# Patient Record
Sex: MALE | Race: WHITE | NOT HISPANIC OR LATINO | Employment: OTHER | URBAN - NONMETROPOLITAN AREA
[De-identification: names, ages, dates, MRNs, and addresses within clinical notes are randomized per-mention and may not be internally consistent; named-entity substitution may affect disease eponyms.]

---

## 2017-02-28 ENCOUNTER — HOSPITAL ENCOUNTER (OUTPATIENT)
Dept: RADIOLOGY | Facility: OTHER | Age: 67
End: 2017-02-28
Attending: FAMILY MEDICINE

## 2017-02-28 ENCOUNTER — HISTORY (OUTPATIENT)
Dept: FAMILY MEDICINE | Facility: OTHER | Age: 67
End: 2017-02-28

## 2017-02-28 ENCOUNTER — OFFICE VISIT - GICH (OUTPATIENT)
Dept: FAMILY MEDICINE | Facility: OTHER | Age: 67
End: 2017-02-28

## 2017-02-28 DIAGNOSIS — M75.101 RIGHT ROTATOR CUFF TEAR: ICD-10-CM

## 2017-03-06 ENCOUNTER — AMBULATORY - GICH (OUTPATIENT)
Dept: FAMILY MEDICINE | Facility: OTHER | Age: 67
End: 2017-03-06

## 2017-03-06 ENCOUNTER — COMMUNICATION - GICH (OUTPATIENT)
Dept: FAMILY MEDICINE | Facility: OTHER | Age: 67
End: 2017-03-06

## 2017-03-06 DIAGNOSIS — M75.101 RIGHT ROTATOR CUFF TEAR: ICD-10-CM

## 2017-03-06 DIAGNOSIS — S46.811D STRAIN OF OTHER MUSCLES, FASCIA AND TENDONS AT SHOULDER AND UPPER ARM LEVEL, RIGHT ARM, SUBSEQUENT ENCOUNTER: ICD-10-CM

## 2017-03-23 ENCOUNTER — HISTORY (OUTPATIENT)
Dept: ORTHOPEDICS | Facility: OTHER | Age: 67
End: 2017-03-23

## 2017-03-23 ENCOUNTER — AMBULATORY - GICH (OUTPATIENT)
Dept: ORTHOPEDICS | Facility: OTHER | Age: 67
End: 2017-03-23

## 2017-03-27 ENCOUNTER — OFFICE VISIT - GICH (OUTPATIENT)
Dept: ORTHOPEDICS | Facility: OTHER | Age: 67
End: 2017-03-27

## 2017-03-27 ENCOUNTER — HISTORY (OUTPATIENT)
Dept: ORTHOPEDICS | Facility: OTHER | Age: 67
End: 2017-03-27

## 2017-03-27 ENCOUNTER — COMMUNICATION - GICH (OUTPATIENT)
Dept: FAMILY MEDICINE | Facility: OTHER | Age: 67
End: 2017-03-27

## 2017-03-27 DIAGNOSIS — G47.33 OBSTRUCTIVE SLEEP APNEA: ICD-10-CM

## 2017-03-27 DIAGNOSIS — M75.121 COMPLETE TEAR OF RIGHT ROTATOR CUFF: ICD-10-CM

## 2017-03-27 DIAGNOSIS — S46.811D STRAIN OF OTHER MUSCLES, FASCIA AND TENDONS AT SHOULDER AND UPPER ARM LEVEL, RIGHT ARM, SUBSEQUENT ENCOUNTER: ICD-10-CM

## 2017-03-27 DIAGNOSIS — M75.41 IMPINGEMENT SYNDROME OF RIGHT SHOULDER: ICD-10-CM

## 2017-03-27 DIAGNOSIS — M19.019 PRIMARY OSTEOARTHRITIS OF SHOULDER: ICD-10-CM

## 2017-03-28 ENCOUNTER — COMMUNICATION - GICH (OUTPATIENT)
Dept: FAMILY MEDICINE | Facility: OTHER | Age: 67
End: 2017-03-28

## 2017-03-28 DIAGNOSIS — M75.121 COMPLETE TEAR OF RIGHT ROTATOR CUFF: ICD-10-CM

## 2017-03-29 ENCOUNTER — HISTORY (OUTPATIENT)
Dept: FAMILY MEDICINE | Facility: OTHER | Age: 67
End: 2017-03-29

## 2017-03-29 ENCOUNTER — OFFICE VISIT - GICH (OUTPATIENT)
Dept: FAMILY MEDICINE | Facility: OTHER | Age: 67
End: 2017-03-29

## 2017-03-29 DIAGNOSIS — M75.121 COMPLETE TEAR OF RIGHT ROTATOR CUFF: ICD-10-CM

## 2017-04-10 ENCOUNTER — OFFICE VISIT - GICH (OUTPATIENT)
Dept: FAMILY MEDICINE | Facility: OTHER | Age: 67
End: 2017-04-10

## 2017-04-10 ENCOUNTER — HISTORY (OUTPATIENT)
Dept: FAMILY MEDICINE | Facility: OTHER | Age: 67
End: 2017-04-10

## 2017-04-10 ENCOUNTER — HISTORY (OUTPATIENT)
Dept: SURGERY | Facility: OTHER | Age: 67
End: 2017-04-10

## 2017-04-10 DIAGNOSIS — G47.33 OBSTRUCTIVE SLEEP APNEA: ICD-10-CM

## 2017-04-11 ENCOUNTER — AMBULATORY - GICH (OUTPATIENT)
Dept: FAMILY MEDICINE | Facility: OTHER | Age: 67
End: 2017-04-11

## 2017-04-11 DIAGNOSIS — G47.33 OBSTRUCTIVE SLEEP APNEA: ICD-10-CM

## 2017-04-19 ENCOUNTER — SURGERY (OUTPATIENT)
Dept: SURGERY | Facility: OTHER | Age: 67
End: 2017-04-19

## 2017-04-19 ENCOUNTER — HOSPITAL ENCOUNTER (OUTPATIENT)
Dept: SURGERY | Facility: OTHER | Age: 67
Discharge: HOME | End: 2017-04-19
Attending: ORTHOPAEDIC SURGERY | Admitting: ORTHOPAEDIC SURGERY

## 2017-04-19 ENCOUNTER — HISTORY (OUTPATIENT)
Dept: SURGERY | Facility: OTHER | Age: 67
End: 2017-04-19

## 2017-04-19 DIAGNOSIS — Z98.890 OTHER SPECIFIED POSTPROCEDURAL STATES: ICD-10-CM

## 2017-04-21 ENCOUNTER — COMMUNICATION - GICH (OUTPATIENT)
Dept: ORTHOPEDICS | Facility: OTHER | Age: 67
End: 2017-04-21

## 2017-04-21 ENCOUNTER — AMBULATORY - GICH (OUTPATIENT)
Dept: ORTHOPEDICS | Facility: OTHER | Age: 67
End: 2017-04-21

## 2017-04-21 DIAGNOSIS — Z98.890 OTHER SPECIFIED POSTPROCEDURAL STATES: ICD-10-CM

## 2017-04-21 DIAGNOSIS — M75.121 COMPLETE TEAR OF RIGHT ROTATOR CUFF: ICD-10-CM

## 2017-04-24 ENCOUNTER — HISTORY (OUTPATIENT)
Dept: ORTHOPEDICS | Facility: OTHER | Age: 67
End: 2017-04-24

## 2017-04-24 ENCOUNTER — OFFICE VISIT - GICH (OUTPATIENT)
Dept: ORTHOPEDICS | Facility: OTHER | Age: 67
End: 2017-04-24

## 2017-04-24 DIAGNOSIS — Z98.890 OTHER SPECIFIED POSTPROCEDURAL STATES: ICD-10-CM

## 2017-05-03 ENCOUNTER — HOSPITAL ENCOUNTER (OUTPATIENT)
Dept: PHYSICAL THERAPY | Facility: OTHER | Age: 67
Setting detail: THERAPIES SERIES
End: 2017-05-03
Attending: ORTHOPAEDIC SURGERY

## 2017-05-03 DIAGNOSIS — Z98.890 OTHER SPECIFIED POSTPROCEDURAL STATES: ICD-10-CM

## 2017-05-10 ENCOUNTER — HOSPITAL ENCOUNTER (OUTPATIENT)
Dept: PHYSICAL THERAPY | Facility: OTHER | Age: 67
Setting detail: THERAPIES SERIES
End: 2017-05-10
Attending: ORTHOPAEDIC SURGERY

## 2017-05-17 ENCOUNTER — HOSPITAL ENCOUNTER (OUTPATIENT)
Dept: PHYSICAL THERAPY | Facility: OTHER | Age: 67
Setting detail: THERAPIES SERIES
End: 2017-05-17
Attending: ORTHOPAEDIC SURGERY

## 2017-05-31 ENCOUNTER — HOSPITAL ENCOUNTER (OUTPATIENT)
Dept: PHYSICAL THERAPY | Facility: OTHER | Age: 67
Setting detail: THERAPIES SERIES
End: 2017-05-31
Attending: ORTHOPAEDIC SURGERY

## 2017-06-01 ENCOUNTER — HOSPITAL ENCOUNTER (OUTPATIENT)
Dept: PHYSICAL THERAPY | Facility: OTHER | Age: 67
Setting detail: THERAPIES SERIES
End: 2017-06-01
Attending: ORTHOPAEDIC SURGERY

## 2017-06-05 ENCOUNTER — HISTORY (OUTPATIENT)
Dept: ORTHOPEDICS | Facility: OTHER | Age: 67
End: 2017-06-05

## 2017-06-05 ENCOUNTER — OFFICE VISIT - GICH (OUTPATIENT)
Dept: ORTHOPEDICS | Facility: OTHER | Age: 67
End: 2017-06-05

## 2017-06-05 DIAGNOSIS — Z98.890 OTHER SPECIFIED POSTPROCEDURAL STATES: ICD-10-CM

## 2017-06-06 ENCOUNTER — HOSPITAL ENCOUNTER (OUTPATIENT)
Dept: PHYSICAL THERAPY | Facility: OTHER | Age: 67
Setting detail: THERAPIES SERIES
End: 2017-06-06
Attending: ORTHOPAEDIC SURGERY

## 2017-06-08 ENCOUNTER — HOSPITAL ENCOUNTER (OUTPATIENT)
Dept: PHYSICAL THERAPY | Facility: OTHER | Age: 67
Setting detail: THERAPIES SERIES
End: 2017-06-08
Attending: ORTHOPAEDIC SURGERY

## 2017-06-13 ENCOUNTER — HOSPITAL ENCOUNTER (OUTPATIENT)
Dept: PHYSICAL THERAPY | Facility: OTHER | Age: 67
Setting detail: THERAPIES SERIES
End: 2017-06-13
Attending: ORTHOPAEDIC SURGERY

## 2017-06-15 ENCOUNTER — HOSPITAL ENCOUNTER (OUTPATIENT)
Dept: PHYSICAL THERAPY | Facility: OTHER | Age: 67
Setting detail: THERAPIES SERIES
End: 2017-06-15
Attending: ORTHOPAEDIC SURGERY

## 2017-06-16 ENCOUNTER — OFFICE VISIT - GICH (OUTPATIENT)
Dept: FAMILY MEDICINE | Facility: OTHER | Age: 67
End: 2017-06-16

## 2017-06-16 ENCOUNTER — HISTORY (OUTPATIENT)
Dept: FAMILY MEDICINE | Facility: OTHER | Age: 67
End: 2017-06-16
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
## 2017-06-16 DIAGNOSIS — L03.90 CELLULITIS: ICD-10-CM

## 2017-06-16 LAB
ABSOLUTE BASOPHILS - HISTORICAL: 0.1 THOU/CU MM
ABSOLUTE EOSINOPHILS - HISTORICAL: 0.2 THOU/CU MM
ABSOLUTE IMMATURE GRANULOCYTES(METAS,MYELOS,PROS) - HISTORICAL: 0 THOU/CU MM
ABSOLUTE LYMPHOCYTES - HISTORICAL: 2.4 THOU/CU MM (ref 0.9–2.9)
ABSOLUTE MONOCYTES - HISTORICAL: 0.6 THOU/CU MM
ABSOLUTE NEUTROPHILS - HISTORICAL: 3.5 THOU/CU MM (ref 1.7–7)
BASOPHILS # BLD AUTO: 0.9 %
EOSINOPHIL NFR BLD AUTO: 2.7 %
ERYTHROCYTE [DISTWIDTH] IN BLOOD BY AUTOMATED COUNT: 12.7 % (ref 11.5–15.5)
HCT VFR BLD AUTO: 44.2 % (ref 37–53)
HEMOGLOBIN: 15.3 G/DL (ref 13.5–17.5)
IMMATURE GRANULOCYTES(METAS,MYELOS,PROS) - HISTORICAL: 0.1 %
LYMPHOCYTES NFR BLD AUTO: 35 % (ref 20–44)
MCH RBC QN AUTO: 32.6 PG (ref 26–34)
MCHC RBC AUTO-ENTMCNC: 34.6 G/DL (ref 32–36)
MCV RBC AUTO: 94 FL (ref 80–100)
MONOCYTES NFR BLD AUTO: 8.9 %
NEUTROPHILS NFR BLD AUTO: 52.4 % (ref 42–72)
PLATELET # BLD AUTO: 216 THOU/CU MM (ref 140–440)
PMV BLD: 9.9 FL (ref 6.5–11)
RED BLOOD COUNT - HISTORICAL: 4.69 MIL/CU MM (ref 4.3–5.9)
WHITE BLOOD COUNT - HISTORICAL: 6.7 THOU/CU MM (ref 4.5–11)

## 2017-06-20 ENCOUNTER — HOSPITAL ENCOUNTER (OUTPATIENT)
Dept: PHYSICAL THERAPY | Facility: OTHER | Age: 67
Setting detail: THERAPIES SERIES
End: 2017-06-20
Attending: ORTHOPAEDIC SURGERY

## 2017-06-22 ENCOUNTER — HOSPITAL ENCOUNTER (OUTPATIENT)
Dept: PHYSICAL THERAPY | Facility: OTHER | Age: 67
Setting detail: THERAPIES SERIES
End: 2017-06-22
Attending: ORTHOPAEDIC SURGERY

## 2017-06-22 LAB — CULTURE - HISTORICAL: NORMAL

## 2017-06-27 ENCOUNTER — HOSPITAL ENCOUNTER (OUTPATIENT)
Dept: PHYSICAL THERAPY | Facility: OTHER | Age: 67
Setting detail: THERAPIES SERIES
End: 2017-06-27
Attending: ORTHOPAEDIC SURGERY

## 2017-06-28 ENCOUNTER — AMBULATORY - GICH (OUTPATIENT)
Dept: SCHEDULING | Facility: OTHER | Age: 67
End: 2017-06-28

## 2017-06-29 ENCOUNTER — HOSPITAL ENCOUNTER (OUTPATIENT)
Dept: PHYSICAL THERAPY | Facility: OTHER | Age: 67
Setting detail: THERAPIES SERIES
End: 2017-06-29
Attending: ORTHOPAEDIC SURGERY

## 2017-07-13 ENCOUNTER — HOSPITAL ENCOUNTER (OUTPATIENT)
Dept: PHYSICAL THERAPY | Facility: OTHER | Age: 67
Setting detail: THERAPIES SERIES
End: 2017-07-13
Attending: ORTHOPAEDIC SURGERY

## 2017-07-20 ENCOUNTER — HOSPITAL ENCOUNTER (OUTPATIENT)
Dept: PHYSICAL THERAPY | Facility: OTHER | Age: 67
Setting detail: THERAPIES SERIES
End: 2017-07-20
Attending: ORTHOPAEDIC SURGERY

## 2017-07-24 ENCOUNTER — OFFICE VISIT - GICH (OUTPATIENT)
Dept: ORTHOPEDICS | Facility: OTHER | Age: 67
End: 2017-07-24

## 2017-07-24 ENCOUNTER — HISTORY (OUTPATIENT)
Dept: ORTHOPEDICS | Facility: OTHER | Age: 67
End: 2017-07-24

## 2017-07-24 DIAGNOSIS — Z98.890 OTHER SPECIFIED POSTPROCEDURAL STATES: ICD-10-CM

## 2017-07-24 DIAGNOSIS — M65.311 TRIGGER THUMB OF RIGHT HAND: ICD-10-CM

## 2017-07-27 ENCOUNTER — HOSPITAL ENCOUNTER (OUTPATIENT)
Dept: PHYSICAL THERAPY | Facility: OTHER | Age: 67
Setting detail: THERAPIES SERIES
End: 2017-07-27
Attending: ORTHOPAEDIC SURGERY

## 2017-08-02 ENCOUNTER — OFFICE VISIT - GICH (OUTPATIENT)
Dept: FAMILY MEDICINE | Facility: OTHER | Age: 67
End: 2017-08-02

## 2017-08-02 ENCOUNTER — HISTORY (OUTPATIENT)
Dept: FAMILY MEDICINE | Facility: OTHER | Age: 67
End: 2017-08-02

## 2017-08-02 DIAGNOSIS — M65.311 TRIGGER THUMB OF RIGHT HAND: ICD-10-CM

## 2017-08-02 DIAGNOSIS — R09.82 POSTNASAL DRIP: ICD-10-CM

## 2017-08-17 ENCOUNTER — COMMUNICATION - GICH (OUTPATIENT)
Dept: FAMILY MEDICINE | Facility: OTHER | Age: 67
End: 2017-08-17

## 2017-08-17 DIAGNOSIS — N40.0 ENLARGED PROSTATE WITHOUT LOWER URINARY TRACT SYMPTOMS (LUTS): ICD-10-CM

## 2017-09-21 ENCOUNTER — AMBULATORY - GICH (OUTPATIENT)
Dept: ORTHOPEDICS | Facility: OTHER | Age: 67
End: 2017-09-21

## 2017-09-21 DIAGNOSIS — M25.511 PAIN IN RIGHT SHOULDER: ICD-10-CM

## 2017-09-22 ENCOUNTER — HOSPITAL ENCOUNTER (OUTPATIENT)
Dept: RADIOLOGY | Facility: OTHER | Age: 67
End: 2017-09-22
Attending: ORTHOPAEDIC SURGERY

## 2017-09-22 ENCOUNTER — OFFICE VISIT - GICH (OUTPATIENT)
Dept: ORTHOPEDICS | Facility: OTHER | Age: 67
End: 2017-09-22

## 2017-09-22 DIAGNOSIS — M25.511 PAIN IN RIGHT SHOULDER: ICD-10-CM

## 2017-09-22 DIAGNOSIS — M65.311 TRIGGER THUMB OF RIGHT HAND: ICD-10-CM

## 2017-09-22 DIAGNOSIS — Z98.890 OTHER SPECIFIED POSTPROCEDURAL STATES: ICD-10-CM

## 2017-12-27 NOTE — PROGRESS NOTES
Patient Information     Patient Name MRN Sex Kaushik Christensen [id number] Male 1950      Progress Notes by Forest Jennings DO at 2017  8:30 AM     Author:  Forest Jennings DO Service:  (none) Author Type:  Physician     Filed:  2017  8:53 AM Encounter Date:  2017 Status:  Signed     :  Forest Jennings DO (Physician)            PROGRESS NOTE    SUBJECTIVE:  Kaushik Ansari is here for evaluation in regards to his right shoulder. He is now 13 weeks and 5 days since shoulder surgery. He has expected discomfort into the biceps as well as some of his stretches as he is now working on his strengthening. He did have a new complaint about his right thumb and start having some clicking into the thumb. It appears that he does have a right trigger thumb. This came about after doing some of his exercises where he wrap the rope around his thumb. He is not utilizing medications of present time. I encouraged him to utilize an anti-inflammatory if he can. No other complaints.    OBJECTIVE:  /82  Pulse 88  Wt 106.6 kg (235 lb)  BMI 29.97 kg/m2 Body mass index is 29.97 kg/(m^2).    General Appearance: Pleasant male in good appearance, mood and affect.  Alert and orientated times three ( time, date and location).    Skin: Well-healed incision sites.    Shoulder:  Strength: Expected weakness.  Motion:  When testing his motion in the standing position I can get him to approximately 170   with him wanting to utilize his scapula for part of the motion. There is some tightness to his capsule, this has improved. Abduction he is still about 10  short actively and passively only 5 .    Elbow:  Flexion: Normal  Extension: Normal  Deep tendon reflexes: Normal    Hand:  Sensation: Normal  Radial and ulnar blood flow:  Normal.  Patient does have a palpable nodule and clicking of the right thumb.    Eyes: Pupils are round.    Ears: Hearing: Intact    Heart: Good cap refill into  his hands.    Lungs: Coarse breath sounds.     Physical therapy: Notes show that he has 145  active forward flexion.    Notes where reviewed with the patient.  Please refer to the reports for full details.    ASSESSMENT:    Right trigger thumb.    POSTOPERATIVE DIAGNOSES  1. Large chronic right rotator cuff tear to include supraspinatus and infraspinatus.   2. Long head of the biceps tearing.   3. Labral tearing.   4. 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   5. Impingement syndrome.  6. AC arthritis.     PROCEDURES  1. Right shoulder arthroscopy with extensive debridement to include 4% of the anterior to posterior labrum (DOS 04/19/2017).   2. Arthroscopic biceps tenotomy.   3. Arthroscopic chondroplasty of the humeral head and glenoid.   4. Arthroscopic subacromial decompression.   5. Arthroscopic distal clavicle excision.   6. Open rotator cuff repair utilizing a double row technique with 2 medial row 5.5 mm BioComposite SwiveLock anchors with 2 FiberTapes and one FiberWire placed for traction and lateral row fixation with two 5.5 mm BioComposite SwiveLock anchors with the FiberTapes and one 4.75 mm BioComposite SwiveLock with the FiberWire.     PLAN:    We went over his wall walking, his counter slides, and utilizing the cane to help regain his forward flexion and he understands the importance of this motion. The importance is always working on his motion.  We did talk at length about the fact that he has a large chance of failure because this was a massive tear he does verbalize an understanding and also how to follow the rules.  He will continue with physical therapy as ordered.  Questions and concerns answered to their satisfaction.  Follow up in 2 months with x-ray first of the right shoulder.  If patient starts having increased discomfort about the thumb and the symptoms have not resolved in 1 month he will come in I would need x-rays of his thumb prior to that exam.    Forest Jennings,  JANNETTE  Orthopaedic Surgeon    [facility]  [address] [place] [address]  Phone [phone] (KNEE)  Fax [phone]    This document was created using computer generated templates and voice activated software.    8:48 AM 7/24/2017

## 2017-12-27 NOTE — PROGRESS NOTES
"Patient Information     Patient Name MRN Sex Kaushik Christensen [id number] Male 1950      Progress Notes by Ary Vargas at 2017 10:31 AM     Author:  Ary Vargas Service:  (none) Author Type:  PT- Physical Therapy Assistant     Filed:  2017 11:14 AM Date of Service:  2017 10:31 AM Status:  Signed     :  Ary Vargas (PT- Physical Therapy Assistant)            [facility]  Outpatient PT - Daily Note      Date of Service: 2017    Visit: 6     Patient Name: \"Jean Claude\" Kaushik Ansari   YOB: 1950   Referring MD/Provider: Dick  Diagnosis: S/P arthroscopic right shoulder surgery Z98.890    Medical & Treatment Diagnosis: S/P arthroscopic right shoulder surgery Z98.890   Insurance:  Medicare  Start of Care Date: 17  Certification Dates: From Start of Service to Re-Cert Due: 17  Preadmission Functional Mobility: Independent  Instructions:  DOS 17  Start rehab now   Phase 1 - no passive ROM at shoulder   Phase 2A - on    Phase 2B - on  -out of pillow in the daytime, wall walk 1X per every hour awake.  Focus is full passive forward flexion by 8 weeks post op.   Wear pillow at night   Phase 3 - on  - out of all gear then.      Subjective      Pain Ratin/10; Location: R shoulder  Jean Claude saw Dr. Jennings yesterday - seemed to be happy with the progress.     Continued compliance with HEP - now has pulleys at home.     Objective    DATE:       ROM:  R shoulder flexion 90 P 130 P 135 P 139 P      P=PROM     Today's Intervention:   Therapeutic Exercise to improve mobility, strength and endurance:  - PROM R shoulder flexion, hold end range 60-90 second hold x5; jt oscillations between sets  - supine AAROM flexion with wand,   - R scap mobility, pt L side ly  - OH pulleys, hold end range flexion 60 second hold x3  - scap sets with green band x15  - wall slide, 60 second hold - instruction on 1x/hr     Game " Ready x10 minutes low compression with R UE resting on pillow for comfort    Home Exercise Program:   - R elbow, wrist AROM    Assessment    Goals:   STGs to be reached in 3-6 weeks  1. Pt to be independent with HEP and self mgmt techniques.  2. Pt to demo full PROM of R shoulder for improved mobility  3. Pt to have 1/10 pain or less throughout the day for improved ADLs     LTGs to be reached in 6-12 weeks  1. Pt to be able use his R UE for everyday ADLs like dressing, eating, drinking  2. Pt to have 0/10 pain or less throughout the day for improved ADLs  3. Pt to have at least 5-/5 MMT of R shoulder for improved strength and mobility  4. Pt to be able to sleep throughout the night without waking do to pain for improved health and wellness.      Patient Specific Functional and Pain Scales (PSFS) on 5/3/2017:                         Clinician Instructions: Complete after the history and before the exam.                         Initial Assessment: We want to know what 3 activities in your life you are unable to perform, or are having the most difficulty performing, as a result of your chief problem. Please list and score at least 3 activities that you are unable to perform, or having the most difficulty performing, because of your chief problem.   Patient Specific Activity Scoring Scheme (score one number for each activity):   Activity Score (0-10)  0= Unable to perform activity  10= Able to perform activity at same level as before injury or problem   1. Reaching overhead with R UE 0/10   2. Using R UE for dressing 0/10   3. Using R UE for drinking/eating 0/10               Totals:  0/30 = 0% ability which relates to 100% impairment                         Patient verbally states that they understand that the information they have provided above is current and complete to the best of their knowledge.                         Patient Specific Functional Scale Modifier Scale Conversion: (patient's modifier that  correlates with pt's score on PSFS): 0-CN (100% Impaired).     G codes and Modifier taken from patient completing the PSFS:   Initial Primary G Code and Modifier:                         Per the Patient's intake and/or assessment the Primary G Code is: Mobility .                        The Patient's Impairment, Limitation or Restriction Modifier would be best described as: CN - 100% Impairment.   Goal Primary G Code and Modifier:                         The Patient's G Code Goal would be: Mobility                         The Patient's Impairment, Limitation or Restriction Modifier goal would be best described as: CI - 1% - 20% Impairment.       Plan    Plan for next visit:  Cont with PT per POC to improve/maintain ROM, strength, endurance, proprioception and to decrease pain/discomfort.     Planned Interventions:    Home Exercise Program development   Therapeutic Exercise (ROM & Strengthening)   Manual Therapy   Neuromuscular Re-education   Ultrasound including Phonophoresis with Ketoprofen   Electrical Stimulation including Iontophoresis with Dexamethasone   Therapeutic Activities   Gait Training  Mechanical Traction        Student or PTA has been instructed in and demonstrates skills necessary to carry out above stated treatment plan: Yes    Thank you for your referral to [facility].  Please call with any questions, concerns or comments.  [phone]

## 2017-12-27 NOTE — PROGRESS NOTES
Patient Information     Patient Name MRN Sex Kaushik Christensen [id number] Male 1950      Progress Notes by Alec Howard MD at 2017 10:15 AM     Author:  Alec Howard MD Service:  (none) Author Type:  Physician     Filed:  2017 10:43 AM Encounter Date:  2017 Status:  Signed     :  Alec Hoawrd MD (Physician)            SUBJECTIVE:    Kaushik Ansari is a 66 y.o. male who presents for sore throat and thumb concerns    HPI    3 weeks ago he started to have some post nasal drip and mild sore throat.  Has noted white matter in the pharynx when he look in it.  Worse in the afternoons, with mild malaise.  Had been trying essential oils in his CPAP, felt this might be triggering it so stopped, but still has the symptoms.  No previous seasonal allergies.    Had recent right shoulder surgery.  Has been doing a pulley ROM activity at home.  In doing this he has injured the left thumb.  Had a rope wrapped around the thumb daily for a few days and then noted pains in the 1st MCP.  Will get clicking in it.  Mentioned this to Dr. Jennings who felt it was a trigger thumb and offered a steroid injection if it does not resolve after a month or so.  Uses ice and ibuprofen and continues to have pain.  Wants my opinion as well.      No Known Allergies,   Current Outpatient Prescriptions on File Prior to Visit       Medication  Sig Dispense Refill     CPAP CPAP, heated humidifier, mask, headgear, filters and tubing. For home use.     Length of Need: 99 1 unit 0     cyclobenzaprine (FLEXERIL) 10 mg tablet Take 1 tablet by mouth 3 times daily. 30 tablet 2     propranolol (INDERAL) 40 mg tablet Take 1 by mouth daily as needed for tremors.       tamsulosin (FLOMAX) 0.4 mg capsule TAKE ONE CAPSULE BY MOUTH ONCE DAILY AFTER  A  MEAL 90 capsule 3     No current facility-administered medications on file prior to visit.    ,   Past Medical History:     Diagnosis  Date     AC (acromioclavicular) joint  arthritis 3/23/2017     Anxiety      Complete tear of right rotator cuff 3/23/2017     Early morning wakening      Familial tremor      Impingement syndrome of right shoulder 3/23/2017     REDD on CPAP      S/P arthroscopic right shoulder surgery 4/19/2017     Sciatica      Trigger finger of right thumb 7/24/2017    and   Past Surgical History:      Procedure  Laterality Date     COLONOSCOPY  3/1/2005    normal by patient report       COLONOSCOPY SCREENING  03/2000     CT CORONARY ANGIOGRAM (IA)       S/P RIGHT ARTHROSCOPIC SHOULDER SURGERY Right 04/19/2017     VASECTOMY         REVIEW OF SYSTEMS:  Review of Systems   Constitutional: Positive for malaise/fatigue. Negative for chills and fever.   HENT: Positive for congestion and sore throat.    Respiratory: Negative for cough and shortness of breath.    Musculoskeletal: Positive for joint pain.   Skin: Negative for itching and rash.       OBJECTIVE:  /88  Temp 98.2  F (36.8  C) (Tympanic)  Wt 108.1 kg (238 lb 6.4 oz)  BMI 30.4 kg/m2    EXAM:   Physical Exam   Constitutional: He is oriented to person, place, and time and well-developed, well-nourished, and in no distress. No distress.   HENT:   Head: Normocephalic and atraumatic.   Right Ear: External ear normal.   Left Ear: External ear normal.   Mild to moderate cobblestoning of pharynx    Eyes: Conjunctivae are normal. Pupils are equal, round, and reactive to light.   Pulmonary/Chest: Effort normal. No respiratory distress. He has no wheezes. He has no rales.   Musculoskeletal:   Triggering of right thumb   Neurological: He is alert and oriented to person, place, and time.   Skin: Skin is warm and dry. No rash noted. He is not diaphoretic. No erythema.   Psychiatric: Memory, affect and judgment normal.       ASSESSMENT/PLAN:    ICD-10-CM    1. Trigger finger of right thumb M65.311    2. Post-nasal drip R09.82         Plan:  For the thumb, I agree with trying the injection in a few weeks if ongoing  conservative treatment not helping.  For the nose, I do not see thrush or other infectious findings on exam.  I advised either saline lavage or a nasal steroid.  Follow up as needed if this is not working for him.    Alec Howard MD ....................  8/2/2017   10:42 AM

## 2017-12-27 NOTE — PROGRESS NOTES
"Patient Information     Patient Name MRN Sex Kaushik Christensen [id number] Male 1950      Progress Notes by Ary Vargas at 2017  9:03 AM     Author:  Ary Vargas Service:  (none) Author Type:  PT- Physical Therapy Assistant     Filed:  2017 10:11 AM Date of Service:  2017  9:03 AM Status:  Signed     :  Ary Vargas (PT- Physical Therapy Assistant)            [facility]  Outpatient PT - Daily Note      Date of Service: 2017    Visit: 5     Patient Name: \"Jean Claude\" Kaushik Ansari   YOB: 1950   Referring MD/Provider: Dick  Diagnosis: S/P arthroscopic right shoulder surgery Z98.890    Medical & Treatment Diagnosis: S/P arthroscopic right shoulder surgery Z98.890   Insurance:  Medicare  Start of Care Date: 17  Certification Dates: From Start of Service to Re-Cert Due: 17  Preadmission Functional Mobility: Independent  Instructions:  DOS 17  Start rehab now   Phase 1 - no passive ROM at shoulder   Phase 2A - on    Phase 2B - on  -out of pillow in the daytime, wall walk 1X per every hour awake.  Focus is full passive forward flexion by 8 weeks post op.   Wear pillow at night   Phase 3 - on  - out of all gear then.      Subjective      Pain Ratin/10; Location: R shoulder  Jean Claude reports that he is a little insecure without his abductor pillow during the day.      Objective    DATE:        ROM:  R shoulder flexion 90 P 130 P 135 P       P=PROM     Today's Intervention:   Therapeutic Exercise to improve mobility, strength and endurance:  - PROM R shoulder flexion, hold end range 60-90 second hold x5; jt oscillations between sets  - supine AAROM flexion with wand,   - R scap mobility, pt L side ly  - OH pulleys, hold end range flexion 60 second hold x3  - scap sets with green band x15  - wall slide, 60 second hold - instruction on 1x/hr    Jean Claude was issued a green tband as he forgot it from his last " appointment.     Pt declined GameReady, stated he would ice at home    Home Exercise Program:   - R elbow, wrist AROM    Assessment    Goals:   STGs to be reached in 3-6 weeks  1. Pt to be independent with HEP and self mgmt techniques.  2. Pt to demo full PROM of R shoulder for improved mobility  3. Pt to have 1/10 pain or less throughout the day for improved ADLs     LTGs to be reached in 6-12 weeks  1. Pt to be able use his R UE for everyday ADLs like dressing, eating, drinking  2. Pt to have 0/10 pain or less throughout the day for improved ADLs  3. Pt to have at least 5-/5 MMT of R shoulder for improved strength and mobility  4. Pt to be able to sleep throughout the night without waking do to pain for improved health and wellness.      Patient Specific Functional and Pain Scales (PSFS) on 5/3/2017:                         Clinician Instructions: Complete after the history and before the exam.                         Initial Assessment: We want to know what 3 activities in your life you are unable to perform, or are having the most difficulty performing, as a result of your chief problem. Please list and score at least 3 activities that you are unable to perform, or having the most difficulty performing, because of your chief problem.   Patient Specific Activity Scoring Scheme (score one number for each activity):   Activity Score (0-10)  0= Unable to perform activity  10= Able to perform activity at same level as before injury or problem   1. Reaching overhead with R UE 0/10   2. Using R UE for dressing 0/10   3. Using R UE for drinking/eating 0/10               Totals:  0/30 = 0% ability which relates to 100% impairment                         Patient verbally states that they understand that the information they have provided above is current and complete to the best of their knowledge.                         Patient Specific Functional Scale Modifier Scale Conversion: (patient's modifier that correlates with  pt's score on PSFS): 0-CN (100% Impaired).     G codes and Modifier taken from patient completing the PSFS:   Initial Primary G Code and Modifier:                         Per the Patient's intake and/or assessment the Primary G Code is: Mobility .                        The Patient's Impairment, Limitation or Restriction Modifier would be best described as: CN - 100% Impairment.   Goal Primary G Code and Modifier:                         The Patient's G Code Goal would be: Mobility                         The Patient's Impairment, Limitation or Restriction Modifier goal would be best described as: CI - 1% - 20% Impairment.       Plan    Plan for next visit:  Cont with PT per POC to improve/maintain ROM, strength, endurance, proprioception and to decrease pain/discomfort.     Planned Interventions:    Home Exercise Program development   Therapeutic Exercise (ROM & Strengthening)   Manual Therapy   Neuromuscular Re-education   Ultrasound including Phonophoresis with Ketoprofen   Electrical Stimulation including Iontophoresis with Dexamethasone   Therapeutic Activities   Gait Training  Mechanical Traction        Student or PTA has been instructed in and demonstrates skills necessary to carry out above stated treatment plan: Yes    Thank you for your referral to [facility].  Please call with any questions, concerns or comments.  [phone]

## 2017-12-27 NOTE — PROGRESS NOTES
"Patient Information     Patient Name MRN Sex Kaushik Christensen [id number] Male 1950      Progress Notes by Ary Vargas at 2017  9:35 AM     Author:  Ary Vargas  Service:  (none) Author Type:  PT- Physical Therapy Assistant     Filed:  2017 10:14 AM  Date of Service:  2017  9:35 AM Status:  Addendum     :  Ary Vargas (PT- Physical Therapy Assistant)        Related Notes: Original Note by Ary Vargas (PT- Physical Therapy Assistant) filed at 2017 10:11 AM            [facility]  Outpatient PT - Daily Note      Date of Service: 2017    Visit: 8     Patient Name: \"Jean Claude\" Kaushik Ansari   YOB: 1950   Referring MD/Provider: Dick  Diagnosis: S/P arthroscopic right shoulder surgery Z98.890    Medical & Treatment Diagnosis: S/P arthroscopic right shoulder surgery Z98.890   Insurance:  Medicare  Start of Care Date: 17  Certification Dates: From Start of Service to Re-Cert Due: 17  Preadmission Functional Mobility: Independent  Instructions:  DOS 17  Start rehab now   Phase 1 - no passive ROM at shoulder   Phase 2A - on    Phase 2B - on  -out of pillow in the daytime, wall walk 1X per every hour awake.  Focus is full passive forward flexion by 8 weeks post op.   Wear pillow at night   Phase 3 - on  - out of all gear then.      Subjective      Pain Ratin/10; Location: R shoulder  Jean Claude reports that his shoulder was really sore yesterday - iced a lot and worked on HEP a lot as well. Reports pain yesterday was 3-4/10     Objective    DATE:     ROM:  R shoulder flexion 90 P 130 P 135 P 139 P 155  AA     P=PROM  AA=AAROM    Today's Intervention:   Therapeutic Exercise to improve mobility, strength and endurance:  - PROM R shoulder flexion, hold end range 60-90 second hold x5; jt oscillations between sets  - PA/AP mobs   - supine AAROM flexion with wand, 2# wt on " "wand. 90\" hold x3  - R scap mobility, pt L sidely  - OH pulleys, hold end range flexion 60 second hold x3  - wall slide, 60 second hold - instruction on 1x/hr     GameReady x15 minutes low compression      Discussed progression into next phase and D/Cing use of sling. Discussed keeping it handy as needed for the first few days.     Home Exercise Program:   - R elbow, wrist AROM    Assessment    Goals:   STGs to be reached in 3-6 weeks  1. Pt to be independent with HEP and self mgmt techniques.  2. Pt to demo full PROM of R shoulder for improved mobility  3. Pt to have 1/10 pain or less throughout the day for improved ADLs     LTGs to be reached in 6-12 weeks  1. Pt to be able use his R UE for everyday ADLs like dressing, eating, drinking  2. Pt to have 0/10 pain or less throughout the day for improved ADLs  3. Pt to have at least 5-/5 MMT of R shoulder for improved strength and mobility  4. Pt to be able to sleep throughout the night without waking do to pain for improved health and wellness.      Patient Specific Functional and Pain Scales (PSFS) on 5/3/2017:                         Clinician Instructions: Complete after the history and before the exam.                         Initial Assessment: We want to know what 3 activities in your life you are unable to perform, or are having the most difficulty performing, as a result of your chief problem. Please list and score at least 3 activities that you are unable to perform, or having the most difficulty performing, because of your chief problem.   Patient Specific Activity Scoring Scheme (score one number for each activity):   Activity Score (0-10)  0= Unable to perform activity  10= Able to perform activity at same level as before injury or problem   1. Reaching overhead with R UE 0/10   2. Using R UE for dressing 0/10   3. Using R UE for drinking/eating 0/10               Totals:  0/30 = 0% ability which relates to 100% impairment                         Patient " verbally states that they understand that the information they have provided above is current and complete to the best of their knowledge.                         Patient Specific Functional Scale Modifier Scale Conversion: (patient's modifier that correlates with pt's score on PSFS): 0-CN (100% Impaired).     G codes and Modifier taken from patient completing the PSFS:   Initial Primary G Code and Modifier:                         Per the Patient's intake and/or assessment the Primary G Code is: Mobility .                        The Patient's Impairment, Limitation or Restriction Modifier would be best described as: CN - 100% Impairment.   Goal Primary G Code and Modifier:                         The Patient's G Code Goal would be: Mobility                         The Patient's Impairment, Limitation or Restriction Modifier goal would be best described as: CI - 1% - 20% Impairment.       Plan    Plan for next visit:  Cont with PT per POC to improve/maintain ROM, strength, endurance, proprioception and to decrease pain/discomfort.     Planned Interventions:    Home Exercise Program development   Therapeutic Exercise (ROM & Strengthening)   Manual Therapy   Neuromuscular Re-education   Ultrasound including Phonophoresis with Ketoprofen   Electrical Stimulation including Iontophoresis with Dexamethasone   Therapeutic Activities   Gait Training  Mechanical Traction        Student or PTA has been instructed in and demonstrates skills necessary to carry out above stated treatment plan: Yes    Thank you for your referral to [facility]  Please call with any questions, concerns or comments.  [phone]

## 2017-12-27 NOTE — PROGRESS NOTES
"Patient Information     Patient Name MRN Kaushik Miguel [id number] Male 1950      Progress Notes by Yfn Kelley, PT at 2017 11:13 AM     Author:  Yfn Kelley, PT Service:  (none) Author Type:  PT- Physical Therapist     Filed:  2017 11:58 AM Date of Service:  2017 11:13 AM Status:  Signed     :  Yfn Kelley, PT (PT- Physical Therapist)            [facility]  Outpatient PT - Daily Note      Date of Service: 2017    Visit: 5/10   Total Visits: 14  Updated PSFS: 6/15/2017, visit #9     Patient Name: \"Jean Claude\" Kaushik Ansari   YOB: 1950   Referring MD/Provider: Dick  Diagnosis: S/P arthroscopic right shoulder surgery Z98.890    Medical & Treatment Diagnosis: S/P arthroscopic right shoulder surgery Z98.890   Insurance:  Medicare  Start of Care Date: 17  Certification Dates: From Start of Service to Re-Cert Due: 17  Preadmission Functional Mobility: Independent  Instructions:  DOS 17  Start rehab now   Phase 1 - no passive ROM at shoulder   Phase 2A - on    Phase 2B - on  - out of pillow in the daytime, wall walk 1X per every hour awake.  Focus is full passive forward flexion by 8 weeks post op.   Wear pillow at night   Phase 3 - on  - out of all gear then.      Subjective      Pain Rating: 3-4/10; Location: R shoulder  Using his arm more and more throughout the day. He feels that there is more motion compared to a few weeks ago. Shoulder tires quickly with activity. His R thumb is causing him concern, it clicks at the IP joint. We discussed inflammation mgmt, ie icing, for his thumb    Objective    DATE: 5/17 5/31 6/1 6/6 6/8 6/13 6/15 6/20   ROM:  R shoulder flexion 90 P 130 P 135 P 139 P 155  AA  150 AA  95 A 100 A     DATE:        ROM:   120 A 155 AA  120 A 160 AA  140 A       P=PROM  AA=AAROM  A=AROM    Today's Intervention:   Therapeutic Exercise to improve mobility, " strength and endurance:  - arm bike 5 minutes fwd x3 minutes back    Manual Therapy to help improve tissue mobility, improve circulation and to decrease tissue tension thru the use of IASTM with the Graston Technique  - GT3 to posterior, lateral, anterior R shoulder & R UT  - GT3 over scar on R shoulder    Therapeutic Exercise to improve mobility, strength and endurance:  - MedX lat pull used as stretch, 120#, hold per pt tolerance x2  - overhead activity threading string thru chain until pt fatigued  - placing cones on/off high shelf in work cond room, 3# wt on wrist. 3 cones on/off x3  - educ on importance of continuing to work on mobility/motion  - reviewed HEP and self mgm techniques     Home Exercise Program:   - wall slides  - standing AAROM with wand  - using his R UE for ADLs  - scap sets  - isometrics   - OH pulleys    Date: 06/27/2017 Prepared by: Ary Vargas Access Code: [email or web address]    Bent Shoulder Horizontal Abduction and Adduction with Table Support - 10-30 reps - 1 sets - 0 hold - 1x daily - 7x weekly    Assessment    Goals:   STGs to be reached in 3-6 weeks   1. Pt to be independent with HEP and self mgmt techniques. GOAL MET AT THIS TIME   2. Pt to demo full PROM of R shoulder for improved mobility   3. Pt to have 1/10 pain or less throughout the day for improved ADLs     LTGs to be reached in 6-12 weeks  1. Pt to be able use his R UE for everyday ADLs like dressing, eating, drinking  2. Pt to have 0/10 pain or less throughout the day for improved ADLs  3. Pt to have at least 5-/5 MMT of R shoulder for improved strength and mobility  4. Pt to be able to sleep throughout the night without waking do to pain for improved health and wellness.      Patient Specific Functional and Pain Scales (PSFS) on 5/3/2017:                         Clinician Instructions: Complete after the history and before the exam.                         Initial Assessment: We want to know what 3 activities in your life you are  unable to perform, or are having the most difficulty performing, as a result of your chief problem. Please list and score at least 3 activities that you are unable to perform, or having the most difficulty performing, because of your chief problem.   Patient Specific Activity Scoring Scheme (score one number for each activity):   Activity Score (0-10)  0= Unable to perform activity  10= Able to perform activity at same level as before injury or problem   1. Reaching overhead with R UE 0/10   2. Using R UE for dressing 0/10   3. Using R UE for drinking/eating 0/10               Totals:  0/30 = 0% ability which relates to 100% impairment                         Patient verbally states that they understand that the information they have provided above is current and complete to the best of their knowledge.                         Patient Specific Functional Scale Modifier Scale Conversion: (patient's modifier that correlates with pt's score on PSFS): 0-CN (100% Impaired).     G codes and Modifier taken from patient completing the PSFS:   Initial Primary G Code and Modifier:                         Per the Patient's intake and/or assessment the Primary G Code is: Mobility .                        The Patient's Impairment, Limitation or Restriction Modifier would be best described as: CN - 100% Impairment.   Goal Primary G Code and Modifier:                         The Patient's G Code Goal would be: Mobility                         The Patient's Impairment, Limitation or Restriction Modifier goal would be best described as: CI - 1% - 20% Impairment.     Updated Patient Specific Functional and Pain Scales (PSFS) on 6/15/2017:    Clinician Instructions: Complete after the history and before the exam.    Initial Assessment: We want to know what 3 activities in your life you are unable to perform, or are having the most difficulty performing, as a result of your chief problem. Please list and score at least 3  activities that you are unable to perform, or having the most difficulty performing, because of your chief problem.   Patient Specific Activity Scoring Scheme (score one number for each activity):   Activity Score (0-10)  0= Unable to perform activity  10= Able to perform activity at same level as before injury or problem   1. Reaching overhead with R UE 5/10   2. Using R UE for dressing 5/10   3. Using R UE for drinking/eating 5/10           Totals:  15/30 = 50% ability which relates to 50% impairment    Patient verbally states that they understand that the information they have provided above is current and complete to the best of their knowledge.    Patient Specific Functional Scale Modifier Scale Conversion: (patient's modifier that correlates with pt's score on PSFS): 5-CK (50% Impaired).    G codes and Modifier taken from patient completing the PSFS:   Current Primary G Code and Modifier:    Per the Patient's intake and/or assessment the Primary G Code is: Mobility .   The Patient's Impairment, Limitation or Restriction Modifier would be best described as: CK - 40% - 60% Impairment.   Goal Primary G Code and Modifier:    The Patient's G Code Goal would be: Mobility    The Patient's Impairment, Limitation or Restriction Modifier goal would be best described as: CI - 1% - 20% Impairment.       Plan    Plan for next visit:  Cont with PT per POC to improve/maintain ROM, strength, endurance, proprioception and to decrease pain/discomfort.     Planned Interventions:    Home Exercise Program development   Therapeutic Exercise (ROM & Strengthening)   Manual Therapy   Neuromuscular Re-education   Ultrasound including Phonophoresis with Ketoprofen   Electrical Stimulation including Iontophoresis with Dexamethasone   Therapeutic Activities   Gait Training  Mechanical Traction        Student or PTA has been instructed in and demonstrates skills necessary to carry out above stated treatment plan: Yes    Thank you  for your referral to [facility].  Please call with any questions, concerns or comments.  [phone]

## 2017-12-27 NOTE — PROGRESS NOTES
"Patient Information     Patient Name MRN Sex Kaushik Christensen [id number] Male 1950      Progress Notes by Forest Jennings DO at 2017  9:15 AM     Author:  Forest Jennings DO Service:  (none) Author Type:  Physician     Filed:  2017  9:51 AM Encounter Date:  2017 Status:  Signed     :  Forest Jennings DO (Physician)            PROGRESS NOTE    SUBJECTIVE:  Kaushik Ansari is here for evaluation in regards to his right shoulder. He is doing very well and making good progress in regards to his shoulder. He is now 5 months out from shoulder surgery. He does have some pain about that right thumb it is still catching and locking. He has been wearing his brace with some relief. He is considering an injection. Overall satisfied with his care and progress. He does utilize over-the-counter medications were needed and has been going to the [facility] to work out.    OBJECTIVE:  /64  Pulse 88  Ht 1.88 m (6' 2\")  Wt 108 kg (238 lb)  BMI 30.56 kg/m2 Body mass index is 30.56 kg/(m^2).    General Appearance: Pleasant male in good appearance, mood and affect.  Alert and orientated times three ( time, date and location).    Skin: Well-healed incision sites.    Shoulder:  Strength: Expected weakness.  Motion:  When testing his motion in the standing position I can get him to approximately 175   actively he moves to 170  with ease. There is some tightness to his capsule, this has improved. Abduction he is still about 5  short actively and passively full.    Elbow:  Flexion: Normal  Extension: Normal  Deep tendon reflexes: Normal    Hand:  Sensation: Normal  Radial and ulnar blood flow:  Normal.  Patient does have a palpable nodule and clicking of the right thumb.    Eyes: Pupils are round.    Ears: Hearing: Intact    Heart: Good cap refill into his hands.    Lungs: Coarse breath sounds.     Radiographic images from  where independently reviewed and discussed with the " patient.      Xray:     X-rays show appropriate subacromial decompression and distal clavicle excision. Humeral head is in appropriate position. There is some mild glenohumeral arthritis.    General Appearance: Pleasant male in good appearance, mood and affect.  Alert and orientated times three ( time, date and location).    Skin: Well-healed incision sites.    Shoulder:  Strength: Expected weakness.  Motion:  When testing his motion in the standing position I can get him to approximately 170   with him wanting to utilize his scapula for part of the motion. There is some tightness to his capsule, this has improved. Abduction he is still about 10  short actively and passively only 5 .    Elbow:  Flexion: Normal  Extension: Normal  Deep tendon reflexes: Normal    Hand:  Sensation: Normal  Radial and ulnar blood flow:  Normal.  Patient does have a palpable nodule and clicking of the right thumb.    Eyes: Pupils are round.    Ears: Hearing: Intact    Heart: Good cap refill into his hands.    Lungs: Coarse breath sounds.     Physical therapy: Notes show that he has 145  active forward flexion.    Notes where reviewed with the patient.  Please refer to the reports for full details.    ASSESSMENT:    Right trigger thumb.    POSTOPERATIVE DIAGNOSES  1. Large chronic right rotator cuff tear to include supraspinatus and infraspinatus.   2. Long head of the biceps tearing.   3. Labral tearing.   4. 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   5. Impingement syndrome.  6. AC arthritis.     PROCEDURES  1. Right shoulder arthroscopy with extensive debridement to include 4% of the anterior to posterior labrum (DOS 04/19/2017).   2. Arthroscopic biceps tenotomy.   3. Arthroscopic chondroplasty of the humeral head and glenoid.   4. Arthroscopic subacromial decompression.   5. Arthroscopic distal clavicle excision.   6. Open rotator cuff repair utilizing a double row technique with 2 medial row 5.5 mm BioComposite  SwiveLock anchors with 2 FiberTapes and one FiberWire placed for traction and lateral row fixation with two 5.5 mm BioComposite SwiveLock anchors with the FiberTapes and one 4.75 mm BioComposite SwiveLock with the FiberWire.     PLAN:    We went over his wall walking, his counter slides, and utilizing the cane to help regain his forward flexion and he understands the importance of this motion. The importance is always working on his motion.  We did talk at length about the fact that he has a large chance of failure because this was a massive tear he does verbalize an understanding and also how to follow the rules.  He will continue with physical therapy as ordered.  Questions and concerns answered to their satisfaction.  Follow up in 2 months with x-ray first of the right shoulder.  If patient starts having increased discomfort about the thumb and the symptoms have not resolved in 1 month he will come in I would need x-rays of his thumb prior to that exam.    ASSESSMENT:    Right trigger thumb.    Right glenohumeral arthritis.    POSTOPERATIVE DIAGNOSES  1. Large chronic right rotator cuff tear to include supraspinatus and infraspinatus.   2. Long head of the biceps tearing.   3. Labral tearing.   4. 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   5. Impingement syndrome.  6. AC arthritis.     PROCEDURES  1. Right shoulder arthroscopy with extensive debridement to include 4% of the anterior to posterior labrum (DOS 04/19/2017).   2. Arthroscopic biceps tenotomy.   3. Arthroscopic chondroplasty of the humeral head and glenoid.   4. Arthroscopic subacromial decompression.   5. Arthroscopic distal clavicle excision.   6. Open rotator cuff repair utilizing a double row technique with 2 medial row 5.5 mm BioComposite SwiveLock anchors with 2 FiberTapes and one FiberWire placed for traction and lateral row fixation with two 5.5 mm BioComposite SwiveLock anchors with the FiberTapes and one 4.75 mm BioComposite  Ha with the FiberWire.     PLAN:    We went over his wall walking, his counter slides, and utilizing the cane to help regain his forward flexion and he understands the importance of this motion. The importance is always working on his motion.  We did talk at length about the fact that he has a large chance of failure because this was a massive tear he does verbalize an understanding and also how to follow the rules.  He will continue with his home workout regiment but the importance is he must work on his motion.  Questions and concerns answered to their satisfaction.  Follow up as needed  At this time he would like an injection into his trigger thumb.    Risks, benefits, conservative, surgical, and alternatives of treatment were thoroughly outlined. No guarantees were given. Risks which do include, but are not limited to:  Scar, infection, decreased motion, damage to blood vessels, nerves and tendons, failure or need for further treatment, reaction to medications and anesthesia, blood clots, and the possibility of death where discussed.  He did verbalize an understanding. All questions and concerns were addressed.    PROCEDURE:  After written and oral informed consent was received from the patient having listed the risks that do include pain, nerve damage, injury to the tendons ,damage to vascular structures and infection but not limited to these; the patients right hand was sterilely prepped and draped after a timeout was performed. Utilizing ethyl chloride the area was cooled.  With sterile technique a 25 gauge needle was introduced into the hand just proximal to the A1 pulley and 1/2 cc of lidocaine and 1/2 cc of Celestone was injected.  A sterile bandage was applied and the patient tolerated the procedure well.  They where given a handout about injections to take home.     Forest Jennings D.O.  Orthopaedic Surgeon    [facility]  [address] [place] [address]  Phone  [phone] ([phone])  Fax [phone]    This document was created using computer generated templates and voice activated software.    9:47 AM 9/22/2017

## 2017-12-28 NOTE — PROGRESS NOTES
"Patient Information     Patient Name MRN Sex Kaushik Christensen [id number] Male 1950      Progress Notes by Ary Vargas at 2017 10:33 AM     Author:  Ary Vargas  Service:  (none) Author Type:  PT- Physical Therapy Assistant     Filed:  2017  1:20 PM  Date of Service:  2017 10:33 AM Status:  Addendum     :  Ary Vargas (PT- Physical Therapy Assistant)        Related Notes: Original Note by Ary Vargas (PT- Physical Therapy Assistant) filed at 2017  1:19 PM            [facility]  Outpatient PT - Daily Note      Date of Service: 2017    Visit: 1/10   Total Visits: 10  Updated PSFS: 6/15/2017, visit #9     Patient Name: \"Jean Claude\" Kaushik Ansari   YOB: 1950   Referring MD/Provider: Dick  Diagnosis: S/P arthroscopic right shoulder surgery Z98.890    Medical & Treatment Diagnosis: S/P arthroscopic right shoulder surgery Z98.890   Insurance:  Medicare  Start of Care Date: 17  Certification Dates: From Start of Service to Re-Cert Due: 17  Preadmission Functional Mobility: Independent  Instructions:  DOS 17  Start rehab now   Phase 1 - no passive ROM at shoulder   Phase 2A - on    Phase 2B - on  -out of pillow in the daytime, wall walk 1X per every hour awake.  Focus is full passive forward flexion by 8 weeks post op.   Wear pillow at night   Phase 3 - on  - out of all gear then.      Subjective      Pain Ratin/10; Location: R shoulder  Generally shoulder feels good. Incision did turn red and warm following a tick bite - this was cultured and no signs of infection at this point.       Objective  Pt hesitant to use his R UE, needs verbal encouragement to use    DATE: 5/17 5/31 6/1 6/6 6/8 6/13 6/15 6/20   ROM:  R shoulder flexion 90 P 130 P 135 P 139 P 155  AA  150 AA  95 A 100 A   P=PROM  AA=AAROM  A=AROM    Today's Intervention:   Therapeutic Exercise to improve mobility, strength and " endurance:  - arm bike 5' fwd    Manual Therapy to help improve tissue mobility, improve circulation and to decrease tissue tension thru the use of IASTM with the Graston Technique  - GT3 to posterior, lateral, anterior R shoulder. Pt seated with UE support from pillow - added work on R UT   - GT3 over scar on R shoulder    Therapeutic Exercise to improve mobility, strength and endurance:  - reaching for cones in random postitions  - OH pulleys, hold end range flexion 60 second hold x3  - standing AAROM flexion with wand, pt leaning back into wall, hold end range per tolerance x3  - ball on wall cw/ccw  - educ on using his R UE for ADLs throughout the day  - reviewed HEP and self mgm techniques     Answered Jean Claude's questions regarding his ROM progress and when it is appropriate to add weights to his exercises.     Home Exercise Program:   - wall slides  - standing AAROM with wand  - using his R UE for ADLs  - scap sets    Assessment    Goals:   STGs to be reached in 3-6 weeks   1. Pt to be independent with HEP and self mgmt techniques. GOAL MET AT THIS TIME   2. Pt to demo full PROM of R shoulder for improved mobility   3. Pt to have 1/10 pain or less throughout the day for improved ADLs     LTGs to be reached in 6-12 weeks  1. Pt to be able use his R UE for everyday ADLs like dressing, eating, drinking  2. Pt to have 0/10 pain or less throughout the day for improved ADLs  3. Pt to have at least 5-/5 MMT of R shoulder for improved strength and mobility  4. Pt to be able to sleep throughout the night without waking do to pain for improved health and wellness.      Patient Specific Functional and Pain Scales (PSFS) on 5/3/2017:                         Clinician Instructions: Complete after the history and before the exam.                         Initial Assessment: We want to know what 3 activities in your life you are unable to perform, or are having the most difficulty performing, as a result of your chief problem.  Please list and score at least 3 activities that you are unable to perform, or having the most difficulty performing, because of your chief problem.   Patient Specific Activity Scoring Scheme (score one number for each activity):   Activity Score (0-10)  0= Unable to perform activity  10= Able to perform activity at same level as before injury or problem   1. Reaching overhead with R UE 0/10   2. Using R UE for dressing 0/10   3. Using R UE for drinking/eating 0/10               Totals:  0/30 = 0% ability which relates to 100% impairment                         Patient verbally states that they understand that the information they have provided above is current and complete to the best of their knowledge.                         Patient Specific Functional Scale Modifier Scale Conversion: (patient's modifier that correlates with pt's score on PSFS): 0-CN (100% Impaired).     G codes and Modifier taken from patient completing the PSFS:   Initial Primary G Code and Modifier:                         Per the Patient's intake and/or assessment the Primary G Code is: Mobility .                        The Patient's Impairment, Limitation or Restriction Modifier would be best described as: CN - 100% Impairment.   Goal Primary G Code and Modifier:                         The Patient's G Code Goal would be: Mobility                         The Patient's Impairment, Limitation or Restriction Modifier goal would be best described as: CI - 1% - 20% Impairment.     Updated Patient Specific Functional and Pain Scales (PSFS) on 6/15/2017:    Clinician Instructions: Complete after the history and before the exam.    Initial Assessment: We want to know what 3 activities in your life you are unable to perform, or are having the most difficulty performing, as a result of your chief problem. Please list and score at least 3 activities that you are unable to perform, or having the most difficulty performing, because of your  chief problem.   Patient Specific Activity Scoring Scheme (score one number for each activity):   Activity Score (0-10)  0= Unable to perform activity  10= Able to perform activity at same level as before injury or problem   1. Reaching overhead with R UE 5/10   2. Using R UE for dressing 5/10   3. Using R UE for drinking/eating 5/10           Totals:  15/30 = 50% ability which relates to 50% impairment    Patient verbally states that they understand that the information they have provided above is current and complete to the best of their knowledge.    Patient Specific Functional Scale Modifier Scale Conversion: (patient's modifier that correlates with pt's score on PSFS): 5-CK (50% Impaired).    G codes and Modifier taken from patient completing the PSFS:   Current Primary G Code and Modifier:    Per the Patient's intake and/or assessment the Primary G Code is: Mobility .   The Patient's Impairment, Limitation or Restriction Modifier would be best described as: CK - 40% - 60% Impairment.   Goal Primary G Code and Modifier:    The Patient's G Code Goal would be: Mobility    The Patient's Impairment, Limitation or Restriction Modifier goal would be best described as: CI - 1% - 20% Impairment.       Plan    Plan for next visit:  Cont with PT per POC to improve/maintain ROM, strength, endurance, proprioception and to decrease pain/discomfort.     Planned Interventions:    Home Exercise Program development   Therapeutic Exercise (ROM & Strengthening)   Manual Therapy   Neuromuscular Re-education   Ultrasound including Phonophoresis with Ketoprofen   Electrical Stimulation including Iontophoresis with Dexamethasone   Therapeutic Activities   Gait Training  Mechanical Traction        Student or PTA has been instructed in and demonstrates skills necessary to carry out above stated treatment plan: Yes    Thank you for your referral to [facility].  Please call with any questions, concerns or  comments.  [phone]

## 2017-12-28 NOTE — PROGRESS NOTES
Patient Information     Patient Name MRN Sex Kaushik Christensen [id number] Male 1950      Progress Notes by Forest Jennings DO at 2017  8:15 AM     Author:  Forest Jennings DO Service:  (none) Author Type:  Physician     Filed:  2017  8:35 AM Encounter Date:  2017 Status:  Signed     :  Forest Jennings DO (Physician)            PROGRESS NOTE    SUBJECTIVE:  Kaushik Ansari is here for evaluation in regards to right shoulder. He is 6 weeks 5 days out from shoulder surgery. He was down in [place] in a camper so he wasn't doing as much therapy over a 10 day period. He has been working hard now once he returned home. He has expected discomfort.    OBJECTIVE:  /88  Pulse 80  Wt 108.9 kg (240 lb)  BMI 30.61 kg/m2 Body mass index is 30.61 kg/(m^2).    General Appearance: Pleasant male in good appearance, mood and affect.  Alert and orientated times three ( time, date and location).    Incision Clean and dry, closed, no infection    Shoulder:  Strength: Expected weakness.  Motion:  When testing his motion in the standing position I can get him to approximately 130  with him wanting to utilize his scapula for part of the motion. There is some tightness to his capsule.    Elbow:  Flexion: Normal  Extension: Normal  Deep tendon reflexes: Normal    Hand:  Sensation: Normal  Radial and ulnar blood flow:  Normal    Eyes: Pupils are round.    Ears: Hearing: Intact    Heart: Good cap refill into his hands.    Lungs: Coarse breath sounds.     Physical therapy: Notes show that he has 130  passive forward flexion.    Notes where reviewed with the patient.  Please refer to the reports for full details.    ASSESSMENT:    POSTOPERATIVE DIAGNOSES  1. Large chronic right rotator cuff tear to include supraspinatus and infraspinatus.   2. Long head of the biceps tearing.   3. Labral tearing.   4. 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   5. Impingement  syndrome.  6. AC arthritis.     PROCEDURES  1. Right shoulder arthroscopy with extensive debridement to include 4% of the anterior to posterior labrum (DOS 04/19/2017).   2. Arthroscopic biceps tenotomy.   3. Arthroscopic chondroplasty of the humeral head and glenoid.   4. Arthroscopic subacromial decompression.   5. Arthroscopic distal clavicle excision.   6. Open rotator cuff repair utilizing a double row technique with 2 medial row 5.5 mm BioComposite SwiveLock anchors with 2 FiberTapes and one FiberWire placed for traction and lateral row fixation with two 5.5 mm BioComposite SwiveLock anchors with the FiberTapes and one 4.75 mm BioComposite SwiveLock with the FiberWire.     PLAN:    We went over his wall walking, his counter slides, and utilizing the cane to help regain his forward flexion and he understands the importance of this motion.  We did talk at length about the fact that he has a large chance of failure because this was a beak tear knee does verbalize an understanding and also how to follow the rules.  He will continue with physical therapy as ordered.  Questions and concerns answered to their satisfaction.  Follow-up in 6 1/2 weeks with PT notes.    Phase 3 - on 6/14 - out of all gear then.    Forest Jennings D.O.  Orthopaedic Surgeon    [facility]  [address] [place] [address]  Phone [phone] (KNEE)  Fax [phone]    This document was created using computer generated templates and voice activated software.    8:33 AM 6/5/2017

## 2017-12-28 NOTE — PATIENT INSTRUCTIONS
Patient Information     Patient Name MRN Sex Kaushik Christensen [id number] Male 1950      Patient Instructions by Bebe Jasso MD at 2017  3:26 PM     Author:  Bebe Jasso MD  Service:  (none) Author Type:  Physician     Filed:  2017  3:26 PM  Encounter Date:  2017 Status:  Addendum     :  Bebe Jasso MD (Physician)        Related Notes: Original Note by Bebe Jasso MD (Physician) filed at 2017  3:26 PM            1.  Blood culture pending  2.  Normal cbc  3.  Okay to ice shoulder  4. Take 2 tablets of ibuprofen twice a day  For inflammation for 3 days.( always check temp prior if concern of infection )  5.  If redness goes beyond stehpani by 1 cm , then need to be seen for possible cbc.  6.  Okay to continue PT.     7.  Tick bite;    If signs of joint aches, muscle aches;  Then may need tick labs.        Index [place] Related topics   Tick Bite   ________________________________________________________________________  KEY POINTS    Ticks are small bugs that feed on the blood of animals, birds, and people. If you find a tick attached to your skin, you have been bitten. You usually will not feel anything when a tick bites you, but you may have a little redness around the bite.    You need to remove the tick yourself or get help from your healthcare provider. Save the tick in case you later start having symptoms of disease and need to know what kind of tick bit you. Check for a rash and other symptoms for about 4 weeks after the bite.    When you are outdoors, wear long-sleeved shirts tucked into your pants. Wear your pants tucked into your socks or boot tops if possible. Use approved tick repellents on exposed skin and clothing.  ________________________________________________________________________  What are ticks?  Ticks are small bugs that feed on the blood of animals, birds, and people. There are many different kinds of  ticks. Black-legged ticks, or deer ticks, are usually no bigger than the head of a pin. Wood and dog ticks are usually much larger. They may be about a quarter of an inch before feeding and half an inch when they are full of blood.  Ticks are found in woodlands, grasslands, and marshlands and at the seashore. Wild birds and animals, as well as domestic animals and pets such as dogs, horses, and cows, can carry ticks. Ticks may climb on humans from animals, leaves, or low-lying brush. They may fall from tree leaves, especially on a windy day. Ticks cannot jump or fly.  How do I know if I have been bitten by a tick?  If you find a tick attached to your skin, you have been bitten. You usually will not feel anything when a tick bites you, but you may have a little redness around the bite.  Can I get sick from a tick bite?  There is little risk from the bite of a tick most of the time. However, some ticks carry infections that can be passed to people. For example:    Deer tick bites may cause Lyme disease. The symptoms of Lyme disease include a red, round rash that starts at the site of the bite and gets bigger. Over time, a similar rash may appear in other parts of the body. In some cases, the rash looks like a bulls-eye or target on your skin. Some people have a rash without other symptoms. If you do have other symptoms, they may include feeling very tired, headache, muscle aches, joint pain or swelling, and a fever.    Wood tick or dog tick bites may cause [place] spotted fever (RMSF). RMSF may first cause fever, headache, muscle aches, joint pain or swelling, and then a pink or red spotted rash. You need to get treatment right away if you have these symptoms and have had a tick bite. RMSF is seen in most states, not just the [place] areas.  Tick bites may cause other diseases as well.  How are tick bites treated?  If you find a tick attached to your body, you need to remove it. You can remove it  yourself or get help from your healthcare provider. To remove an attached tick:    Grasp the tick with tweezers or fingers (covered with gloves or a tissue) as close to the skin as possible.    Gently pull the tick straight away from you until it releases its hold. Use a slow gentle pulling motion. Pulling the tick out too quickly may tear the body from the mouth, leaving the mouth still in the skin. If you are unable to remove the tick completely, you may need to see your healthcare provider.    Do not twist the tick as you pull, and try not to squeeze its body. Squeezing or crushing the tick could force infected fluids from the tick into the site of the bite.  After you have removed the tick, thoroughly wash your hands and the bite area with soap and water. Put an antiseptic such as rubbing alcohol on the area where you were bitten.  Infected ticks usually do not spread an infection until after the tick has been attached and feeding on your blood for several hours. Check for a rash and other symptoms for about 4 weeks after the bite.  Save the tick in case you later start having symptoms of disease and need to know what kind of tick bit you. Put the tick in a sealed plastic bag and keep it in the freezer. Identification of the tick may help your provider diagnose and treat your symptoms. If you do not have any symptoms of disease after 1 month, you can throw the tick away.  How can I help prevent tick bites?    In areas of thick underbrush, try to stay near the center of trails.    When you are outdoors, wear long-sleeved shirts tucked into your pants. Wear your pants tucked into your socks or boot tops if possible. A hat may help, too. Wearing light-colored clothing may make it easier to spot a small tick before it reaches your skin and bites. While you are outside, check for ticks every 4 hours and remove any ticks on clothing or exposed skin.    Use approved tick repellents on exposed skin and clothing. Don't  use more repellent than recommended in the package directions. Don't put repellent on open wounds or rashes. When using sprays, don t spray the repellent directly on your face. Spray the repellent on your hands first and then put it on your face, but not near your eyes or mouth. Then wash the spray off your hands.    Adults should use products with no more than 35% DEET. Children older than 2 months can use repellents with no more than 30% DEET. DEET should be applied just once a day. Wash it off your body when you go back indoors. Some products contain more than 35% DEET. The higher concentrations are no more effective than the lower concentrations, but they may last longer. Read the label carefully before applying.    Picaridin may irritate the skin less than DEET and appears to be just as effective.    Spray clothes with repellents because insects may crawl from clothing to the skin or bite through thin clothing. Products containing permethrin are recommended for use on clothing, shoes, bed nets, and camping gear. Permethrin-treated clothing repels and kills ticks, mosquitoes, and other insects and can keep working after laundering. Permethrin should be reapplied to clothing according to the instructions on the product label. You can buy clothing and hats pretreated with permethrin. Permethrin does not work as a repellent when it is put on the skin.    Treat household pets for ticks and fleas. Check pets after they have been outdoors.    Brush off clothing and pets before entering the house.    After you have been outdoors, undress and check your body for ticks. They usually crawl around for several hours before biting. Check your clothes, too. Wash them right away to remove any ticks.    Shower and shampoo after your outing.    Inspect any gear you have carried outdoors.    If you spend much time hiking, you may want to include a pair of tick tweezers in your first-aid kit. You can buy them at [employer]  stores.  Developed by [employer].  Adult Advisor 2016.3 published by [employer].  Last modified: 2016-04-04  Last reviewed: 2016-03-31  This content is reviewed periodically and is subject to change as new health information becomes available. The information is intended to inform and educate and is not a replacement for medical evaluation, advice, diagnosis or treatment by a healthcare professional.  References   Adult Advisor 2016.3 Index    Copyright   2016 [employer], a division of [employer] All rights reserved.        Index Thai Related topics   Lyme Disease   ________________________________________________________________________  KEY POINTS    Lyme disease is an infection caused by the bite of a blacklegged tick (also called deer tick) that is infected with the bacteria.    Lyme disease is treated with antibiotics. In most cases, the symptoms go away a few weeks or months after antibiotic treatment, but sometimes the symptoms last several years.    Follow the full course of treatment prescribed by your healthcare provider. Do not stop taking antibiotics because you start to feel better or your symptoms go away.    Wear long-sleeved shirts and long pants and use insect repellant to avoid ticks. If you find a tick attached to your body, you need to remove it.  ________________________________________________________________________  What is Lyme disease?  Lyme disease is an infection caused by the bite of a blacklegged tick (also called deer tick) infected with bacteria. The tick is so small that you may not notice the tick or its bite. Most deer ticks do not carry Lyme disease. Even if a tick is infected, it may not transfer the disease to you. An infected tick that is attached for less than 36 hours is less likely to cause Lyme disease.  If the disease is not diagnosed and treated, the symptoms can last for several years, but they usually get better over time.  What is the  cause?  Ticks are found in woods, forests, grasslands, and marshlands and at the seashore. Wild birds and animals, as well as domestic animals and pets such as dogs, horses, and cows, can carry ticks. Ticks may climb on humans from animals, leaves, or low-lying brush. Ticks cannot jump or fly.  People usually become infected during the summer when they are more likely to be exposed to ticks. Hikers, campers, hunters, and people living in wooded or rural areas have a higher risk for Lyme disease. In the , the infection is more common in the northern states.  What are the symptoms?  Lyme disease is hard to diagnose because its symptoms can vary greatly from person to person. The first symptoms may not even be noticed. Symptoms may come and go in cycles lasting a week or longer.  Untreated Lyme disease may progress through these 3 stages:  Stage 1:  In the first month after a bite by an infected tick, you may get a rash at the site of your bite. The rash begins as a large red spot that may be flat or bumpy. The area of the rash feels warm, but it is not painful or itchy. The rash slowly spreads and the red color at the center of the rash may fade, creating what is called a bull's-eye rash. Sometimes the rash may blister or scab in the center. The thigh, groin, and armpit are common sites for the rash, but it can appear anywhere.  Although most infected people develop a rash, you may not have this symptom, or you may overlook it.  You may feel like you have the flu, with symptoms such as:    Feeling very tired or drowsy    Pain or stiffness in muscles and joints    Headache or jaw pain    Chills and fever    Stiff neck  Less common symptoms of early Lyme disease are:    Red, irritated eyes    Sore throat and cough    In men, swelling of the testicles  Even if you don't get treatment, the early symptoms usually improve or go away within several weeks. However, you may feel tired, drowsy, and have muscle or joint pain  for months after the rash has gone.  Stage 2:  If not treated, Lyme disease can spread to your brain, heart, and joints. Several weeks to months after the first symptoms appear, you may develop:    Weakness or paralysis of one or both sides of your face    Fever, headache, and a stiff neck    Heart problems, such as an irregular heartbeat    Confusion    Seizures    Coma  During this second stage, you may have pain in your joints, tendons, muscles, or bones, usually without joint swelling. These symptoms usually go away within a few weeks.  Stage 3:  After several months of infection, you may have:    Joint pain and swelling    Numbness or tingling in your hands and feet    Trouble concentrating    Weakness in your arms or legs    Depression  How is it diagnosed?  Your healthcare provider will ask about your symptoms and medical history and examine you. You may have a blood test for Lyme disease. Or you and your provider may decide to start treatment without the test or before the test results are available.  If you were recently bitten by a tick, saving the tick in a marked plastic bag in your freezer may help your provider diagnose your symptoms and decide on treatment.  How is it treated?  Lyme disease is treated with antibiotics. In most cases, the symptoms go away a few weeks or months after antibiotic treatment, but sometimes the symptoms last several years.   If you are in stages 2 or 3 of the disease, you may need other treatments if you have symptoms that affect your heart, nervous system, or joints.  If you are pregnant or nursing and have Lyme disease, you may pass the disease to your baby. Although this happens rarely, you should call your healthcare provider right away if you are pregnant or nursing and are bitten by a tick or have symptoms of Lyme disease.  How can I take care of myself?  Follow the full course of treatment prescribed by your healthcare provider. You need to take all of your antibiotic  medicine. Do not stop taking antibiotics because you start to feel better or your symptoms go away. Ask your healthcare provider:    How long it will take to recover    If there are activities you should avoid and when you can return to your normal activities    How to take care of yourself at home    What symptoms or problems you should watch for and what to do if you have them  Make sure you know when you should come back for a checkup.  How can I help prevent Lyme disease?    In areas of thick underbrush, try to stay near the center of trails.    When you are outdoors, wear long-sleeved shirts tucked into your pants. Wear your pants tucked into your socks or boot tops if possible. A hat may help, too. Wearing light-colored clothing may make it easier to spot a small tick before it reaches your skin and bites. While you are outside, check for ticks every 4 hours and remove any ticks on clothing or exposed skin.    Use approved tick repellents on exposed skin and clothing. Don t use more than recommended in the package directions. Do not put repellent on open wounds or rashes. When using sprays, don t spray the repellent directly on your face. Spray the repellent on your hands first and then put it on your face, but not near your eyes or mouth. Then wash the spray off your hands.    Adults should use repellent products with no more than 35% DEET. Children older than 2 months can use repellents with no more than 30% DEET. Don t put DEET on a child s hand or other body part they are likely to put in their mouth. DEET should be applied just once a day. Wash it off your body when you go back indoors. Some products contain more than 35% DEET. The higher concentrations are no more effective than the lower concentrations, but they may last longer. Read the label carefully before applying.    Picaridin may irritate the skin less than DEET and appears to be just as effective.    Spray clothes with repellents because ticks  may crawl from clothing to the skin. Products containing permethrin are recommended for use on clothing, shoes, bed nets, and camping gear. Permethrin-treated clothing repels and kills ticks, mosquitoes, and other insects and can keep working after laundering. Permethrin should be reapplied to clothing according to the instructions on the product label. You can buy clothing and hats pretreated with permethrin. Permethrin does not work as a repellent when it is put on the skin.    Treat household pets for ticks and fleas. Check pets after they've been outdoors.    Brush off clothing and pets before entering the house.    After you have been outdoors, undress and check your body for ticks. They usually crawl around for several hours before biting. Check your clothes, too. Wash them right away to remove any ticks.    If you find a tick attached to your body, you need to remove it.    Grasp the tick with tweezers or fingers (covered with gloves or a tissue) as close to the skin as possible. Gently pull the tick straight away from you until it releases its hold. Use a slow gentle pulling motion. Pulling the tick out too quickly may tear the body from the mouth, leaving the mouth still in the skin. If you are unable to remove the tick completely, you may need to see your healthcare provider. Do not twist the tick as you pull, and try not to squeeze its body.    After you have removed the tick, thoroughly wash your hands and the bite area with soap and water. Put an antiseptic such as rubbing alcohol on the area where you were bitten.    Put the tick in a sealed plastic bag and keep it in the freezer. Identification of the tick may help your provider diagnose and treat any symptoms. If you do not have any symptoms of disease after 1 month, you can throw away the tick.    Shower and shampoo after your outing.    Inspect any gear you have carried outdoors.    If you spend much time hiking, you may want to include a pair of  tick tweezers in your first-aid kit. You can buy them at sporting [employer] stores.    If you had a shot against Lyme disease (shots were available until 2002) you are probably no longer protected because the vaccine loses its effect over time. A new vaccine may be approved by the FDA in 2015. There is no vaccine available for Lyme disease for humans at this time.  Developed by [employer].  Adult Advisor 2016.3 published by [employer].  Last modified: 2016-07-13  Last reviewed: 2016-05-31  This content is reviewed periodically and is subject to change as new health information becomes available. The information is intended to inform and educate and is not a replacement for medical evaluation, advice, diagnosis or treatment by a healthcare professional.  References   Adult Advisor 2016.3 Index    Copyright   2016 [employer], a division of [employer] All rights reserved.        Index Related topics   Ehrlichiosis   ________________________________________________________________________  KEY POINTS    Ehrlichiosis is an infection caused by the bite of a tick infected with bacteria. It affects animals such as dogs, deer, coyotes, and mice, as well as humans.    Ehrlichiosis is treated with antibiotic medicine. If your symptoms are serious, you may be treated in the hospital.    Follow the full course of treatment prescribed by your healthcare provider. Do not stop taking antibiotics because you start to feel better or your symptoms go away.    Wear clothing and use insect repellant to avoid ticks. If you find a tick attached to your body, you need to remove it.  ________________________________________________________________________  What is ehrlichiosis?  Ehrlichiosis is an infection caused by the bite of a tick infected with bacteria. It affects animals, such as dogs, deer, coyotes, and mice, as well as humans.  Rarely, especially without prompt treatment, the infection can become severe and life  threatening, causing serious complications such as infection in the brain, seizures, or heart failure.  What is the cause?  Ticks are found in woods, forests, grasslands, and marshlands and at the seashore. Wild birds and animals, as well as domestic animals and pets such as dogs, horses, and cows, can carry ticks. Ticks may climb on humans from animals, leaves, or low-lying brush. Ticks cannot jump or fly.  This disease does not spread from person to person. It is spread from the bite of an infected tick. In the , the infection is more common in the southeastern and [place] central parts of the country.  What are the symptoms?  The symptoms of ehrlichiosis vary. They may appear a few days to 3 weeks after a tick bite. You may not remember getting the bite.  Symptoms may include:    Muscle aches    Feeling tired    Headache    Fever and chills    Nausea and vomiting    Cough    Joint pain    Confusion  Unlike some other infections spread by ticks, the infection does not usually cause a rash.  How is it diagnosed?  Your healthcare provider will ask about your symptoms and medical history and examine you. You may have blood tests.  How is it treated?  Ehrlichiosis is treated with antibiotic medicine, usually for 7 to 10 days. If your symptoms are serious, you may be treated in the hospital.  Without treatment, the symptoms of ehrlichiosis may last for up to 2 months. Once you start taking antibiotics, you will usually start feeling better in a couple of days.  How can I take care of myself?  Follow the full course of treatment prescribed by your healthcare provider. Do not stop taking antibiotics because you start to feel better or your symptoms go away. Ask your healthcare provider:    How long it will take to recover    If there are activities you should avoid and when you can return to your normal activities    How to take care of yourself at home    What symptoms or problems you should watch for and what to do if  you have them  Make sure you know when you should come back for a checkup.  How can I help prevent ehrlichiosis?    In areas of thick underbrush, try to stay near the center of trails.    When you are outdoors, wear long-sleeved shirts tucked into your pants. Wear long pants tucked into your socks or boot tops if possible. A hat may help, too. Wearing light-colored clothing may make it easier to spot the small tick before it reaches your skin and bites. While you are outside, check for ticks every 4 hours and remove any ticks on clothing or exposed skin.    Use approved tick repellents on exposed skin and clothing. Don't use more repellent than recommended in the package directions. Don't put repellent on open wounds or rashes. When using sprays, don t spray the repellent directly on your face. Spray the repellent on your hands first and then put it on your face, but not close to your eyes or mouth. Then wash the spray off your hands.    Adults should use products with no more than 35% DEET. Children older than 2 months can use repellents with no more than 30% DEET. DEET should be applied just once a day. Wash it off your body when you go back indoors. Some products contain more than 35% DEET. The higher concentrations are no more effective than the lower concentrations, but they may last longer. Read the label carefully before applying.    Picaridin may irritate the skin less than DEET and appears to be just as effective.    Spray clothes with repellents because insects may crawl from clothing to the skin or bite through thin clothing. Products containing permethrin are recommended for use on clothing, shoes, bed nets, and camping gear. Permethrin-treated clothing repels and kills ticks, mosquitoes, and other insects and can keep working after laundering. Permethrin should be reapplied to clothing according to the instructions on the product label. You can buy clothing and hats pretreated with permethrin.  Permethrin does not work as a repellent when it is put on the skin.    Treat household pets for ticks and fleas. Check pets after they've been outdoors.    Brush off clothing and pets before entering the house.    After you have been outdoors, undress and check your body for ticks. They usually crawl around for several hours before biting. Check your clothes, too. Wash them right away to remove any ticks.    If you find a tick attached to your body, you need to remove it.    Grasp the tick with tweezers or fingers (covered with gloves or a tissue) as close to the skin as possible. Gently pull the tick straight away from you until it releases its hold. Use a slow gentle pulling motion. Pulling the tick out too quickly may tear the body from the mouth, leaving the mouth still in the skin. If you are unable to remove the tick completely, you may need to see your healthcare provider. Do not twist the tick as you pull, and try not to squeeze its body.    After you have removed the tick, thoroughly wash your hands and the bite area with soap and water. Put an antiseptic such as rubbing alcohol on the area where you were bitten.    Put the tick in a sealed plastic bag and keep it in the freezer. Identification of the tick may help your provider diagnose and treat any symptoms. If you do not have any symptoms of disease after 1 month, you can throw away the tick.    Shower and shampoo after your outing.    Inspect any gear you were carrying.    If you spend much time hiking, you may want to include a pair of tick tweezers in your first-aid kit. You can buy them at sporting goods stores.  Developed by [employer].  Adult Advisor 2016.3 published by [employer].  Last modified: 2016-05-31  Last reviewed: 2016-05-31  This content is reviewed periodically and is subject to change as new health information becomes available. The information is intended to inform and educate and is not a replacement for medical evaluation,  advice, diagnosis or treatment by a healthcare professional.  References   Adult Advisor 2016.3 Index    Copyright   2016 [employer], a division of [employer] All rights reserved.

## 2017-12-28 NOTE — PROGRESS NOTES
"Patient Information     Patient Name MRN Kaushik Miguel [id number] Male 1950      Progress Notes by Yfn Kelley, PT at 6/15/2017  8:59 AM     Author:  Yfn Kelley, PT Service:  (none) Author Type:  PT- Physical Therapist     Filed:  6/15/2017  9:53 AM Date of Service:  6/15/2017  8:59 AM Status:  Signed     :  Yfn Kelley, PT (PT- Physical Therapist)            [facility]  Outpatient PT - Daily Note/Progress Report      Date of Service: 6/15/2017    Visit: 9/10   Total Visits: 9  Updated PSFS: 6/15/2017, visit #9     Patient Name: \"Jean Claude\" Kaushik Ansari   YOB: 1950   Referring MD/Provider: Dick  Diagnosis: S/P arthroscopic right shoulder surgery Z98.890    Medical & Treatment Diagnosis: S/P arthroscopic right shoulder surgery Z98.890   Insurance:  Medicare  Start of Care Date: 17  Certification Dates: From Start of Service to Re-Cert Due: 17  Preadmission Functional Mobility: Independent  Instructions:  DOS 17  Start rehab now   Phase 1 - no passive ROM at shoulder   Phase 2A - on    Phase 2B - on  -out of pillow in the daytime, wall walk 1X per every hour awake.  Focus is full passive forward flexion by 8 weeks post op.   Wear pillow at night   Phase 3 - on  - out of all gear then.      Subjective      Pain Ratin/10; Location: R shoulder  Took his sling off yesterday and used it a few time throughout the day    Objective  Pt hesitant to use his R UE, needs verbal encouragement to use    DATE: 5/17 5/31 6/1 6/6 6/8 6/13 6/15   ROM:  R shoulder flexion 90 P 130 P 135 P 139 P 155  AA  150 AA  95 A   P=PROM  AA=AAROM  A=AROM    Today's Intervention:   Therapeutic Exercise to improve mobility, strength and endurance:  - arm bike 5' fwd    Manual Therapy to help improve tissue mobility, improve circulation and to decrease tissue tension thru the use of IASTM with the Graston Technique  - GT3 to " posterior, lateral, anterior R shoulder. Pt seated with UE support from pillow  - GT3 over scar on R shoulder    Therapeutic Exercise to improve mobility, strength and endurance:  - reaching for cones in random postitions  - OH pulleys, hold end range flexion 60 second hold x3  - standing AAROM flexion with wand, pt leaning back into wall, hold end range per tolerance x3  - ball on wall cw/ccw  - educ on using his R UE for ADLs throughout the day  - reviewed HEP and self mgm techniques     Home Exercise Program:   - wall slides  - standing AAROM with wand  - using his R UE for ADLs  - scap sets    Assessment    Goals:   STGs to be reached in 3-6 weeks   1. Pt to be independent with HEP and self mgmt techniques. GOAL MET AT THIS TIME   2. Pt to demo full PROM of R shoulder for improved mobility   3. Pt to have 1/10 pain or less throughout the day for improved ADLs     LTGs to be reached in 6-12 weeks  1. Pt to be able use his R UE for everyday ADLs like dressing, eating, drinking  2. Pt to have 0/10 pain or less throughout the day for improved ADLs  3. Pt to have at least 5-/5 MMT of R shoulder for improved strength and mobility  4. Pt to be able to sleep throughout the night without waking do to pain for improved health and wellness.      Patient Specific Functional and Pain Scales (PSFS) on 5/3/2017:                         Clinician Instructions: Complete after the history and before the exam.                         Initial Assessment: We want to know what 3 activities in your life you are unable to perform, or are having the most difficulty performing, as a result of your chief problem. Please list and score at least 3 activities that you are unable to perform, or having the most difficulty performing, because of your chief problem.   Patient Specific Activity Scoring Scheme (score one number for each activity):   Activity Score (0-10)  0= Unable to perform activity  10= Able to perform activity at same level as  before injury or problem   1. Reaching overhead with R UE 0/10   2. Using R UE for dressing 0/10   3. Using R UE for drinking/eating 0/10               Totals:  0/30 = 0% ability which relates to 100% impairment                         Patient verbally states that they understand that the information they have provided above is current and complete to the best of their knowledge.                         Patient Specific Functional Scale Modifier Scale Conversion: (patient's modifier that correlates with pt's score on PSFS): 0-CN (100% Impaired).     G codes and Modifier taken from patient completing the PSFS:   Initial Primary G Code and Modifier:                         Per the Patient's intake and/or assessment the Primary G Code is: Mobility .                        The Patient's Impairment, Limitation or Restriction Modifier would be best described as: CN - 100% Impairment.   Goal Primary G Code and Modifier:                         The Patient's G Code Goal would be: Mobility                         The Patient's Impairment, Limitation or Restriction Modifier goal would be best described as: CI - 1% - 20% Impairment.     Updated Patient Specific Functional and Pain Scales (PSFS) on 6/15/2017:    Clinician Instructions: Complete after the history and before the exam.    Initial Assessment: We want to know what 3 activities in your life you are unable to perform, or are having the most difficulty performing, as a result of your chief problem. Please list and score at least 3 activities that you are unable to perform, or having the most difficulty performing, because of your chief problem.   Patient Specific Activity Scoring Scheme (score one number for each activity):   Activity Score (0-10)  0= Unable to perform activity  10= Able to perform activity at same level as before injury or problem   1. Reaching overhead with R UE 5/10   2. Using R UE for dressing 5/10   3. Using R UE for drinking/eating 5/10            Totals:  15/30 = 50% ability which relates to 50% impairment    Patient verbally states that they understand that the information they have provided above is current and complete to the best of their knowledge.    Patient Specific Functional Scale Modifier Scale Conversion: (patient's modifier that correlates with pt's score on PSFS): 5-CK (50% Impaired).    G codes and Modifier taken from patient completing the PSFS:   Current Primary G Code and Modifier:    Per the Patient's intake and/or assessment the Primary G Code is: Mobility .   The Patient's Impairment, Limitation or Restriction Modifier would be best described as: CK - 40% - 60% Impairment.   Goal Primary G Code and Modifier:    The Patient's G Code Goal would be: Mobility    The Patient's Impairment, Limitation or Restriction Modifier goal would be best described as: CI - 1% - 20% Impairment.       Plan    Plan for next visit:  Cont with PT per POC to improve/maintain ROM, strength, endurance, proprioception and to decrease pain/discomfort.     Planned Interventions:    Home Exercise Program development   Therapeutic Exercise (ROM & Strengthening)   Manual Therapy   Neuromuscular Re-education   Ultrasound including Phonophoresis with Ketoprofen   Electrical Stimulation including Iontophoresis with Dexamethasone   Therapeutic Activities   Gait Training  Mechanical Traction        Student or PTA has been instructed in and demonstrates skills necessary to carry out above stated treatment plan: Yes    Thank you for your referral to [facility] & [facility].  Please call with any questions, concerns or comments.  [phone]

## 2017-12-28 NOTE — PROGRESS NOTES
"Patient Information     Patient Name MRN Sex Kaushik Christensen [id number] Male 1950      Progress Notes by Yfn Kelley, PT at 2017 10:30 AM     Author:  Yfn Kelley, PT  Service:  (none) Author Type:  PT- Physical Therapist     Filed:  2017 12:49 PM  Date of Service:  2017 10:30 AM Status:  Addendum     :  Yfn Kelley, PT (PT- Physical Therapist)        Related Notes: Original Note by Ary Vargas (PT- Physical Therapy Assistant) filed at 2017 12:01 PM            [facility]  Outpatient PT - Daily Note      Date of Service: 2017    Visit: 3/10   Total Visits: 12  Updated PSFS: 6/15/2017, visit #9     Patient Name: \"Jean Claude\" Kaushik Ansari   YOB: 1950   Referring MD/Provider: Dick  Diagnosis: S/P arthroscopic right shoulder surgery Z98.890    Medical & Treatment Diagnosis: S/P arthroscopic right shoulder surgery Z98.890   Insurance:  Medicare  Start of Care Date: 17  Certification Dates: From Start of Service to Re-Cert Due: 17  Preadmission Functional Mobility: Independent  Instructions:  DOS 17  Start rehab now   Phase 1 - no passive ROM at shoulder   Phase 2A - on    Phase 2B - on  - out of pillow in the daytime, wall walk 1X per every hour awake.  Focus is full passive forward flexion by 8 weeks post op.   Wear pillow at night   Phase 3 - on  - out of all gear then.      Subjective      Pain Rating: 3-4/10; Location: R shoulder    Reports that wonders if his pain is normal, the lowered tolerance to lying on his R side, and some pain that travels into his 3rd and 4th finger of his hand (no numbness/tingling) usually when in bed.      Continues to use R UE for everyday activities.     Objective    DATE: 5/17 5/31 6/1 6/6 6/8 6/13 6/15 6/20   ROM:  R shoulder flexion 90 P 130 P 135 P 139 P 155  AA  150 AA  95 A 100 A     DATE:         ROM:   120 A 120 A      " "  P=PROM  AA=AAROM  A=AROM    Today's Intervention:   Therapeutic Exercise to improve mobility, strength and endurance:  - arm bike 5 minutes fwd x3 minutes back    Discussed that having some pain at this point is normal, as is the difficulty with lying on affected side.     Manual Therapy to help improve tissue mobility, improve circulation and to decrease tissue tension thru the use of IASTM with the Graston Technique  - GT3 to posterior, lateral, anterior R shoulder & R UT  - GT3 over scar on R shoulder    Therapeutic Exercise to improve mobility, strength and endurance:  - isometrics: flex, abd, IR, ER 5\" hold x5 each  - reaching for cones in random positions   - added EC reaching  - OH pulleys, hold end range flexion 60 second hold x3, end range IR 60\" x3  - ball on wall cw/ccw  - added forward bent horizontal abd 0# x12  - scap sets with green band  - educ on using his R UE for ADLs throughout the day  - reviewed HEP and self mgm techniques     Home Exercise Program:   - wall slides  - standing AAROM with wand  - using his R UE for ADLs  - scap sets  - isometrics   - OH pulleys    Date: 06/27/2017 Prepared by: Ary Vargas Access Code: [email or web address]    Bent Shoulder Horizontal Abduction and Adduction with Table Support - 10-30 reps - 1 sets - 0 hold - 1x daily - 7x weekly    Assessment    Goals:   STGs to be reached in 3-6 weeks   1. Pt to be independent with HEP and self mgmt techniques. GOAL MET AT THIS TIME   2. Pt to demo full PROM of R shoulder for improved mobility   3. Pt to have 1/10 pain or less throughout the day for improved ADLs     LTGs to be reached in 6-12 weeks  1. Pt to be able use his R UE for everyday ADLs like dressing, eating, drinking  2. Pt to have 0/10 pain or less throughout the day for improved ADLs  3. Pt to have at least 5-/5 MMT of R shoulder for improved strength and mobility  4. Pt to be able to sleep throughout the night without waking do to pain for improved health and wellness. "      Patient Specific Functional and Pain Scales (PSFS) on 5/3/2017:                         Clinician Instructions: Complete after the history and before the exam.                         Initial Assessment: We want to know what 3 activities in your life you are unable to perform, or are having the most difficulty performing, as a result of your chief problem. Please list and score at least 3 activities that you are unable to perform, or having the most difficulty performing, because of your chief problem.   Patient Specific Activity Scoring Scheme (score one number for each activity):   Activity Score (0-10)  0= Unable to perform activity  10= Able to perform activity at same level as before injury or problem   1. Reaching overhead with R UE 0/10   2. Using R UE for dressing 0/10   3. Using R UE for drinking/eating 0/10               Totals:  0/30 = 0% ability which relates to 100% impairment                         Patient verbally states that they understand that the information they have provided above is current and complete to the best of their knowledge.                         Patient Specific Functional Scale Modifier Scale Conversion: (patient's modifier that correlates with pt's score on PSFS): 0-CN (100% Impaired).     G codes and Modifier taken from patient completing the PSFS:   Initial Primary G Code and Modifier:                         Per the Patient's intake and/or assessment the Primary G Code is: Mobility .                        The Patient's Impairment, Limitation or Restriction Modifier would be best described as: CN - 100% Impairment.   Goal Primary G Code and Modifier:                         The Patient's G Code Goal would be: Mobility                         The Patient's Impairment, Limitation or Restriction Modifier goal would be best described as: CI - 1% - 20% Impairment.     Updated Patient Specific Functional and Pain Scales (PSFS) on 6/15/2017:    Clinician Instructions:  Complete after the history and before the exam.    Initial Assessment: We want to know what 3 activities in your life you are unable to perform, or are having the most difficulty performing, as a result of your chief problem. Please list and score at least 3 activities that you are unable to perform, or having the most difficulty performing, because of your chief problem.   Patient Specific Activity Scoring Scheme (score one number for each activity):   Activity Score (0-10)  0= Unable to perform activity  10= Able to perform activity at same level as before injury or problem   1. Reaching overhead with R UE 5/10   2. Using R UE for dressing 5/10   3. Using R UE for drinking/eating 5/10           Totals:  15/30 = 50% ability which relates to 50% impairment    Patient verbally states that they understand that the information they have provided above is current and complete to the best of their knowledge.    Patient Specific Functional Scale Modifier Scale Conversion: (patient's modifier that correlates with pt's score on PSFS): 5-CK (50% Impaired).    G codes and Modifier taken from patient completing the PSFS:   Current Primary G Code and Modifier:    Per the Patient's intake and/or assessment the Primary G Code is: Mobility .   The Patient's Impairment, Limitation or Restriction Modifier would be best described as: CK - 40% - 60% Impairment.   Goal Primary G Code and Modifier:    The Patient's G Code Goal would be: Mobility    The Patient's Impairment, Limitation or Restriction Modifier goal would be best described as: CI - 1% - 20% Impairment.       Plan    Plan for next visit:  Cont with PT per POC to improve/maintain ROM, strength, endurance, proprioception and to decrease pain/discomfort.     Planned Interventions:    Home Exercise Program development   Therapeutic Exercise (ROM & Strengthening)   Manual Therapy   Neuromuscular Re-education   Ultrasound including Phonophoresis with Ketoprofen    Electrical Stimulation including Iontophoresis with Dexamethasone   Therapeutic Activities   Gait Training  Mechanical Traction        Student or PTA has been instructed in and demonstrates skills necessary to carry out above stated treatment plan: Yes    Thank you for your referral to [facility].  Please call with any questions, concerns or comments.  [phone]      Observed/supervised PTA. Plan of care reviewed and appropriate.   Yfn Kelley, PT 12:49 PM 6/27/2017

## 2017-12-28 NOTE — PROGRESS NOTES
Patient Information     Patient Name MRN Sex Kaushik Christensen [id number] Male 1950      Progress Notes by Bebe Jasso MD at 2017  2:00 PM     Author:  Bebe Jasso MD Service:  (none) Author Type:  Physician     Filed:  2017  5:59 PM Encounter Date:  2017 Status:  Signed     :  Bebe Jasso MD (Physician)            Nursing Notes:   Shanice Johnson  2017  2:01 PM  Signed  Patient is 8 weeks post op from a R) Shoulder rotator cuff surgery with Dr. Jennings on .  He states his shoulder has been feeling hot to touch and an increase of pain along with a headache,  but he just got out of his sling.  He also said he had received a tick bite in the same area X 1 week ago and is unsure if that me be causing his issues.   Shanice Johnson LPN........................2017  1:57 PM         Subjective:  Kaushik Ansari is a 66 y.o. male who presents for tick bite/ erythema    Patient is a very pleasant 66-year-old white male who had rotator cuff surgery back in April. He has recently started physical therapy in the last week as he just got out of his sling. He notes that he had an embedded tick which she doesn't think was on for longer than 24 hours. It was a regular tach. When he got out of the sling he noticed that he had some redness over his incision. The area of erythema increased with physical therapy. He notes his physical therapist and mentioned it to him. He is otherwise feeling fine and denies any fevers chills nausea vomiting or diarrhea no arthralgias nor myalgias. He is biggest concern is that he doesn't have an infection.    Allergies: reviewed in EMR  Medications: reviewed in EMR  Problem List/PMH: reviewed in EMR    Social Hx:  Social History      Substance Use Topics        Smoking status:  Former Smoker     Packs/day: 2.00     Years: 20.00     Types: Cigarettes     Quit date: 1984     Smokeless tobacco:  Former User      Types: Chew     Alcohol use  No     Social History Narrative    His wife  about , now remarried.     Works as a D[employer] .  Now retired as of .    Sarah (Dennie) - Wife    Updated  2013    **pre upload 2012**        Family Hx:   Family History      Problem  Relation Age of Onset     Cancer-breast Mother      Alcohol/Drug Father      Diabetes Daughter        Objective:  /80  Pulse 100  Temp 97.7  F (36.5  C) (Temporal)   Wt 106.6 kg (235 lb)  BMI 29.97 kg/m2    patient is alert oriented ×3 PERRLA EOMI moist mucous membranes neck supple without adenopathy. On his right shoulder he has a area of erythema extending 1 cm on either side of his surgical scar minimally warm to touch. No evidence of discharge. Nonfluctuant. He has marker around this area. At the inferior aspect seems to be receding in side based off marker. It is nontender with palpation. On the posterior aspect of his humerus he has a area of erythema that is approximately quarter-sized Center different word would take was. Lungs clear heart sounds regular. External genitalia without edema    Results for orders placed or performed in visit on 17      BLOOD CULTURE      Result  Value Ref Range    CULTURE No growth to date.    CBC WITH AUTO DIFFERENTIAL      Result  Value Ref Range    WHITE BLOOD COUNT         6.7 4.5 - 11.0 thou/cu mm    RED BLOOD COUNT           4.69 4.30 - 5.90 mil/cu mm    HEMOGLOBIN                15.3 13.5 - 17.5 g/dL    HEMATOCRIT                44.2 37.0 - 53.0 %    MCV                       94 80 - 100 fL    MCH                       32.6 26.0 - 34.0 pg    MCHC                      34.6 32.0 - 36.0 g/dL    RDW                       12.7 11.5 - 15.5 %    PLATELET COUNT            216 140 - 440 thou/cu mm    MPV                       9.9 6.5 - 11.0 fL    NEUTROPHILS               52.4 42.0 - 72.0 %    LYMPHOCYTES               35.0 20.0 - 44.0 %    MONOCYTES                 8.9 <12.0 %     EOSINOPHILS               2.7 <8.0 %    BASOPHILS                 0.9 <3.0 %    IMMATURE GRANULOCYTES(METAS,MYELOS,PROS) 0.1 %    ABSOLUTE NEUTROPHILS      3.5 1.7 - 7.0 thou/cu mm    ABSOLUTE LYMPHOCYTES      2.4 0.9 - 2.9 thou/cu mm    ABSOLUTE MONOCYTES        0.6 <0.9 thou/cu mm    ABSOLUTE EOSINOPHILS      0.2 <0.5 thou/cu mm    ABSOLUTE BASOPHILS        0.1 <0.3 thou/cu mm    ABSOLUTE IMMATURE GRANULOCYTES(METAS,MYELOS,PROS) 0.0 <=0.3 thou/cu mm         Assessment:    ICD-10-CM    1. Tick bite, initial encounter W57.XXXA CBC WITH DIFFERENTIAL      BLOOD CULTURE      CBC WITH DIFFERENTIAL      BLOOD CULTURE      CBC WITH AUTO DIFFERENTIAL        Reassurance provided no need for antibiotic at this time recommendations below    Plan:   -- Expected clinical course discussed   -- Medications and their side effects discussed  Patient Instructions   1.  Blood culture pending  2.  Normal cbc  3.  Okay to ice shoulder  4. Take 2 tablets of ibuprofen twice a day  For inflammation for 3 days.( always check temp prior if concern of infection )  5.  If redness goes beyond stephani by 1 cm , then need to be seen for possible cbc.  6.  Okay to continue PT.     7.  Tick bite;    If signs of joint aches, muscle aches;  Then may need tick labs.        Index [place] Related topics   Tick Bite   ________________________________________________________________________  KEY POINTS    Ticks are small bugs that feed on the blood of animals, birds, and people. If you find a tick attached to your skin, you have been bitten. You usually will not feel anything when a tick bites you, but you may have a little redness around the bite.    You need to remove the tick yourself or get help from your healthcare provider. Save the tick in case you later start having symptoms of disease and need to know what kind of tick bit you. Check for a rash and other symptoms for about 4 weeks after the bite.    When you are outdoors, wear long-sleeved shirts  tucked into your pants. Wear your pants tucked into your socks or boot tops if possible. Use approved tick repellents on exposed skin and clothing.  ________________________________________________________________________  What are ticks?  Ticks are small bugs that feed on the blood of animals, birds, and people. There are many different kinds of ticks. Black-legged ticks, or deer ticks, are usually no bigger than the head of a pin. Wood and dog ticks are usually much larger. They may be about a quarter of an inch before feeding and half an inch when they are full of blood.  Ticks are found in woodlands, grasslands, and marshlands and at the seashore. Wild birds and animals, as well as domestic animals and pets such as dogs, horses, and cows, can carry ticks. Ticks may climb on humans from animals, leaves, or low-lying brush. They may fall from tree leaves, especially on a windy day. Ticks cannot jump or fly.  How do I know if I have been bitten by a tick?  If you find a tick attached to your skin, you have been bitten. You usually will not feel anything when a tick bites you, but you may have a little redness around the bite.  Can I get sick from a tick bite?  There is little risk from the bite of a tick most of the time. However, some ticks carry infections that can be passed to people. For example:    Deer tick bites may cause Lyme disease. The symptoms of Lyme disease include a red, round rash that starts at the site of the bite and gets bigger. Over time, a similar rash may appear in other parts of the body. In some cases, the rash looks like a bulls-eye or target on your skin. Some people have a rash without other symptoms. If you do have other symptoms, they may include feeling very tired, headache, muscle aches, joint pain or swelling, and a fever.    Wood tick or dog tick bites may cause [place] spotted fever (RMSF). RMSF may first cause fever, headache, muscle aches, joint pain or swelling, and then  a pink or red spotted rash. You need to get treatment right away if you have these symptoms and have had a tick bite. RMSF is seen in most states, not just the [place] areas.  Tick bites may cause other diseases as well.  How are tick bites treated?  If you find a tick attached to your body, you need to remove it. You can remove it yourself or get help from your healthcare provider. To remove an attached tick:    Grasp the tick with tweezers or fingers (covered with gloves or a tissue) as close to the skin as possible.    Gently pull the tick straight away from you until it releases its hold. Use a slow gentle pulling motion. Pulling the tick out too quickly may tear the body from the mouth, leaving the mouth still in the skin. If you are unable to remove the tick completely, you may need to see your healthcare provider.    Do not twist the tick as you pull, and try not to squeeze its body. Squeezing or crushing the tick could force infected fluids from the tick into the site of the bite.  After you have removed the tick, thoroughly wash your hands and the bite area with soap and water. Put an antiseptic such as rubbing alcohol on the area where you were bitten.  Infected ticks usually do not spread an infection until after the tick has been attached and feeding on your blood for several hours. Check for a rash and other symptoms for about 4 weeks after the bite.  Save the tick in case you later start having symptoms of disease and need to know what kind of tick bit you. Put the tick in a sealed plastic bag and keep it in the freezer. Identification of the tick may help your provider diagnose and treat your symptoms. If you do not have any symptoms of disease after 1 month, you can throw the tick away.  How can I help prevent tick bites?    In areas of thick underbrush, try to stay near the center of trails.    When you are outdoors, wear long-sleeved shirts tucked into your pants. Wear your pants tucked into  your socks or boot tops if possible. A hat may help, too. Wearing light-colored clothing may make it easier to spot a small tick before it reaches your skin and bites. While you are outside, check for ticks every 4 hours and remove any ticks on clothing or exposed skin.    Use approved tick repellents on exposed skin and clothing. Don't use more repellent than recommended in the package directions. Don't put repellent on open wounds or rashes. When using sprays, don t spray the repellent directly on your face. Spray the repellent on your hands first and then put it on your face, but not near your eyes or mouth. Then wash the spray off your hands.    Adults should use products with no more than 35% DEET. Children older than 2 months can use repellents with no more than 30% DEET. DEET should be applied just once a day. Wash it off your body when you go back indoors. Some products contain more than 35% DEET. The higher concentrations are no more effective than the lower concentrations, but they may last longer. Read the label carefully before applying.    Picaridin may irritate the skin less than DEET and appears to be just as effective.    Spray clothes with repellents because insects may crawl from clothing to the skin or bite through thin clothing. Products containing permethrin are recommended for use on clothing, shoes, bed nets, and camping gear. Permethrin-treated clothing repels and kills ticks, mosquitoes, and other insects and can keep working after laundering. Permethrin should be reapplied to clothing according to the instructions on the product label. You can buy clothing and hats pretreated with permethrin. Permethrin does not work as a repellent when it is put on the skin.    Treat household pets for ticks and fleas. Check pets after they have been outdoors.    Brush off clothing and pets before entering the house.    After you have been outdoors, undress and check your body for ticks. They usually crawl  around for several hours before biting. Check your clothes, too. Wash them right away to remove any ticks.    Shower and shampoo after your outing.    Inspect any gear you have carried outdoors.    If you spend much time hiking, you may want to include a pair of tick tweezers in your first-aid kit. You can buy them at [employer] stores.  Developed by [employer].  Adult Advisor 2016.3 published by [employer].  Last modified: 2016-04-04  Last reviewed: 2016-03-31  This content is reviewed periodically and is subject to change as new health information becomes available. The information is intended to inform and educate and is not a replacement for medical evaluation, advice, diagnosis or treatment by a healthcare professional.  References   Adult Advisor 2016.3 Index    Copyright   2016 [employer], a division of [employer] All rights reserved.        Index [place] Related topics   Lyme Disease   ________________________________________________________________________  KEY POINTS    Lyme disease is an infection caused by the bite of a blacklegged tick (also called deer tick) that is infected with the bacteria.    Lyme disease is treated with antibiotics. In most cases, the symptoms go away a few weeks or months after antibiotic treatment, but sometimes the symptoms last several years.    Follow the full course of treatment prescribed by your healthcare provider. Do not stop taking antibiotics because you start to feel better or your symptoms go away.    Wear long-sleeved shirts and long pants and use insect repellant to avoid ticks. If you find a tick attached to your body, you need to remove it.  ________________________________________________________________________  What is Lyme disease?  Lyme disease is an infection caused by the bite of a blacklegged tick (also called deer tick) infected with bacteria. The tick is so small that you may not notice the tick or its bite. Most deer ticks do not  carry Lyme disease. Even if a tick is infected, it may not transfer the disease to you. An infected tick that is attached for less than 36 hours is less likely to cause Lyme disease.  If the disease is not diagnosed and treated, the symptoms can last for several years, but they usually get better over time.  What is the cause?  Ticks are found in woods, forests, grasslands, and marshlands and at the seashore. Wild birds and animals, as well as domestic animals and pets such as dogs, horses, and cows, can carry ticks. Ticks may climb on humans from animals, leaves, or low-lying brush. Ticks cannot jump or fly.  People usually become infected during the summer when they are more likely to be exposed to ticks. Hikers, campers, hunters, and people living in wooded or rural areas have a higher risk for Lyme disease. In the , the infection is more common in the northern states.  What are the symptoms?  Lyme disease is hard to diagnose because its symptoms can vary greatly from person to person. The first symptoms may not even be noticed. Symptoms may come and go in cycles lasting a week or longer.  Untreated Lyme disease may progress through these 3 stages:  Stage 1:  In the first month after a bite by an infected tick, you may get a rash at the site of your bite. The rash begins as a large red spot that may be flat or bumpy. The area of the rash feels warm, but it is not painful or itchy. The rash slowly spreads and the red color at the center of the rash may fade, creating what is called a bull's-eye rash. Sometimes the rash may blister or scab in the center. The thigh, groin, and armpit are common sites for the rash, but it can appear anywhere.  Although most infected people develop a rash, you may not have this symptom, or you may overlook it.  You may feel like you have the flu, with symptoms such as:    Feeling very tired or drowsy    Pain or stiffness in muscles and joints    Headache or jaw pain    Chills and  fever    Stiff neck  Less common symptoms of early Lyme disease are:    Red, irritated eyes    Sore throat and cough    In men, swelling of the testicles  Even if you don't get treatment, the early symptoms usually improve or go away within several weeks. However, you may feel tired, drowsy, and have muscle or joint pain for months after the rash has gone.  Stage 2:  If not treated, Lyme disease can spread to your brain, heart, and joints. Several weeks to months after the first symptoms appear, you may develop:    Weakness or paralysis of one or both sides of your face    Fever, headache, and a stiff neck    Heart problems, such as an irregular heartbeat    Confusion    Seizures    Coma  During this second stage, you may have pain in your joints, tendons, muscles, or bones, usually without joint swelling. These symptoms usually go away within a few weeks.  Stage 3:  After several months of infection, you may have:    Joint pain and swelling    Numbness or tingling in your hands and feet    Trouble concentrating    Weakness in your arms or legs    Depression  How is it diagnosed?  Your healthcare provider will ask about your symptoms and medical history and examine you. You may have a blood test for Lyme disease. Or you and your provider may decide to start treatment without the test or before the test results are available.  If you were recently bitten by a tick, saving the tick in a marked plastic bag in your freezer may help your provider diagnose your symptoms and decide on treatment.  How is it treated?  Lyme disease is treated with antibiotics. In most cases, the symptoms go away a few weeks or months after antibiotic treatment, but sometimes the symptoms last several years.   If you are in stages 2 or 3 of the disease, you may need other treatments if you have symptoms that affect your heart, nervous system, or joints.  If you are pregnant or nursing and have Lyme disease, you may pass the disease to your  baby. Although this happens rarely, you should call your healthcare provider right away if you are pregnant or nursing and are bitten by a tick or have symptoms of Lyme disease.  How can I take care of myself?  Follow the full course of treatment prescribed by your healthcare provider. You need to take all of your antibiotic medicine. Do not stop taking antibiotics because you start to feel better or your symptoms go away. Ask your healthcare provider:    How long it will take to recover    If there are activities you should avoid and when you can return to your normal activities    How to take care of yourself at home    What symptoms or problems you should watch for and what to do if you have them  Make sure you know when you should come back for a checkup.  How can I help prevent Lyme disease?    In areas of thick underbrush, try to stay near the center of trails.    When you are outdoors, wear long-sleeved shirts tucked into your pants. Wear your pants tucked into your socks or boot tops if possible. A hat may help, too. Wearing light-colored clothing may make it easier to spot a small tick before it reaches your skin and bites. While you are outside, check for ticks every 4 hours and remove any ticks on clothing or exposed skin.    Use approved tick repellents on exposed skin and clothing. Don t use more than recommended in the package directions. Do not put repellent on open wounds or rashes. When using sprays, don t spray the repellent directly on your face. Spray the repellent on your hands first and then put it on your face, but not near your eyes or mouth. Then wash the spray off your hands.    Adults should use repellent products with no more than 35% DEET. Children older than 2 months can use repellents with no more than 30% DEET. Don t put DEET on a child s hand or other body part they are likely to put in their mouth. DEET should be applied just once a day. Wash it off your body when you go back  indoors. Some products contain more than 35% DEET. The higher concentrations are no more effective than the lower concentrations, but they may last longer. Read the label carefully before applying.    Picaridin may irritate the skin less than DEET and appears to be just as effective.    Spray clothes with repellents because ticks may crawl from clothing to the skin. Products containing permethrin are recommended for use on clothing, shoes, bed nets, and camping gear. Permethrin-treated clothing repels and kills ticks, mosquitoes, and other insects and can keep working after laundering. Permethrin should be reapplied to clothing according to the instructions on the product label. You can buy clothing and hats pretreated with permethrin. Permethrin does not work as a repellent when it is put on the skin.    Treat household pets for ticks and fleas. Check pets after they've been outdoors.    Brush off clothing and pets before entering the house.    After you have been outdoors, undress and check your body for ticks. They usually crawl around for several hours before biting. Check your clothes, too. Wash them right away to remove any ticks.    If you find a tick attached to your body, you need to remove it.    Grasp the tick with tweezers or fingers (covered with gloves or a tissue) as close to the skin as possible. Gently pull the tick straight away from you until it releases its hold. Use a slow gentle pulling motion. Pulling the tick out too quickly may tear the body from the mouth, leaving the mouth still in the skin. If you are unable to remove the tick completely, you may need to see your healthcare provider. Do not twist the tick as you pull, and try not to squeeze its body.    After you have removed the tick, thoroughly wash your hands and the bite area with soap and water. Put an antiseptic such as rubbing alcohol on the area where you were bitten.    Put the tick in a sealed plastic bag and keep it in the  freezer. Identification of the tick may help your provider diagnose and treat any symptoms. If you do not have any symptoms of disease after 1 month, you can throw away the tick.    Shower and shampoo after your outing.    Inspect any gear you have carried outdoors.    If you spend much time hiking, you may want to include a pair of tick tweezers in your first-aid kit. You can buy them at sporting [employer] stores.    If you had a shot against Lyme disease (shots were available until 2002) you are probably no longer protected because the vaccine loses its effect over time. A new vaccine may be approved by the FDA in 2015. There is no vaccine available for Lyme disease for humans at this time.  Developed by [employer].  Adult Advisor 2016.3 published by [employer].  Last modified: 2016-07-13  Last reviewed: 2016-05-31  This content is reviewed periodically and is subject to change as new health information becomes available. The information is intended to inform and educate and is not a replacement for medical evaluation, advice, diagnosis or treatment by a healthcare professional.  References   Adult Advisor 2016.3 Index    Copyright   2016 [employer], a division of [employer] All rights reserved.        Index Related topics   Ehrlichiosis   ________________________________________________________________________  KEY POINTS    Ehrlichiosis is an infection caused by the bite of a tick infected with bacteria. It affects animals such as dogs, deer, coyotes, and mice, as well as humans.    Ehrlichiosis is treated with antibiotic medicine. If your symptoms are serious, you may be treated in the hospital.    Follow the full course of treatment prescribed by your healthcare provider. Do not stop taking antibiotics because you start to feel better or your symptoms go away.    Wear clothing and use insect repellant to avoid ticks. If you find a tick attached to your body, you need to remove  it.  ________________________________________________________________________  What is ehrlichiosis?  Ehrlichiosis is an infection caused by the bite of a tick infected with bacteria. It affects animals, such as dogs, deer, coyotes, and mice, as well as humans.  Rarely, especially without prompt treatment, the infection can become severe and life threatening, causing serious complications such as infection in the brain, seizures, or heart failure.  What is the cause?  Ticks are found in woods, forests, grasslands, and marshlands and at the seashore. Wild birds and animals, as well as domestic animals and pets such as dogs, horses, and cows, can carry ticks. Ticks may climb on humans from animals, leaves, or low-lying brush. Ticks cannot jump or fly.  This disease does not spread from person to person. It is spread from the bite of an infected tick. In the , the infection is more common in the southeastern and south [place] parts of the country.  What are the symptoms?  The symptoms of ehrlichiosis vary. They may appear a few days to 3 weeks after a tick bite. You may not remember getting the bite.  Symptoms may include:    Muscle aches    Feeling tired    Headache    Fever and chills    Nausea and vomiting    Cough    Joint pain    Confusion  Unlike some other infections spread by ticks, the infection does not usually cause a rash.  How is it diagnosed?  Your healthcare provider will ask about your symptoms and medical history and examine you. You may have blood tests.  How is it treated?  Ehrlichiosis is treated with antibiotic medicine, usually for 7 to 10 days. If your symptoms are serious, you may be treated in the hospital.  Without treatment, the symptoms of ehrlichiosis may last for up to 2 months. Once you start taking antibiotics, you will usually start feeling better in a couple of days.  How can I take care of myself?  Follow the full course of treatment prescribed by your healthcare provider. Do not  stop taking antibiotics because you start to feel better or your symptoms go away. Ask your healthcare provider:    How long it will take to recover    If there are activities you should avoid and when you can return to your normal activities    How to take care of yourself at home    What symptoms or problems you should watch for and what to do if you have them  Make sure you know when you should come back for a checkup.  How can I help prevent ehrlichiosis?    In areas of thick underbrush, try to stay near the center of trails.    When you are outdoors, wear long-sleeved shirts tucked into your pants. Wear long pants tucked into your socks or boot tops if possible. A hat may help, too. Wearing light-colored clothing may make it easier to spot the small tick before it reaches your skin and bites. While you are outside, check for ticks every 4 hours and remove any ticks on clothing or exposed skin.    Use approved tick repellents on exposed skin and clothing. Don't use more repellent than recommended in the package directions. Don't put repellent on open wounds or rashes. When using sprays, don t spray the repellent directly on your face. Spray the repellent on your hands first and then put it on your face, but not close to your eyes or mouth. Then wash the spray off your hands.    Adults should use products with no more than 35% DEET. Children older than 2 months can use repellents with no more than 30% DEET. DEET should be applied just once a day. Wash it off your body when you go back indoors. Some products contain more than 35% DEET. The higher concentrations are no more effective than the lower concentrations, but they may last longer. Read the label carefully before applying.    Picaridin may irritate the skin less than DEET and appears to be just as effective.    Spray clothes with repellents because insects may crawl from clothing to the skin or bite through thin clothing. Products containing permethrin are  recommended for use on clothing, shoes, bed nets, and camping gear. Permethrin-treated clothing repels and kills ticks, mosquitoes, and other insects and can keep working after laundering. Permethrin should be reapplied to clothing according to the instructions on the product label. You can buy clothing and hats pretreated with permethrin. Permethrin does not work as a repellent when it is put on the skin.    Treat household pets for ticks and fleas. Check pets after they've been outdoors.    Brush off clothing and pets before entering the house.    After you have been outdoors, undress and check your body for ticks. They usually crawl around for several hours before biting. Check your clothes, too. Wash them right away to remove any ticks.    If you find a tick attached to your body, you need to remove it.    Grasp the tick with tweezers or fingers (covered with gloves or a tissue) as close to the skin as possible. Gently pull the tick straight away from you until it releases its hold. Use a slow gentle pulling motion. Pulling the tick out too quickly may tear the body from the mouth, leaving the mouth still in the skin. If you are unable to remove the tick completely, you may need to see your healthcare provider. Do not twist the tick as you pull, and try not to squeeze its body.    After you have removed the tick, thoroughly wash your hands and the bite area with soap and water. Put an antiseptic such as rubbing alcohol on the area where you were bitten.    Put the tick in a sealed plastic bag and keep it in the freezer. Identification of the tick may help your provider diagnose and treat any symptoms. If you do not have any symptoms of disease after 1 month, you can throw away the tick.    Shower and shampoo after your outing.    Inspect any gear you were carrying.    If you spend much time hiking, you may want to include a pair of tick tweezers in your first-aid kit. You can buy them at [employer]  stores.  Developed by [employer].  Adult Advisor 2016.3 published by [employer].  Last modified: 2016-05-31  Last reviewed: 2016-05-31  This content is reviewed periodically and is subject to change as new health information becomes available. The information is intended to inform and educate and is not a replacement for medical evaluation, advice, diagnosis or treatment by a healthcare professional.  References   Adult Advisor 2016.3 Index    Copyright   2016 [employer], a division of [employer] All rights reserved.           Electronically signed by Bebe Jasso MD

## 2017-12-28 NOTE — PROGRESS NOTES
"Patient Information     Patient Name MRN Kaushik Miguel [id number] Male 1950      Progress Notes by Yfn Kelley, PT at 2017  8:57 AM     Author:  Yfn Kelley, PT  Service:  (none) Author Type:  PT- Physical Therapist     Filed:  2017  9:41 AM  Date of Service:  2017  8:57 AM Status:  Addendum     :  Yfn Kelley PT (PT- Physical Therapist)        Related Notes: Original Note by Yfn Kelley, PT (PT- Physical Therapist) filed at 2017  9:41 AM            [facility]  Outpatient PT - Daily Note      Date of Service: 2017    Visit: 2/10   Total Visits: 11  Updated PSFS: 6/15/2017, visit #9     Patient Name: \"Jean Claude\" Kaushik Ansari   YOB: 1950   Referring MD/Provider: Dick  Diagnosis: S/P arthroscopic right shoulder surgery Z98.890    Medical & Treatment Diagnosis: S/P arthroscopic right shoulder surgery Z98.890   Insurance:  Medicare  Start of Care Date: 17  Certification Dates: From Start of Service to Re-Cert Due: 17  Preadmission Functional Mobility: Independent  Instructions:  DOS 17  Start rehab now   Phase 1 - no passive ROM at shoulder   Phase 2A - on    Phase 2B - on  -out of pillow in the daytime, wall walk 1X per every hour awake.  Focus is full passive forward flexion by 8 weeks post op.   Wear pillow at night   Phase 3 - on  - out of all gear then.      Subjective      Pain Ratin-3/10; Location: R shoulder  He's using his R UE more and more each day. He's not fully comfortable laying on his R side yet. Was sore last night but he did use his arm quite a bit yesterday. Putting dishes away in the cupboard is improving. He shaved this morning with his R arm for the first time.     Objective    DATE: 5/17 5/31 6/1 6/6 6/8 6/13 6/15 6/20   ROM:  R shoulder flexion 90 P 130 P 135 P 139 P 155  AA  150 AA  95 A 100 A     DATE:          ROM:   120 A       " "  P=PROM  AA=AAROM  A=AROM    Today's Intervention:   Therapeutic Exercise to improve mobility, strength and endurance:  - arm bike 5' fwd    Manual Therapy to help improve tissue mobility, improve circulation and to decrease tissue tension thru the use of IASTM with the Graston Technique  - GT3 to posterior, lateral, anterior R shoulder & R UT  - GT3 over scar on R shoulder    Therapeutic Exercise to improve mobility, strength and endurance:  - isometrics: flex, abd, IR, ER 5\" hold x5 each  - reaching for cones in random postitions  - OH pulleys, hold end range flexion 60 second hold x3, end range IR 60\" x3  - ball on wall cw/ccw  - scap sets with green band  - educ on using his R UE for ADLs throughout the day  - reviewed HEP and self mgm techniques     Home Exercise Program:   - wall slides  - standing AAROM with wand  - using his R UE for ADLs  - scap sets  - isometrics   - OH pulleys    Assessment    Goals:   STGs to be reached in 3-6 weeks   1. Pt to be independent with HEP and self mgmt techniques. GOAL MET AT THIS TIME   2. Pt to demo full PROM of R shoulder for improved mobility   3. Pt to have 1/10 pain or less throughout the day for improved ADLs     LTGs to be reached in 6-12 weeks  1. Pt to be able use his R UE for everyday ADLs like dressing, eating, drinking  2. Pt to have 0/10 pain or less throughout the day for improved ADLs  3. Pt to have at least 5-/5 MMT of R shoulder for improved strength and mobility  4. Pt to be able to sleep throughout the night without waking do to pain for improved health and wellness.      Patient Specific Functional and Pain Scales (PSFS) on 5/3/2017:                         Clinician Instructions: Complete after the history and before the exam.                         Initial Assessment: We want to know what 3 activities in your life you are unable to perform, or are having the most difficulty performing, as a result of your chief problem. Please list and score at least " 3 activities that you are unable to perform, or having the most difficulty performing, because of your chief problem.   Patient Specific Activity Scoring Scheme (score one number for each activity):   Activity Score (0-10)  0= Unable to perform activity  10= Able to perform activity at same level as before injury or problem   1. Reaching overhead with R UE 0/10   2. Using R UE for dressing 0/10   3. Using R UE for drinking/eating 0/10               Totals:  0/30 = 0% ability which relates to 100% impairment                         Patient verbally states that they understand that the information they have provided above is current and complete to the best of their knowledge.                         Patient Specific Functional Scale Modifier Scale Conversion: (patient's modifier that correlates with pt's score on PSFS): 0-CN (100% Impaired).     G codes and Modifier taken from patient completing the PSFS:   Initial Primary G Code and Modifier:                         Per the Patient's intake and/or assessment the Primary G Code is: Mobility .                        The Patient's Impairment, Limitation or Restriction Modifier would be best described as: CN - 100% Impairment.   Goal Primary G Code and Modifier:                         The Patient's G Code Goal would be: Mobility                         The Patient's Impairment, Limitation or Restriction Modifier goal would be best described as: CI - 1% - 20% Impairment.     Updated Patient Specific Functional and Pain Scales (PSFS) on 6/15/2017:    Clinician Instructions: Complete after the history and before the exam.    Initial Assessment: We want to know what 3 activities in your life you are unable to perform, or are having the most difficulty performing, as a result of your chief problem. Please list and score at least 3 activities that you are unable to perform, or having the most difficulty performing, because of your chief problem.   Patient Specific  Activity Scoring Scheme (score one number for each activity):   Activity Score (0-10)  0= Unable to perform activity  10= Able to perform activity at same level as before injury or problem   1. Reaching overhead with R UE 5/10   2. Using R UE for dressing 5/10   3. Using R UE for drinking/eating 5/10           Totals:  15/30 = 50% ability which relates to 50% impairment    Patient verbally states that they understand that the information they have provided above is current and complete to the best of their knowledge.    Patient Specific Functional Scale Modifier Scale Conversion: (patient's modifier that correlates with pt's score on PSFS): 5-CK (50% Impaired).    G codes and Modifier taken from patient completing the PSFS:   Current Primary G Code and Modifier:    Per the Patient's intake and/or assessment the Primary G Code is: Mobility .   The Patient's Impairment, Limitation or Restriction Modifier would be best described as: CK - 40% - 60% Impairment.   Goal Primary G Code and Modifier:    The Patient's G Code Goal would be: Mobility    The Patient's Impairment, Limitation or Restriction Modifier goal would be best described as: CI - 1% - 20% Impairment.       Plan    Plan for next visit:  Cont with PT per POC to improve/maintain ROM, strength, endurance, proprioception and to decrease pain/discomfort.     Planned Interventions:    Home Exercise Program development   Therapeutic Exercise (ROM & Strengthening)   Manual Therapy   Neuromuscular Re-education   Ultrasound including Phonophoresis with Ketoprofen   Electrical Stimulation including Iontophoresis with Dexamethasone   Therapeutic Activities   Gait Training  Mechanical Traction        Student or PTA has been instructed in and demonstrates skills necessary to carry out above stated treatment plan: Yes    Thank you for your referral to [facility].  Please call with any questions, concerns or comments.  [phone]

## 2017-12-28 NOTE — PROGRESS NOTES
"Patient Information     Patient Name MRN Kaushik Miguel [id number] Male 1950      Progress Notes by Yfn Kelley, PT at 2017  8:57 AM     Author:  Yfn Kelley, PT Service:  (none) Author Type:  PT- Physical Therapist     Filed:  2017  9:41 AM Date of Service:  2017  8:57 AM Status:  Signed     :  Yfn Kelley, PT (PT- Physical Therapist)            [facility]  Outpatient PT - Daily Note      Date of Service: 2017    Visit: 4/10   Total Visits: 13  Updated PSFS: 6/15/2017, visit #9     Patient Name: \"Jean Claude\" Kaushik Ansari   YOB: 1950   Referring MD/Provider: Dick  Diagnosis: S/P arthroscopic right shoulder surgery Z98.890    Medical & Treatment Diagnosis: S/P arthroscopic right shoulder surgery Z98.890   Insurance:  Medicare  Start of Care Date: 17  Certification Dates: From Start of Service to Re-Cert Due: 17  Preadmission Functional Mobility: Independent  Instructions:  DOS 17  Start rehab now   Phase 1 - no passive ROM at shoulder   Phase 2A - on    Phase 2B - on  - out of pillow in the daytime, wall walk 1X per every hour awake.  Focus is full passive forward flexion by 8 weeks post op.   Wear pillow at night   Phase 3 - on  - out of all gear then.      Subjective      Pain Rating: 3-4/10; Location: R shoulder  Gets some discomfort in his shoulder from time to time as he expects. States that it is \"definetaly\" getting better. Putting dishes up into the cupboard with his R UE is improving. He's also sleeping better on his R side.    Objective    DATE: 5/17 5/31 6/1 6/6 6/8 6/13 6/15 6/20   ROM:  R shoulder flexion 90 P 130 P 135 P 139 P 155  AA  150 AA  95 A 100 A     DATE:         ROM:   120 A 155 AA  120 A        P=PROM  AA=AAROM  A=AROM    Today's Intervention:   Therapeutic Exercise to improve mobility, strength and endurance:  - arm bike 5 minutes fwd x3 minutes " back    Manual Therapy to help improve tissue mobility, improve circulation and to decrease tissue tension thru the use of IASTM with the Graston Technique  - GT3 to posterior, lateral, anterior R shoulder & R UT  - GT3 over scar on R shoulder    Therapeutic Exercise to improve mobility, strength and endurance:  - ball on wall cw/ccw until fatigued x2  - PROM to end range flexion, pt then holds end range as long as possible x2  - reviewed isometrics  - reaching for cones in random positions with eyes closed x10  - MedX lat pull used as stretch, 120#, hold per pt tolerance x2  - MedX rows 80# x15  - reviewed HEP and self mgm techniques     Home Exercise Program:   - wall slides  - standing AAROM with wand  - using his R UE for ADLs  - scap sets  - isometrics   - OH pulleys    Date: 06/27/2017 Prepared by: Ary Vargas Access Code: [email or web address]    Bent Shoulder Horizontal Abduction and Adduction with Table Support - 10-30 reps - 1 sets - 0 hold - 1x daily - 7x weekly    Assessment    Goals:   STGs to be reached in 3-6 weeks   1. Pt to be independent with HEP and self mgmt techniques. GOAL MET AT THIS TIME   2. Pt to demo full PROM of R shoulder for improved mobility   3. Pt to have 1/10 pain or less throughout the day for improved ADLs     LTGs to be reached in 6-12 weeks  1. Pt to be able use his R UE for everyday ADLs like dressing, eating, drinking  2. Pt to have 0/10 pain or less throughout the day for improved ADLs  3. Pt to have at least 5-/5 MMT of R shoulder for improved strength and mobility  4. Pt to be able to sleep throughout the night without waking do to pain for improved health and wellness.      Patient Specific Functional and Pain Scales (PSFS) on 5/3/2017:                         Clinician Instructions: Complete after the history and before the exam.                         Initial Assessment: We want to know what 3 activities in your life you are unable to perform, or are having the most difficulty  performing, as a result of your chief problem. Please list and score at least 3 activities that you are unable to perform, or having the most difficulty performing, because of your chief problem.   Patient Specific Activity Scoring Scheme (score one number for each activity):   Activity Score (0-10)  0= Unable to perform activity  10= Able to perform activity at same level as before injury or problem   1. Reaching overhead with R UE 0/10   2. Using R UE for dressing 0/10   3. Using R UE for drinking/eating 0/10               Totals:  0/30 = 0% ability which relates to 100% impairment                         Patient verbally states that they understand that the information they have provided above is current and complete to the best of their knowledge.                         Patient Specific Functional Scale Modifier Scale Conversion: (patient's modifier that correlates with pt's score on PSFS): 0-CN (100% Impaired).     G codes and Modifier taken from patient completing the PSFS:   Initial Primary G Code and Modifier:                         Per the Patient's intake and/or assessment the Primary G Code is: Mobility .                        The Patient's Impairment, Limitation or Restriction Modifier would be best described as: CN - 100% Impairment.   Goal Primary G Code and Modifier:                         The Patient's G Code Goal would be: Mobility                         The Patient's Impairment, Limitation or Restriction Modifier goal would be best described as: CI - 1% - 20% Impairment.     Updated Patient Specific Functional and Pain Scales (PSFS) on 6/15/2017:    Clinician Instructions: Complete after the history and before the exam.    Initial Assessment: We want to know what 3 activities in your life you are unable to perform, or are having the most difficulty performing, as a result of your chief problem. Please list and score at least 3 activities that you are unable to perform, or having the  most difficulty performing, because of your chief problem.   Patient Specific Activity Scoring Scheme (score one number for each activity):   Activity Score (0-10)  0= Unable to perform activity  10= Able to perform activity at same level as before injury or problem   1. Reaching overhead with R UE 5/10   2. Using R UE for dressing 5/10   3. Using R UE for drinking/eating 5/10           Totals:  15/30 = 50% ability which relates to 50% impairment    Patient verbally states that they understand that the information they have provided above is current and complete to the best of their knowledge.    Patient Specific Functional Scale Modifier Scale Conversion: (patient's modifier that correlates with pt's score on PSFS): 5-CK (50% Impaired).    G codes and Modifier taken from patient completing the PSFS:   Current Primary G Code and Modifier:    Per the Patient's intake and/or assessment the Primary G Code is: Mobility .   The Patient's Impairment, Limitation or Restriction Modifier would be best described as: CK - 40% - 60% Impairment.   Goal Primary G Code and Modifier:    The Patient's G Code Goal would be: Mobility    The Patient's Impairment, Limitation or Restriction Modifier goal would be best described as: CI - 1% - 20% Impairment.       Plan    Plan for next visit:  Cont with PT per POC to improve/maintain ROM, strength, endurance, proprioception and to decrease pain/discomfort.     Planned Interventions:    Home Exercise Program development   Therapeutic Exercise (ROM & Strengthening)   Manual Therapy   Neuromuscular Re-education   Ultrasound including Phonophoresis with Ketoprofen   Electrical Stimulation including Iontophoresis with Dexamethasone   Therapeutic Activities   Gait Training  Mechanical Traction        Student or PTA has been instructed in and demonstrates skills necessary to carry out above stated treatment plan: Yes    Thank you for your referral to [facility].   Please call with any questions, concerns or comments.  [phone]

## 2017-12-28 NOTE — PROGRESS NOTES
"Patient Information     Patient Name MRN Kaushik Miguel [id number] Male 1950      Progress Notes by Yfn Kelley, PT at 2017 11:15 AM     Author:  Yfn Kelley, PT Service:  (none) Author Type:  PT- Physical Therapist     Filed:  2017 11:58 AM Date of Service:  2017 11:15 AM Status:  Signed     :  Yfn Kelley, PT (PT- Physical Therapist)            [facility]  Outpatient PT - Daily Note      Date of Service: 2017    Visit: 6/10   Total Visits: 15  Updated PSFS: 6/15/2017, visit #9     Patient Name: \"Jean Claude\" Kaushik Ansari   YOB: 1950   Referring MD/Provider: Dick  Diagnosis: S/P arthroscopic right shoulder surgery Z98.890    Medical & Treatment Diagnosis: S/P arthroscopic right shoulder surgery Z98.890   Insurance:  Medicare  Start of Care Date: 17  Certification Dates: From Start of Service to Re-Cert Due: 17  Preadmission Functional Mobility: Independent  Instructions:  DOS 17  Start rehab now   Phase 1 - no passive ROM at shoulder   Phase 2A - on    Phase 2B - on  - out of pillow in the daytime, wall walk 1X per every hour awake.  Focus is full passive forward flexion by 8 weeks post op.   Wear pillow at night   Phase 3 - on  - out of all gear then.      Subjective      Pain Rating: 3-4/10; Location: R shoulder  Continues to feel that he is getting more & more motion each week. Putting dishes away is less hard. He has good and bad days.   His thumb still hurts and clicks, he may ask Dr Jennings about that at his f/u on Monday.     Objective    DATE: 5/17 5/31 6/1 6/6 6/8 6/13 6/15 6/20   ROM:  R shoulder flexion 90 P 130 P 135 P 139 P 155  AA  150 AA  95 A 100 A     DATE:       ROM:   120 A 155 AA  120 A 160 AA  140 A 160 AA  145 A      P=PROM  AA=AAROM  A=AROM    Today's Intervention:   Therapeutic Exercise to improve mobility, strength and endurance:  - arm " bike 5 minutes fwd    Manual Therapy to help improve tissue mobility, improve circulation and to decrease tissue tension thru the use of IASTM with the Graston Technique  - GT3 to posterior, lateral, anterior R shoulder & R UT  - GT3 over scar on R shoulder    Therapeutic Exercise to improve mobility, strength and endurance:  - MedX lat pull used as stretch, 120#, hold per pt tolerance x2  - overhead activity threading string thru chain until pt fatigued  - placing cones on/off high shelf in work cond room, 3# wt on wrist. 3 cones on/off x3  - standing AROM flex, abd, IR, ER with red band and eccentric control, 10 each  - educ on importance of continuing to work on mobility/motion  - reviewed HEP and self mgm techniques     Home Exercise Program:   - wall slides  - standing AAROM with wand  - using his R UE for ADLs  - scap sets  - isometrics   - OH pulleys    Date: 06/27/2017 Prepared by: Ary Vargas Access Code: [email or web address]    Bent Shoulder Horizontal Abduction and Adduction with Table Support - 10-30 reps - 1 sets - 0 hold - 1x daily - 7x weekly    Assessment    Goals:   STGs to be reached in 3-6 weeks   1. Pt to be independent with HEP and self mgmt techniques. GOAL MET AT THIS TIME   2. Pt to demo full PROM of R shoulder for improved mobility   3. Pt to have 1/10 pain or less throughout the day for improved ADLs     LTGs to be reached in 6-12 weeks  1. Pt to be able use his R UE for everyday ADLs like dressing, eating, drinking  2. Pt to have 0/10 pain or less throughout the day for improved ADLs  3. Pt to have at least 5-/5 MMT of R shoulder for improved strength and mobility  4. Pt to be able to sleep throughout the night without waking do to pain for improved health and wellness.      Patient Specific Functional and Pain Scales (PSFS) on 5/3/2017:                         Clinician Instructions: Complete after the history and before the exam.                         Initial Assessment: We want to know what 3  activities in your life you are unable to perform, or are having the most difficulty performing, as a result of your chief problem. Please list and score at least 3 activities that you are unable to perform, or having the most difficulty performing, because of your chief problem.   Patient Specific Activity Scoring Scheme (score one number for each activity):   Activity Score (0-10)  0= Unable to perform activity  10= Able to perform activity at same level as before injury or problem   1. Reaching overhead with R UE 0/10   2. Using R UE for dressing 0/10   3. Using R UE for drinking/eating 0/10               Totals:  0/30 = 0% ability which relates to 100% impairment                         Patient verbally states that they understand that the information they have provided above is current and complete to the best of their knowledge.                         Patient Specific Functional Scale Modifier Scale Conversion: (patient's modifier that correlates with pt's score on PSFS): 0-CN (100% Impaired).     G codes and Modifier taken from patient completing the PSFS:   Initial Primary G Code and Modifier:                         Per the Patient's intake and/or assessment the Primary G Code is: Mobility .                        The Patient's Impairment, Limitation or Restriction Modifier would be best described as: CN - 100% Impairment.   Goal Primary G Code and Modifier:                         The Patient's G Code Goal would be: Mobility                         The Patient's Impairment, Limitation or Restriction Modifier goal would be best described as: CI - 1% - 20% Impairment.     Updated Patient Specific Functional and Pain Scales (PSFS) on 6/15/2017:    Clinician Instructions: Complete after the history and before the exam.    Initial Assessment: We want to know what 3 activities in your life you are unable to perform, or are having the most difficulty performing, as a result of your chief problem.  Please list and score at least 3 activities that you are unable to perform, or having the most difficulty performing, because of your chief problem.   Patient Specific Activity Scoring Scheme (score one number for each activity):   Activity Score (0-10)  0= Unable to perform activity  10= Able to perform activity at same level as before injury or problem   1. Reaching overhead with R UE 5/10   2. Using R UE for dressing 5/10   3. Using R UE for drinking/eating 5/10           Totals:  15/30 = 50% ability which relates to 50% impairment    Patient verbally states that they understand that the information they have provided above is current and complete to the best of their knowledge.    Patient Specific Functional Scale Modifier Scale Conversion: (patient's modifier that correlates with pt's score on PSFS): 5-CK (50% Impaired).    G codes and Modifier taken from patient completing the PSFS:   Current Primary G Code and Modifier:    Per the Patient's intake and/or assessment the Primary G Code is: Mobility .   The Patient's Impairment, Limitation or Restriction Modifier would be best described as: CK - 40% - 60% Impairment.   Goal Primary G Code and Modifier:    The Patient's G Code Goal would be: Mobility    The Patient's Impairment, Limitation or Restriction Modifier goal would be best described as: CI - 1% - 20% Impairment.       Plan    Plan for next visit:  Cont with PT per POC to improve/maintain ROM, strength, endurance, proprioception and to decrease pain/discomfort.     Planned Interventions:    Home Exercise Program development   Therapeutic Exercise (ROM & Strengthening)   Manual Therapy   Neuromuscular Re-education   Ultrasound including Phonophoresis with Ketoprofen   Electrical Stimulation including Iontophoresis with Dexamethasone   Therapeutic Activities   Gait Training  Mechanical Traction        Student or PTA has been instructed in and demonstrates skills necessary to carry out above stated  treatment plan: Yes    Thank you for your referral to [facility].  Please call with any questions, concerns or comments.  [phone]

## 2017-12-28 NOTE — PROGRESS NOTES
"Patient Information     Patient Name MRN Sex Kaushik Christensen [id number] Male 1950      Progress Notes by Yfn Kelley, PT at 2017  8:56 AM     Author:  Yfn Kelley, PT Service:  (none) Author Type:  PT- Physical Therapist     Filed:  2017  9:27 AM Date of Service:  2017  8:56 AM Status:  Signed     :  Yfn Kelley PT (PT- Physical Therapist)            [facility]  Outpatient PT - Daily Note      Date of Service: 2017    Visit: 7     Patient Name: \"Jean Claude\" Kaushik Ansari   YOB: 1950   Referring MD/Provider: Dick  Diagnosis: S/P arthroscopic right shoulder surgery Z98.890    Medical & Treatment Diagnosis: S/P arthroscopic right shoulder surgery Z98.890   Insurance:  Medicare  Start of Care Date: 17  Certification Dates: From Start of Service to Re-Cert Due: 17  Preadmission Functional Mobility: Independent  Instructions:  DOS 17  Start rehab now   Phase 1 - no passive ROM at shoulder   Phase 2A - on    Phase 2B - on  -out of pillow in the daytime, wall walk 1X per every hour awake.  Focus is full passive forward flexion by 8 weeks post op.   Wear pillow at night   Phase 3 - on  - out of all gear then.      Subjective      Pain Ratin/10; Location: R shoulder  States compliance with HEP & self management techniques. Has been working hard on his motion     Objective    DATE:      ROM:  R shoulder flexion 90 P 130 P 135 P 139 P 155 AA     P=PROM  AA=AAROM    Today's Intervention:   Therapeutic Exercise to improve mobility, strength and endurance:  - PROM R shoulder flexion, hold end range 60-90 second hold x5; jt oscillations between sets  - PA/AP mobs   - supine AAROM flexion with wand, 2# wt on wand. 90\" hold x3  - R scap mobility, pt L side ly  - OH pulleys, hold end range flexion 60 second hold x3  - wall slide, 60 second hold - instruction on 1x/hr     Pt declined [employer] " today due to having another commitment     Home Exercise Program:   - R elbow, wrist AROM    Assessment    Goals:   STGs to be reached in 3-6 weeks  1. Pt to be independent with HEP and self mgmt techniques.  2. Pt to demo full PROM of R shoulder for improved mobility  3. Pt to have 1/10 pain or less throughout the day for improved ADLs     LTGs to be reached in 6-12 weeks  1. Pt to be able use his R UE for everyday ADLs like dressing, eating, drinking  2. Pt to have 0/10 pain or less throughout the day for improved ADLs  3. Pt to have at least 5-/5 MMT of R shoulder for improved strength and mobility  4. Pt to be able to sleep throughout the night without waking do to pain for improved health and wellness.      Patient Specific Functional and Pain Scales (PSFS) on 5/3/2017:                         Clinician Instructions: Complete after the history and before the exam.                         Initial Assessment: We want to know what 3 activities in your life you are unable to perform, or are having the most difficulty performing, as a result of your chief problem. Please list and score at least 3 activities that you are unable to perform, or having the most difficulty performing, because of your chief problem.   Patient Specific Activity Scoring Scheme (score one number for each activity):   Activity Score (0-10)  0= Unable to perform activity  10= Able to perform activity at same level as before injury or problem   1. Reaching overhead with R UE 0/10   2. Using R UE for dressing 0/10   3. Using R UE for drinking/eating 0/10               Totals:  0/30 = 0% ability which relates to 100% impairment                         Patient verbally states that they understand that the information they have provided above is current and complete to the best of their knowledge.                         Patient Specific Functional Scale Modifier Scale Conversion: (patient's modifier that correlates with pt's score on PSFS): 0-CN  (100% Impaired).     G codes and Modifier taken from patient completing the PSFS:   Initial Primary G Code and Modifier:                         Per the Patient's intake and/or assessment the Primary G Code is: Mobility .                        The Patient's Impairment, Limitation or Restriction Modifier would be best described as: CN - 100% Impairment.   Goal Primary G Code and Modifier:                         The Patient's G Code Goal would be: Mobility                         The Patient's Impairment, Limitation or Restriction Modifier goal would be best described as: CI - 1% - 20% Impairment.       Plan    Plan for next visit:  Cont with PT per POC to improve/maintain ROM, strength, endurance, proprioception and to decrease pain/discomfort.     Planned Interventions:    Home Exercise Program development   Therapeutic Exercise (ROM & Strengthening)   Manual Therapy   Neuromuscular Re-education   Ultrasound including Phonophoresis with Ketoprofen   Electrical Stimulation including Iontophoresis with Dexamethasone   Therapeutic Activities   Gait Training  Mechanical Traction        Student or PTA has been instructed in and demonstrates skills necessary to carry out above stated treatment plan: Yes    Thank you for your referral to [facility].  Please call with any questions, concerns or comments.  [phone]

## 2017-12-28 NOTE — TELEPHONE ENCOUNTER
Patient Information     Patient Name MRN Sex Kaushik Christensen [id number] Male 1950      Telephone Encounter by Margarette Padilla RN at 2017 12:07 PM     Author:  Margarette Padilla RN Service:  (none) Author Type:  NURS- Registered Nurse     Filed:  2017 12:09 PM Encounter Date:  2017 Status:  Signed     :  Margarette Padilla RN (NURS- Registered Nurse)            [facility]    Office visit in the past 12 months or per provider note.    Last visit with SID MONDRAGON was on: 2017 in [facility]  Next visit with SID MONDRAGON is on: No future appointment listed with this provider  Next visit with Family Practice is on: No future appointment listed in this department    Max refill for 12 months from last office visit or per provider note.  Prescription refilled per RN Medication Refill Policy.................... MARGARETTE PADILLA RN ....................  2017   12:08 PM

## 2017-12-29 NOTE — PATIENT INSTRUCTIONS
Patient Information     Patient Name MRN Kaushik Miguel [id number] Male 1950      Patient Instructions by Alec Howard MD at 2017 10:15 AM     Author:  Alec Howard MD Service:  (none) Author Type:  Physician     Filed:  2017 10:45 AM Encounter Date:  2017 Status:  Signed     :  Alec Howard MD (Physician)            NettiPot and/or Flonase- 2 sprays in each side daily.

## 2017-12-29 NOTE — DISCHARGE SUMMARY
"Patient Information     Patient Name MRN Sex Kaushik Christensen [id number] Male 1950      Discharge Summaries by Yfn Kelley PT at 10/10/2017  9:42 AM     Author:  Yfn Kelley PT Service:  (none) Author Type:  PT- Physical Therapist     Filed:  10/10/2017  9:43 AM Date of Service:  10/10/2017  9:42 AM Status:  Signed     :  Yfn Kelley PT (PT- Physical Therapist)            [facility]  Outpatient PT - Discharge Summary    Date of discharge: 10/10/2017     Patient Name: \"Jean Claude\" Kaushik Ansari   YOB: 1950   Referring MD/Provider: Dick  Diagnosis: S/P arthroscopic right shoulder surgery Z98.890    Medical & Treatment Diagnosis: S/P arthroscopic right shoulder surgery Z98.890   Insurance:  Medicare  Start of Care Date: 17  Certification Dates: Re-Cert Due: 17  Preadmission Functional Mobility: Independent    Dates of Service: 5/3/17    Thru:  17  Number of visits: 16         Reason for Discharge:  Pt has not returned to PT indicating that he is doing well on his own with HEP and self mgmt techniques. He had a f/u with Dr Jennings on . Please refer to pt's medical record/notes for any additional information.  This is an unplanned DC     Patient is discharged from Physical Therapy    Thank you for your referral to [facility].  Please call with any questions, concerns or comments.  [phone]      Yfn Kelley PT, ATC         "

## 2017-12-30 NOTE — NURSING NOTE
Patient Information     Patient Name MRN Sex Kaushik Christensen [id number] Male 1950      Nursing Note by Shanice Johnson at 2017  2:00 PM     Author:  Shanice Johnson Service:  (none) Author Type:  (none)     Filed:  2017  2:01 PM Encounter Date:  2017 Status:  Signed     :  Shanice Johnson            Patient is 8 weeks post op from a R) Shoulder rotator cuff surgery with Dr. Jennings on .  He states his shoulder has been feeling hot to touch and an increase of pain along with a headache,  but he just got out of his sling.  He also said he had received a tick bite in the same area X 1 week ago and is unsure if that me be causing his issues.   Shanice Johnson LPN........................2017  1:57 PM

## 2017-12-30 NOTE — NURSING NOTE
Patient Information     Patient Name MRN Sex Kaushik Christensen [id number] Male 1950      Nursing Note by Darshana Reyes at 2017  9:15 AM     Author:  Darshana Reyes Service:  (none) Author Type:  (none)     Filed:  2017  9:58 AM Encounter Date:  2017 Status:  Signed     :  Darshana Reyes            Universal Protocol    A. Pre-procedure verification complete yes  1-relevant information / documentation available, reviewed and properly matched to the patient; 2-consent accurate and complete, 3-equipment and supplies available    B. Site marking complete N/A  Site marked if not in continuous attendance with patient    C. TIME OUT completed yes  Time Out was conducted just prior to starting procedure to verify the eight required elements: 1-patient identity, 2-consent accurate and complete, 3-position, 4-correct side/site marked (if applicable), 5-procedure, 6-relevant images / results properly labeled and displayed (if applicable), 7-antibiotics / irrigation fluids (if applicable), 8-safety precautions.    Darshana Reyes LPN .......2017 9:58 AM

## 2017-12-30 NOTE — NURSING NOTE
Patient Information     Patient Name MRN Sex Kaushik Christensen [id number] Male 1950      Nursing Note by Darshana Reyes at 2017  8:30 AM     Author:  Darshana Reyes Service:  (none) Author Type:  (none)     Filed:  2017  8:32 AM Encounter Date:  2017 Status:  Signed     :  Darshana Reyes            Patient is here for a follow up on his right shoulder.  DOS: 17  Darshana Reyes LPN .......2017 8:31 AM

## 2017-12-30 NOTE — NURSING NOTE
Patient Information     Patient Name MRN Sex Kaushik Christensen [id number] Male 1950      Nursing Note by Gosselin, Norma J at 2017  9:15 AM     Author:  Gosselin, Norma J Service:  (none) Author Type:  (none)     Filed:  2017  9:21 AM Encounter Date:  2017 Status:  Signed     :  Gosselin, Norma J            Follow up right shoulder DOS: 17  Norma J Gosselin LPN....................  2017   9:20 AM

## 2017-12-30 NOTE — NURSING NOTE
Patient Information     Patient Name MRN Sex Kaushik Christensen [id number] Male 1950      Nursing Note by Darshana Reyes at 2017  8:15 AM     Author:  Darshana Reyes Service:  (none) Author Type:  (none)     Filed:  2017  8:15 AM Encounter Date:  2017 Status:  Signed     :  Darshana Reyes            Patient is here to follow up on his right shoulder.  DOS: 17  Darshana Reyes LPN .......2017 8:15 AM

## 2017-12-30 NOTE — DISCHARGE SUMMARY
"Patient Information     Patient Name MRN Kaushik Miguel [id number] Male 1950      Discharge Summaries by Yfn Kelley, PT at 2017 11:13 AM     Author:  Yfn Kelley, PT Service:  (none) Author Type:  PT- Physical Therapist     Filed:  2017 11:55 AM Date of Service:  2017 11:13 AM Status:  Signed     :  Yfn Kelley PT (PT- Physical Therapist)            [facility]  Outpatient PT - Daily Note/Discharge Summary      Date of Service: 2017    Visit: 7/10   Total Visits: 16  Updated PSFS: 6/15/2017, visit #9  Updated PSFS: 2017, visit #16     Patient Name: \"Jean Claude\" Kaushik Ansari   YOB: 1950   Referring MD/Provider: Dick  Diagnosis: S/P arthroscopic right shoulder surgery Z98.890    Medical & Treatment Diagnosis: S/P arthroscopic right shoulder surgery Z98.890   Insurance:  Medicare  Start of Care Date: 17  Certification Dates: Re-Cert Due: 17  Preadmission Functional Mobility: Independent  Instructions:  DOS 17  Start rehab now   Phase 1 - no passive ROM at shoulder   Phase 2A - on    Phase 2B - on  - out of pillow in the daytime, wall walk 1X per every hour awake.  Focus is full passive forward flexion by 8 weeks post op.   Wear pillow at night   Phase 3 - on  - out of all gear then.    Subjective      Pain Ratin-2/10; Location: R shoulder  He saw Dr Jennings on Monday, see his note for details. Also talked to him about his thumb.  Pt was sore after last session, he thinks from the AROM and the band.   Pt feels that he can cont on his own, [facility]. He is happy with his progress.  Sleeping has improved     Objective    DATE: 5/17 5/31 6/1 6/6 6/8 6/13 6/15 6/20   ROM:  R shoulder flexion 90 P 130 P 135 P 139 P 155  AA  150 AA  95 A 100 A     DATE:      ROM:   120 A 155 AA  120 A 160 AA  140 A 160 AA  145 " A 160 AA  145 A     P=PROM  AA=AAROM  A=AROM    Today's Intervention:   Therapeutic Exercise to improve mobility, strength and endurance:  - arm bike 6 minutes fwd    Manual Therapy to help improve tissue mobility, improve circulation and to decrease tissue tension thru the use of IASTM with the Graston Technique  - GT4 to posterior, lateral, anterior R shoulder & R UT  - GT4 over scar on R shoulder    Therapeutic Exercise to improve mobility, strength and endurance:  - overhead activity threading string thru chain until pt fatigued  - placing cones on/off high shelf in work cond room, 3# wt on wrist. 3 cones on/off x3  - MedX lat pull used as stretch, 120#, hold per pt tolerance x2  - educ on importance of continuing to work on mobility/motion  - reviewed HEP and self mgm techniques     Home Exercise Program:   - wall slides  - standing JERMAN with wand  - using his R UE for ADLs  - scap sets  - isometrics   - OH pulleys    Date: 06/27/2017 Prepared by: Ary Vargas Access Code: [email or web address]    Bent Shoulder Horizontal Abduction and Adduction with Table Support - 10-30 reps - 1 sets - 0 hold - 1x daily - 7x weekly    Assessment    Goals:   STGs to be reached in 3-6 weeks   1. Pt to be independent with HEP and self mgmt techniques. GOAL MET AT THIS TIME   2. Pt to demo full PROM of R shoulder for improved mobility   3. Pt to have 1/10 pain or less throughout the day for improved ADLs GOAL MET AT THIS TIME     LTGs to be reached in 6-12 weeks  1. Pt to be able use his R UE for everyday ADLs like dressing, eating, drinking GOAL MET AT THIS TIME  2. Pt to have 0/10 pain or less throughout the day for improved ADLs  3. Pt to have at least 5-/5 MMT of R shoulder for improved strength and mobility  4. Pt to be able to sleep throughout the night without waking do to pain for improved health and wellness. GOAL MET AT THIS TIME     Patient Specific Functional and Pain Scales (PSFS) on 5/3/2017:                         Clinician  Instructions: Complete after the history and before the exam.                         Initial Assessment: We want to know what 3 activities in your life you are unable to perform, or are having the most difficulty performing, as a result of your chief problem. Please list and score at least 3 activities that you are unable to perform, or having the most difficulty performing, because of your chief problem.   Patient Specific Activity Scoring Scheme (score one number for each activity):   Activity Score (0-10)  0= Unable to perform activity  10= Able to perform activity at same level as before injury or problem   1. Reaching overhead with R UE 0/10   2. Using R UE for dressing 0/10   3. Using R UE for drinking/eating 0/10               Totals:  0/30 = 0% ability which relates to 100% impairment                         Patient verbally states that they understand that the information they have provided above is current and complete to the best of their knowledge.                         Patient Specific Functional Scale Modifier Scale Conversion: (patient's modifier that correlates with pt's score on PSFS): 0-CN (100% Impaired).     G codes and Modifier taken from patient completing the PSFS:   Initial Primary G Code and Modifier:                         Per the Patient's intake and/or assessment the Primary G Code is: Mobility .                        The Patient's Impairment, Limitation or Restriction Modifier would be best described as: CN - 100% Impairment.   Goal Primary G Code and Modifier:                         The Patient's G Code Goal would be: Mobility                         The Patient's Impairment, Limitation or Restriction Modifier goal would be best described as: CI - 1% - 20% Impairment.     Updated Patient Specific Functional and Pain Scales (PSFS) on 6/15/2017:    Clinician Instructions: Complete after the history and before the exam.    Initial Assessment: We want to know what 3  activities in your life you are unable to perform, or are having the most difficulty performing, as a result of your chief problem. Please list and score at least 3 activities that you are unable to perform, or having the most difficulty performing, because of your chief problem.   Patient Specific Activity Scoring Scheme (score one number for each activity):   Activity Score (0-10)  0= Unable to perform activity  10= Able to perform activity at same level as before injury or problem   1. Reaching overhead with R UE 5/10   2. Using R UE for dressing 5/10   3. Using R UE for drinking/eating 5/10           Totals:  15/30 = 50% ability which relates to 50% impairment    Patient verbally states that they understand that the information they have provided above is current and complete to the best of their knowledge.    Patient Specific Functional Scale Modifier Scale Conversion: (patient's modifier that correlates with pt's score on PSFS): 5-CK (50% Impaired).    G codes and Modifier taken from patient completing the PSFS:   Current Primary G Code and Modifier:    Per the Patient's intake and/or assessment the Primary G Code is: Mobility .   The Patient's Impairment, Limitation or Restriction Modifier would be best described as: CK - 40% - 60% Impairment.   Goal Primary G Code and Modifier:    The Patient's G Code Goal would be: Mobility    The Patient's Impairment, Limitation or Restriction Modifier goal would be best described as: CI - 1% - 20% Impairment.     Updated Patient Specific Functional and Pain Scales (PSFS) on 7/27/2017:    Clinician Instructions: Complete after the history and before the exam.    Initial Assessment: We want to know what 3 activities in your life you are unable to perform, or are having the most difficulty performing, as a result of your chief problem. Please list and score at least 3 activities that you are unable to perform, or having the most difficulty performing, because of  your chief problem.   Patient Specific Activity Scoring Scheme (score one number for each activity):   Activity Score (0-10)  0= Unable to perform activity  10= Able to perform activity at same level as before injury or problem   1. Reaching overhead with R UE 7/10   2. Using R UE for dressing 10/10   3. Using R UE for drinking/eating 10/10           Totals:  27/30 = 90% ability which relates to 10% impairment    Patient verbally states that they understand that the information they have provided above is current and complete to the best of their knowledge.    Patient Specific Functional Scale Modifier Scale Conversion: (patient's modifier that correlates with pt's score on PSFS): 9-CI (10% Impaired).    Goal Primary G Code and Modifier:    The Patient's G Code Goal would be: Mobility    The Patient's Impairment, Limitation or Restriction Modifier goal would be best described as: CI - 1% - 20% Impairment.   Discharge Primary G Code and Modifier:    The Patient's status upon Discharge is Mobility    The Patient's Impairment, Limitation or Restriction Modifier would be best described as CI - 1% - 20% Impairment.      Plan    Pt to cont on his own. Pt instructed to call with any questions, problems or concerns. Pt is discharged from PT    Planned Interventions:    Home Exercise Program development   Therapeutic Exercise (ROM & Strengthening)   Manual Therapy   Neuromuscular Re-education   Ultrasound including Phonophoresis with Ketoprofen   Electrical Stimulation including Iontophoresis with Dexamethasone   Therapeutic Activities   Gait Training  Mechanical Traction        Student or PTA has been instructed in and demonstrates skills necessary to carry out above stated treatment plan: Yes    Thank you for your referral to [facility].  Please call with any questions, concerns or comments.  [phone]

## 2017-12-30 NOTE — NURSING NOTE
Patient Information     Patient Name MRN Kaushik Miguel [id number] Male 1950      Nursing Note by Shanice Mondragon at 2017 10:15 AM     Author:  Shanice Mondragon Service:  (none) Author Type:  (none)     Filed:  2017 10:29 AM Encounter Date:  2017 Status:  Signed     :  Shanice Mondragon            Patient here for sore throat x 3 week. Worst in Evening, symptoms sore, white drainage.   Also wants his right thumb checked too.   Shanice Mondragon LPN ..........2017 10:20 AM

## 2018-01-03 NOTE — PROGRESS NOTES
"Patient Information     Patient Name MRN Sex Kaushik Christensen [id number] Male 1950      Progress Notes by Alec Howard MD at 2017  1:15 PM     Author:  Alec Howard MD Service:  (none) Author Type:  Physician     Filed:  2017  2:11 PM Encounter Date:  2017 Status:  Signed     :  Alec Howard MD (Physician)            SUBJECTIVE:    Kaushik Ansari is a 66 y.o. male who presents for hip and shoulder pains    HPI    Has had right sciatic pain for years.  Then got groin pain and had an injection into the joint.  It helped slightly, but not much.  X-ray showed moderate degenerative joint disease.    Also, has had right shoulder pains.  Swims a lot.  I have done 2 shoulder injections.  The 2nd one resolved his pain \"overnight\".  About 3 weeks ago the pain returned.  Is now worse than the last time.  Throbbing in the anterior joint area.  Sine then he has tried to rest it more, with less swimming.  Wants an MRI of both areas.  Really wants to try to avoid surgery if possible.  Needs an open scan MRI.  Had fallen on the outstretched hand on the right about 15 years ago.    No Known Allergies,   Current Outpatient Prescriptions on File Prior to Visit       Medication  Sig Dispense Refill     CPAP CPAP, heated humidifier, mask, headgear, filters and tubing. For home use.     Length of Need: 99 1 unit 0     cyclobenzaprine (FLEXERIL) 10 mg tablet Take 1 tablet by mouth 3 times daily. 30 tablet 2     ofloxacin 0.3 % ophthalmic (OCUFLOX) 0.3 % ophthalmic solution 1 gtt in eye(s) q2h x2 days, then 1 gtt qid x5 days 5 mL 0     propranolol (INDERAL) 40 mg tablet Take 1 by mouth daily as needed for tremors.       tamsulosin (FLOMAX) 0.4 mg capsule TAKE ONE CAPSULE BY MOUTH ONCE DAILY AFTER  A  MEAL 90 capsule 3     No current facility-administered medications on file prior to visit.    ,   Past Medical History     Diagnosis  Date     Early morning wakening     and   Past Surgical " History       Procedure   Laterality Date     Vasectomy        Ct coronary angiogram ([place])        Colonoscopy screening   03/2000     Colonoscopy   3/1/2005     normal by patient report         REVIEW OF SYSTEMS:  Review of Systems   Constitutional: Negative for chills and fever.   Musculoskeletal: Positive for joint pain.   Skin: Negative for itching and rash.   Endo/Heme/Allergies: Does not bruise/bleed easily.       OBJECTIVE:  /72  Resp 16  Wt 107 kg (235 lb 12.8 oz)  BMI 30.27 kg/m2    EXAM:   Physical Exam   Constitutional: He is oriented to person, place, and time and well-developed, well-nourished, and in no distress.   HENT:   Head: Normocephalic and atraumatic.   Musculoskeletal:   Right shoulder with full abduction. Mild loss of range of motion on internal rotation.  4/5 weakness with empty can testing and mild pain with impingement testing.    Right greater trochanter without tenderness, no pain in sciatic notch.   Neurological: He is alert and oriented to person, place, and time.     The x-ray appears normal.  Normal slope to acromion    ASSESSMENT/PLAN:    ICD-10-CM    1. Rotator cuff syndrome, right M75.101 XR SHOULDER 4 VIEWS RIGHT      MR SHOULDER RIGHT WO        Plan:  Regarding the hip, an MRI would not really help in that the next treatment option at his age is basically a replacement.  He wants then to wait for a while on this.  Further follow up on the shoulder based on MRI, weather we do another injection or get an Orthopedics consult.    Alec Howard MD ....................  2/28/2017   2:08 PM

## 2018-01-03 NOTE — TELEPHONE ENCOUNTER
Patient Information     Patient Name MRN Sex Kaushik Christensen [id number] Male 1950      Telephone Encounter by Caridad Hilliard at 3/6/2017 11:50 AM     Author:  Caridad Hilliard Service:  (none) Author Type:  (none)     Filed:  3/6/2017 11:51 AM Encounter Date:  3/6/2017 Status:  Signed     :  Caridad Hilliard            Pt with like to go with an Orthopedic consult for his shoulder  Caridad Hilliard ....................  3/6/2017   11:51 AM

## 2018-01-04 NOTE — TELEPHONE ENCOUNTER
Patient Information     Patient Name MRN Kaushik Miguel [id number] Male 1950      Telephone Encounter by Stormy Harris at 2017  8:33 AM     Author:  Stormy Harris Service:  (none) Author Type:  (none)     Filed:  2017  8:35 AM Encounter Date:  2017 Status:  Signed     :  Stormy Harris            See previous phone note. Called pt back after speaking with Dr. Jennings. He was able to get his pain under control during the night and now is just wanting something for nausea. E prescribed an rx for zofran to pt's pharmacy.    Stormy Harris CMA 2017 8:35 AM

## 2018-01-04 NOTE — OR ANESTHESIA
Patient Information     Patient Name MRN Sex Kaushik Christensen [id number] Male 1950      OR Anesthesia by Clara Saba MD at 2017  9:30 AM     Author:  Clara Saba MD  Service:  (none) Author Type:  PHYS- Anesthesiologist     Filed:  2017 11:08 AM  Date of Service:  2017  9:30 AM Status:  Addendum     :  Clara Saba MD (PHYS- Anesthesiologist)        Related Notes: Original Note by Clara Saba MD (PHYS- Anesthesiologist) filed at 2017 10:43 AM                                                           ANESTHESIA ASSESSMENT    Date: 17 Time: 9:30 AM      Patient:  Kaushik Ansari    Procedure(s) (LRB):  Right Shoulder Arthroscopy w/ Subacromial Decompression , Distal Clavicle Excision. (Right)  Rotator Cuff Repair probable Open (Right)    Past Medical History:     Diagnosis  Date     AC (acromioclavicular) joint arthritis 3/23/2017     Anxiety      Complete tear of right rotator cuff 3/23/2017     Early morning wakening      Familial tremor      Impingement syndrome of right shoulder 3/23/2017     REDD on CPAP      S/P arthroscopic right shoulder surgery 2017     Sciatica        Past Surgical History:      Procedure  Laterality Date     COLONOSCOPY  3/1/2005    normal by patient report       COLONOSCOPY SCREENING  2000     CT CORONARY ANGIOGRAM ([place])       S/P RIGHT ARTHROSCOPIC SHOULDER SURGERY Right 2017     VASECTOMY         Family History      Problem  Relation Age of Onset     Cancer-breast Mother      Alcohol/Drug Father      Diabetes Daughter        Patient Active Problem List     Diagnosis  Code     FAMILIAL TREMOR G25.0     SCIATICA M54.30     OBSTRUCTIVE SLEEP APNEA G47.33     SEBORRHEIC KERATOSIS L82.1     KNEE PAIN, RIGHT M25.569     LATERAL EPICONDYLITIS M77.10     OTITIS MEDIA, SEROUS H65.90     SEBORRHEIC KERATOSIS L82.1     ANXIETY F41.1     LACTOSE INTOLERANCE E73.9     UPPER RESPIRATORY INFECTION, ACUTE, WITH  BRONCHITIS J06.9     U R I J06.9     Allergic rhinitis due to pollen J30.1     Benign non-nodular prostatic hyperplasia with lower urinary tract symptoms N40.1     Degeneration of intervertebral disc of lumbar region M51.36     Complete tear of right rotator cuff M75.121     Impingement syndrome of right shoulder M75.41     AC (acromioclavicular) joint arthritis M19.90     S/P arthroscopic right shoulder surgery Z98.890       Prescriptions Prior to Admission       Medication  Sig Dispense Refill     CPAP CPAP, heated humidifier, mask, headgear, filters and tubing. For home use.     Length of Need: 99 1 unit 0     cyclobenzaprine (FLEXERIL) 10 mg tablet Take 1 tablet by mouth 3 times daily. 30 tablet 2     propranolol (INDERAL) 40 mg tablet Take 1 by mouth daily as needed for tremors.       tamsulosin (FLOMAX) 0.4 mg capsule TAKE ONE CAPSULE BY MOUTH ONCE DAILY AFTER  A  MEAL 90 capsule 3       Allergies:No Known Allergies    Review of Systems:  GERD: (not recorded)  Chest pain: No (lateral flattening of T waves & ST depression in V5 & V6 has progressed since 12/6/13.)  Shortness of breath: No (REDD/CPAP nightly.)  Recent fever: No  Poor exercise tolerance: No (Works out at the [employer] regularly.)  Bleeding tendency: No  Pregnant: No  Anesthesia Complications: None      History     Smoking Status       Former Smoker      Packs/day: 2.00     Years: 20.00     Types: Cigarettes     Quit date: 1/1/1984   Smokeless Tobacco       Former User      Types: Chew     Social History     Social History        Marital status:       Spouse name: N/A     Number of children:  N/A     Years of education:  N/A     Social History Main Topics        Smoking status:  Former Smoker     Packs/day: 2.00     Years: 20.00     Types: Cigarettes     Quit date: 1/1/1984     Smokeless tobacco:  Former User     Types: Chew     Alcohol use  No     Drug use:  No     Sexual activity:  No     Other Topics  Concern     Not on file      Social History  "Narrative     His wife  about , now remarried.     Works as a [employer] .  Now retired as of .    Sarah (Dennie) - Wife    Updated  2013    **pre upload 2012**       Physical Examination:  /92  Pulse 95  Temp 97.4  F (36.3  C)  Resp 16  Ht 1.886 m (6' 2.25\")  Wt 109.1 kg (240 lb 8 oz)  SpO2 97%  BMI 30.67 kg/m2 Body mass index is 30.67 kg/(m^2). Body surface area is 2.39 meters squared.  Dental Condition: Good     Mallampati Score (Airway): II (Large tongue; short TMD.)  Cardiovascular: Normal  Pulmonary: Normal  Other: N/A    Recent Labs in [facility]:    No results for input(s): SODIUM, POTASSIUM, CHLORIDE, BP4ROUYV, ANIONGAP, BUN, CREATININE, BUNCREARATIO, CALCIUM, GLUCOSE, GLUCOSEMETER, KETONES, MAGNESIUM, WBC, HGB, HCT, PLT, ABORH, RHTYPE, PREGURINE, BHCGQL, HCGBETAQUANT, INR in the last 72 hours.          Assessment/Plan:  ASA Class: III  Risk of dental injury discussed: Yes  NPO status confirmed: Yes  Anesthetic Plan:  General; Block (Interscalene block for PPC.)  Discussed possibility of extended stay if we are unable to wean his supplemental oxygen post-operatively.  Risk/Benefit/Alt discussed: Yes  Questions answered: Yes  Emergency Case?: No  Labs/ECG/Radiology Reviewed?: Yes      H&P Reviewed.  Patient Examined.      Provider Electronic Signature:  ИРИНА WARD MD                "

## 2018-01-04 NOTE — PROCEDURES
Patient Information     Patient Name MRN Sex Kaushik Christensen [id number] Male 1950      Procedures signed by Forest Gonzalez DO at 2017  2:59 PM      Author:  Forest Gonzalez DO Service:  (none) Author Type:  Physician     Filed:  2017  2:59 PM Creation Time:  2017  1:44 PM Status:  Signed     :  Forest Gonzalez DO (Physician)            -  DATE OF SERVICE:  2017    SURGEON  FOREST GONZALEZ DO    ASSISTANT  NONE    PREOPERATIVE DIAGNOSES   1. Large right rotator cuff tear.   2. Impingement syndrome.   3. AC arthritis.     POSTOPERATIVE DIAGNOSES  1. Large chronic right rotator cuff tear to include supraspinatus and infraspinatus.   2. Long head of the biceps tearing.   3. Labral tearing.   4. 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   5. Impingement syndrome.  6. AC arthritis.    PROCEDURES  1. Right shoulder arthroscopy with extensive debridement to include 4% of the anterior to posterior labrum (DOS 2017).   2. Arthroscopic biceps tenotomy.   3. Arthroscopic chondroplasty of the humeral head and glenoid.   4. Arthroscopic subacromial decompression.   5. Arthroscopic distal clavicle excision.   6. Open rotator cuff repair utilizing a double row technique with 2 medial row 5.5 mm BioComposite SwiveLock anchors with 2 FiberTapes and one FiberWire placed for traction and lateral row fixation with two 5.5 mm BioComposite SwiveLock anchors with the FiberTapes and one 4.75 mm BioComposite SwiveLock with the FiberWire.     IMPLANTS   As above.     ANESTHESIA  General via LMA and a block for pain control.     ESTIMATED BLOOD LOSS  Less than 10.     FLUIDS  See chart.     DRAINS  None.     COMPLICATIONS  None.     DISPOSITION  Stable to postop.     INDICATIONS FOR PROCEDURE  The patient is a 66-year-old male who had been having ongoing complaints of right shoulder pain. After trying conservative measures with limited results, he underwent  an MR arthrogram that showed a very large tear of the rotator cuff. After discussion, he elected to go ahead with operative intervention. He understood that these large tears have over a 40% chance of re-tearing. He elected to go ahead with surgical intervention to see if there was anything that could be performed.     PROCEDURE NOTE  After informed consent was received from the patient, having listed all the risks and benefits as noted on the consent form, and not limited to consent form, and having discussed all conservative, surgical, and alternatives to treatment, the patient signed a consent form with a witness present. The patient understood there were numerous risks, all of them were not written down but were discussed at length. No guarantees were given. All questions were answered prior to signing the consent form. Patient was given antibiotics one half hour prior to skin incision, also received an interscalene block. He was taken back to the operating room, supine on the gurney, transferred to the operating room table, secured and all bony prominences padded. He was then given general anesthesia via LMA. Once proper anesthesia was obtained, examined shoulder, showing him to have cephalad migration of the humeral head as I ran it through range of motion. He was then repositioned in the beach chair, head and neck positioned by anesthesia. Bony prominences were checked, padded once again, and he was secured to the operating room table. The patient's right upper extremity was then sterilely prepped and draped.     Timeout was performed according to institutional guidelines. With this done, I then marked my incision site and made my posterior skin incision with a #11 scalpel. Blunt trocar and cannula were inserted. Trocar removed and a [employer] viewing lens was placed. Beginning in the glenohumeral joint, the patient was noted to have a massive tear of the supraspinatus and infraspinatus. There was grade II  chondromalacia of the anterior glenoid and also on the anterior humeral head. The long head of the biceps had some fraying on it. There was fraying of the labrum as well. No loose bodies in the inferior pouch. Synovitis was appreciated. Utilizing inside-out technique, I made my anterior portal site. With the probe in place, I once again noted the above-mentioned structures. I then inserted my shaver and performed extensive debridement. About 4% of the anterior to posterior labrum was removed. I then inserted an arthroscopic scissors and performed a biceps tenotomy. I debrided the stump with this as well. I then performed a chondroplasty of the humeral head and glenoid. With this back to good stability, I brought my viewing lens anteriorly and looked at the posterior labrum. I was happy with my debridement. I removed my instruments.     I repositioned my instruments into the subacromial space. After performing extensive debridement and bursectomy, I was able to note once again this large tear. This was supraspinatus, infraspinatus, and it also was like a delamination tear on top of it. The delamination part was very old in nature and had retracted significantly back to the edge of the glenoid. The more inferior aspect of the supraspinatus and infraspinatus complex was a little bit closer to the tuberosity, but also retracted significantly. Large bone spurs were appreciated on the acromion and the undersurface of the distal clavicle. I made a lateral portal site under direct visualization, I brought my bur in posteriorly with my viewing lens laterally, and performed a subacromial decompression. I reversed my viewing and working portals, and removed more of the anterolateral corner of the acromion until I had it back to a type I reconfiguration. I then removed the inferior aspect of the distal clavicle, brought my bur in anteriorly, and performed the last of the distal clavicle excision to approximately 12 mm with the  resection completed. I then irrigated the area copiously. I turned my attention now back to the rotator cuff. I tried multiple ways to see how much I could translate it. I placed a FiberWire suture for a traction suture. I elected to go ahead with a mini open procedure to get the best fixation possible in this very large tear. I then removed my instruments and closed the portal sites with 3-0 nylon modified horizontal fashion.     I made an anterolateral skin incision, deltoid splitting in nature. I carefully taken down through this plane, placed my retractors, and was able to get visualization of the tear. The result of bringing out my sutures, I had placed retraction. I roughened up the tuberosity. I placed two 5.5 mm BioComposite SwiveLock anchors and passed all 4 suture limbs. I then crisscrossed this pattern and placed these into 2 lateral row 5.5 mm BioComposite SwiveLock anchors after I had utilized the traction suture and placed a 4.75 mm BioComposite SwiveLock anchor laterally. This gave good coverage to it. Of note though it was a very large tear. I irrigated the area copiously, took pictures, then removed my instruments.     I closed the deltoid loosely with 0 Monocryl interrupted fashion. Then the deep subcu was closed with 3-0 Monocryl in a running fashion. Skin was closed with 4-0 STRATAFIX in a running fashion and Steri-Strips were then applied.     Sterile dressings, Xeroform, 4 x 4, and Tegaderm were placed. He was placed into a slingshot 3, extubated, transferred back to the [facility], and taken to recovery room in satisfactory condition. He will be discharged home per protocol. Given a prescription for Norco 10/325, #60, no refills; Keflex and aspirin. He will followup in my office as already arranged.      DO DIANE LYNN/megan   D:  04/19/2017 13:08:45  T:  04/19/2017 13:29:10  Voice Job ID:  [id number]  Text Job ID:  [id number]  cc:SID MONDRAGON MD, PRIMARY PHYSICIAN         [facility] [place] [facility]  [place], [place]:  KRISS CEBALLOS  MR#:  [id number]  :  1950  DATE:  2017  LOCATION:  [facility]  ROOM:  OR  TYPE:  AMBOPERATIVE / PROCEDUREPage 1  3

## 2018-01-04 NOTE — OR POSTOP
Patient Information     Patient Name MRN Sex Kaushik Christensen [id number] Male 1950      OR PostOp by Ora Vázquez RN at 2017  3:47 PM     Author:  Ora Vázquez RN Service:  (none) Author Type:  NURS- Registered Nurse     Filed:  2017  3:48 PM Date of Service:  2017  3:47 PM Status:  Signed     :  Ora Vázquez RN (NURS- Registered Nurse)            Discharge Note    Data:  Kaushik Ansari has been discharged home at 1545 via wheelchair accompanied by Registered Nurse.      Action:  Written discharge/follow-up instructions were provided to patient. Prescriptions were sent to patients pharmacy.  Belongings sent with patient. Medications from home sent with patient/family: Not Applicable  Equipment none .     Response:  Patient verbalized understanding of discharge instructions, reason for discharge, and necessary follow-up appointments.  ORA VÁZQUEZ RN ....................  2017   3:48 PM

## 2018-01-04 NOTE — H&P
"Patient Information     Patient Name MRN Kaushik Miguel [id number] Male 1950      H&P (View-Only) by Alec Howard MD at 4/10/2017  9:15 AM     Author:  Alec Howard MD Service:  (none) Author Type:  Physician     Filed:  4/10/2017 10:57 AM Date of Service:  4/10/2017  9:15 AM Status:  Signed     :  Alec Howard MD (Physician)            ----------------- PREOPERATIVE EXAM ------------------  4/10/2017    SUBJECTIVE:  Kaushik Ansari is a 66 y.o. male here for preoperative optimization.    I was asked to see Kaushik Ansari by Dr. Jennings for  preoperative evaluation due to obstructive sleep apnea    Date of Surgery: 17  Type of Surgery: Outpatient  [facility]: [facility]    HPI:  Is having his torn rotator cuff repaired.  Has had sleep apnea for many years.  Snored all they way back into his 20's.  Started treatment for it about 17 years ago.  Uses it faithfully.  If he skips a night will be very tired the nest day.  Pressure was just bumped up from 9 to 10.      Fever/Chills or other infectious symptoms in past month:  (NO)   >10lb weight loss in past two months:  (NO)     Health Care Directive/Code status: Full, no living will  Hx of blood transfusions:   (NO)   History of VRE/MRSA:  (NO) Date:   Site:      Preoperative Evaluation: Obstructive Sleep Apnea screening    S: Snore -  Do you snore loudly? (louder than talking or loud enough to be heard through closed doors)(YES)  T: Tired - Do you often feel tired, fatigued, or sleepy during the daytime?(NO)  O: Observed - Has anyone ever observed you stop breathing during your sleep?(YES)  P: Pressure - Do you have or are you being treated for high blood pressure?(NO)  B: BMI - BMI greater than 35kg/m2?(NO)  A: Age - Age over 50 years old?(YES)  N: Neck - Neck circumference greater than 40 cm?(YES)  G: Gender - Gender: Male?(YES)    Total number of \"YES\" responses:  N/A, has known obstructive sleep apnea "     Scoring: Low risk of REDD 0-2  At Risk of REDD: >3 High Risk of REDD: 5-8        Patient Active Problem List       Diagnosis  Date Noted     Complete tear of right rotator cuff  03/23/2017     Impingement syndrome of right shoulder  03/23/2017     AC (acromioclavicular) joint arthritis  03/23/2017     Benign non-nodular prostatic hyperplasia with lower urinary tract symptoms  10/23/2015     Degeneration of intervertebral disc of lumbar region  10/23/2015     Allergic rhinitis due to pollen  05/27/2015     UPPER RESPIRATORY INFECTION, ACUTE, WITH BRONCHITIS  06/19/2012     U R I  06/19/2012     LACTOSE INTOLERANCE  08/30/2011     ANXIETY  03/08/2011     SEBORRHEIC KERATOSIS  01/07/2011     OTITIS MEDIA, SEROUS  12/28/2010     SEBORRHEIC KERATOSIS  08/11/2010     KNEE PAIN, RIGHT  08/11/2010     LATERAL EPICONDYLITIS  08/11/2010     FAMILIAL TREMOR       Benign          SCIATICA       Mild          OBSTRUCTIVE SLEEP APNEA       On CPAP            Past Medical History:     Diagnosis  Date     AC (acromioclavicular) joint arthritis 3/23/2017     Complete tear of right rotator cuff 3/23/2017     Early morning wakening      Impingement syndrome of right shoulder 3/23/2017       Past Surgical History:      Procedure  Laterality Date     COLONOSCOPY  3/1/2005    normal by patient report       COLONOSCOPY SCREENING  03/2000     CT CORONARY ANGIOGRAM ([place])       VASECTOMY         Current Outpatient Prescriptions       Medication  Sig Dispense Refill     CPAP CPAP, heated humidifier, mask, headgear, filters and tubing. For home use.     Length of Need: 99 1 unit 0     cyclobenzaprine (FLEXERIL) 10 mg tablet Take 1 tablet by mouth 3 times daily. 30 tablet 2     ofloxacin 0.3 % ophthalmic (OCUFLOX) 0.3 % ophthalmic solution 1 gtt in eye(s) q2h x2 days, then 1 gtt qid x5 days 5 mL 0     propranolol (INDERAL) 40 mg tablet Take 1 by mouth daily as needed for tremors.       tamsulosin (FLOMAX) 0.4 mg capsule TAKE ONE CAPSULE BY  "MOUTH ONCE DAILY AFTER  A  MEAL 90 capsule 3     No current facility-administered medications for this visit.      Medications have been reviewed by me and are current to the best of my knowledge and ability.    Recent use of: no recent use of aspirin (ASA), NSAIDS or steroids    Allergies:  No Known Allergies  Latex allergy  no  Immunizations:  Immunization History     Administered  Date(s) Administered     Influenza Virus, Unspecified 10/22/2008     Influenza, High-dose Inactivated 09/15/2015     Influenza, IIV3 (Age >=3 years) 11/01/2013     Tdap 12/13/2012       Family History      Problem  Relation Age of Onset     Cancer-breast Mother      Alcohol/Drug Father      Diabetes Daughter        Denies family hx of bleeding tendencies, anesthesia complications, or other problems with surgery.    Social History      Substance Use Topics        Smoking status:  Former Smoker     Packs/day: 2.00     Years: 20.00     Types: Cigarettes     Quit date: 1/1/1984     Smokeless tobacco:  Former User     Types: Chew     Alcohol use  No       ROS:    Surgical:  patient denies previous complications from prior surgeries including but not limited to prolonged bleeding, anesthesia complications, dysrhythmias, surgical wound infections, or prolonged hospital stay.  Cardiorespiratory: denies chest pain, palpitations, shortness of breath, cough. Most exertion in the past 2 weeks was heavy, works out at [employer] regularly.   Complete ROS otherwise negative except as noted in HPI.     -------------------------------------------------------------    PHYSICAL EXAM:  /68  Pulse 91  Resp 16  Ht 1.886 m (6' 2.25\")  Wt 109.1 kg (240 lb 9.6 oz)  SpO2 96%  BMI 30.68 kg/m2    EXAM:  General Appearance: Pleasant, alert, appropriate appearance for age. No acute distress  Head Exam: Normal. Normocephalic, atraumatic.  Eyes: PERRL, EOMI  Ears: Normal TM's bilaterally.   OroPharynx: Mucosa pink and moist. Dentition in good repair.   Neck: " Supple, no masses or nodes, no lymphadenopathy.  No thyromegaly.  Lungs: Normal chest wall and respirations. Clear to auscultation, no wheezes or crackles.  Cardiovascular: Regular rate and rhythm. S1, S2, no murmurs.    Musculoskeletal: No edema. No warm or erythematous joints.  Skin: no concerning or new rashes.  Neurologic Exam: CN 2-12 grossly intact.  Normal gait.  Symmetric DTRs, normal gross motor movement, tone, and coordination. No tremor.  Psychiatric Exam: Alert and oriented, appropriate affect.      EKG:  appears normal, NSR and normal axis, normal intervals, no acute ST/T changes c/w ischemia  ---------------------------------------------------------------    ASSESSEMENT AND PLAN:       Kaushik was seen today for pre-op exam.    Diagnoses and all orders for this visit:    Obstructive sleep apnea  -     EKG 12 LEAD; Future  -     OH ELECTROCARDIOGRAM COMPLETE      PRE OP RECOMMENDATIONS:    No family history of problems with bleeding or anesthesia. Patient is able to tolerate greater than 4 METs of activity without any cardiopulmonary symptoms. ASA PS class 1 . No cardiopulmonary workup is neccessary for the current procedure. Please contact the office with any questions or concerns.    Patient is on chronic pain medications (NO);   Patient is on antiplatlet/anticoagulation (NO)  Other medications that need adjustment perioperatively (NO)    Other:  Patient was advised to call our office and the surgical services with any change in condition or new symptoms if they were to develop between today and their surgical date; especially any cardiopulmonary symptoms or symptoms concerning for an infection.    Recommendations: Proceed as planned.  Very motivated patient.    Alec Howard MD ....................  4/10/2017   10:56 AM

## 2018-01-04 NOTE — NURSING NOTE
Patient Information     Patient Name MRN Sex Kaushik Christensen [id number] Male 1950      Nursing Note by Stormy Harris at 3/27/2017  8:45 AM     Author:  Stormy Harris Service:  (none) Author Type:  (none)     Filed:  3/27/2017  8:38 AM Encounter Date:  3/27/2017 Status:  Signed     :  Stormy Harris            Pt presents for consult of his right shoulder pain. Referred by Dr. Howard.    Stormy Harris CMA 3/27/2017 8:37 AM

## 2018-01-04 NOTE — TELEPHONE ENCOUNTER
Patient Information     Patient Name MRN Sex Kaushik Christensen [id number] Male 1950      Telephone Encounter by Lorena Morales at 3/28/2017  9:37 AM     Author:  Lorena Morales Service:  (none) Author Type:  (none)     Filed:  3/28/2017  9:37 AM Encounter Date:  3/28/2017 Status:  Signed     :  Lorena Morales            Patient has questions regarding a shoulder surgery.    Lorena Morales ....................  3/28/2017   9:37 AM

## 2018-01-04 NOTE — NURSING NOTE
Patient Information     Patient Name MRN Kaushik Miguel [id number] Male 1950      Nursing Note by Caridad Hilliard at 3/29/2017  9:15 AM     Author:  Caridad Hilliard Service:  (none) Author Type:  (none)     Filed:  3/29/2017  9:20 AM Encounter Date:  3/29/2017 Status:  Signed     :  Caridad Hilliard            Coming in to talk about what to do next with his shoulder  Caridad Hilliard ....................  3/29/2017   9:15 AM

## 2018-01-04 NOTE — NURSING NOTE
Patient Information     Patient Name MRN Sex Kaushik Christensen [id number] Male 1950      Nursing Note by Caridad Hilliard at 4/10/2017  9:15 AM     Author:  Caridad Hilliard Service:  (none) Author Type:  (none)     Filed:  4/10/2017  9:27 AM Encounter Date:  4/10/2017 Status:  Signed     :  Caridad Hilliard            This patient presents today for a Preoperative exam for this procedure: Rt shoulder   Date of Surgery: 17  Surgeon:  Dick  Facility:  [facility]  Fax:      Coming in for a preop for Rt shoulder surgery      Neck 18 inches

## 2018-01-04 NOTE — PROGRESS NOTES
Patient Information     Patient Name MRN Sex Kaushik Christensen [id number] Male 1950      Progress Notes by Forest Jennings DO at 3/27/2017  8:45 AM     Author:  Forest Jennings DO Service:  (none) Author Type:  Physician     Filed:  3/27/2017 10:06 AM Encounter Date:  3/27/2017 Status:  Signed     :  Forest Jennings DO (Physician)            Kaushik Ansari was seen in consultation for Alec Howard MD for a chief complaint of pain about the right shoulder.    CHIEF COMPLAINT: Kaushik Ansari is a 66 y.o.  male  Chief Complaint     Patient presents with       Consult      rt shoulder         HISTORY OF PRESENTING INJURY   History of presenting injury, patient was trying to start a snowblower last May when he was pulling on it and on the third pull he had a sharp pain into his right shoulder. He tried working through it on his own eventually saw his primary physician had an injection in yet based discomfort didn't go away. He would have good days and bad days and after about 4 months he did get back to some swimming activities which he enjoys. Overall though he really never felt the same he eventually underwent an MRI and then is here today to go over the results and come up with a plan.  Description of pain:  moderate aching and increased pain with activity   Radiation of pain: no  Pain course: gradually worsening  Worse with: bending or flexing affected area, extending affected area, overhead reaching, pushing and pulling  Improved by: OTC meds and rest  History of injection: Yes  Any PT: No    PAST MEDICAL HISTORY:  Past Medical History     Diagnosis  Date     AC (acromioclavicular) joint arthritis 3/23/2017     Complete tear of right rotator cuff 3/23/2017     Early morning wakening      Impingement syndrome of right shoulder 3/23/2017       PAST SURGICAL HISTORY:  Past Surgical History       Procedure   Laterality Date     Vasectomy        Ct coronary angiogram ([place])  "       Colonoscopy screening   03/2000     Colonoscopy   3/1/2005     normal by patient report         ORTHOPEDIC FRACTURES AND BROKEN BONES:  As above.    ALLERGIES:  No Known Allergies    CURRENT MEDICATIONS:  Current Outpatient Prescriptions       Medication  Sig Dispense Refill     CPAP CPAP, heated humidifier, mask, headgear, filters and tubing. For home use.     Length of Need: 99 1 unit 0     cyclobenzaprine (FLEXERIL) 10 mg tablet Take 1 tablet by mouth 3 times daily. 30 tablet 2     ofloxacin 0.3 % ophthalmic (OCUFLOX) 0.3 % ophthalmic solution 1 gtt in eye(s) q2h x2 days, then 1 gtt qid x5 days 5 mL 0     propranolol (INDERAL) 40 mg tablet Take 1 by mouth daily as needed for tremors.       tamsulosin (FLOMAX) 0.4 mg capsule TAKE ONE CAPSULE BY MOUTH ONCE DAILY AFTER  A  MEAL 90 capsule 3     No current facility-administered medications for this visit.      Medications have been reviewed by me and are current to the best of my knowledge and ability.      SOCIAL HISTORY:  Marital Status:   Children: Yes  Occupation: Retired from the [employer].  Alcohol use:  No  Tobacco use: Smoker: no, smoked for 20 years and quit.  Are you or have you used illicit drugs:  no    FAMILY HISTORY:  Family History      Problem  Relation Age of Onset     Cancer-breast Mother      Alcohol/Drug Father      Diabetes Daughter        REVIEW OF SYSTEMS:  The review of systems as documented in the HPI and on the intake questionnaire, completed by the patient on 3/27/2017., have been reviewed by myself and the pertinent positives and negatives addressed.  The remainder of the complete review of systems was non-contributory.    PHYSICAL EXAM:   Visit Vitals       /78     Ht 1.88 m (6' 2\")     Wt 106.6 kg (235 lb)     BMI 30.17 kg/m2    Body mass index is 30.17 kg/(m^2).    General Appearance: Pleasant male in good appearance, mood and affect.  Alert and orientated times three ( time, date and location).    Skin: " Normal    Shoulder:  Motion: Patient has excellent forward flexion, with a small height in the last 5 . He does have the same type of motion with his abduction with some scapular substitution in the last 10 . He does have tight internal rotation and can get to his back pocket.  Hawkin's Sign: positive  Neer's Sign: positive  Cross body adduction:  positive  Drop arm test: positive  Weakness at waist level: positive  Bicipital testing: negative  Lift off test:  negative  Irvin's test:  negative    Elbow:  Flexion: Normal  Extension: Normal    Hand:  Sensation: Normal  Radial and ulnar blood flow:  Normal    Eyes: Pupils are round.    Ears: Hearing: Intact    Heart: regular rate and rhythm    Lungs: Clear.     Radiographic images from 2/28 where independently reviewed and discussed with the patient.      Xray:     Patient does have narrowing of the acromioclavicular joint with large osteophytes. Small type II acromial hook appreciated. There is flattening of the greater tuberosity.    Exam: XR SHOULDER 4 VIEWS RIGHT  History: Rotator cuff syndrome, right  Technique: 4 views.  Findings: There is mild hypertrophic change in the AC joint. No fracture or dislocation is seen. Acromiohumeral distance is normal. No focal osseous lesion is seen.    Impression: Mild degenerative change in the AC joint.  Electronically Signed By: Berta Pruett M.D. on 2/28/2017 2:11 PM    MRI/MRA:    The MRI which was done at [facility] shows a large supraspinatus tear with very mild atrophy, the tear measures 2.7 cm. There is also significant osteoarthritic changes of the distal clavicle with impingement signs.    IMPRESSION:  Large right supraspinatus tear with retraction.  Right impingement syndrome.  Right acromioclavicular arthritis.    PLAN:  Risks, benefits, conservative, surgical and alternatives to treatment where discussed and the patient would like to proceed with operative intervention, he does understand these large tears  have a high recurrence rate of over 40%. It may be in a repairable tear due to the timeline upon which this has been going on.  I utilized the handout, poster board, and model to help him understand shoulder anatomy and his pathology.  I told him it is okay to get a second opinion if he so chose.  He received all education today and will discuss with his wife options.  Questions and concerns were answered today.  He will call if he wants to set up surgery.    I have discussed options with Kaushik NELSON Coty the treatment for shoulder rotator cuff tear, which have included observation, physical therapy, corticosteroid injection versus shoulder rotator cuff repair. I discussed pros and cons of each approach, and at this point, Kaushik Ansari would like to proceed with rotator cuff shoulder surgery. We discussed surgery would be an outpatient procedure, you would be able to go home following the surgery. We will plan on arthroscopic versus mini open rotator cuff repair with anything else that needs to be done.  Surgical anesthesia would be general anesthesia with possible interscalene block to control pain following surgery.   I discussed following surgery Kaushik Ansari would be in a sling for eight weeks following surgery with gentle motion of the elbow, wrist maneuvers after surgery.  At four weeks following surgery  further motion and strengthening with the shoulder with physical therapy will commence and it is a step wise approach.   Full recovery from rotator cuff surgery may take a year. The goal will be pain relief. Complications were discussed including continued pain, stiffness in the shoulder, rare chance of neurovascular damage, potential chance of infection. If deep   Infection were to occur, further surgery may be needed with repeat washout in the operating room with possible need for treatment with antibiotics.    If tolerated use of of an EC-ASA 325mg is recommended for 30 days after  "surgery.    Risks, benefits, conservative, surgical, and alternatives of treatment were thoroughly outlined. No guarantees were given. Risks which do include, but are not limited to:  Scar, infection, decreased motion, damage to blood vessels, nerves and tendons, failure or need for further treatment, reaction to medications and anesthesia, blood clots, and the possibility of death where discussed.  He did verbalize an understanding of the above and that if a biceps tenotomy is performed they would have a \"meg sign\" since the long head of the biceps would sit lower.  All questions and concerns were addressed.    Patient is set up for right shoulder arthroscopy with SAD,DCE, rotator cuff repair with possible graft and biceps tenotomy and a block if it is felt appropriate after discussing with anesthesia.    Kaushik Ansari will need pre op clearance for management of sleep apnea.    Follow up after surgery will be 3-7 days    All questions where answered to the patients satisfaction.    Forest Jennings D.O.  Orthopaedic Surgeon    [facility] and [address] [address]  Phone [phone] (KNEE)  Fax [phone]    This document was created using computer generated templates and voice activated software.    9:59 AM 3/27/2017          "

## 2018-01-04 NOTE — INITIAL ASSESSMENTS
"Patient Information     Patient Name MRN Kaushik Miguel [id number] Male 1950      Initial Assessments by Yfn Kelley, PT at 5/3/2017  2:28 PM     Author:  Yfn Kelley, PT Service:  (none) Author Type:  PT- Physical Therapist     Filed:  5/3/2017  3:39 PM Date of Service:  5/3/2017  2:28 PM Status:  Signed     :  Yfn Kelley PT (PT- Physical Therapist)            [facility]  Outpatient PT - Initial Evaluation      Date of Service: 5/3/2017    Visit: 1    Patient Name: \"Jean Claude\" Kaushik Ansari   YOB: 1950   Referring MD/Provider: Dick  Diagnosis: S/P arthroscopic right shoulder surgery Z98.890    Medical & Treatment Diagnosis: S/P arthroscopic right shoulder surgery Z98.890   Insurance:  Medicare  Start of Care Date: 17  Certification Dates: From Start of Service to Re-Cert Due: 17  Preadmission Functional Mobility: Independent  Instructions:  DOS 17  Start rehab now   Phase 1 - no passive ROM at shoulder   Phase 2A - on    Phase 2B - on  -out of pillow in the daytime, wall walk 1X per every hour awake.  Focus is full passive forward flexion by 8 weeks post op.   Wear pillow at night   Phase 3 - on  - out of all gear then.  Cognition:  Oriented to Person, Place, and Time.    Spouse: Reji present for today's session  SUBJECTIVE    HISTORY/LUCAS:  Reports that he isn't completely sure on a LUCAS but about a year ago he was pulling on a rope starter for a . After 3 pulls he felt something was wrong with his shoulder. He tried to tough it out but eventually sought medical attention and thus having surgery. See pt's notes for any additional details.  Overall he feel that he is doing pretty well and is happy with the recovery process thus far. He has a good support system at home.  He ices occasionally but doesn't ice as much now as he used to the first week after surgery    POSTOPERATIVE DIAGNOSES  1. Large " chronic right rotator cuff tear to include supraspinatus and infraspinatus.   2. Long head of the biceps tearing.   3. Labral tearing.   4. 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   5. Impingement syndrome.  6. AC arthritis.     PROCEDURES  1. Right shoulder arthroscopy with extensive debridement to include 4% of the anterior to posterior labrum (DOS 04/19/2017).   2. Arthroscopic biceps tenotomy.   3. Arthroscopic chondroplasty of the humeral head and glenoid.   4. Arthroscopic subacromial decompression.   5. Arthroscopic distal clavicle excision.   6. Open rotator cuff repair utilizing a double row technique with 2 medial row 5.5 mm BioComposite SwiveLock anchors with 2 FiberTapes and one FiberWire placed for traction and lateral row fixation with two 5.5 mm BioComposite SwiveLock anchors with the FiberTapes and one 4.75 mm BioComposite SwiveLock with the FiberWire.     PAIN/LOCATION:  2-3/10 avg during the day. More stiffness in in mornings and evenings.     CURRENT SIGNS/SYMPTOMS:  R shoulder pain. Decreased ROM, strength, endurance    AGGRAVATING FACTORS: sleeping can be uncomfortable. Pt is compliant with wearing his sling so aggravating factors are avoided for the most part    RELIEVING FACTORS: Wears his sling. Uses pillows for support at night.     PRIOR FUNCTIONAL STATUS:  R shoulder function was limited and painful but he was able to use it for ADLs    CURRENT FUNCTIONAL STATUS: lack of  R shoulder & UE function    OCCUPATION:  retired    LIVING STATUS:  lives at home with spouse    PATIENT GOALS: Return to activity at prior level of function.    MEDICAL HISTORY: Please refer to pt's medical record for detailed medical history.      Pt is familiar with physical therapy and has no questions at this time.    OBJECTIVE    Fall Risk Screening:  No risk factors identified    VISUAL INSPECTION/OBSERVATION:  Pt is very pleasant and in good spirits. He comes ready with questions and all questions  were answered to pt's satisfaction    ROM & MMT:   Full, functional L shoulder ROM and strength  R shoulder n/a at this time    PALPATION: n/a    SPECIAL TESTS: n/a    SENSATION: intact throughout R UE    TODAY'S INTERVENTION:   Therapeutic Exercise to improve mobility, strength and endurance:  - educ on phases of rehab protocol  - educ on sleeping positions  - R elbow, wrist, finger AROM (HEP)    Pt declined icing    OTHER: pt properly demo'd home exercise program (HEP)    ASSESSMENT/GOALS    Current Functional Status: decreased R shoulder function secondary to R shoulder repair  Signs and symptoms consistent with R shoulder post op  Pt will benefit from skilled physical therapy to address functional limitations.    STGs to be reached in 3-6 weeks  1. Pt to be independent with HEP and self mgmt techniques.  2. Pt to demo full PROM of R shoulder for improved mobility  3. Pt to have 1/10 pain or less throughout the day for improved ADLs    LTGs to be reached in 6-12 weeks  1. Pt to be able use his R UE for everyday ADLs like dressing, eating, drinking  2. Pt to have 0/10 pain or less throughout the day for improved ADLs  3. Pt to have at least 5-/5 MMT of R shoulder for improved strength and mobility  Pt to be able to sleep throughout the night without waking do to pain for improved health and wellness.     Patient Specific Functional and Pain Scales (PSFS) on 5/3/2017:    Clinician Instructions: Complete after the history and before the exam.    Initial Assessment: We want to know what 3 activities in your life you are unable to perform, or are having the most difficulty performing, as a result of your chief problem. Please list and score at least 3 activities that you are unable to perform, or having the most difficulty performing, because of your chief problem.   Patient Specific Activity Scoring Scheme (score one number for each activity):   Activity Score (0-10)  0= Unable to perform activity  10= Able to  perform activity at same level as before injury or problem   1. Reaching overhead with R UE 0/10   2. Using R UE for dressing 0/10   3. Using R UE for drinking/eating 0/10           Totals:  0/30 = 0% ability which relates to 100% impairment    Patient verbally states that they understand that the information they have provided above is current and complete to the best of their knowledge.    Patient Specific Functional Scale Modifier Scale Conversion: (patient's modifier that correlates with pt's score on PSFS): 0-CN (100% Impaired).    G codes and Modifier taken from patient completing the PSFS:   Initial Primary G Code and Modifier:    Per the Patient's intake and/or assessment the Primary G Code is: Mobility .   The Patient's Impairment, Limitation or Restriction Modifier would be best described as: CN - 100% Impairment.   Goal Primary G Code and Modifier:    The Patient's G Code Goal would be: Mobility    The Patient's Impairment, Limitation or Restriction Modifier goal would be best described as: CI - 1% - 20% Impairment.       Patient participated in goal selection and understand(s) the plan of care: Yes, pt stated a verbal understanding  Patient Potential for Achieving Desired Outcome:  Excellent, Good    FUNCTIONAL LIMITATIONS:  Decreased R shoulder ROM, strength, endurance and proprioception secondary to shoulder surgery which is limiting work duties and home ADLs     PLAN    Pt to be seen 1-2x/wk for up to 12 weeks/3 months to improve R UE strength, endurance, mobility, proprioception and to decrease pain and discomfort.    Planned Interventions:    Home Exercise Program development  Therapeutic Exercise (ROM & Strengthening)  Manual Therapy  Neuromuscular Re-education  Ultrasound including Phonophoresis with Ketoprofen  Electrical Stimulation including Iontophoresis with Dexamethasone  Therapeutic Activities  Gait Training  Mechanical Traction  Vasopneumatic Compression    Plan for next visit:   Cont with PT per POC to improve/maintain ROM, strength, endurance, proprioception and to decrease pain/discomfort.   Patient instructed to call with any questions, problems or concerns.    Student or PTA has been instructed in and demonstrates skills necessary to carry out above stated treatment plan: Yes    Thank you for your referral to [facility].  Please call with any questions, concerns or comments.  [phone]    The signature, of the referring medical provider, on this document indicates certification of the above prescribed plan of care and is medically necessary.        Yfn Kelley, PT, ATC

## 2018-01-04 NOTE — TELEPHONE ENCOUNTER
Patient Information     Patient Name MRN Kaushik Miguel [id number] Male 1950      Telephone Encounter by Jennifer Sue at 3/27/2017  8:35 AM     Author:  Jennifer Sue Service:  (none) Author Type:  (none)     Filed:  3/27/2017  8:37 AM Encounter Date:  3/27/2017 Status:  Signed     :  Jennifer Sue            TJP - PT NEEDS [facility]'S PRESCRIPTION FOR CPAP MACHINE AND NEW MASK CALLED INTO HOME MEDICAL        Jennifer OLIVAS  ....................  3/27/2017   8:36 AM

## 2018-01-04 NOTE — TELEPHONE ENCOUNTER
Patient Information     Patient Name MRN Sex Kaushik Christensen [id number] Male 1950      Telephone Encounter by Caridad Hilliard at 3/28/2017  3:15 PM     Author:  Caridad Hilliard Service:  (none) Author Type:  (none)     Filed:  3/28/2017  3:16 PM Encounter Date:  3/28/2017 Status:  Signed     :  Caridad Hilliard            Pt made an appointment  Caridad Hilliard ....................  3/28/2017   3:16 PM

## 2018-01-04 NOTE — H&P
"Patient Information     Patient Name MRN Kaushik Miguel [id number] Male 1950      H&P by Alec Howard MD at 4/10/2017  9:15 AM     Author:  Alec Howard MD Service:  (none) Author Type:  Physician     Filed:  4/10/2017 10:57 AM Encounter Date:  4/10/2017 Status:  Signed     :  Alec Howard MD (Physician)            ----------------- PREOPERATIVE EXAM ------------------  4/10/2017    SUBJECTIVE:  Kaushik Ansari is a 66 y.o. male here for preoperative optimization.    I was asked to see Kaushik Ansari by Dr. Jennings for  preoperative evaluation due to obstructive sleep apnea    Date of Surgery: 17  Type of Surgery: Outpatient  Surgeon: [facility]:  [facility]    HPI:  Is having his torn rotator cuff repaired.  Has had sleep apnea for many years.  Snored all they way back into his 20's.  Started treatment for it about 17 years ago.  Uses it faithfully.  If he skips a night will be very tired the nest day.  Pressure was just bumped up from 9 to 10.      Fever/Chills or other infectious symptoms in past month:  (NO)   >10lb weight loss in past two months:  (NO)     Health Care Directive/Code status: Full, no living will  Hx of blood transfusions:   (NO)   History of VRE/MRSA:  (NO) Date:   Site:      Preoperative Evaluation: Obstructive Sleep Apnea screening    S: Snore -  Do you snore loudly? (louder than talking or loud enough to be heard through closed doors)(YES)  T: Tired - Do you often feel tired, fatigued, or sleepy during the daytime?(NO)  O: Observed - Has anyone ever observed you stop breathing during your sleep?(YES)  P: Pressure - Do you have or are you being treated for high blood pressure?(NO)  B: BMI - BMI greater than 35kg/m2?(NO)  A: Age - Age over 50 years old?(YES)  N: Neck - Neck circumference greater than 40 cm?(YES)  G: Gender - Gender: Male?(YES)    Total number of \"YES\" responses:  N/A, has known obstructive sleep apnea     Scoring: Low risk of REDD " 0-2  At Risk of REDD: >3 High Risk of REDD: 5-8        Patient Active Problem List       Diagnosis  Date Noted     Complete tear of right rotator cuff  03/23/2017     Impingement syndrome of right shoulder  03/23/2017     AC (acromioclavicular) joint arthritis  03/23/2017     Benign non-nodular prostatic hyperplasia with lower urinary tract symptoms  10/23/2015     Degeneration of intervertebral disc of lumbar region  10/23/2015     Allergic rhinitis due to pollen  05/27/2015     UPPER RESPIRATORY INFECTION, ACUTE, WITH BRONCHITIS  06/19/2012     U R I  06/19/2012     LACTOSE INTOLERANCE  08/30/2011     ANXIETY  03/08/2011     SEBORRHEIC KERATOSIS  01/07/2011     OTITIS MEDIA, SEROUS  12/28/2010     SEBORRHEIC KERATOSIS  08/11/2010     KNEE PAIN, RIGHT  08/11/2010     LATERAL EPICONDYLITIS  08/11/2010     FAMILIAL TREMOR       Benign          SCIATICA       Mild          OBSTRUCTIVE SLEEP APNEA       On CPAP            Past Medical History:     Diagnosis  Date     AC (acromioclavicular) joint arthritis 3/23/2017     Complete tear of right rotator cuff 3/23/2017     Early morning wakening      Impingement syndrome of right shoulder 3/23/2017       Past Surgical History:      Procedure  Laterality Date     COLONOSCOPY  3/1/2005    normal by patient report       COLONOSCOPY SCREENING  03/2000     CT CORONARY ANGIOGRAM ([place])       VASECTOMY         Current Outpatient Prescriptions       Medication  Sig Dispense Refill     CPAP CPAP, heated humidifier, mask, headgear, filters and tubing. For home use.     Length of Need: 99 1 unit 0     cyclobenzaprine (FLEXERIL) 10 mg tablet Take 1 tablet by mouth 3 times daily. 30 tablet 2     ofloxacin 0.3 % ophthalmic (OCUFLOX) 0.3 % ophthalmic solution 1 gtt in eye(s) q2h x2 days, then 1 gtt qid x5 days 5 mL 0     propranolol (INDERAL) 40 mg tablet Take 1 by mouth daily as needed for tremors.       tamsulosin (FLOMAX) 0.4 mg capsule TAKE ONE CAPSULE BY MOUTH ONCE DAILY AFTER  A  MEAL  "90 capsule 3     No current facility-administered medications for this visit.      Medications have been reviewed by me and are current to the best of my knowledge and ability.    Recent use of: no recent use of aspirin (ASA), NSAIDS or steroids    Allergies:  No Known Allergies  Latex allergy  no  Immunizations:  Immunization History     Administered  Date(s) Administered     Influenza Virus, Unspecified 10/22/2008     Influenza, High-dose Inactivated 09/15/2015     Influenza, IIV3 (Age >=3 years) 11/01/2013     Tdap 12/13/2012       Family History      Problem  Relation Age of Onset     Cancer-breast Mother      Alcohol/Drug Father      Diabetes Daughter        Denies family hx of bleeding tendencies, anesthesia complications, or other problems with surgery.    Social History      Substance Use Topics        Smoking status:  Former Smoker     Packs/day: 2.00     Years: 20.00     Types: Cigarettes     Quit date: 1/1/1984     Smokeless tobacco:  Former User     Types: Chew     Alcohol use  No       ROS:    Surgical:  patient denies previous complications from prior surgeries including but not limited to prolonged bleeding, anesthesia complications, dysrhythmias, surgical wound infections, or prolonged hospital stay.  Cardiorespiratory: denies chest pain, palpitations, shortness of breath, cough. Most exertion in the past 2 weeks was heavy, works out at [employer] regularly.   Complete ROS otherwise negative except as noted in HPI.     -------------------------------------------------------------    PHYSICAL EXAM:  /68  Pulse 91  Resp 16  Ht 1.886 m (6' 2.25\")  Wt 109.1 kg (240 lb 9.6 oz)  SpO2 96%  BMI 30.68 kg/m2    EXAM:  General Appearance: Pleasant, alert, appropriate appearance for age. No acute distress  Head Exam: Normal. Normocephalic, atraumatic.  Eyes: PERRL, EOMI  Ears: Normal TM's bilaterally.   OroPharynx: Mucosa pink and moist. Dentition in good repair.   Neck: Supple, no masses or nodes, no " lymphadenopathy.  No thyromegaly.  Lungs: Normal chest wall and respirations. Clear to auscultation, no wheezes or crackles.  Cardiovascular: Regular rate and rhythm. S1, S2, no murmurs.    Musculoskeletal: No edema. No warm or erythematous joints.  Skin: no concerning or new rashes.  Neurologic Exam: CN 2-12 grossly intact.  Normal gait.  Symmetric DTRs, normal gross motor movement, tone, and coordination. No tremor.  Psychiatric Exam: Alert and oriented, appropriate affect.      EKG:  appears normal, NSR and normal axis, normal intervals, no acute ST/T changes c/w ischemia  ---------------------------------------------------------------    ASSESSEMENT AND PLAN:       Kaushik was seen today for pre-op exam.    Diagnoses and all orders for this visit:    Obstructive sleep apnea  -     EKG 12 LEAD; Future  -     [place] ELECTROCARDIOGRAM COMPLETE      PRE OP RECOMMENDATIONS:    No family history of problems with bleeding or anesthesia. Patient is able to tolerate greater than 4 METs of activity without any cardiopulmonary symptoms. ASA PS class 1 . No cardiopulmonary workup is neccessary for the current procedure. Please contact the office with any questions or concerns.    Patient is on chronic pain medications (NO);   Patient is on antiplatlet/anticoagulation (NO)  Other medications that need adjustment perioperatively (NO)    Other:  Patient was advised to call our office and the surgical services with any change in condition or new symptoms if they were to develop between today and their surgical date; especially any cardiopulmonary symptoms or symptoms concerning for an infection.    Recommendations: Proceed as planned.  Very motivated patient.    Alec Howard MD ....................  4/10/2017   10:56 AM

## 2018-01-04 NOTE — PROGRESS NOTES
Patient Information     Patient Name MRN Sex Kaushik Christensen [id number] Male 1950      Progress Notes by Forest Jennings DO at 2017  8:30 AM     Author:  Forest Jennings DO Service:  (none) Author Type:  Physician     Filed:  2017  9:11 AM Encounter Date:  2017 Status:  Signed     :  Forest Jennings DO (Physician)            PROGRESS NOTE    SUBJECTIVE:  Kaushik Ansari is here for recheck in evaluation of his right shoulder. He's doing well and has had a little bit of lightheadedness and dizziness probably related to his antinausea medication and his pain medication. Otherwise he is doing better.     OBJECTIVE:  /68  Pulse 88  Temp 96.8  F (36  C) (Tympanic)  There is no height or weight on file to calculate BMI.    General Appearance: Pleasant male in good appearance, mood and affect.  Alert and orientated times three ( time, date and location).    Incision Clean and dry, closed, no infection    Shoulder:  Effusion: no  Motion:  not tested due to the recent surgical procedure.    Elbow:  Flexion: Normal  Extension: Normal  Deep tendon reflexes: Normal    Hand:  Sensation: Normal  Radial and ulnar blood flow:  Normal    Eyes: Pupils are round.    Ears: Hearing: Intact    Heart: Good cap refill into his hands.    Lungs: Coarse breath sounds.     Arthroscopy pictures and op note where reviewed with the patient and copies given.    ASSESSMENT:    POSTOPERATIVE DIAGNOSES  1. Large chronic right rotator cuff tear to include supraspinatus and infraspinatus.   2. Long head of the biceps tearing.   3. Labral tearing.   4. 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   5. Impingement syndrome.  6. AC arthritis.     PROCEDURES  1. Right shoulder arthroscopy with extensive debridement to include 4% of the anterior to posterior labrum (DOS 2017).   2. Arthroscopic biceps tenotomy.   3. Arthroscopic chondroplasty of the humeral head and  glenoid.   4. Arthroscopic subacromial decompression.   5. Arthroscopic distal clavicle excision.   6. Open rotator cuff repair utilizing a double row technique with 2 medial row 5.5 mm BioComposite SwiveLock anchors with 2 FiberTapes and one FiberWire placed for traction and lateral row fixation with two 5.5 mm BioComposite SwiveLock anchors with the FiberTapes and one 4.75 mm BioComposite SwiveLock with the FiberWire.     PLAN:    Suture removal and wound care where completed.  We did talk at length about the fact that he has a large chance of failure because this was a beak tear knee does verbalize an understanding and also how to follow the rules.  He will begin physical therapy as ordered.  Questions and concerns answered to their satisfaction.  Follow-up in 6 weeks with PT notes.    Evaluate and treat  Modalities as needed    See operative note for full details of procedure    Start rehab now  Phase 1 - no passive ROM at shoulder  Phase 2A - on 5/17  Phase 2B - on 5/31 -out of pillow in the daytime, wall walk 1X per every hour awake.  Focus is full passive forward flexion by 8 weeks post op.  Wear pillow at night  Phase 3 - on 6/14 - out of all gear then.    Forest Jennings D.O.  Orthopaedic Surgeon    [facility]  [address] [place] [address]  Phone [phone] (KNEE)  Fax [phone]    This document was created using computer generated templates and voice activated software.    9:08 AM 4/24/2017

## 2018-01-04 NOTE — PROGRESS NOTES
"Patient Information     Patient Name MRN Kaushik Miguel [id number] Male 1950      Progress Notes by Alec Howard MD at 4/10/2017  9:15 AM     Author:  Alec Howard MD Service:  (none) Author Type:  Physician     Filed:  4/10/2017 10:57 AM Encounter Date:  4/10/2017 Status:  Signed     :  Alec Howard MD (Physician)            ----------------- PREOPERATIVE EXAM ------------------  4/10/2017    SUBJECTIVE:  Kaushik Ansari is a 66 y.o. male here for preoperative optimization.    I was asked to see Kaushik Ansari by Dr. Jennings for  preoperative evaluation due to obstructive sleep apnea    Date of Surgery: 17  Type of Surgery: Outpatient  Surgeon: [facility]:  [facility]    HPI:  Is having his torn rotator cuff repaired.  Has had sleep apnea for many years.  Snored all they way back into his 20's.  Started treatment for it about 17 years ago.  Uses it faithfully.  If he skips a night will be very tired the nest day.  Pressure was just bumped up from 9 to 10.      Fever/Chills or other infectious symptoms in past month:  (NO)   >10lb weight loss in past two months:  (NO)     Health Care Directive/Code status: Full, no living will  Hx of blood transfusions:   (NO)   History of VRE/MRSA:  (NO) Date:   Site:      Preoperative Evaluation: Obstructive Sleep Apnea screening    S: Snore -  Do you snore loudly? (louder than talking or loud enough to be heard through closed doors)(YES)  T: Tired - Do you often feel tired, fatigued, or sleepy during the daytime?(NO)  O: Observed - Has anyone ever observed you stop breathing during your sleep?(YES)  P: Pressure - Do you have or are you being treated for high blood pressure?(NO)  B: BMI - BMI greater than 35kg/m2?(NO)  A: Age - Age over 50 years old?(YES)  N: Neck - Neck circumference greater than 40 cm?(YES)  G: Gender - Gender: Male?(YES)    Total number of \"YES\" responses:  N/A, has known obstructive sleep apnea     Scoring: Low " risk of REDD 0-2  At Risk of REDD: >3 High Risk of REDD: 5-8        Patient Active Problem List       Diagnosis  Date Noted     Complete tear of right rotator cuff  03/23/2017     Impingement syndrome of right shoulder  03/23/2017     AC (acromioclavicular) joint arthritis  03/23/2017     Benign non-nodular prostatic hyperplasia with lower urinary tract symptoms  10/23/2015     Degeneration of intervertebral disc of lumbar region  10/23/2015     Allergic rhinitis due to pollen  05/27/2015     UPPER RESPIRATORY INFECTION, ACUTE, WITH BRONCHITIS  06/19/2012     U R I  06/19/2012     LACTOSE INTOLERANCE  08/30/2011     ANXIETY  03/08/2011     SEBORRHEIC KERATOSIS  01/07/2011     OTITIS MEDIA, SEROUS  12/28/2010     SEBORRHEIC KERATOSIS  08/11/2010     KNEE PAIN, RIGHT  08/11/2010     LATERAL EPICONDYLITIS  08/11/2010     FAMILIAL TREMOR       Benign          SCIATICA       Mild          OBSTRUCTIVE SLEEP APNEA       On CPAP            Past Medical History:     Diagnosis  Date     AC (acromioclavicular) joint arthritis 3/23/2017     Complete tear of right rotator cuff 3/23/2017     Early morning wakening      Impingement syndrome of right shoulder 3/23/2017       Past Surgical History:      Procedure  Laterality Date     COLONOSCOPY  3/1/2005    normal by patient report       COLONOSCOPY SCREENING  03/2000     CT CORONARY ANGIOGRAM ([place])       VASECTOMY         Current Outpatient Prescriptions       Medication  Sig Dispense Refill     CPAP CPAP, heated humidifier, mask, headgear, filters and tubing. For home use.     Length of Need: 99 1 unit 0     cyclobenzaprine (FLEXERIL) 10 mg tablet Take 1 tablet by mouth 3 times daily. 30 tablet 2     ofloxacin 0.3 % ophthalmic (OCUFLOX) 0.3 % ophthalmic solution 1 gtt in eye(s) q2h x2 days, then 1 gtt qid x5 days 5 mL 0     propranolol (INDERAL) 40 mg tablet Take 1 by mouth daily as needed for tremors.       tamsulosin (FLOMAX) 0.4 mg capsule TAKE ONE CAPSULE BY MOUTH ONCE DAILY  "AFTER  A  MEAL 90 capsule 3     No current facility-administered medications for this visit.      Medications have been reviewed by me and are current to the best of my knowledge and ability.    Recent use of: no recent use of aspirin (ASA), NSAIDS or steroids    Allergies:  No Known Allergies  Latex allergy  no  Immunizations:  Immunization History     Administered  Date(s) Administered     Influenza Virus, Unspecified 10/22/2008     Influenza, High-dose Inactivated 09/15/2015     Influenza, IIV3 (Age >=3 years) 11/01/2013     Tdap 12/13/2012       Family History      Problem  Relation Age of Onset     Cancer-breast Mother      Alcohol/Drug Father      Diabetes Daughter        Denies family hx of bleeding tendencies, anesthesia complications, or other problems with surgery.    Social History      Substance Use Topics        Smoking status:  Former Smoker     Packs/day: 2.00     Years: 20.00     Types: Cigarettes     Quit date: 1/1/1984     Smokeless tobacco:  Former User     Types: Chew     Alcohol use  No       ROS:    Surgical:  patient denies previous complications from prior surgeries including but not limited to prolonged bleeding, anesthesia complications, dysrhythmias, surgical wound infections, or prolonged hospital stay.  Cardiorespiratory: denies chest pain, palpitations, shortness of breath, cough. Most exertion in the past 2 weeks was heavy, works out at [employer] regularly.   Complete ROS otherwise negative except as noted in HPI.     -------------------------------------------------------------    PHYSICAL EXAM:  /68  Pulse 91  Resp 16  Ht 1.886 m (6' 2.25\")  Wt 109.1 kg (240 lb 9.6 oz)  SpO2 96%  BMI 30.68 kg/m2    EXAM:  General Appearance: Pleasant, alert, appropriate appearance for age. No acute distress  Head Exam: Normal. Normocephalic, atraumatic.  Eyes: PERRL, EOMI  Ears: Normal TM's bilaterally.   OroPharynx: Mucosa pink and moist. Dentition in good repair.   Neck: Supple, no masses or " nodes, no lymphadenopathy.  No thyromegaly.  Lungs: Normal chest wall and respirations. Clear to auscultation, no wheezes or crackles.  Cardiovascular: Regular rate and rhythm. S1, S2, no murmurs.    Musculoskeletal: No edema. No warm or erythematous joints.  Skin: no concerning or new rashes.  Neurologic Exam: CN 2-12 grossly intact.  Normal gait.  Symmetric DTRs, normal gross motor movement, tone, and coordination. No tremor.  Psychiatric Exam: Alert and oriented, appropriate affect.      EKG:  appears normal, NSR and normal axis, normal intervals, no acute ST/T changes c/w ischemia  ---------------------------------------------------------------    ASSESSEMENT AND PLAN:       Kaushik was seen today for pre-op exam.    Diagnoses and all orders for this visit:    Obstructive sleep apnea  -     EKG 12 LEAD; Future  -     [place] ELECTROCARDIOGRAM COMPLETE      PRE OP RECOMMENDATIONS:    No family history of problems with bleeding or anesthesia. Patient is able to tolerate greater than 4 METs of activity without any cardiopulmonary symptoms. ASA PS class 1 . No cardiopulmonary workup is neccessary for the current procedure. Please contact the office with any questions or concerns.    Patient is on chronic pain medications (NO);   Patient is on antiplatlet/anticoagulation (NO)  Other medications that need adjustment perioperatively (NO)    Other:  Patient was advised to call our office and the surgical services with any change in condition or new symptoms if they were to develop between today and their surgical date; especially any cardiopulmonary symptoms or symptoms concerning for an infection.    Recommendations: Proceed as planned.  Very motivated patient.    Alec Howard MD ....................  4/10/2017   10:56 AM

## 2018-01-04 NOTE — TELEPHONE ENCOUNTER
Patient Information     Patient Name MRN Sex Kaushik Christensen [id number] Male 1950      Telephone Encounter by Caridad Hilliard at 3/28/2017 10:39 AM     Author:  Caridad Hilliard Service:  (none) Author Type:  (none)     Filed:  3/28/2017 10:49 AM Encounter Date:  3/28/2017 Status:  Signed     :  Caridad Hilliard            Was seen yesterday with Dr Jennings, he had two options PT or see a surgeon, Dick said he needed surgery and the patient thought he had the two options. Patient would like a 2nd opinion form a Dr in [place], Pt is having some anxiety over this.  Caridad Hilliard ....................  3/28/2017   10:48 AM

## 2018-01-04 NOTE — OR POSTOP
Patient Information     Patient Name MRN Sex Kaushik Christensen [id number] Male 1950      OR PostOp by Lavonne Martini RN at 2017  1:32 PM     Author:  Lavonne Martini RN Service:  (none) Author Type:  NURS- Registered Nurse     Filed:  2017  1:32 PM Date of Service:  2017  1:32 PM Status:  Signed     :  Lavonne Martini RN (NURS- Registered Nurse)            PACU Transfer Note    Kaushik Ansari transferred to [facility] via cart.  Report to [facility].  Equipment used for transport:  None.  Accompanied by:  Registered Nurse    Patient stable and meets phase 1 discharge criteria for transport from PACU.      PACU Respiratory Event Documentation     1) Episodes of Apnea greater than or equal to 10 seconds: no    2) Bradypnea - less than 8 breaths per minute: no    3) Pain score on 0 to 10 scale: 0    4) Pain-sedation mismatch (yes or no): no    5) Repeated 02 desaturation less than 90% (yes or no): no    Anesthesia notified? (yes or no): no    Any of the above events occuring repeatedly in separate 30 minute intervals may be considered recurrent PACU respiratory events.

## 2018-01-04 NOTE — OR ANESTHESIA
"Patient Information     Patient Name MRN Sex     Kaushik Ansari [id number] Male 1950      OR Anesthesia by Clara Saba MD at 2017 10:29 AM     Author:  Clara Saba MD  Service:  (none) Author Type:  PHYS- Anesthesiologist     Filed:  2017 11:12 AM  Date of Service:  2017 10:29 AM Status:  Addendum     :  Clara Saba MD (PHYS- Anesthesiologist)        Related Notes: Original Note by Clara Saba MD (PHYS- Anesthesiologist) filed at 2017 10:30 AM            ULTRASOUND-GUIDED INTERSCALENE BRACHIAL PLEXUS BLOCK FOR POST-OP PAIN MANAGEMENT  [facility]    Patient: KAUSHIK ANSARI  Patient : 1950  Date: 2017  Procedure time:  1015 to 1025  Diagnosis: Shoulder pain.    Surgeon requests interscalene block of the brachial plexus for post-op pain management.    The procedure, potential benefits, risks, and alternatives were discussed during the preoperative interview.  The patient voiced understanding of the information and agreed to proceed with the procedure.    Universal protocol was followed.  TIME OUT conducted just prior to starting procedure confirmed patient identity, site/side, procedure, patient position and availability of correct equipment and implants.     Full continuous monitoring and supplemental oxygen per nasal cannula were placed.  Sedation: midazolam 2 mg IV & fentanyl 50 mcg IV was administered pre-procedure.  Anesthesia: 0.5% ropivacaine subcutaneously.    The ultrasound probe was used to locate the brachial plexus at the clavicle adjacent to the artery.  It was followed cephalad to a point where it was bracketed by the scalene muscles. The right neck was prepped with chlorhexidine.  A 22 gauge 4\" stimuplex insulated needle was advanced to a point where the tip rested adjacent to but not within the brachial plexus.  Proper needle placement was confirmed using a nerve stimulator set at 0.4 mAmps.  " After negative aspiration, a 1 mL test dose of local anesthetic was administered.  The patient denied tinnitus or metallic taste in the mouth.  A 5 mL bolus of local was injected easily and painlessly to demonstrate hydrodissection around the brachial plexus.  A total of 30 mL of 0.5% ropivacaine with 2 mg preservative free dexamethasone was injected incrementally, with negative aspiration every 5 ml.  No parasthesias were elicited either during needle placement or medication injection.  There were no other immediate complications apparent.  The patient tolerated the procedure well.     A permanent copy of the ultrasound image was printed and will be scanned into the EMR.    Electronically Signed by  ИРИНА WARD MD

## 2018-01-04 NOTE — TELEPHONE ENCOUNTER
Patient Information     Patient Name MRN Sex Kaushik Christensen [id number] Male 1950      Telephone Encounter by Caridad Hilliard at 3/28/2017  9:14 AM     Author:  Caridad Hilliard Service:  (none) Author Type:  (none)     Filed:  3/28/2017  9:15 AM Encounter Date:  3/27/2017 Status:  Signed     :  Caridad Hilliard            Called Pt and left a message that Rx was faxed in  Caridad Hilliard ....................  3/28/2017   9:15 AM

## 2018-01-04 NOTE — INTERVAL H&P NOTE
Patient Information     Patient Name MRN Sex Kaushik Christensen [id number] Male 1950      Interval H&P Note by Forest Jennings DO at 2017  9:28 AM     Author:  Forest Jennings DO Service:  (none) Author Type:  Physician     Filed:  2017  9:28 AM Date of Service:  2017  9:28 AM Status:  Signed     :  Forest Jennings DO (Physician)            History and Physical Update    The history and physical has been reviewed and the patient's right shoulder has been examined.  There are no interim changes to the patient's history or physical condition.  He is ready to proceed with planned procedure.  He understands the risks and benefits and once again these where outlined.    Forest Jennings DO  Orthopedic Surgeon        Source Note     Author:  Alec Howard MD Service:  (none) Author Type:  Physician    Filed:  4/10/2017 10:57 AM Date of Service:  4/10/2017  9:15 AM Status:  Signed    :  Alec Howard MD (Physician)              ----------------- PREOPERATIVE EXAM ------------------  4/10/2017    SUBJECTIVE:  Kaushik Ansari is a 66 y.o. male here for preoperative optimization.    I was asked to see Kaushik Ansari by Dr. Jennings for  preoperative evaluation due to obstructive sleep apnea    Date of Surgery: 17  Type of Surgery: Outpatient  Surgeon: [facility]:  [facility]    HPI:  Is having his torn rotator cuff repaired.  Has had sleep apnea for many years.  Snored all they way back into his 20's.  Started treatment for it about 17 years ago.  Uses it faithfully.  If he skips a night will be very tired the nest day.  Pressure was just bumped up from 9 to 10.      Fever/Chills or other infectious symptoms in past month:  (NO)   >10lb weight loss in past two months:  (NO)     Health Care Directive/Code status: Full, no living will  Hx of blood transfusions:   (NO)   History of VRE/MRSA:  (NO) Date:   Site:      Preoperative Evaluation: Obstructive  "Sleep Apnea screening    S: Snore -  Do you snore loudly? (louder than talking or loud enough to be heard through closed doors)(YES)  T: Tired - Do you often feel tired, fatigued, or sleepy during the daytime?(NO)  O: Observed - Has anyone ever observed you stop breathing during your sleep?(YES)  P: Pressure - Do you have or are you being treated for high blood pressure?(NO)  B: BMI - BMI greater than 35kg/m2?(NO)  A: Age - Age over 50 years old?(YES)  N: Neck - Neck circumference greater than 40 cm?(YES)  G: Gender - Gender: Male?(YES)    Total number of \"YES\" responses:  N/A, has known obstructive sleep apnea     Scoring: Low risk of REDD 0-2  At Risk of REDD: >3 High Risk of REDD: 5-8        Patient Active Problem List       Diagnosis  Date Noted     Complete tear of right rotator cuff  03/23/2017     Impingement syndrome of right shoulder  03/23/2017     AC (acromioclavicular) joint arthritis  03/23/2017     Benign non-nodular prostatic hyperplasia with lower urinary tract symptoms  10/23/2015     Degeneration of intervertebral disc of lumbar region  10/23/2015     Allergic rhinitis due to pollen  05/27/2015     UPPER RESPIRATORY INFECTION, ACUTE, WITH BRONCHITIS  06/19/2012     U R I  06/19/2012     LACTOSE INTOLERANCE  08/30/2011     ANXIETY  03/08/2011     SEBORRHEIC KERATOSIS  01/07/2011     OTITIS MEDIA, SEROUS  12/28/2010     SEBORRHEIC KERATOSIS  08/11/2010     KNEE PAIN, RIGHT  08/11/2010     LATERAL EPICONDYLITIS  08/11/2010     FAMILIAL TREMOR       Benign          SCIATICA       Mild          OBSTRUCTIVE SLEEP APNEA       On CPAP            Past Medical History:     Diagnosis  Date     AC (acromioclavicular) joint arthritis 3/23/2017     Complete tear of right rotator cuff 3/23/2017     Early morning wakening      Impingement syndrome of right shoulder 3/23/2017       Past Surgical History:      Procedure  Laterality Date     COLONOSCOPY  3/1/2005    normal by patient report       COLONOSCOPY SCREENING  " 03/2000     CT CORONARY ANGIOGRAM ([place])       VASECTOMY         Current Outpatient Prescriptions       Medication  Sig Dispense Refill     CPAP CPAP, heated humidifier, mask, headgear, filters and tubing. For home use.     Length of Need: 99 1 unit 0     cyclobenzaprine (FLEXERIL) 10 mg tablet Take 1 tablet by mouth 3 times daily. 30 tablet 2     ofloxacin 0.3 % ophthalmic (OCUFLOX) 0.3 % ophthalmic solution 1 gtt in eye(s) q2h x2 days, then 1 gtt qid x5 days 5 mL 0     propranolol (INDERAL) 40 mg tablet Take 1 by mouth daily as needed for tremors.       tamsulosin (FLOMAX) 0.4 mg capsule TAKE ONE CAPSULE BY MOUTH ONCE DAILY AFTER  A  MEAL 90 capsule 3     No current facility-administered medications for this visit.      Medications have been reviewed by me and are current to the best of my knowledge and ability.    Recent use of: no recent use of aspirin (ASA), NSAIDS or steroids    Allergies:  No Known Allergies  Latex allergy  no  Immunizations:  Immunization History     Administered  Date(s) Administered     Influenza Virus, Unspecified 10/22/2008     Influenza, High-dose Inactivated 09/15/2015     Influenza, IIV3 (Age >=3 years) 11/01/2013     Tdap 12/13/2012       Family History      Problem  Relation Age of Onset     Cancer-breast Mother      Alcohol/Drug Father      Diabetes Daughter        Denies family hx of bleeding tendencies, anesthesia complications, or other problems with surgery.    Social History      Substance Use Topics        Smoking status:  Former Smoker     Packs/day: 2.00     Years: 20.00     Types: Cigarettes     Quit date: 1/1/1984     Smokeless tobacco:  Former User     Types: Chew     Alcohol use  No       ROS:    Surgical:  patient denies previous complications from prior surgeries including but not limited to prolonged bleeding, anesthesia complications, dysrhythmias, surgical wound infections, or prolonged hospital stay.  Cardiorespiratory: denies chest pain, palpitations, shortness of  "breath, cough. Most exertion in the past 2 weeks was heavy, works out at [employer] regularly.   Complete ROS otherwise negative except as noted in HPI.     -------------------------------------------------------------    PHYSICAL EXAM:  /68  Pulse 91  Resp 16  Ht 1.886 m (6' 2.25\")  Wt 109.1 kg (240 lb 9.6 oz)  SpO2 96%  BMI 30.68 kg/m2    EXAM:  General Appearance: Pleasant, alert, appropriate appearance for age. No acute distress  Head Exam: Normal. Normocephalic, atraumatic.  Eyes: PERRL, EOMI  Ears: Normal TM's bilaterally.   OroPharynx: Mucosa pink and moist. Dentition in good repair.   Neck: Supple, no masses or nodes, no lymphadenopathy.  No thyromegaly.  Lungs: Normal chest wall and respirations. Clear to auscultation, no wheezes or crackles.  Cardiovascular: Regular rate and rhythm. S1, S2, no murmurs.    Musculoskeletal: No edema. No warm or erythematous joints.  Skin: no concerning or new rashes.  Neurologic Exam: CN 2-12 grossly intact.  Normal gait.  Symmetric DTRs, normal gross motor movement, tone, and coordination. No tremor.  Psychiatric Exam: Alert and oriented, appropriate affect.      EKG:  appears normal, NSR and normal axis, normal intervals, no acute ST/T changes c/w ischemia  ---------------------------------------------------------------    ASSESSEMENT AND PLAN:       Kaushik was seen today for pre-op exam.    Diagnoses and all orders for this visit:    Obstructive sleep apnea  -     EKG 12 LEAD; Future  -     [place] ELECTROCARDIOGRAM COMPLETE      PRE OP RECOMMENDATIONS:    No family history of problems with bleeding or anesthesia. Patient is able to tolerate greater than 4 METs of activity without any cardiopulmonary symptoms. ASA PS class 1 . No cardiopulmonary workup is neccessary for the current procedure. Please contact the office with any questions or concerns.    Patient is on chronic pain medications (NO);   Patient is on antiplatlet/anticoagulation (NO)  Other medications that " need adjustment perioperatively (NO)    Other:  Patient was advised to call our office and the surgical services with any change in condition or new symptoms if they were to develop between today and their surgical date; especially any cardiopulmonary symptoms or symptoms concerning for an infection.    Recommendations: Proceed as planned.  Very motivated patient.    Alec Howard MD ....................  4/10/2017   10:56 AM

## 2018-01-04 NOTE — PROCEDURES
Patient Information     Patient Name MRN Sex Kaushik Christensen [id number] Male 1950      Procedures by Forest Jennings DO at 2017 12:58 PM     Author:  Forest Jennings DO Service:  (none) Author Type:  Physician     Filed:  2017 12:59 PM Date of Service:  2017 12:58 PM Status:  Signed     :  Forest Jennings DO (Physician)            POSTOPERATIVE / POSTPROCEDURE NOTE - IMMEDIATE :    Surgeon(s)/Proceduralist(s) and Assistants (if any):  Surgeon(s):  Forest Jennings DO    Procedure(s):  Right shoulder arthroscopy with ED/DCE/SAD/BICEPS TENOTOMY/CHONDROPLASTY AND OPEN RTCR    Procedure(s) findings:   See op note    Specimen(s) removed: no    (EBL) Estimated blood loss (ml):10    Postoperative/Postprocedure Diagnosis:   See op note    Forest Jennings D.O.  Orthopaedic Surgeon    [address] [address]  Phone [phone] (KNEE)  Fax [phone]    12:58 PM 2017

## 2018-01-04 NOTE — OR ANESTHESIA
Patient Information     Patient Name MRN Sex     Kaushik Ansari [id number] Male 1950      OR Anesthesia by Clara aSba MD at 2017  3:57 PM     Author:  Clara Saba MD Service:  (none) Author Type:  PHYS- Anesthesiologist     Filed:  2017  3:57 PM Date of Service:  2017  3:57 PM Status:  Signed     :  Clara Saba MD (PHYS- Anesthesiologist)            Anesthesia Post Operative Care Note    Name: Kaushik Ansari  MRN:   [id number]  :    1950       Procedure Done:  See Surgeon Note   Case Cancelled for Anesthetic Reason:  No   Post Op Considerations:  REDD    Anesthesia Technique    Anesthetic Type:  General     Airway Management:  LMA     Oral Trauma:  No    Intraoperative Course     Hemodynamics:  Requires Support  Hemodynamics Unstable: Hypotensive          Vasoactives:  Neosynephrine, Volume and Other              Other Vasoactives: Ephedrine IV used.    Ventilation Normal:  Yes Lung Sounds:  Normal      PACU Course      Nondepolarizer Used: No    Reintubation:  No   Hemodynamics:  Stable      Hydration: Euvolemic   Temperature:  36.1 - 38.3      Mental Status:  Awake, alert, follows commands   Pain Management:  Adequate     Regional Block:  Yes     Regional Block Outcome:  Adequate   Anesthesia Complications:  None      Vital Signs:  Temp: 97.3  F (36.3  C)  Pulse: 100  BP: 121/88  Resp: 16  SpO2: 92 %    O2 Device: Room Air         Level of Nausea: None        Active Lines:  Patient Lines/Drains/Airways Status    Active Line     None                Intake & Output:  Date  17 1500 - 17 0659(Not Admitted)   17 0700 - 17 0659      Shift  1223-1511 6853-5057 24 Hour Total 7601-6805 6661-7316 9868-4488 24 Hour Total   I  N  T  A  K  E   Oral/Enteral    300   300       +I/O+    Oral    300   300    Intravenous    190   1900       +I/O+  Maint IV (lactated ringers infusion)    1900   1900    Shift Total       220    O  U  T  P  U  T   Shift Total          NET     2200 2200   Weight (kg)  109.1 109.1 109.1 109.1 109.1 109.1 109.1         Labs:  No results for input(s): RJ4BWGOXNIP, QTZ7ITSOFNRR, PHARTERIAL, OHZ8VDFOTVCR, R3CYMZMAVGYY in the last 24 hours.    No results for input(s): MAGNESIUM in the last 24 hours.    No results for input(s): GLUCOSEMETER in the last 720 hours.        ИРИНА WARD MD ....................  4/19/2017   3:57 PM

## 2018-01-04 NOTE — INITIAL ASSESSMENTS
Patient Information     Patient Name MRN Sex Kaushik Christensen [id number] Male 1950      Initial Assessments by Rosie Salter OT at 2017 10:28 AM     Author:  Rosie Salter OT Service:  (none) Author Type:  OT- Occupational Therapist     Filed:  2017  5:18 PM Date of Service:  2017 10:28 AM Status:  Signed     :  Rosie Salter OT (OT- Occupational Therapist)            Occupational Therapy Evaluation: 2017    Patient is a 66 year old male presenting this date for right shoulder surgery with Dr. Jennings. Patient alert and cooperative with his spouse present and engaged. Patient seen pre-operatively and was fit with Large Sling Shot III immobilizer and adjustments made. Will re-assess immobilizer fit post-operatively and make changes as appropriate.     Patient seen post-operatively for adjustment of immobilizer as well as final education. Education provided to patient and spouse regarding fit of immobilizer as well as elbow, wrist, hand exercises. Written handout provided to spouse. Demonstration with returned demonstration provided to patient and spouse regarding one-handed ADL techniques including bathing and dressing.      Patient and spouse returned verbal understanding of training provided this date. No further skilled OT services warranted.       G codes and Modifier based on patient's presentation and clinical judgement:     Current Primary G Code and Modifier:    Per the Patient's intake and/or assessment the Primary G Code is: Self Care .   The Patient's Impairment, Limitation or Restriction Modifier would be best described as: CI - 1% - 20% Impairment.     Goal Primary G Code and Modifier:    The Patient's G Code Goal would be: Self Care    The Patient's Impairment, Limitation or Restriction Modifier goal would be best described as: CI - 1% - 20% Impairment.   Discharge Primary G Code and Modifier:      The Patient's status upon Discharge  is Self Care    The Patient's Impairment, Limitation or Restriction Modifier would be best described as CI - 1% - 20% Impairment.         Rosie Salter, OTR/L

## 2018-01-04 NOTE — OR PREOP
Patient Information     Patient Name MRN Sex Kaushik Christensen [id number] Male 1950      OR PreOp by Shahram Noriega, RN at 2017  9:42 AM     Author:  Shahram Noriega, RN  Service:  (none) Author Type:  NURS- Registered Nurse     Filed:  2017  9:44 AM  Date of Service:  2017  9:42 AM Status:  Addendum     :  Shahram Noriega RN (NURS- Registered Nurse)        Related Notes: Original Note by Shahram Noriega, RN (NURS- Registered Nurse) filed at 2017  9:44 AM            Versed 2mg  And fentanyl 50mcg given pre block at 1010 on 17

## 2018-01-04 NOTE — INITIAL ASSESSMENTS
Patient Information     Patient Name MRN Kaushik Miguel [id number] Male 1950      Initial Assessments by Diego Crandall PT at 2017  1:37 PM     Author:  Diego Crandall PT Service:  (none) Author Type:  PT- Physical Therapist     Filed:  2017  4:18 PM Date of Service:  2017  1:37 PM Status:  Signed     :  Diego Crandall PT (PT- Physical Therapist)            S: Patient seen post operatively for right shoulder arthroscopy. Patient's spouse present during post operative session with PT/OT.    O: Patient properly fitted for Breg Slingshot 3 brace and then instructed in proper donning and doffing technique. Patient also instructed by OT with safe performance of ADLs along with instruction for gentle exercises to be performed. Patient able to           demonstrate fair stability with mobility activities.    A: Patient appears to understand function of Breg brace and appears stable with mobility.    P: Patient discharge to home with family.        G codes and Modifier based on patient's presentation and clinical judgement:     Current Primary G Code and Modifier:    Per the Patient's intake and/or assessment the Primary G Code is: Mobility .   The Patient's Impairment, Limitation or Restriction Modifier would be best described as: CI - 1% - 20% Impairment.     Goal Primary G Code and Modifier:    The Patient's G Code Goal would be: Mobility    The Patient's Impairment, Limitation or Restriction Modifier goal would be best described as: CI - 1% - 20% Impairment.   Discharge Primary G Code and Modifier:      The Patient's status upon Discharge is Mobility    The Patient's Impairment, Limitation or Restriction Modifier would be best described as CI - 1% - 20% Impairment.

## 2018-01-04 NOTE — NURSING NOTE
Patient Information     Patient Name MRN Sex Kaushik Christensen [id number] Male 1950      Nursing Note by Darshana Reyes at 2017  8:30 AM     Author:  Darshana Reyes Service:  (none) Author Type:  (none)     Filed:  2017  8:30 AM Encounter Date:  2017 Status:  Signed     :  Darshana Reyes            Patient is here for his post op of his right shoulder.  DOS: 16  Darshana Reyes LPN .......2017 8:29 AM

## 2018-01-04 NOTE — TELEPHONE ENCOUNTER
Patient Information     Patient Name MRN Kaushik Miguel [id number] Male 1950      Telephone Encounter by Stormy Harris at 2017  8:15 AM     Author:  Stormy Harris Service:  (none) Author Type:  (none)     Filed:  2017  8:28 AM Encounter Date:  2017 Status:  Signed     :  Stormy Harris            Pt's wife called and left a message stating that the pt is in a lot of pain since his block wore all the way off. He is currently taking a 1/2 a tab of hydrocodone every hour and a half and his pain is still at an 8 or 9.  I spoke with Dr. Jennings who said we can get him an rx for Toradol to break through the pain and he can take 1 tab every 2 hours today and then he should be able to stretch it out closer to every 6 starting tomorrow.

## 2018-01-05 NOTE — PROGRESS NOTES
"Patient Information     Patient Name MRN Sex Kaushik Christensen [id number] Male 1950      Progress Notes by Yfn Kelley, PT at 2017 10:43 AM     Author:  Yfn Kelley, PT Service:  (none) Author Type:  PT- Physical Therapist     Filed:  2017 12:38 PM Date of Service:  2017 10:43 AM Status:  Signed     :  Yfn Kelley PT (PT- Physical Therapist)            [facility]  Outpatient PT - Daily Note      Date of Service: 2017    Visit: 3     Patient Name: \"Jean Claude\" Kaushik Ansari   YOB: 1950   Referring MD/Provider: Dick  Diagnosis: S/P arthroscopic right shoulder surgery Z98.890    Medical & Treatment Diagnosis: S/P arthroscopic right shoulder surgery Z98.890   Insurance:  Medicare  Start of Care Date: 17  Certification Dates: From Start of Service to Re-Cert Due: 17  Preadmission Functional Mobility: Independent  Instructions:  DOS 17  Start rehab now   Phase 1 - no passive ROM at shoulder   Phase 2A - on    Phase 2B - on  -out of pillow in the daytime, wall walk 1X per every hour awake.  Focus is full passive forward flexion by 8 weeks post op.   Wear pillow at night   Phase 3 - on  - out of all gear then.      Subjective      Pain Ratin-2/10; Location: R shoulder  Has minimal discomfort throughout the day, the little discomfort he has is in his anterior shoulder region.  Pt states he is leaving for [place] tomorrow and will not be back until May 30th. We discussed the importance of maintaining his HEP while he is gone.    Objective    DATE:          ROM:  R shoulder flexion 90 P         P=PROM     Today's Intervention:   Therapeutic Exercise to improve mobility, strength and endurance:  - reviewed HEP and rehab protocol/phases  - PROM R shoulder flexion, hold end range 60-90\" x5; jt oscillations between sets  - R scap mobility, pt L side ly  - counter top stretch, 30\" hold x4  - scap sets with " red band x15    Vasopneumatic Compression to help decrease swelling, pain and improve ROM  - GameReady 15' to R shoulder, low compression. Pt positioned seated with UE support from pillow      Home Exercise Program:   - R elbow, wrist AROM    Assessment    Goals:   STGs to be reached in 3-6 weeks  1. Pt to be independent with HEP and self mgmt techniques.  2. Pt to demo full PROM of R shoulder for improved mobility  3. Pt to have 1/10 pain or less throughout the day for improved ADLs     LTGs to be reached in 6-12 weeks  1. Pt to be able use his R UE for everyday ADLs like dressing, eating, drinking  2. Pt to have 0/10 pain or less throughout the day for improved ADLs  3. Pt to have at least 5-/5 MMT of R shoulder for improved strength and mobility  4. Pt to be able to sleep throughout the night without waking do to pain for improved health and wellness.      Patient Specific Functional and Pain Scales (PSFS) on 5/3/2017:                         Clinician Instructions: Complete after the history and before the exam.                         Initial Assessment: We want to know what 3 activities in your life you are unable to perform, or are having the most difficulty performing, as a result of your chief problem. Please list and score at least 3 activities that you are unable to perform, or having the most difficulty performing, because of your chief problem.   Patient Specific Activity Scoring Scheme (score one number for each activity):   Activity Score (0-10)  0= Unable to perform activity  10= Able to perform activity at same level as before injury or problem   1. Reaching overhead with R UE 0/10   2. Using R UE for dressing 0/10   3. Using R UE for drinking/eating 0/10               Totals:  0/30 = 0% ability which relates to 100% impairment                         Patient verbally states that they understand that the information they have provided above is current and complete to the best of their knowledge.                          Patient Specific Functional Scale Modifier Scale Conversion: (patient's modifier that correlates with pt's score on PSFS): 0-CN (100% Impaired).     G codes and Modifier taken from patient completing the PSFS:   Initial Primary G Code and Modifier:                         Per the Patient's intake and/or assessment the Primary G Code is: Mobility .                        The Patient's Impairment, Limitation or Restriction Modifier would be best described as: CN - 100% Impairment.   Goal Primary G Code and Modifier:                         The Patient's G Code Goal would be: Mobility                         The Patient's Impairment, Limitation or Restriction Modifier goal would be best described as: CI - 1% - 20% Impairment.       Plan    Plan for next visit:  Cont with PT per POC to improve/maintain ROM, strength, endurance, proprioception and to decrease pain/discomfort.     Planned Interventions:    Home Exercise Program development   Therapeutic Exercise (ROM & Strengthening)   Manual Therapy   Neuromuscular Re-education   Ultrasound including Phonophoresis with Ketoprofen   Electrical Stimulation including Iontophoresis with Dexamethasone   Therapeutic Activities   Gait Training  Mechanical Traction        Student or PTA has been instructed in and demonstrates skills necessary to carry out above stated treatment plan: Yes    Thank you for your referral to [facility].  Please call with any questions, concerns or comments.  [phone]        Yfn Kelley, PT, ATC

## 2018-01-05 NOTE — PROGRESS NOTES
"Patient Information     Patient Name MRN Sex Kaushik Christensen [id number] Male 1950      Progress Notes by Yfn Kelley, PT at 2017 10:32 AM     Author:  Yfn Kelley, PT Service:  (none) Author Type:  PT- Physical Therapist     Filed:  2017 11:15 AM Date of Service:  2017 10:32 AM Status:  Signed     :  Yfn Kelley PT (PT- Physical Therapist)            [facility]  Outpatient PT - Daily Note      Date of Service: 2017    Visit: 4     Patient Name: \"Jean Claude\" Kaushik Ansari   YOB: 1950   Referring MD/Provider: Dick  Diagnosis: S/P arthroscopic right shoulder surgery Z98.890    Medical & Treatment Diagnosis: S/P arthroscopic right shoulder surgery Z98.890   Insurance:  Medicare  Start of Care Date: 17  Certification Dates: From Start of Service to Re-Cert Due: 17  Preadmission Functional Mobility: Independent  Instructions:  DOS 17  Start rehab now   Phase 1 - no passive ROM at shoulder   Phase 2A - on    Phase 2B - on  -out of pillow in the daytime, wall walk 1X per every hour awake.  Focus is full passive forward flexion by 8 weeks post op.   Wear pillow at night   Phase 3 - on  - out of all gear then.      Subjective      Pain Ratin-3/10; Location: R shoulder  Had a good trip to [place]. States semi compliance with HEP & self management techniques while on his trip.    Objective    DATE:         ROM:  R shoulder flexion 90 P 130 P        P=PROM     Today's Intervention:   Therapeutic Exercise to improve mobility, strength and endurance:  - PROM R shoulder flexion, hold end range 60-90\" x5; jt oscillations between sets  - supine AAROM flexion with wand,   - R scap mobility, pt L side ly  - OH pulleys, hold end range flexion 60\" x3  - scap sets with green band x15  - wall slide, 60\" hold - instruction on 1x/hr    Pt declined [employer], stated he would ice at home    Home Exercise Program: "   - R elbow, wrist AROM    Assessment    Goals:   STGs to be reached in 3-6 weeks  1. Pt to be independent with HEP and self mgmt techniques.  2. Pt to demo full PROM of R shoulder for improved mobility  3. Pt to have 1/10 pain or less throughout the day for improved ADLs     LTGs to be reached in 6-12 weeks  1. Pt to be able use his R UE for everyday ADLs like dressing, eating, drinking  2. Pt to have 0/10 pain or less throughout the day for improved ADLs  3. Pt to have at least 5-/5 MMT of R shoulder for improved strength and mobility  4. Pt to be able to sleep throughout the night without waking do to pain for improved health and wellness.      Patient Specific Functional and Pain Scales (PSFS) on 5/3/2017:                         Clinician Instructions: Complete after the history and before the exam.                         Initial Assessment: We want to know what 3 activities in your life you are unable to perform, or are having the most difficulty performing, as a result of your chief problem. Please list and score at least 3 activities that you are unable to perform, or having the most difficulty performing, because of your chief problem.   Patient Specific Activity Scoring Scheme (score one number for each activity):   Activity Score (0-10)  0= Unable to perform activity  10= Able to perform activity at same level as before injury or problem   1. Reaching overhead with R UE 0/10   2. Using R UE for dressing 0/10   3. Using R UE for drinking/eating 0/10               Totals:  0/30 = 0% ability which relates to 100% impairment                         Patient verbally states that they understand that the information they have provided above is current and complete to the best of their knowledge.                         Patient Specific Functional Scale Modifier Scale Conversion: (patient's modifier that correlates with pt's score on PSFS): 0-CN (100% Impaired).     G codes and Modifier taken from patient  completing the PSFS:   Initial Primary G Code and Modifier:                         Per the Patient's intake and/or assessment the Primary G Code is: Mobility .                        The Patient's Impairment, Limitation or Restriction Modifier would be best described as: CN - 100% Impairment.   Goal Primary G Code and Modifier:                         The Patient's G Code Goal would be: Mobility                         The Patient's Impairment, Limitation or Restriction Modifier goal would be best described as: CI - 1% - 20% Impairment.       Plan    Plan for next visit:  Cont with PT per POC to improve/maintain ROM, strength, endurance, proprioception and to decrease pain/discomfort.     Planned Interventions:    Home Exercise Program development   Therapeutic Exercise (ROM & Strengthening)   Manual Therapy   Neuromuscular Re-education   Ultrasound including Phonophoresis with Ketoprofen   Electrical Stimulation including Iontophoresis with Dexamethasone   Therapeutic Activities   Gait Training  Mechanical Traction        Student or PTA has been instructed in and demonstrates skills necessary to carry out above stated treatment plan: Yes    Thank you for your referral to [facility].  Please call with any questions, concerns or comments.  [phone]        Yfn Kelley, PT, ATC

## 2018-01-05 NOTE — PROGRESS NOTES
Patient Information     Patient Name MRN Sex Kuashik Christensen [id number] Male 1950      Progress Notes by Alec Howard MD at 3/29/2017  9:15 AM     Author:  Alec Howard MD  Service:  (none) Author Type:  Physician     Filed:  2017  9:19 AM  Encounter Date:  3/29/2017 Status:  Addendum     :  Alec Howard MD (Physician)        Related Notes: Original Note by Alec Howard MD (Physician) filed at 3/29/2017 10:51 AM            SUBJECTIVE:    Kaushik Ansari is a 66 y.o. male who presents for questions about surgery    HPI    He has a right supraspinatus tendon tear.  Met with Dr. Jennings.  Has lots of questions about the procedure and the work up.  Known and self admitted anxiety about the procedure.  Notes some relief with the steroid injection, but he has not been able to swim with the tear and says this exercise has been one of the greatest stress relievers in his life.    No Known Allergies,   Current Outpatient Prescriptions on File Prior to Visit       Medication  Sig Dispense Refill     CPAP CPAP, heated humidifier, mask, headgear, filters and tubing. For home use.     Length of Need: 99 1 unit 0     cyclobenzaprine (FLEXERIL) 10 mg tablet Take 1 tablet by mouth 3 times daily. 30 tablet 2     ofloxacin 0.3 % ophthalmic (OCUFLOX) 0.3 % ophthalmic solution 1 gtt in eye(s) q2h x2 days, then 1 gtt qid x5 days 5 mL 0     propranolol (INDERAL) 40 mg tablet Take 1 by mouth daily as needed for tremors.       tamsulosin (FLOMAX) 0.4 mg capsule TAKE ONE CAPSULE BY MOUTH ONCE DAILY AFTER  A  MEAL 90 capsule 3     No current facility-administered medications on file prior to visit.    ,   Past Medical History     Diagnosis  Date     AC (acromioclavicular) joint arthritis 3/23/2017     Complete tear of right rotator cuff 3/23/2017     Early morning wakening      Impingement syndrome of right shoulder 3/23/2017    and   Past Surgical History       Procedure   Laterality Date      Vasectomy        Ct coronary angiogram (ia)        Colonoscopy screening   03/2000     Colonoscopy   3/1/2005     normal by patient report         REVIEW OF SYSTEMS:  ROS    OBJECTIVE:  /78  Resp 16  Wt 109.1 kg (240 lb 9.6 oz)  BMI 30.89 kg/m2    EXAM:   Physical Exam    ASSESSMENT/PLAN:    ICD-10-CM    1. Complete tear of right rotator cuff M75.121         Plan:  15/15 minutes spent with patient entirely in counseling about the procedure, indications for not doing it such as patient preference to avoid surgery, willingness to live with restrictions and the pain.  After talking to me he felt much more relaxed and is now going to set up a time for the operation.  Follow up with me for a preoperative px next.    Alec Howard MD ....................  3/29/2017   10:51 AM    Patient continues to use and benefit from CPAP.    Alec Howard MD ....................  6/1/2017   9:19 AM

## 2018-01-27 VITALS
BODY MASS INDEX: 30.88 KG/M2 | HEIGHT: 74 IN | OXYGEN SATURATION: 96 % | SYSTOLIC BLOOD PRESSURE: 148 MMHG | HEART RATE: 80 BPM | WEIGHT: 240 LBS | DIASTOLIC BLOOD PRESSURE: 68 MMHG | SYSTOLIC BLOOD PRESSURE: 126 MMHG | BODY MASS INDEX: 30.81 KG/M2 | TEMPERATURE: 96.8 F | DIASTOLIC BLOOD PRESSURE: 68 MMHG | WEIGHT: 240.6 LBS | SYSTOLIC BLOOD PRESSURE: 136 MMHG | HEART RATE: 91 BPM | RESPIRATION RATE: 16 BRPM | DIASTOLIC BLOOD PRESSURE: 88 MMHG | HEART RATE: 88 BPM

## 2018-01-27 VITALS
BODY MASS INDEX: 30.54 KG/M2 | WEIGHT: 238 LBS | DIASTOLIC BLOOD PRESSURE: 64 MMHG | HEIGHT: 74 IN | HEART RATE: 88 BPM | SYSTOLIC BLOOD PRESSURE: 126 MMHG

## 2018-01-27 VITALS
WEIGHT: 235 LBS | DIASTOLIC BLOOD PRESSURE: 80 MMHG | BODY MASS INDEX: 30.17 KG/M2 | TEMPERATURE: 97.7 F | HEART RATE: 100 BPM | SYSTOLIC BLOOD PRESSURE: 134 MMHG

## 2018-01-27 VITALS
BODY MASS INDEX: 30.27 KG/M2 | DIASTOLIC BLOOD PRESSURE: 78 MMHG | BODY MASS INDEX: 30.89 KG/M2 | SYSTOLIC BLOOD PRESSURE: 138 MMHG | SYSTOLIC BLOOD PRESSURE: 124 MMHG | DIASTOLIC BLOOD PRESSURE: 72 MMHG | WEIGHT: 235.8 LBS | RESPIRATION RATE: 16 BRPM | WEIGHT: 240.6 LBS | RESPIRATION RATE: 16 BRPM

## 2018-01-27 VITALS — SYSTOLIC BLOOD PRESSURE: 134 MMHG | WEIGHT: 238.4 LBS | DIASTOLIC BLOOD PRESSURE: 88 MMHG | TEMPERATURE: 98.2 F

## 2018-01-27 VITALS
HEIGHT: 74 IN | SYSTOLIC BLOOD PRESSURE: 148 MMHG | HEART RATE: 88 BPM | DIASTOLIC BLOOD PRESSURE: 78 MMHG | DIASTOLIC BLOOD PRESSURE: 82 MMHG | BODY MASS INDEX: 30.16 KG/M2 | WEIGHT: 235 LBS | SYSTOLIC BLOOD PRESSURE: 140 MMHG | WEIGHT: 235 LBS | BODY MASS INDEX: 30.17 KG/M2

## 2018-02-12 DIAGNOSIS — Z98.890 STATUS POST SHOULDER SURGERY: Primary | ICD-10-CM

## 2018-02-13 ENCOUNTER — OFFICE VISIT (OUTPATIENT)
Dept: ORTHOPEDICS | Facility: OTHER | Age: 68
End: 2018-02-13
Attending: ORTHOPAEDIC SURGERY
Payer: MEDICARE

## 2018-02-13 ENCOUNTER — HOSPITAL ENCOUNTER (OUTPATIENT)
Dept: GENERAL RADIOLOGY | Facility: OTHER | Age: 68
Discharge: HOME | End: 2018-02-13
Attending: ORTHOPAEDIC SURGERY | Admitting: ORTHOPAEDIC SURGERY
Payer: MEDICARE

## 2018-02-13 VITALS — SYSTOLIC BLOOD PRESSURE: 120 MMHG | BODY MASS INDEX: 29.79 KG/M2 | WEIGHT: 232 LBS | DIASTOLIC BLOOD PRESSURE: 80 MMHG

## 2018-02-13 DIAGNOSIS — Z98.890 STATUS POST SHOULDER SURGERY: Primary | ICD-10-CM

## 2018-02-13 DIAGNOSIS — Z98.890 STATUS POST SHOULDER SURGERY: ICD-10-CM

## 2018-02-13 PROCEDURE — 73030 X-RAY EXAM OF SHOULDER: CPT | Mod: RT

## 2018-02-13 PROCEDURE — G0463 HOSPITAL OUTPT CLINIC VISIT: HCPCS | Mod: 25

## 2018-02-13 PROCEDURE — 99213 OFFICE O/P EST LOW 20 MIN: CPT | Performed by: ORTHOPAEDIC SURGERY

## 2018-02-13 RX ORDER — TAMSULOSIN HYDROCHLORIDE 0.4 MG/1
0.4 CAPSULE ORAL
COMMUNITY
Start: 2017-08-21 | End: 2018-06-23

## 2018-02-13 NOTE — PROGRESS NOTES
PROGRESS NOTE    SUBJECTIVE:  Kaushik Ansari is here for evaluation in regards to right shoulder.  Patient has been working with caring for his dog who is older and needs help going outside.  He has been lifting it is of somewhat awkward position to take the dog outside.  He started having increased discomfort and pain so therefore he came in for evaluation.  At rest he does not have pain with awkward motions it is more sore than anything else.  He describes it as a dull ache at times but otherwise not too bad.  I does calm down if he rests it.    OBJECTIVE:  /80 (BP Location: Right arm, Patient Position: Sitting, Cuff Size: Adult Regular)  Wt 105.2 kg (232 lb)  BMI 29.79 kg/m2 Body mass index is 29.79 kg/(m^2).    General Appearance: Pleasant male in good appearance, mood and affect.  Alert and orientated times three ( time, date and location).    Skin: Well-healed incision sites.    Shoulder:  Strength: Expected weakness.  Motion:  When testing his motion in the standing position I can get him to approximately 175   actively he moves to 170  with ease. There is some tightness to his capsule, this has improved. Abduction he is still about 5  short actively and passively full.    Elbow:  Flexion: Normal  Extension: Normal  Deep tendon reflexes: Normal    Hand:  Sensation: Normal  Radial and ulnar blood flow:  Normal.  Patient does have a palpable nodule and clicking of the right thumb.    Eyes: Pupils are round.    Ears: Hearing: Intact    Heart: Good cap refill into his hands.    Lungs: Coarse breath sounds.     Radiographic images from 9/22 where independently reviewed and discussed with the patient.      Xray:     X-rays show appropriate subacromial decompression and distal clavicle excision. Humeral head is in appropriate position. There is some mild glenohumeral arthritis.    General Appearance: Pleasant male in good appearance, mood and affect.  Alert and orientated times three ( time, date and  location).    Skin: Well-healed incision sites.    Shoulder:  Strength: Equal bilaterally.  Equal testing of the supraspinatus at shoulder height and waist level.  Negative Weber sign, Neer sign and cross body abduction sign.  Motion:  When testing his motion in the standing position I can get him to approximately 180   with minimal substitution.  His active motion is approximately 175  in forward flexion his abduction is relatively the same range.  His internal and external rotation have improved since our last visit.    Elbow:  Flexion: Normal  Extension: Normal  Deep tendon reflexes: Normal    Hand:  Sensation: Normal  Radial and ulnar blood flow:  Normal.  Patient does have a palpable nodule and clicking of the right thumb.    Eyes: Pupils are round.    Ears: Hearing: Intact    Heart: Good cap refill into his hands.    Lungs: Coarse breath sounds.     Radiographic images from 2/13 where independently reviewed and discussed with the patient.      Xray:     X-rays demonstrate appropriate subacromial decompression and distal clavicle excision.  Glenohumeral arthritic changes are noted which are mild.  Humeral head is in appropriate position.    ASSESSMENT:    Irritation of the right shoulder related to activities as noted above.    POSTOPERATIVE DIAGNOSES  1. Large chronic right rotator cuff tear to include supraspinatus and infraspinatus.   2. Long head of the biceps tearing.   3. Labral tearing.   4. 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   5. Impingement syndrome.  6. AC arthritis.     PROCEDURES  1. Right shoulder arthroscopy with extensive debridement to include 4% of the anterior to posterior labrum (DOS 04/19/2017).   2. Arthroscopic biceps tenotomy.   3. Arthroscopic chondroplasty of the humeral head and glenoid.   4. Arthroscopic subacromial decompression.   5. Arthroscopic distal clavicle excision.   6. Open rotator cuff repair utilizing a double row technique with 2 medial row 5.5 mm  BioComposite SwiveLock anchors with 2 FiberTapes and one FiberWire placed for traction and lateral row fixation with two 5.5 mm BioComposite SwiveLock anchors with the FiberTapes and one 4.75 mm BioComposite SwiveLock with the FiberWire.     PLAN:    We went over his wall walking, his counter slides, and utilizing the cane to help regain his forward flexion and he understands the importance of this motion. The importance is always working on his motion.  We did talk at length about the fact that he has a large chance of failure because this was a massive tear he does verbalize an understanding and also how to follow the rules.  He will continue with home physical therapy.  Questions and concerns answered to their satisfaction.  Follow up as needed    Forest Jennings D.O.  Orthopaedic Surgeon    [address] [place] [address]  Phone [phone] (KNEE)  Fax [phone]    Disclaimer:  This note consists of words and symbols derived from keyboarding, dictation, or using voice recognition software. As a result, there may be errors in the script that have gone undetected. Please consider this when interpreting information found in this note.    1:11 PM 2/13/2018

## 2018-02-13 NOTE — MR AVS SNAPSHOT
"              After Visit Summary   2/13/2018    Kaushik Ansari    MRN: [id number]           Patient Information     Date Of Birth          1950        Visit Information        Provider Department      2/13/2018 12:45 PM Forest Jennings DO [facility]        Today's Diagnoses     Status post shoulder surgery    -  1       Follow-ups after your visit        Follow-up notes from your care team     Return if symptoms worsen or fail to improve.      Future tests that were ordered for you today     Open Future Orders        Priority Expected Expires Ordered    XR Shoulder Right G/E 3 Views Routine 2/13/2018 2/12/2019 2/12/2018            Who to contact     If you have questions or need follow up information about today's clinic visit or your schedule please contact [facility] \[facility]\"" directly at [phone].  Normal or non-critical lab and imaging results will be communicated to you by eSeekershart, letter or phone within 4 business days after the clinic has received the results. If you do not hear from us within 7 days, please contact the clinic through eSeekershart or phone. If you have a critical or abnormal lab result, we will notify you by phone as soon as possible.  Submit refill requests through [employer] or call your pharmacy and they will forward the refill request to us. Please allow 3 business days for your refill to be completed.          Additional Information About Your Visit        eSeekershart Information     [employer] lets you send messages to your doctor, view your test results, renew your prescriptions, schedule appointments and more. To sign up, go to [email or web address] . Click on \"Log in\" on the left side of the screen, which will take you to the Welcome page. Then click on \"Sign up Now\" on the right side of the page.     You will be asked to enter the access code listed below, as well as some personal information. Please follow the directions to create your username " and password.     Your access code is: [email or web address]  Expires: 2018  1:16 PM     Your access code will  in 90 days. If you need help or a new code, please call your [facility] or [phone].        Care EveryWhere ID     This is your Care EveryWhere ID. This could be used by other organizations to access your [facility] medical records  [id number]        Your Vitals Were     BMI (Body Mass Index)                   29.79 kg/m2            Blood Pressure from Last 3 Encounters:   18 120/80   17 126/64   17 134/88    Weight from Last 3 Encounters:   18 105.2 kg (232 lb)   17 108 kg (238 lb)   17 108.1 kg (238 lb 6.4 oz)              Today, you had the following     No orders found for display       Primary Care Provider Office Phone # Fax #    Alec Howard [phone]       [address] [facility] [address]        Equal Access to Services     [facility]: Hadii aad ku hadasho Sodoug, waaxda luqadaha, qaybta kaalmada devi, leidy stephens . So [facility] [phone].    ATENCIÓN: Si habla español, tiene a perez disposición servicios gratuitos de asistencia lingüística. Llame al [phone].    We comply with applicable federal civil rights laws and [place] laws. We do not discriminate on the basis of race, color, national origin, age, disability, sex, sexual orientation, or gender identity.            Thank you!     Thank you for choosing [facility]  for your care. Our goal is always to provide you with excellent care. Hearing back from our patients is one way we can continue to improve our services. Please take a few minutes to complete the written survey that you may receive in the mail after your visit with us. Thank you!             Your Updated Medication List - Protect others around you: Learn how to safely use, store and throw away your medicines at www.disposemymeds.org.           This list is accurate as of 2/13/18  1:16 PM.  Always use your most recent med list.                   Brand Name Dispense Instructions for use Diagnosis    tamsulosin 0.4 MG capsule    FLOMAX     Take 0.4 mg by mouth

## 2018-06-23 DIAGNOSIS — N40.0 BPH (BENIGN PROSTATIC HYPERPLASIA): Primary | ICD-10-CM

## 2018-06-26 RX ORDER — TAMSULOSIN HYDROCHLORIDE 0.4 MG/1
CAPSULE ORAL
Qty: 90 CAPSULE | Refills: 0 | Status: SHIPPED | OUTPATIENT
Start: 2018-06-26 | End: 2018-09-25

## 2018-06-26 NOTE — TELEPHONE ENCOUNTER
"Refill request from [facility] for:  tamsulosin (FLOMAX) 0.4 MG capsule    LOV 8/2/2017 with Alec Howard MD    OV with Alec Howard MD 5/27/2015  \"Having up to 3 hours of inability to sleep daily now.  Has been at 2 hours previously.  Nocturia and polyuria now.  Was advised to take flomax in the past, but did not want to.\"    No upcoming appt noted at this time.    Medication has since refilled and as been reviewed.      Attempted to contact patient to verify if patient was still taking medication.  No answer no message left.    Will provide limited refill with note to pharmacy regarding patient coming due for annual appt.  Requested Prescriptions   Pending Prescriptions Disp Refills     tamsulosin (FLOMAX) 0.4 MG capsule [Pharmacy Med Name: TAMSULOSIN 0.4MG    CAP] 90 capsule 2     Sig: TAKE ONE CAPSULE BY MOUTH ONCE DAILY AFTER  A  MEAL    Alpha Blockers Passed    6/23/2018  9:52 AM       Passed - Blood pressure under 140/90 in past 12 months    BP Readings from Last 3 Encounters:   02/13/18 120/80   09/22/17 126/64   08/02/17 134/88          Passed - Recent (12 mo) or future (30 days) visit within the authorizing provider's specialty    Patient had office visit in the last 12 months or has a visit in the next 30 days with authorizing provider or within the authorizing provider's specialty.  See \"Patient Info\" tab in inbasket, or \"Choose Columns\" in Meds & Orders section of the refill encounter.           Passed - Patient does not have Tadalafil, Vardenafil, or Sildenafil on their medication list       Passed - Patient is 18 years of age or older      Medication Detail    Disp Refills Start End    tamsulosin (FLOMAX) 0.4 mg capsule 90 capsule 2 8/21/2017     Sig - Route: TAKE ONE CAPSULE BY MOUTH ONCE DAILY AFTER A MEAL - Oral    Class: eRx    Cosign for Ordering: Accepted by Martina Serrato NP on 8/24/2017  1:49 PM    E-Prescribing Status: Receipt confirmed by pharmacy (8/21/2017 12:10 PM CDT)  "   Associated Diagnoses   BPH (benign prostatic hyperplasia)           Mary Ann Palomo RN  ....................  6/26/2018   10:09 AM

## 2018-09-14 ENCOUNTER — TRANSFERRED RECORDS (OUTPATIENT)
Dept: HEALTH INFORMATION MANAGEMENT | Facility: OTHER | Age: 68
End: 2018-09-14

## 2018-09-25 ENCOUNTER — TELEPHONE (OUTPATIENT)
Dept: FAMILY MEDICINE | Facility: OTHER | Age: 68
End: 2018-09-25

## 2018-09-25 DIAGNOSIS — N40.0 BPH (BENIGN PROSTATIC HYPERPLASIA): ICD-10-CM

## 2018-09-25 NOTE — LETTER
September 28, 2018      Kaushik Ansari  [address] [address]        Dear Kaushik,     This letter is to remind you that you are due for your annual exam with Alec Howard. Your last comprehensive visit was more than 12 months ago.    A LIMITED refill of tamsulosin has been called into your pharmacy. Additional refills require you to complete this appointment.    Please call the clinic at [phone] to schedule your appointment.    If you should require additional refills before your scheduled appointment, please contact your pharmacy and we will refill your medication until the date of your appointment.    If you are no longer seeing Alec Howard for primary care, please call to let us know. Doing so will remove you from our call/contact list.      Thank you for choosing [facility] and [facility] for your health care needs.    Sincerely,    Refill RN  [facility]

## 2018-09-28 RX ORDER — TAMSULOSIN HYDROCHLORIDE 0.4 MG/1
CAPSULE ORAL
Qty: 30 CAPSULE | Refills: 0 | Status: SHIPPED | OUTPATIENT
Start: 2018-09-28 | End: 2018-10-16

## 2018-09-28 NOTE — TELEPHONE ENCOUNTER
Last OV 8/2/17 with PCP. Tamsulosin 0.4 mg cap approved per [facility] Refill Protocol. A limited refill authorized for 30 days has been issued.  Patient is due for annual exam.  Reminder letter has been sent.        Sully Garsia RN on 9/28/2018 at 8:35 AM

## 2018-10-16 ENCOUNTER — HOSPITAL ENCOUNTER (OUTPATIENT)
Dept: GENERAL RADIOLOGY | Facility: OTHER | Age: 68
Discharge: HOME | End: 2018-10-16
Attending: FAMILY MEDICINE | Admitting: FAMILY MEDICINE
Payer: MEDICARE

## 2018-10-16 ENCOUNTER — OFFICE VISIT (OUTPATIENT)
Dept: FAMILY MEDICINE | Facility: OTHER | Age: 68
End: 2018-10-16
Attending: FAMILY MEDICINE
Payer: MEDICARE

## 2018-10-16 VITALS
SYSTOLIC BLOOD PRESSURE: 122 MMHG | HEIGHT: 75 IN | DIASTOLIC BLOOD PRESSURE: 78 MMHG | BODY MASS INDEX: 29.34 KG/M2 | WEIGHT: 236 LBS | HEART RATE: 75 BPM | RESPIRATION RATE: 16 BRPM

## 2018-10-16 DIAGNOSIS — N40.1 BENIGN PROSTATIC HYPERPLASIA WITH URINARY FREQUENCY: ICD-10-CM

## 2018-10-16 DIAGNOSIS — Z23 NEED FOR INFLUENZA VACCINATION: ICD-10-CM

## 2018-10-16 DIAGNOSIS — S93.401A SPRAIN OF RIGHT ANKLE, UNSPECIFIED LIGAMENT, INITIAL ENCOUNTER: ICD-10-CM

## 2018-10-16 DIAGNOSIS — Z00.00 ROUTINE GENERAL MEDICAL EXAMINATION AT A HEALTH CARE FACILITY: Primary | ICD-10-CM

## 2018-10-16 DIAGNOSIS — R35.0 BENIGN PROSTATIC HYPERPLASIA WITH URINARY FREQUENCY: ICD-10-CM

## 2018-10-16 PROBLEM — M72.2 PLANTAR FASCIAL FIBROMATOSIS: Status: ACTIVE | Noted: 2018-10-16

## 2018-10-16 PROBLEM — M19.019 AC (ACROMIOCLAVICULAR) JOINT ARTHRITIS: Status: ACTIVE | Noted: 2017-03-23

## 2018-10-16 PROBLEM — M65.311 TRIGGER FINGER OF RIGHT THUMB: Status: ACTIVE | Noted: 2017-07-24

## 2018-10-16 PROBLEM — M54.30 SCIATICA: Status: ACTIVE | Noted: 2018-10-16

## 2018-10-16 PROBLEM — M75.41 IMPINGEMENT SYNDROME OF RIGHT SHOULDER: Status: ACTIVE | Noted: 2017-03-23

## 2018-10-16 PROBLEM — M21.619 BUNION: Status: ACTIVE | Noted: 2018-10-16

## 2018-10-16 PROBLEM — G25.0 FAMILIAL TREMOR: Status: ACTIVE | Noted: 2018-10-16

## 2018-10-16 PROBLEM — M75.121 COMPLETE TEAR OF RIGHT ROTATOR CUFF: Status: ACTIVE | Noted: 2017-03-23

## 2018-10-16 LAB
ANION GAP SERPL CALCULATED.3IONS-SCNC: 7 MMOL/L (ref 3–14)
BUN SERPL-MCNC: 16 MG/DL (ref 7–25)
CALCIUM SERPL-MCNC: 9.7 MG/DL (ref 8.6–10.3)
CHLORIDE SERPL-SCNC: 107 MMOL/L (ref 98–107)
CO2 SERPL-SCNC: 28 MMOL/L (ref 21–31)
CREAT SERPL-MCNC: 0.97 MG/DL (ref 0.7–1.3)
GFR SERPL CREATININE-BSD FRML MDRD: 77 ML/MIN/1.7M2
GLUCOSE SERPL-MCNC: 100 MG/DL (ref 70–105)
POTASSIUM SERPL-SCNC: 3.9 MMOL/L (ref 3.5–5.1)
PSA SERPL-MCNC: 0.89 NG/ML
SODIUM SERPL-SCNC: 142 MMOL/L (ref 134–144)

## 2018-10-16 PROCEDURE — 90471 IMMUNIZATION ADMIN: CPT

## 2018-10-16 PROCEDURE — 36415 COLL VENOUS BLD VENIPUNCTURE: CPT | Performed by: FAMILY MEDICINE

## 2018-10-16 PROCEDURE — G0463 HOSPITAL OUTPT CLINIC VISIT: HCPCS

## 2018-10-16 PROCEDURE — G0008 ADMIN INFLUENZA VIRUS VAC: HCPCS

## 2018-10-16 PROCEDURE — 90662 IIV NO PRSV INCREASED AG IM: CPT | Performed by: FAMILY MEDICINE

## 2018-10-16 PROCEDURE — 80048 BASIC METABOLIC PNL TOTAL CA: CPT | Performed by: FAMILY MEDICINE

## 2018-10-16 PROCEDURE — 73610 X-RAY EXAM OF ANKLE: CPT | Mod: RT

## 2018-10-16 PROCEDURE — 84153 ASSAY OF PSA TOTAL: CPT | Performed by: FAMILY MEDICINE

## 2018-10-16 PROCEDURE — 99397 PER PM REEVAL EST PAT 65+ YR: CPT | Performed by: FAMILY MEDICINE

## 2018-10-16 RX ORDER — TAMSULOSIN HYDROCHLORIDE 0.4 MG/1
0.4 CAPSULE ORAL DAILY
Qty: 90 CAPSULE | Refills: 3 | Status: SHIPPED | OUTPATIENT
Start: 2018-10-16 | End: 2019-04-12

## 2018-10-16 ASSESSMENT — ANXIETY QUESTIONNAIRES
2. NOT BEING ABLE TO STOP OR CONTROL WORRYING: NOT AT ALL
7. FEELING AFRAID AS IF SOMETHING AWFUL MIGHT HAPPEN: NOT AT ALL
5. BEING SO RESTLESS THAT IT IS HARD TO SIT STILL: NOT AT ALL
GAD7 TOTAL SCORE: 0
6. BECOMING EASILY ANNOYED OR IRRITABLE: NOT AT ALL
1. FEELING NERVOUS, ANXIOUS, OR ON EDGE: NOT AT ALL
3. WORRYING TOO MUCH ABOUT DIFFERENT THINGS: NOT AT ALL
IF YOU CHECKED OFF ANY PROBLEMS ON THIS QUESTIONNAIRE, HOW DIFFICULT HAVE THESE PROBLEMS MADE IT FOR YOU TO DO YOUR WORK, TAKE CARE OF THINGS AT HOME, OR GET ALONG WITH OTHER PEOPLE: NOT DIFFICULT AT ALL

## 2018-10-16 ASSESSMENT — PAIN SCALES - GENERAL: PAINLEVEL: MODERATE PAIN (5)

## 2018-10-16 ASSESSMENT — PATIENT HEALTH QUESTIONNAIRE - PHQ9: 5. POOR APPETITE OR OVEREATING: NOT AT ALL

## 2018-10-16 NOTE — PROGRESS NOTES
SUBJECTIVE:   CC: Kaushik Ansari is an 67 year old male who presents for preventative health visit.   Regularly taking Flomax, needs refill. States that it has been helping. Happy with effects of medication. Describes no side effects, doesn't believe that he has any dizziness.   Describes rolling his ankle while going down his steps, about April, still having pain and tightening in right calf. Has tried physical therapy at [facility] - has been doing ankle strengthening exercises, however, balance and stability are much worse.  Stays active, and goes regularly to physical therapy.      Healthy Habits:    Do you get at least three servings of calcium containing foods daily (dairy, green leafy vegetables, etc.)? yes    Amount of exercise or daily activities, outside of work: 3 day(s) per week    Problems taking medications regularly No    Medication side effects: No    Have you had an eye exam in the past two years? yes    Do you see a dentist twice per year? yes    Do you have sleep apnea, excessive snoring or daytime drowsiness? No, uses CPAP each night         Today's PHQ-2 Score:   PHQ-2 ( 1999 Pf[employer]) 10/16/2018 2/13/2018   Q1: Little interest or pleasure in doing things 0 0   Q2: Feeling down, depressed or hopeless 0 0   PHQ-2 Score 0 0       Abuse: Current or Past(Physical, Sexual or Emotional)- No  Do you feel safe in your environment - Yes    Social History   Substance Use Topics     Smoking status: Former Smoker     Packs/day: 2.00     Years: 20.00     Types: Cigarettes     Quit date: 1/1/1984     Smokeless tobacco: Former User     Types: Chew     Alcohol use No      If you drink alcohol do you typically have >3 drinks per day or >7 drinks per week? No                      Last PSA: No results found for: PSA    Reviewed orders with patient. Reviewed health maintenance and updated orders accordingly - Yes  Labs reviewed in EPIC    Reviewed and updated as needed this visit by clinical staff  Tobacco  " Allergies  Meds  Med Hx  Surg Hx  Fam Hx  Soc Hx        Reviewed and updated as needed this visit by Provider         ROS:  CONSTITUTIONAL: NEGATIVE for fever, chills, change in weight  INTEGUMENTARY/SKIN: NEGATIVE for worrisome rashes, moles or lesions  EYES: NEGATIVE for vision changes or irritation  ENT: NEGATIVE for ear, mouth and throat problems  RESP: NEGATIVE for significant cough or SOB  CV: NEGATIVE for chest pain, palpitations or peripheral edema  GI: NEGATIVE for nausea, abdominal pain, heartburn, or change in bowel habits   male: negative for dysuria, hematuria, decreased urinary stream, erectile dysfunction, urethral discharge  MUSCULOSKELETAL: NEGATIVE for significant arthralgias or myalgia  NEURO: NEGATIVE for weakness, dizziness or paresthesias  PSYCHIATRIC: NEGATIVE for changes in mood or affect    OBJECTIVE:   /78 (BP Location: Right arm, Patient Position: Sitting, Cuff Size: Adult Regular)  Pulse 75  Resp 16  Ht 6' 2.5\" (1.892 m)  Wt 236 lb (107 kg)  BMI 29.9 kg/m2  EXAM:  GENERAL: healthy, alert and no distress  EYES: Eyes grossly normal to inspection, PERRL and conjunctivae and sclerae normal  HENT: ear canals and TM's normal, nose and mouth without ulcers or lesions  NECK: no adenopathy, no asymmetry, masses, or scars and thyroid normal to palpation  RESP: lungs clear to auscultation - no rales, rhonchi or wheezes  CV: regular rate and rhythm, normal S1 S2, no S3 or S4, no murmur, click or rub, no peripheral edema and peripheral pulses strong  ABDOMEN: soft, nontender, no hepatosplenomegaly, no masses and bowel sounds normal  MS: no gross musculoskeletal defects noted, no edema, mild tenderness with passive inversion of the right foot  SKIN: no suspicious lesions or rashes  NEURO: Normal strength and tone, mentation intact and speech normal  PSYCH: mentation appears normal, affect normal/bright    Diagnostic Test Results:  Results for orders placed or performed in visit on " 10/16/18 (from the past 24 hour(s))   Basic Metabolic Panel   Result Value Ref Range    Sodium 142 134 - 144 mmol/L    Potassium 3.9 3.5 - 5.1 mmol/L    Chloride 107 98 - 107 mmol/L    Carbon Dioxide 28 21 - 31 mmol/L    Anion Gap 7 3 - 14 mmol/L    Glucose 100 70 - 105 mg/dL    Urea Nitrogen 16 7 - 25 mg/dL    Creatinine 0.97 0.70 - 1.30 mg/dL    GFR Estimate 77 >60 mL/min/1.7m2    GFR Estimate If Black >90 >60 mL/min/1.7m2    Calcium 9.7 8.6 - 10.3 mg/dL   Prostate Specific Antigen   Result Value Ref Range    Prostate Specific Antigen 0.894 <3.100 ng/mL     Xray Findings:  No acute fracture or dislocation is identified. The mortise  joint is congruent. The talar dome is intact. Mild degenerative or  chronic postraumatic irregularity is present at the tip of the medial  malleolus. Slight plantar calcaneal spurring is noted.     ASSESSMENT/PLAN:   (Z00.00) Routine general medical examination at a health care facility  (primary encounter diagnosis)  Doing well with current medications. No adverse side effects. Continuing to maintain healthy habits. Discussed exercise and diet.     (S93.401A) Sprain of right ankle, unspecified ligament, initial encounter  Rolled ankle back in April. History and exam suggest ankle sprain, x ray did not identify an acute fracture. Continue to treat with physical therapy.    -Continue with ankle strengthening PT exercises    (N40.1,  R35.0) Benign prostatic hyperplasia with urinary frequency  Continues to have symptomatic improvement with Flomax, no side effects noted. PSA today was within normal limits.  Plan: tamsulosin (FLOMAX) 0.4 MG capsule, Basic         Metabolic Panel, Prostate Specific Antigen-  0.894            (Z23) Need for influenza vaccination  Plan: GH IMM-  FLU VACCINE, INCREASED ANTIGEN, PRESV         FREE    COUNSELING:  Reviewed preventive health counseling, as reflected in patient instructions       Regular exercise       Healthy diet/nutrition    BP Readings from  "Last 1 Encounters:   10/16/18 122/78     Estimated body mass index is 29.9 kg/(m^2) as calculated from the following:    Height as of this encounter: 6' 2.5\" (1.892 m).    Weight as of this encounter: 236 lb (107 kg).     reports that he quit smoking about 34 years ago. His smoking use included Cigarettes. He has a 40.00 pack-year smoking history. He has quit using smokeless tobacco. His smokeless tobacco use included Chew.      Counseling Resources:  ATP IV Guidelines  Pooled Cohorts Equation Calculator  FRAX Risk Assessment  ICSI Preventive Guidelines  Dietary Guidelines for Americans, 2010  USDA's MyPlate  ASA Prophylaxis  Lung CA Screening      "

## 2018-10-16 NOTE — MR AVS SNAPSHOT
After Visit Summary   10/16/2018    Kaushik Ansari    MRN: [id number]           Patient Information     Date Of Birth          1950        Visit Information        Provider Department      10/16/2018 8:45 AM Alec Howard MD [facility]        Today's Diagnoses     Routine general medical examination at a health care facility    -  1    Sprain of right ankle, unspecified ligament, initial encounter        Benign prostatic hyperplasia with urinary frequency        Need for influenza vaccination          Care Instructions      Preventive Health Recommendations:   Male Ages 65 and over    Yearly exam:             See your health care provider every year in order to  o   Review health changes.   o   Discuss preventive care.    o   Review your medicines if your doctor has prescribed any.    Talk with your health care provider about whether you should have a test to screen for prostate cancer (PSA).    Every 3 years, have a diabetes test (fasting glucose). If you are at risk for diabetes, you should have this test more often.    Every 5 years, have a cholesterol test. Have this test more often if you are at risk for high cholesterol or heart disease.     Every 10 years, have a colonoscopy. Or, have a yearly FIT test (stool test). These exams will check for colon cancer.    Talk to with your health care provider about screening for Abdominal Aortic Aneurysm if you have a family history of AAA or have a history of smoking.    Shots:     Get a flu shot each year.     Get a tetanus shot every 10 years.     Talk to your doctor about your pneumonia vaccines. There are now two you should receive - Pneumovax (PPSV 23) and Prevnar (PCV 13).     Talk to your pharmacist about a shingles vaccine.     Talk to your doctor about the hepatitis B vaccine.  Nutrition:     Eat at least 5 servings of fruits and vegetables each day.     Eat whole-grain bread, whole-wheat pasta and brown rice instead  "of white grains and rice.     Get adequate Calcium and Vitamin D.   Lifestyle    Exercise for at least 150 minutes a week (30 minutes a day, 5 days a week). This will help you control your weight and prevent disease.     Limit alcohol to one drink per day.     No smoking.     Wear sunscreen to prevent skin cancer.     See your dentist every six months for an exam and cleaning.     See your eye doctor every 1 to 2 years to screen for conditions such as glaucoma, macular degeneration, cataracts, etc           Follow-ups after your visit        Future tests that were ordered for you today     Open Future Orders        Priority Expected Expires Ordered    Prostate Specific Antigen Routine  10/16/2019 10/16/2018    XR Ankle Right G/E 3 Views Routine 10/16/2018 10/16/2019 10/16/2018            Who to contact     If you have questions or need follow up information about today's clinic visit or your schedule please contact [facility] directly at [phone].  Normal or non-critical lab and imaging results will be communicated to you by DanceTrippinhart, letter or phone within 4 business days after the clinic has received the results. If you do not hear from us within 7 days, please contact the clinic through EarlySense or phone. If you have a critical or abnormal lab result, we will notify you by phone as soon as possible.  Submit refill requests through EarlySense or call your pharmacy and they will forward the refill request to us. Please allow 3 business days for your refill to be completed.          Additional Information About Your Visit        EarlySense Information     EarlySense lets you send messages to your doctor, view your test results, renew your prescriptions, schedule appointments and more. To sign up, go to [email or web address] . Click on \"Log in\" on the left side of the screen, which will take you to the Welcome page. Then click on \"Sign up Now\" on the right side of the page.     You will be asked to " "enter the access code listed below, as well as some personal information. Please follow the directions to create your username and password.     Your access code is: [email or web address]  Expires: 2019  8:43 AM     Your access code will  in 90 days. If you need help or a new code, please call your [facility] clinic or [phone].        Care EveryWhere ID     This is your Care EveryWhere ID. This could be used by other organizations to access your [facility] medical records  [id number]        Your Vitals Were     Pulse Respirations Height BMI (Body Mass Index)          75 16 6' 2.5\" (1.892 m) 29.9 kg/m2         Blood Pressure from Last 3 Encounters:   10/16/18 122/78   18 120/80   17 126/64    Weight from Last 3 Encounters:   10/16/18 236 lb (107 kg)   18 232 lb (105.2 kg)   17 238 lb (108 kg)              We Performed the Following     Basic Metabolic Panel     GH IMM-  FLU VACCINE, INCREASED ANTIGEN, PRESV FREE          Today's Medication Changes          These changes are accurate as of 10/16/18 10:38 AM.  If you have any questions, ask your nurse or doctor.               These medicines have changed or have updated prescriptions.        Dose/Directions    tamsulosin 0.4 MG capsule   Commonly known as:  FLOMAX   This may have changed:  See the new instructions.   Used for:  Benign prostatic hyperplasia with urinary frequency   Changed by:  Alec Howard MD        Dose:  0.4 mg   Take 1 capsule (0.4 mg) by mouth daily   Quantity:  90 capsule   Refills:  3            Where to get your medicines      These medications were sent to [facility] [address] [facility] [address] [address]     Phone:  [phone]     tamsulosin 0.4 MG capsule                Primary Care Provider Office Phone # Fax #    Alec Howard [phone]       [address] [facility] [address]        Equal Access to Services     [facility] " GAAR : Hadii aad toshia hiren Chu, wascarlettda luqadaha, qaybta kaivet quinonez, waxsuzan peter roberto carlosmargaux montillaalfredkasey stephens . So [facility] [phone].    ATENCIÓN: Si habla español, tiene a perez disposición servicios gratuitos de asistencia lingüística. Llame al [phone].    We comply with applicable federal civil rights laws and [place] laws. We do not discriminate on the basis of race, color, national origin, age, disability, sex, sexual orientation, or gender identity.            Thank you!     Thank you for choosing [facility]  for your care. Our goal is always to provide you with excellent care. Hearing back from our patients is one way we can continue to improve our services. Please take a few minutes to complete the written survey that you may receive in the mail after your visit with us. Thank you!             Your Updated Medication List - Protect others around you: Learn how to safely use, store and throw away your medicines at www.disposemymeds.org.          This list is accurate as of 10/16/18 10:38 AM.  Always use your most recent med list.                   Brand Name Dispense Instructions for use Diagnosis    tamsulosin 0.4 MG capsule    FLOMAX    90 capsule    Take 1 capsule (0.4 mg) by mouth daily    Benign prostatic hyperplasia with urinary frequency

## 2018-10-16 NOTE — NURSING NOTE
Coming in for a medication check up, needs labs and refills  Caridad Hilliard LPN on 10/16/2018 at 8:54 AM

## 2018-10-17 PROCEDURE — 90662 IIV NO PRSV INCREASED AG IM: CPT | Performed by: FAMILY MEDICINE

## 2018-10-17 ASSESSMENT — ANXIETY QUESTIONNAIRES: GAD7 TOTAL SCORE: 0

## 2018-10-17 NOTE — PROGRESS NOTES
Injectable Influenza Immunization Documentation    1.  Is the person to be vaccinated sick today?   No    2. Does the person to be vaccinated have an allergy to a component   of the vaccine?   No  Egg Allergy Algorithm Link    3. Has the person to be vaccinated ever had a serious reaction   to influenza vaccine in the past?   No    4. Has the person to be vaccinated ever had Guillain-Barré syndrome?   No    Form completed by Caridad Hilliard LPN on 10/17/2018 at 8:33 AM  VERIFIED

## 2018-11-13 ENCOUNTER — OFFICE VISIT (OUTPATIENT)
Dept: FAMILY MEDICINE | Facility: OTHER | Age: 68
End: 2018-11-13
Attending: FAMILY MEDICINE
Payer: COMMERCIAL

## 2018-11-13 VITALS
SYSTOLIC BLOOD PRESSURE: 124 MMHG | BODY MASS INDEX: 31.11 KG/M2 | DIASTOLIC BLOOD PRESSURE: 76 MMHG | WEIGHT: 245.6 LBS | RESPIRATION RATE: 16 BRPM

## 2018-11-13 DIAGNOSIS — G47.33 OBSTRUCTIVE SLEEP APNEA: Primary | ICD-10-CM

## 2018-11-13 PROCEDURE — 99213 OFFICE O/P EST LOW 20 MIN: CPT | Performed by: FAMILY MEDICINE

## 2018-11-13 PROCEDURE — G0463 HOSPITAL OUTPT CLINIC VISIT: HCPCS

## 2018-11-13 ASSESSMENT — ENCOUNTER SYMPTOMS
HEADACHES: 0
FATIGUE: 0
SLEEP DISTURBANCE: 0

## 2018-11-13 ASSESSMENT — PAIN SCALES - GENERAL: PAINLEVEL: MODERATE PAIN (4)

## 2018-11-13 NOTE — PROGRESS NOTES
SUBJECTIVE:   Kaushik Ansari is a 67 year old male who presents to clinic today for the following health issues:    HPI  CPAP follow up.  He has been getting supplies out of [facility] and needs a face to face for replacement equipment.  He is set at 10 CM, sometimes will go up to 11.  Tried 12 and it was too much.  Last sleep study was through Dr. Rey, he cannot recall when.  First sleep study was about 20 years ago.  Cannot recall when the last one was.  With the current machine he is well refreshed in the AM.  Gets a mask yearly or so.        Patient Active Problem List    Diagnosis Date Noted     Benign prostatic hyperplasia with urinary frequency 10/16/2018     Priority: Medium     Bunion 10/16/2018     Priority: Medium     Familial tremor 10/16/2018     Priority: Medium     Overview:   Benign       Plantar fascial fibromatosis 10/16/2018     Priority: Medium     Overview:   2006 to present       Sciatica 10/16/2018     Priority: Medium     Overview:   Mild       Trigger finger of right thumb 07/24/2017     Priority: Medium     Status post shoulder surgery 04/19/2017     Priority: Medium     AC (acromioclavicular) joint arthritis 03/23/2017     Priority: Medium     Complete tear of right rotator cuff 03/23/2017     Priority: Medium     Impingement syndrome of right shoulder 03/23/2017     Priority: Medium     Degeneration of intervertebral disc of lumbar region 10/23/2015     Priority: Medium     Allergic rhinitis due to pollen 05/27/2015     Priority: Medium     Acute upper respiratory infection 06/19/2012     Priority: Medium     Lactose intolerance 08/30/2011     Priority: Medium     Anxiety state 03/08/2011     Priority: Medium     Otitis media, serous 12/28/2010     Priority: Medium     Pain in joint, lower leg 08/11/2010     Priority: Medium     Lateral epicondylitis 08/11/2010     Priority: Medium     Seborrheic keratosis 08/11/2010     Priority: Medium     Depressive disorder, not elsewhere  classified 01/08/2008     Priority: Medium     Myalgia and myositis 06/13/2006     Priority: Medium     Overview:   Muscle aches  IMO Update 10/11       Disturbance of skin sensation 08/11/2005     Priority: Medium     Overview:   Foot paresthesia, right leg with standing       Diverticulosis of colon 03/01/2005     Priority: Medium     Overview:   Few sigmoid diverticula, one benign polyp, needs repeat in 2009  IMO Update 10/11       History of colonic polyps 03/01/2005     Priority: Medium     Overview:   IMO Update 10/11       Obstructive sleep apnea 04/02/2003     Priority: Medium     Overview:   on CPAP, uses  IMO Update 10/11  Overview:   On CPAP       Past Surgical History:   Procedure Laterality Date     COLONOSCOPY      03/2000     COLONOSCOPY      3/1/2005,normal by patient report     OTHER SURGICAL HISTORY      76347.0,CT CORONARY ANGIOGRAM (IA)     OTHER SURGICAL HISTORY      04/19/2017,031604,OTHER,Right     VASECTOMY      No Comments Provided     Social History   Substance Use Topics     Smoking status: Former Smoker     Packs/day: 2.00     Years: 20.00     Types: Cigarettes     Quit date: 1/1/1984     Smokeless tobacco: Former User     Types: Chew     Alcohol use No     Current Outpatient Prescriptions   Medication Sig Dispense Refill     order for DME Equipment being ordered: CPAP replacement supplies 1 each 3     tamsulosin (FLOMAX) 0.4 MG capsule Take 1 capsule (0.4 mg) by mouth daily 90 capsule 3     No Known Allergies    Review of Systems   Constitutional: Negative for fatigue.   Neurological: Negative for headaches.   Psychiatric/Behavioral: Negative for sleep disturbance.        OBJECTIVE:     /76 (BP Location: Right arm, Patient Position: Sitting, Cuff Size: Adult Regular)  Resp 16  Wt 245 lb 9.6 oz (111.4 kg)  BMI 31.11 kg/m2  Body mass index is 31.11 kg/(m^2).  Physical Exam   Constitutional: He is oriented to person, place, and time. He appears well-developed. No distress.    Neurological: He is alert and oriented to person, place, and time.   Skin: He is not diaphoretic.   Psychiatric: He has a normal mood and affect. His behavior is normal. Thought content normal.           ASSESSMENT/PLAN:         (G47.33) Obstructive sleep apnea  (primary encounter diagnosis)  Comment: 15 minutes with him, over 1/2 in coordination of care.  I have no records in our system from Dr. Rey.  I cannot find a sleep study either.  Will write a signed prescription for replacement supplies, and then also arrange follow up with Dr. Rey.  Plan: order for DME, SLEEP EVALUATION & MANAGEMENT         REFERRAL - ADULT -[facility] and         [facility] [phone] (Age 13         and up)                 Alec Howard MD  [facility]

## 2018-11-13 NOTE — NURSING NOTE
"Coming in to talk face to face for C-PAP supplies and question regarding ankle    Chief Complaint   Patient presents with     Clinic Care Coordination - Face To Face     C-PAP supplies and has other question       Initial /76 (BP Location: Right arm, Patient Position: Sitting, Cuff Size: Adult Regular)  Resp 16  Wt 245 lb 9.6 oz (111.4 kg)  BMI 31.11 kg/m2 Estimated body mass index is 31.11 kg/(m^2) as calculated from the following:    Height as of 10/16/18: 6' 2.5\" (1.892 m).    Weight as of this encounter: 245 lb 9.6 oz (111.4 kg).  Medication Reconciliation: complete    Caridad Hilliard LPN    "

## 2018-11-13 NOTE — MR AVS SNAPSHOT
After Visit Summary   11/13/2018    Kaushik Ansari    MRN: [id number]           Patient Information     Date Of Birth          1950        Visit Information        Provider Department      11/13/2018 8:30 AM Alec Howard MD [facility]'s Diagnoses     Obstructive sleep apnea    -  1       Follow-ups after your visit        Additional Services     SLEEP EVALUATION & MANAGEMENT REFERRAL - [facility] and [facility] [phone] (Age 13 and up)       Please be aware that coverage of these services is subject to the terms and limitations of your health insurance plan.  Call member services at your health plan with any benefit or coverage questions.      Please bring the following to your appointment:    Follow up with Dr. Ken  >>   List of current medications   >>   This referral request   >>   Any documents/labs given to you for this referral                  Follow-up notes from your care team     Return if symptoms worsen or fail to improve.      Future tests that were ordered for you today     Open Future Orders        Priority Expected Expires Ordered    SLEEP EVALUATION & MANAGEMENT REFERRAL - [facility] and [facility] [phone] (Age 13 and up) Routine  11/13/2019 11/13/2018            Who to contact     If you have questions or need follow up information about today's clinic visit or your schedule please contact [facility] directly at [phone].  Normal or non-critical lab and imaging results will be communicated to you by MyChart, letter or phone within 4 business days after the clinic has received the results. If you do not hear from us within 7 days, please contact the clinic through MyChart or phone. If you have a critical or abnormal lab result, we will notify you by phone as soon as possible.  Submit refill requests through [employer] or call your pharmacy and they  will forward the refill request to us. Please allow 3 business days for your refill to be completed.          Additional Information About Your Visit        [employer] Social Information     [employer] gives you secure access to your electronic health record. If you see a primary care provider, you can also send messages to your care team and make appointments. If you have questions, please call your primary care clinic.  If you do not have a primary care provider, please call [phone] and they will assist you.        Care EveryWhere ID     This is your Care EveryWhere ID. This could be used by other organizations to access your [facility] medical records  [id number]        Your Vitals Were     Respirations BMI (Body Mass Index)                16 31.11 kg/m2           Blood Pressure from Last 3 Encounters:   11/13/18 124/76   10/16/18 122/78   02/13/18 120/80    Weight from Last 3 Encounters:   11/13/18 245 lb 9.6 oz (111.4 kg)   10/16/18 236 lb (107 kg)   02/13/18 232 lb (105.2 kg)                 Today's Medication Changes          These changes are accurate as of 11/13/18  8:54 AM.  If you have any questions, ask your nurse or doctor.               Start taking these medicines.        Dose/Directions    order for DME   Used for:  Obstructive sleep apnea   Started by:  Alec Howard MD        Equipment being ordered: CPAP replacement supplies   Quantity:  1 each   Refills:  3            Where to get your medicines      Some of these will need a paper prescription and others can be bought over the counter.  Ask your nurse if you have questions.     Bring a paper prescription for each of these medications     order for DME                Primary Care Provider Office Phone # Fax #    Alec Howard [phone] [address]  [facility] [address]        Equal Access to Services     [facility] VIVEK AH: Muriel Chu, dominic tesfaye, leidy harman  curry stephens ah. So [facility] [phone].    ATENCIÓN: Si habla rox, tiene a perez disposición servicios gratuitos de asistencia lingüística. Ting al [phone].    We comply with applicable federal civil rights laws and [place] laws. We do not discriminate on the basis of race, color, national origin, age, disability, sex, sexual orientation, or gender identity.            Thank you!     Thank you for choosing [facility] AND [facility]  for your care. Our goal is always to provide you with excellent care. Hearing back from our patients is one way we can continue to improve our services. Please take a few minutes to complete the written survey that you may receive in the mail after your visit with us. Thank you!             Your Updated Medication List - Protect others around you: Learn how to safely use, store and throw away your medicines at www.disposemymeds.org.          This list is accurate as of 11/13/18  8:54 AM.  Always use your most recent med list.                   Brand Name Dispense Instructions for use Diagnosis    order for DME     1 each    Equipment being ordered: CPAP replacement supplies    Obstructive sleep apnea       tamsulosin 0.4 MG capsule    FLOMAX    90 capsule    Take 1 capsule (0.4 mg) by mouth daily    Benign prostatic hyperplasia with urinary frequency

## 2018-11-26 ENCOUNTER — OFFICE VISIT (OUTPATIENT)
Dept: FAMILY MEDICINE | Facility: OTHER | Age: 68
End: 2018-11-26
Attending: NURSE PRACTITIONER
Payer: MEDICARE

## 2018-11-26 VITALS
WEIGHT: 245 LBS | HEART RATE: 84 BPM | HEIGHT: 75 IN | DIASTOLIC BLOOD PRESSURE: 84 MMHG | SYSTOLIC BLOOD PRESSURE: 136 MMHG | OXYGEN SATURATION: 97 % | BODY MASS INDEX: 30.46 KG/M2 | TEMPERATURE: 96.9 F

## 2018-11-26 DIAGNOSIS — H57.89 IRRITATION OF LEFT EYE: Primary | ICD-10-CM

## 2018-11-26 PROCEDURE — G0463 HOSPITAL OUTPT CLINIC VISIT: HCPCS

## 2018-11-26 PROCEDURE — 99212 OFFICE O/P EST SF 10 MIN: CPT | Performed by: NURSE PRACTITIONER

## 2018-11-26 ASSESSMENT — PATIENT HEALTH QUESTIONNAIRE - PHQ9
SUM OF ALL RESPONSES TO PHQ QUESTIONS 1-9: 0
5. POOR APPETITE OR OVEREATING: NOT AT ALL

## 2018-11-26 ASSESSMENT — ANXIETY QUESTIONNAIRES
6. BECOMING EASILY ANNOYED OR IRRITABLE: NOT AT ALL
2. NOT BEING ABLE TO STOP OR CONTROL WORRYING: NOT AT ALL
IF YOU CHECKED OFF ANY PROBLEMS ON THIS QUESTIONNAIRE, HOW DIFFICULT HAVE THESE PROBLEMS MADE IT FOR YOU TO DO YOUR WORK, TAKE CARE OF THINGS AT HOME, OR GET ALONG WITH OTHER PEOPLE: NOT DIFFICULT AT ALL
3. WORRYING TOO MUCH ABOUT DIFFERENT THINGS: NOT AT ALL
GAD7 TOTAL SCORE: 0
5. BEING SO RESTLESS THAT IT IS HARD TO SIT STILL: NOT AT ALL
1. FEELING NERVOUS, ANXIOUS, OR ON EDGE: NOT AT ALL
7. FEELING AFRAID AS IF SOMETHING AWFUL MIGHT HAPPEN: NOT AT ALL

## 2018-11-26 ASSESSMENT — PAIN SCALES - GENERAL: PAINLEVEL: NO PAIN (0)

## 2018-11-26 NOTE — PROGRESS NOTES
SUBJECTIVE:   Kaushik Ansari is a 67 year old male who presents to clinic today for the following health issues:    Eye(s) Problem  Onset: Friday - eye was sore, now weepy, swollen    Description:   Location: left  Pain: YES- painful  Redness: YES    Accompanying Signs & Symptoms:  Discharge/mattering: YES- mild; eyelashes with mild amount of matter in the am  Swelling: YES  Visual changes: no   Fever: no   Nasal Congestion: no   Bothered by bright lights: no     History:   Trauma: no   Foreign body exposure: no     Precipitating factors:   Wearing contacts: no     Alleviating factors:  Improved by: none    Therapies Tried and outcome: nothing    Problem list and histories reviewed & adjusted, as indicated.  Additional history: none    Patient Active Problem List   Diagnosis     Status post shoulder surgery     Benign prostatic hyperplasia with urinary frequency     AC (acromioclavicular) joint arthritis     Allergic rhinitis due to pollen     Anxiety state     Bunion     Complete tear of right rotator cuff     Degeneration of intervertebral disc of lumbar region     Disturbance of skin sensation     Diverticulosis of colon     Depressive disorder, not elsewhere classified     Familial tremor     Impingement syndrome of right shoulder     Pain in joint, lower leg     Lactose intolerance     Lateral epicondylitis     Myalgia and myositis     Obstructive sleep apnea     Otitis media, serous     History of colonic polyps     Plantar fascial fibromatosis     Sciatica     Seborrheic keratosis     Trigger finger of right thumb     Acute upper respiratory infection     Past Surgical History:   Procedure Laterality Date     COLONOSCOPY      03/2000     COLONOSCOPY      3/1/2005,normal by patient report     OTHER SURGICAL HISTORY      46836.0,CT CORONARY ANGIOGRAM (IA)     OTHER SURGICAL HISTORY      04/19/2017,477996,OTHER,Right     VASECTOMY      No Comments Provided       Social History   Substance Use Topics      "Smoking status: Former Smoker     Packs/day: 2.00     Years: 20.00     Types: Cigarettes     Quit date: 1/1/1984     Smokeless tobacco: Former User     Types: Chew     Alcohol use No     Family History   Problem Relation Age of Onset     Breast Cancer Mother      Cancer-breast     Substance Abuse Father      Alcohol/Drug     Diabetes Daughter      Diabetes         Current Outpatient Prescriptions   Medication Sig Dispense Refill     order for DME Equipment being ordered: CPAP replacement supplies 1 each 3     tamsulosin (FLOMAX) 0.4 MG capsule Take 1 capsule (0.4 mg) by mouth daily 90 capsule 3     No Known Allergies    Reviewed and updated as needed this visit by clinical staff  Tobacco  Allergies  Meds  Med Hx  Surg Hx  Fam Hx  Soc Hx      Reviewed and updated as needed this visit by Provider         ROS:  As above    OBJECTIVE:     /84  Pulse 84  Temp 96.9  F (36.1  C) (Temporal)  Ht 6' 2.5\" (1.892 m)  Wt 245 lb (111.1 kg)  SpO2 97%  BMI 31.04 kg/m2  Body mass index is 31.04 kg/(m^2).  GENERAL: healthy, alert and no distress  EYES: PERRL, EOMI, visual fields normal, conjunctiva/corneas- mild conjunctival injection OS and mild scleral injection    Diagnostic Test Results:  none     ASSESSMENT/PLAN:      1. Irritation of left eye  Mild irritation, no pain, patient repeatedly reports it as \"itchy\" only. Exam relatively unremarkable. Discussed OTC eye drops for comfort and relief of pruritis, OTC antihistamine in case there is a very slight allergic component to the irritation. Discussed signs and symptoms of when to return.     Patricia Novak NP  [facility]    "

## 2018-11-26 NOTE — NURSING NOTE
"Chief Complaint   Patient presents with     Eye Problem     left eye, itching, weeping         Initial /84  Pulse 84  Temp 96.9  F (36.1  C) (Temporal)  Ht 6' 2.5\" (1.892 m)  Wt 245 lb (111.1 kg)  SpO2 97%  BMI 31.04 kg/m2 Estimated body mass index is 31.04 kg/(m^2) as calculated from the following:    Height as of this encounter: 6' 2.5\" (1.892 m).    Weight as of this encounter: 245 lb (111.1 kg).    Medication Reconciliation: complete      Norma J. Gosselin, LPN  "

## 2018-11-26 NOTE — MR AVS SNAPSHOT
After Visit Summary   11/26/2018    Kaushik Ansari    MRN: [id number]           Patient Information     Date Of Birth          1950        Visit Information        Provider Department      11/26/2018 1:00 PM Patricia Novak NP [facility]        Today's Diagnoses     Irritation of left eye    -  1       Follow-ups after your visit        Follow-up notes from your care team     Return if symptoms worsen or fail to improve.      Your next 10 appointments already scheduled     Dec 17, 2018  9:20 AM CST   [facility] Visit with Nima Rey   [facility]    [address]  [place] [address] [phone]              Who to contact     If you have questions or need follow up information about today's clinic visit or your schedule please contact [facility] directly at [phone].  Normal or non-critical lab and imaging results will be communicated to you by [employer], letter or phone within 4 business days after the clinic has received the results. If you do not hear from us within 7 days, please contact the clinic through [employer] or phone. If you have a critical or abnormal lab result, we will notify you by phone as soon as possible.  Submit refill requests through [employer] or call your pharmacy and they will forward the refill request to us. Please allow 3 business days for your refill to be completed.          Additional Information About Your Visit        MyChart Information     [employer] gives you secure access to your electronic health record. If you see a primary care provider, you can also send messages to your care team and make appointments. If you have questions, please call your primary care clinic.  If you do not have a primary care provider, please call [phone] and they will assist you.        Care EveryWhere ID     This is your Care EveryWhere ID. This could be used by  "other organizations to access your [facility] medical records  [id number]        Your Vitals Were     Pulse Temperature Height Pulse Oximetry BMI (Body Mass Index)       84 96.9  F (36.1  C) (Temporal) 6' 2.5\" (1.892 m) 97% 31.04 kg/m2        Blood Pressure from Last 3 Encounters:   11/26/18 136/84   11/13/18 124/76   10/16/18 122/78    Weight from Last 3 Encounters:   11/26/18 245 lb (111.1 kg)   11/13/18 245 lb 9.6 oz (111.4 kg)   10/16/18 236 lb (107 kg)              Today, you had the following     No orders found for display       Primary Care Provider Office Phone # Fax #    Alec Howard [phone] [address] [facility] [address]        Equal Access to Services     [facility]: Hadii tasha pope hadasho Sodoug, waaxda luqadaha, qaybta kaalmada adeegyada, leidy stephens . So [facility] [place] [facility] [phone].    ATENCIÓN: Si habla español, tiene a perez disposición servicios gratuitos de asistencia lingüística. Lloydame al [phone].    We comply with applicable federal civil rights laws and [place] laws. We do not discriminate on the basis of race, color, national origin, age, disability, sex, sexual orientation, or gender identity.            Thank you!     Thank you for choosing [facility] AND [facility]  for your care. Our goal is always to provide you with excellent care. Hearing back from our patients is one way we can continue to improve our services. Please take a few minutes to complete the written survey that you may receive in the mail after your visit with us. Thank you!             Your Updated Medication List - Protect others around you: Learn how to safely use, store and throw away your medicines at www.disposemymeds.org.          This list is accurate as of 11/26/18  1:23 PM.  Always use your most recent med list.                   Brand Name Dispense Instructions for use Diagnosis    order for DME     1 each    Equipment being ordered: CPAP " replacement supplies    Obstructive sleep apnea       tamsulosin 0.4 MG capsule    FLOMAX    90 capsule    Take 1 capsule (0.4 mg) by mouth daily    Benign prostatic hyperplasia with urinary frequency

## 2018-11-27 ASSESSMENT — ANXIETY QUESTIONNAIRES: GAD7 TOTAL SCORE: 0

## 2018-12-17 ENCOUNTER — OFFICE VISIT (OUTPATIENT)
Dept: PULMONOLOGY | Facility: OTHER | Age: 68
End: 2018-12-17
Attending: INTERNAL MEDICINE
Payer: MEDICARE

## 2018-12-17 VITALS
DIASTOLIC BLOOD PRESSURE: 70 MMHG | SYSTOLIC BLOOD PRESSURE: 150 MMHG | WEIGHT: 247 LBS | HEART RATE: 80 BPM | BODY MASS INDEX: 31.7 KG/M2 | HEIGHT: 74 IN

## 2018-12-17 DIAGNOSIS — G47.33 OBSTRUCTIVE SLEEP APNEA: ICD-10-CM

## 2018-12-17 PROCEDURE — G0463 HOSPITAL OUTPT CLINIC VISIT: HCPCS

## 2018-12-17 ASSESSMENT — MIFFLIN-ST. JEOR: SCORE: 1950.13

## 2018-12-17 ASSESSMENT — ANXIETY QUESTIONNAIRES
1. FEELING NERVOUS, ANXIOUS, OR ON EDGE: NOT AT ALL
5. BEING SO RESTLESS THAT IT IS HARD TO SIT STILL: NOT AT ALL
6. BECOMING EASILY ANNOYED OR IRRITABLE: NOT AT ALL
2. NOT BEING ABLE TO STOP OR CONTROL WORRYING: NOT AT ALL
GAD7 TOTAL SCORE: 0
IF YOU CHECKED OFF ANY PROBLEMS ON THIS QUESTIONNAIRE, HOW DIFFICULT HAVE THESE PROBLEMS MADE IT FOR YOU TO DO YOUR WORK, TAKE CARE OF THINGS AT HOME, OR GET ALONG WITH OTHER PEOPLE: NOT DIFFICULT AT ALL
7. FEELING AFRAID AS IF SOMETHING AWFUL MIGHT HAPPEN: NOT AT ALL
4. TROUBLE RELAXING: NOT AT ALL
3. WORRYING TOO MUCH ABOUT DIFFERENT THINGS: NOT AT ALL

## 2018-12-17 ASSESSMENT — PATIENT HEALTH QUESTIONNAIRE - PHQ9: SUM OF ALL RESPONSES TO PHQ QUESTIONS 1-9: 0

## 2018-12-17 ASSESSMENT — PAIN SCALES - GENERAL: PAINLEVEL: NO PAIN (0)

## 2018-12-17 NOTE — PROGRESS NOTES
Sleep Medicine Progress Note  Kaushik Ansari  December 17, 2018  [id number]    Chief Complaint: Sleep apnea on CPAP    History of Present Illness: Kaushik Ansari is a 68 year old male presenting for above complaint.  He has a prior diagnosis of obstructive sleep apnea and has been wearing CPAP regularly.  He replaced his CPAP in 2015 with  [facility] in [place] which has closed and now he is with [facility] in [facility]. He needs a new order for supplies.  His download for CPAP the last 60 nights shows that he wore it 55 out of 60 nights.  There were 5 nights where he had the wrong power supply and it could not used.  On those nights he noticed his sleep was much worse.  He is now back to wearing CPAP regularly.  He averages 7 hours 7 minutes per night.  On CPAP his apnea hypopnea index is 1.5/h.  His 95th percentile pressure is 11.7/h.  His AHI is 1.5.  He is wondering if we could fine tune the CPAP.  He still has a fragmented sleep schedule where he will sleep for a few hours and be up for a few hours sleep again. He will then nap.  This is a long-standing complaint and he is tolerating it and has adapted to it.  He is retired.        Past Medical History:  Past Medical History:   Diagnosis Date     Anxiety disorder     No Comments Provided     Complete tear of right rotator cuff     3/23/2017     Dependence on other enabling machines and devices     No Comments Provided     Encounter for prescription of emergency contraception     No Comments Provided     Essential tremor     No Comments Provided     Impingement syndrome of right shoulder     3/23/2017     Other specified postprocedural states     4/19/2017     Primary osteoarthritis of shoulder     3/23/2017     Sciatica     No Comments Provided     Status post shoulder surgery 4/19/2017     Trigger thumb of right hand     7/24/2017       Medications:  Current Outpatient Medications   Medication     order for DME     tamsulosin (FLOMAX) 0.4  "MG capsule     No current facility-administered medications for this visit.        Physical Exam:  /70 (BP Location: Right arm, Patient Position: Sitting, Cuff Size: Adult Large)   Pulse 80   Ht 6' 2\" (1.88 m)   Wt 247 lb (112 kg)   HC 17.5\" (44.5 cm)   BMI 31.71 kg/m    Dental exam shows intact dentition, malampatti class IV.  No significant tonsillar tissue.  Lungs are clear, no wheeze, rhonchi, crackles, or focal dullness.  Cardiovascular exam S1-S2, regular rhythm and rate, no murmur, rub, or gallop.    Assessment and Plan:  68 year old male presenting for moderate obstructive sleep apnea compliant and benefiting with CPAP.  The plan is to continue CPAP.  An order is written for new mask tubing and supplies.  I am going to have him try a fixed pressure of 11.8 cm water pressure.  We will check a download in 2 weeks..  With him meeting compliance and benefiting, sleep follow-up will be on a as needed basis.  The plan is to continue CPAP.    CC: [facility], attention [place]  CC: Dr. Alec Howard  "

## 2018-12-17 NOTE — NURSING NOTE
Patient presents to the clinic for a c-pap follow up.    Medication Reconciliation Completed.    Terrence Mccann LPN  12/17/2018 9:16 AM

## 2018-12-18 ASSESSMENT — ANXIETY QUESTIONNAIRES: GAD7 TOTAL SCORE: 0

## 2019-01-09 DIAGNOSIS — M79.671 RIGHT FOOT PAIN: Primary | ICD-10-CM

## 2019-01-10 ENCOUNTER — OFFICE VISIT (OUTPATIENT)
Dept: ORTHOPEDICS | Facility: OTHER | Age: 69
End: 2019-01-10
Attending: PODIATRIST
Payer: MEDICARE

## 2019-01-10 ENCOUNTER — HOSPITAL ENCOUNTER (OUTPATIENT)
Dept: GENERAL RADIOLOGY | Facility: OTHER | Age: 69
Discharge: HOME | End: 2019-01-10
Attending: FAMILY MEDICINE | Admitting: PODIATRIST
Payer: MEDICARE

## 2019-01-10 DIAGNOSIS — M79.671 RIGHT FOOT PAIN: ICD-10-CM

## 2019-01-10 DIAGNOSIS — Z00.00 ROUTINE GENERAL MEDICAL EXAMINATION AT A HEALTH CARE FACILITY: Primary | ICD-10-CM

## 2019-01-10 PROCEDURE — G0463 HOSPITAL OUTPT CLINIC VISIT: HCPCS | Mod: 25

## 2019-01-10 PROCEDURE — 73630 X-RAY EXAM OF FOOT: CPT | Mod: RT

## 2019-01-10 PROCEDURE — G0463 HOSPITAL OUTPT CLINIC VISIT: HCPCS

## 2019-01-15 NOTE — PROGRESS NOTES
"VITALS:   Height:   74  Weight:   225  Pulse rate:  84  Blood Pressure:  136 / 84    CHIEF COMPLAINT: Right Ankle Pain/Instability and Bunion Right Foot    PROBLEMS:   Patient has no noted problems.    PATIENT REPORTED MEDICATIONS:  FLOMAX CAPSULE (TAMSULOSIN HCL CAPS)     PATIENT REPORTED ALLERGIES:   Patient has no noted allergies.    RISK FACTORS:  Tobacco use:   former smoker  Alcohol Use:   No    HISTORY OF PRESENT ILLNESS:    The patient is here with two complaints on his right side.  He has ankle pain and instability.  He sprained it twice last spring.  He has done some therapy and proprioceptive training.  He continues to have some instability and pain with the ankle when he is active.  He also has a bunion which he has known about.  He has questions about getting this fixed.  He is not ready to consider it, but he does have questions regarding this.    The patient's health history form dated 01/10/2019 was reviewed and signed.  Past medical history, surgical history, social history, family history, and review of systems noted.    PAST MEDICAL HISTORY:    Arthritis    PAST ORTHOPEDIC SURGICAL HISTORY:    Right Shoulder Rotator Cuff - \"[place]\"    PAST SURGICAL HISTORY:    No Past Surgical History    FAMILY HISTORY:    Mother:  TB    SOCIAL HISTORY:   Occupation:  Retired DNR  Marital Status:    Alcohol Use:  No  Tobacco Use:  Former  History of HIV:  No  History of Hepatitis:  No    REVIEW OF SYSTEMS:  Joint or Muscle pain: Yes  Stiffness:  Yes  Swelling:  Yes    PHYSICAL EXAMINATION:    CONSTITUTIONAL:  Patient is alert and oriented x3, well-appearing and in no apparent distress.  Affect is pleasant and answers questions appropriately.  VASCULAR:  Circulation is intact with palpable pedal pulses and has adequate capillary fill time.  NEUROLOGIC:  Light touch sensation is intact to digits.  INTEGUMENT:  No dermatologic lesions are noted.  Skin with normal texture and turgor.  MUSCULOSKELETAL:  Mild " ankle instability appreciated.  Does have some tenderness laterally.  Functional and clinical instability is appreciated.  He has a mild bunion with dorsal medial eminence which is prominent.  Mild discoloration here.  Mild abduction and valgus rotation of hallux.    X-RAY:  Three views of the foot and ankle were taken.  They demonstrate mild first MPJ arthrosis and bunion.  Malpositioned sesamoids are appreciated with a prominent dorsal medial eminence and spurring associated here.  The ankle appears to be intact with an intact ankle mortise.  He has a large os trigonum which appears to be an old fracture or injury.    ASSESSMENT:    1.  Right ankle instability.  2.  Right foot bunion.    PLAN:   Discuss condition and treatment with the patient today.  We discuss both options.  We discussed bracing and further therapy for the ankle.  If this is clinically unstable and he continues to have pain, I did recommend an ankle scope and lateral ligament repair.  He will consider this option.  He will reappoint to discuss further.  We did discuss bunion surgery, which he is not ready to do at this point.  We discussed appropriate conservative cares, modification of shoe gear, spacers and pads.  Again, reappoint to discuss further.    Dictated by Marcelino Araujo DPM  cc:  Dr. Araujo at [facility]    D:  01/10/2019  T:  01/15/2019    Typed and/or reviewed and corrected by signing  below, and sent to the Physician for final review and signature.    This report was created using voice recording software and computer-generated templates. Although every effort has been made to review for and eliminate errors, some errors may still occur.         Electronically signed by Tre Aranda on 01/15/2019 at 4:20 PM

## 2019-02-25 ENCOUNTER — OFFICE VISIT (OUTPATIENT)
Dept: FAMILY MEDICINE | Facility: OTHER | Age: 69
End: 2019-02-25
Attending: FAMILY MEDICINE
Payer: COMMERCIAL

## 2019-02-25 VITALS
SYSTOLIC BLOOD PRESSURE: 138 MMHG | HEART RATE: 70 BPM | DIASTOLIC BLOOD PRESSURE: 72 MMHG | BODY MASS INDEX: 31.71 KG/M2 | RESPIRATION RATE: 16 BRPM | WEIGHT: 247 LBS

## 2019-02-25 DIAGNOSIS — F41.8 SITUATIONAL ANXIETY: Primary | ICD-10-CM

## 2019-02-25 PROCEDURE — G0463 HOSPITAL OUTPT CLINIC VISIT: HCPCS

## 2019-02-25 PROCEDURE — 99214 OFFICE O/P EST MOD 30 MIN: CPT | Performed by: FAMILY MEDICINE

## 2019-02-25 ASSESSMENT — ANXIETY QUESTIONNAIRES
IF YOU CHECKED OFF ANY PROBLEMS ON THIS QUESTIONNAIRE, HOW DIFFICULT HAVE THESE PROBLEMS MADE IT FOR YOU TO DO YOUR WORK, TAKE CARE OF THINGS AT HOME, OR GET ALONG WITH OTHER PEOPLE: NOT DIFFICULT AT ALL
5. BEING SO RESTLESS THAT IT IS HARD TO SIT STILL: NOT AT ALL
3. WORRYING TOO MUCH ABOUT DIFFERENT THINGS: NOT AT ALL
GAD7 TOTAL SCORE: 0
7. FEELING AFRAID AS IF SOMETHING AWFUL MIGHT HAPPEN: NOT AT ALL
1. FEELING NERVOUS, ANXIOUS, OR ON EDGE: NOT AT ALL
6. BECOMING EASILY ANNOYED OR IRRITABLE: NOT AT ALL
2. NOT BEING ABLE TO STOP OR CONTROL WORRYING: NOT AT ALL

## 2019-02-25 ASSESSMENT — PATIENT HEALTH QUESTIONNAIRE - PHQ9: 5. POOR APPETITE OR OVEREATING: NOT AT ALL

## 2019-02-25 ASSESSMENT — PAIN SCALES - GENERAL: PAINLEVEL: NO PAIN (0)

## 2019-02-25 NOTE — LETTER
February 25, 2019      Kaushik Ansari  [address] [facility] [address]        Dear Kaushik,     I examined you today and you are currently of sound mind.  I find nothing on your exam that makes me question your judgement in any way.        Sincerely,        Alec Howard MD

## 2019-02-25 NOTE — PROGRESS NOTES
"  SUBJECTIVE:   Kaushik Ansari is a 68 year old male who presents to clinic today for the following health issues:    HPI  Depression.  His son recently told him to get evaluated.  Has never been on meds in the past.  Mostly tries to improve his mood by thinking about his dog, who  in .  He has been looking for another similar dog.  Wife has end stage cancer now.   since .  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  As part of the work up she had a brain scan, showing brain lesions.  She is now seeing Dr. Morris, who offered chemo and she declined.  She is not currently on hospice.  Her son wants them to rewrite the ownership of the home, so he can get some of it should she die.    He is not really feeling depressed about this, more just \"stressed\" about the strain the step son is putting on their relationship.    PHQ-9 SCORE 2018   PHQ-9 Total Score 3 0 0         Patient Active Problem List    Diagnosis Date Noted     Benign prostatic hyperplasia with urinary frequency 10/16/2018     Priority: Medium     Bunion 10/16/2018     Priority: Medium     Familial tremor 10/16/2018     Priority: Medium     Overview:   Benign       Plantar fascial fibromatosis 10/16/2018     Priority: Medium     Overview:   2006 to present       Sciatica 10/16/2018     Priority: Medium     Overview:   Mild       Trigger finger of right thumb 2017     Priority: Medium     Status post shoulder surgery 2017     Priority: Medium     AC (acromioclavicular) joint arthritis 2017     Priority: Medium     Complete tear of right rotator cuff 2017     Priority: Medium     Impingement syndrome of right shoulder 2017     Priority: Medium     Degeneration of intervertebral disc of lumbar region 10/23/2015     Priority: Medium     Allergic rhinitis due to pollen 2015     Priority: Medium     " Acute upper respiratory infection 2012     Priority: Medium     Lactose intolerance 2011     Priority: Medium     Anxiety state 2011     Priority: Medium     Otitis media, serous 2010     Priority: Medium     Pain in joint, lower leg 2010     Priority: Medium     Lateral epicondylitis 2010     Priority: Medium     Seborrheic keratosis 2010     Priority: Medium     Depressive disorder, not elsewhere classified 2008     Priority: Medium     Myalgia and myositis 2006     Priority: Medium     Overview:   Muscle aches  IMO Update 10/11       Disturbance of skin sensation 2005     Priority: Medium     Overview:   Foot paresthesia, right leg with standing       Diverticulosis of colon 2005     Priority: Medium     Overview:   Few sigmoid diverticula, one benign polyp, needs repeat in   IMO Update 10/11       History of colonic polyps 2005     Priority: Medium     Overview:   IMO Update 10/11       Obstructive sleep apnea 2003     Priority: Medium     Overview:   on CPAP, uses  IMO Update 10/11  Overview:   On CPAP       Past Surgical History:   Procedure Laterality Date     COLONOSCOPY  2000     COLONOSCOPY  2005    normal by patient report     COLONOSCOPY  2014     OTHER SURGICAL HISTORY      61224.0,CT CORONARY ANGIOGRAM ([place])     OTHER SURGICAL HISTORY      2017,[id number],OTHER,Right     VASECTOMY      No Comments Provided     Social History     Tobacco Use     Smoking status: Former Smoker     Packs/day: 2.00     Years: 20.00     Pack years: 40.00     Types: Cigarettes     Last attempt to quit: 1984     Years since quittin.1     Smokeless tobacco: Former User     Types: Chew   Substance Use Topics     Alcohol use: No     Alcohol/week: 0.0 oz     Current Outpatient Medications   Medication Sig Dispense Refill     order for DME Equipment being ordered: CPAP replacement supplies 1 each 3     tamsulosin (FLOMAX) 0.4  MG capsule Take 1 capsule (0.4 mg) by mouth daily 90 capsule 3     No Known Allergies    Review of Systems     OBJECTIVE:     /72 (BP Location: Right arm, Patient Position: Sitting, Cuff Size: Adult Large)   Pulse 70   Resp 16   Wt 112 kg (247 lb)   BMI 31.71 kg/m    Body mass index is 31.71 kg/m .  Physical Exam   Constitutional: He is oriented to person, place, and time. He appears well-developed. No distress.   Neurological: He is alert and oriented to person, place, and time.   Skin: Skin is warm. He is not diaphoretic.   Psychiatric: He has a normal mood and affect. His behavior is normal. Thought content normal.   Appropriately tearful at times, talking about the loss of his dog and pending loss of wife.       Diagnostic Test Results:  none     ASSESSMENT/PLAN:         (F41.8) Situational anxiety  (primary encounter diagnosis)  Comment: 30 minutes with him.  All in counseling.   Plan: He really seems to not have clinical anxiety or depression.  Is currently not showing any signs of dementia and is as of this moment of sound mind.  More is experiencing expected grief type reactions.  Also some stress based on the step son wanting to have part of the house.  Which might leave him homeless.  I do not see an indication for meds.  I did give him a list of counselors.      Alec Howard MD  [facility]

## 2019-02-25 NOTE — PATIENT INSTRUCTIONS
[place] counselors/therapists   Telephone Hours Kids? Address   [facility]  (Many counselors) [phone] M-Th 8-5  F 8-12 Yes [address]   [email or web address]   [facility]  (Many counselors) [phone]  Yes [address]   [email or web address]   [facility]  (Many counselors) [phone]  Yes [address]  [email or web address]   [facility]  Gregorio Hernandolumercedez Lamas [phone]  Yes [address]   [address]  [email or web address]   [facility]  Alan Staton [phone]  Yes [address]   Shirley Rich [phone]   [address]  [email or web address]   Lauren Callahan [phone]   [address]   Rox Elkins [phone]   [address]   [address] [phone]  Yes [address]   Annejamie Gustavo [phone]   [address]    Ganesh Marvin [phone]   [address]   Kerrie Huitron [phone]   [address]   [email or web address]   Alisa Sue [phone]   [address]  ([facility])  [email or web address]   [facility]  Johnny Santiago [phone]   [address] [facility]  Michele Rinne Cindy Thomas [phone]      [facility]  Shahram Garcia CNP [phone] M-F 8-5 Yes [address] [place], [place]  [email or web address]   [facility]: [place] [phone]  Yes MIKE Javier  [address]  [email or web address]   [facility]: [place] [phone]  Yes MIKE Hennessy  [address]  [email or web address]

## 2019-02-25 NOTE — NURSING NOTE
"Coming in for a evaluation    Chief Complaint   Patient presents with     Consult     pysc evaluation       Initial /72 (BP Location: Right arm, Patient Position: Sitting, Cuff Size: Adult Large)   Pulse 70   Resp 16   Wt 112 kg (247 lb)   BMI 31.71 kg/m   Estimated body mass index is 31.71 kg/m  as calculated from the following:    Height as of 12/17/18: 1.88 m (6' 2\").    Weight as of this encounter: 112 kg (247 lb).  Medication Reconciliation: complete    Caridad Hilliard LPN  "

## 2019-02-26 ASSESSMENT — ANXIETY QUESTIONNAIRES: GAD7 TOTAL SCORE: 0

## 2019-03-18 ENCOUNTER — MEDICAL CORRESPONDENCE (OUTPATIENT)
Dept: HEALTH INFORMATION MANAGEMENT | Facility: OTHER | Age: 69
End: 2019-03-18

## 2019-04-12 ENCOUNTER — OFFICE VISIT (OUTPATIENT)
Dept: FAMILY MEDICINE | Facility: OTHER | Age: 69
End: 2019-04-12
Attending: FAMILY MEDICINE
Payer: COMMERCIAL

## 2019-04-12 VITALS
HEIGHT: 73 IN | DIASTOLIC BLOOD PRESSURE: 82 MMHG | BODY MASS INDEX: 31.28 KG/M2 | HEART RATE: 76 BPM | RESPIRATION RATE: 16 BRPM | TEMPERATURE: 98 F | SYSTOLIC BLOOD PRESSURE: 134 MMHG | WEIGHT: 236 LBS

## 2019-04-12 DIAGNOSIS — K63.5 POLYP OF COLON, UNSPECIFIED PART OF COLON, UNSPECIFIED TYPE: Primary | ICD-10-CM

## 2019-04-12 DIAGNOSIS — G47.33 OBSTRUCTIVE SLEEP APNEA: ICD-10-CM

## 2019-04-12 PROCEDURE — G0463 HOSPITAL OUTPT CLINIC VISIT: HCPCS

## 2019-04-12 PROCEDURE — 99213 OFFICE O/P EST LOW 20 MIN: CPT | Performed by: FAMILY MEDICINE

## 2019-04-12 ASSESSMENT — ENCOUNTER SYMPTOMS
FATIGUE: 0
FEVER: 0
DIARRHEA: 0
SLEEP DISTURBANCE: 0
HEMATOCHEZIA: 0
SHORTNESS OF BREATH: 0
ANAL BLEEDING: 0
CONSTIPATION: 0

## 2019-04-12 ASSESSMENT — ANXIETY QUESTIONNAIRES
2. NOT BEING ABLE TO STOP OR CONTROL WORRYING: NOT AT ALL
GAD7 TOTAL SCORE: 3
IF YOU CHECKED OFF ANY PROBLEMS ON THIS QUESTIONNAIRE, HOW DIFFICULT HAVE THESE PROBLEMS MADE IT FOR YOU TO DO YOUR WORK, TAKE CARE OF THINGS AT HOME, OR GET ALONG WITH OTHER PEOPLE: NOT DIFFICULT AT ALL
5. BEING SO RESTLESS THAT IT IS HARD TO SIT STILL: SEVERAL DAYS
7. FEELING AFRAID AS IF SOMETHING AWFUL MIGHT HAPPEN: NOT AT ALL
1. FEELING NERVOUS, ANXIOUS, OR ON EDGE: SEVERAL DAYS
6. BECOMING EASILY ANNOYED OR IRRITABLE: NOT AT ALL
3. WORRYING TOO MUCH ABOUT DIFFERENT THINGS: NOT AT ALL

## 2019-04-12 ASSESSMENT — MIFFLIN-ST. JEOR: SCORE: 1888.33

## 2019-04-12 ASSESSMENT — PATIENT HEALTH QUESTIONNAIRE - PHQ9
SUM OF ALL RESPONSES TO PHQ QUESTIONS 1-9: 2
5. POOR APPETITE OR OVEREATING: SEVERAL DAYS

## 2019-04-12 NOTE — PROGRESS NOTES
"  SUBJECTIVE:   Kaushik Ansari is a 68 year old male who presents to clinic today for the following health issues:    HPI   He was contacted by his surgeon for a repeat colonoscopy.  Last one was 5 years ago.  He recalls this one was normal.  Prior to that had a \"pre cancerous\" polyp.  No diarrhea.  No bleeding.  No chest pain or shortness of breath.  Has CPAP and uses this nightly.      Patient Active Problem List    Diagnosis Date Noted     Benign prostatic hyperplasia with urinary frequency 10/16/2018     Priority: Medium     Bunion 10/16/2018     Priority: Medium     Familial tremor 10/16/2018     Priority: Medium     Overview:   Benign       Plantar fascial fibromatosis 10/16/2018     Priority: Medium     Overview:   2006 to present       Sciatica 10/16/2018     Priority: Medium     Overview:   Mild       Trigger finger of right thumb 07/24/2017     Priority: Medium     Status post shoulder surgery 04/19/2017     Priority: Medium     AC (acromioclavicular) joint arthritis 03/23/2017     Priority: Medium     Complete tear of right rotator cuff 03/23/2017     Priority: Medium     Impingement syndrome of right shoulder 03/23/2017     Priority: Medium     Degeneration of intervertebral disc of lumbar region 10/23/2015     Priority: Medium     Allergic rhinitis due to pollen 05/27/2015     Priority: Medium     Acute upper respiratory infection 06/19/2012     Priority: Medium     Lactose intolerance 08/30/2011     Priority: Medium     Anxiety state 03/08/2011     Priority: Medium     Otitis media, serous 12/28/2010     Priority: Medium     Pain in joint, lower leg 08/11/2010     Priority: Medium     Lateral epicondylitis 08/11/2010     Priority: Medium     Seborrheic keratosis 08/11/2010     Priority: Medium     Depressive disorder, not elsewhere classified 01/08/2008     Priority: Medium     Myalgia and myositis 06/13/2006     Priority: Medium     Overview:   Muscle aches  IMO Update 10/11       Disturbance of " "skin sensation 2005     Priority: Medium     Overview:   Foot paresthesia, right leg with standing       Diverticulosis of colon 2005     Priority: Medium     Overview:   Few sigmoid diverticula, one benign polyp, needs repeat in   IMO Update 10/11       History of colonic polyps 2005     Priority: Medium     Overview:   IMO Update 10/11       Obstructive sleep apnea 2003     Priority: Medium     Overview:   on CPAP, uses  IMO Update 10/11  Overview:   On CPAP       Past Surgical History:   Procedure Laterality Date     COLONOSCOPY  2000     COLONOSCOPY  2005    normal by patient report     COLONOSCOPY  2014     OTHER SURGICAL HISTORY      26995.0,CT CORONARY ANGIOGRAM ([place])     OTHER SURGICAL HISTORY      2017,436999,OTHER,Right     VASECTOMY      No Comments Provided     Social History     Tobacco Use     Smoking status: Former Smoker     Packs/day: 2.00     Years: 20.00     Pack years: 40.00     Types: Cigarettes     Last attempt to quit: 1984     Years since quittin.3     Smokeless tobacco: Former User     Types: Chew   Substance Use Topics     Alcohol use: No     Alcohol/week: 0.0 oz     Current Outpatient Medications   Medication Sig Dispense Refill     order for DME Equipment being ordered: CPAP replacement supplies 1 each 3     No Known Allergies    Review of Systems   Constitutional: Negative for fatigue and fever.   Respiratory: Negative for shortness of breath.    Cardiovascular: Negative for chest pain.   Gastrointestinal: Negative for anal bleeding, constipation, diarrhea and hematochezia.   Psychiatric/Behavioral: Negative for sleep disturbance.        OBJECTIVE:     /82   Pulse 76   Temp 98  F (36.7  C)   Resp 16   Ht 1.861 m (6' 1.25\")   Wt 107 kg (236 lb)   BMI 30.92 kg/m    Body mass index is 30.92 kg/m .  Physical Exam   Constitutional: He is oriented to person, place, and time. He appears well-developed. No distress. "   Cardiovascular: Normal rate, regular rhythm and normal heart sounds. Exam reveals no friction rub.   No murmur heard.  Pulmonary/Chest: Effort normal and breath sounds normal. No stridor. No respiratory distress. He has no wheezes. He has no rales.   Neurological: He is alert and oriented to person, place, and time.   Skin: Skin is warm and dry. He is not diaphoretic.           ASSESSMENT/PLAN:         (K63.5) Polyp of colon, unspecified part of colon, unspecified type  (primary encounter diagnosis)  Comment: he is clear to proceed with the scoping.  He intends to arrange this on his own with his surgeon.  Plan:      (G47.33) Obstructive sleep apnea  Comment: stable  Plan: should bring his CPAP with him to the procedure.        Alec Howard MD  [facility]

## 2019-04-12 NOTE — NURSING NOTE
No LMP for male patient.  Medication Reconciliation: complete    Alejandra Staton LPN  4/12/2019 8:12 AM

## 2019-04-12 NOTE — NURSING NOTE
"Date of Surgery: TBD  Type of Surgery: Colonoscopy  Surgeon: [facility]  Hospital:  TBD  Fax:     Fever/Chills or otherinfectious symptoms in past month: No  >10lb weight loss in past two months: No    Health Care Directive/Code status:  Full Code  Hx of bloodtransfusions:   No   Td up to date:  Yes  History of VRE/MRSA:  No Date:     Preoperative Evaluation: Obstructive Sleep Apnea screening  Known sleep apnea (CPAP)  P: Pressure - Do you have or areyou being treated for high blood pressure?No  B: BMI - BMI greater than 35kg/m2?No  A: Age - Age over 50 years old?Yes  N: Neck - Neck circumferencegreater than 40 cm?No  G:Gender - Gender: Male?Yes    Totalnumber of \"YES\" responses:  3    Scoring: Low risk of REDD 0-2  At Risk of REDD: >3 High Risk ofOSA: 5-8    Alejandra Staton LPN....................  4/12/2019   8:09 AM    "

## 2019-04-13 ASSESSMENT — ANXIETY QUESTIONNAIRES: GAD7 TOTAL SCORE: 3

## 2019-05-20 ENCOUNTER — HOSPITAL ENCOUNTER (EMERGENCY)
Facility: OTHER | Age: 69
Discharge: HOME | End: 2019-05-20
Admitting: FAMILY MEDICINE
Payer: MEDICARE

## 2019-05-20 ENCOUNTER — TELEPHONE (OUTPATIENT)
Dept: FAMILY MEDICINE | Facility: OTHER | Age: 69
End: 2019-05-20

## 2019-05-20 VITALS
WEIGHT: 230 LBS | BODY MASS INDEX: 29.52 KG/M2 | TEMPERATURE: 97.6 F | SYSTOLIC BLOOD PRESSURE: 173 MMHG | OXYGEN SATURATION: 98 % | HEIGHT: 74 IN | RESPIRATION RATE: 12 BRPM | DIASTOLIC BLOOD PRESSURE: 99 MMHG | HEART RATE: 80 BPM

## 2019-05-20 DIAGNOSIS — I10 ESSENTIAL HYPERTENSION: ICD-10-CM

## 2019-05-20 DIAGNOSIS — H83.01 LABYRINTHITIS OF RIGHT EAR: ICD-10-CM

## 2019-05-20 PROCEDURE — 99282 EMERGENCY DEPT VISIT SF MDM: CPT | Mod: Z6 | Performed by: FAMILY MEDICINE

## 2019-05-20 PROCEDURE — 99282 EMERGENCY DEPT VISIT SF MDM: CPT | Performed by: FAMILY MEDICINE

## 2019-05-20 ASSESSMENT — MIFFLIN-ST. JEOR: SCORE: 1873.02

## 2019-05-20 NOTE — ED TRIAGE NOTES
"ED Nursing Triage Note (General)   ________________________________    Kaushik Ansari is a 68 year old Male that presents to triage private car  With history of  Pt got up to use the bathroom, was having trouble walking and was listing to the right, states that his left hand is tingling slightly, 1/10, does swim for exercise and has had problems with inner ear infections and vertigo, only about twice in last 20 years, reported by patient   Significant symptoms had onset 1 hour(s) ago.  /80   Pulse 77   Temp 97.6  F (36.4  C) (Tympanic)   Resp 12   Ht 1.88 m (6' 2\")   Wt 104.3 kg (230 lb)   SpO2 98%   BMI 29.53 kg/m  t  Patient appears alert , in no acute distress., and cooperative behavior.    GCS 15  Airway: intact  Breathing noted as Normal.  Circulation Normal  Skin normal  Action taken:  Triage to critical care immediately      PRE HOSPITAL PRIOR LIVING SITUATION Spouse  "

## 2019-05-20 NOTE — TELEPHONE ENCOUNTER
Patient was seen in the Er on 05/19/2019 and has a question regarding his blood pressure. Please call patient.     Lorena Morales on 5/20/2019 at 9:21 AM

## 2019-05-20 NOTE — ED AVS SNAPSHOT
[facility]  [address]  [place] [address]  Phone:  [phone]  Fax:  [phone]                                    Kaushik Ansari   MRN: [id number]    Department:  [facility]   Date of Visit:  5/20/2019           After Visit Summary Signature Page    I have received my discharge instructions, and my questions have been answered. I have discussed any challenges I see with this plan with the nurse or doctor.    ..........................................................................................................................................  Patient/Patient Representative Signature      ..........................................................................................................................................  Patient Representative Print Name and Relationship to Patient    ..................................................               ................................................  Date                                   Time    ..........................................................................................................................................  Reviewed by Signature/Title    ...................................................              ..............................................  Date                                               Time          22EPIC Rev 08/18

## 2019-05-20 NOTE — DISCHARGE INSTRUCTIONS
Recommend claritin over the counter for daytime allergy symptoms. You can  meclizine over the counter for symptoms of dizziness . Return to ER for reevaluation if your symptoms dont improve or should you develop worsening or concerning symptoms

## 2019-05-20 NOTE — ED PROVIDER NOTES
"  History     Chief Complaint   Patient presents with     Hypertension     HPI  Kaushik Ansari is a 68 year old male who presents with symptoms of sensation of room spinning and elevated blood pressure , 170 /90. Does wear CPAP machine at night which showed. \"red \" and he does not know what that means. Also noticed that he was leaning off to the right when he was walking to the bathroom. Does have history of vertigo and inner ear infections and has had similar symptoms 2 or 3 times in the past but has been quite some time. No nausea or vomitting. NO speech issues. NO word finding problems. Left hand felt a \" little unusual \" but same time was taking blood pressure and is same arm he put his cuff on . No chest pain .,,irregular hear beat, palpitations, NO double vision . Dull frontal headache Allergies have been acting up . Has had itchy eyes and post nasal drip .  Some tinnitus over the last couple of days .     Allergies:  No Known Allergies    Problem List:    Patient Active Problem List    Diagnosis Date Noted     Benign prostatic hyperplasia with urinary frequency 10/16/2018     Priority: Medium     Bunion 10/16/2018     Priority: Medium     Familial tremor 10/16/2018     Priority: Medium     Overview:   Benign       Plantar fascial fibromatosis 10/16/2018     Priority: Medium     Overview:   2006 to present       Sciatica 10/16/2018     Priority: Medium     Overview:   Mild       Trigger finger of right thumb 07/24/2017     Priority: Medium     Status post shoulder surgery 04/19/2017     Priority: Medium     AC (acromioclavicular) joint arthritis 03/23/2017     Priority: Medium     Complete tear of right rotator cuff 03/23/2017     Priority: Medium     Impingement syndrome of right shoulder 03/23/2017     Priority: Medium     Degeneration of intervertebral disc of lumbar region 10/23/2015     Priority: Medium     Allergic rhinitis due to pollen 05/27/2015     Priority: Medium     Acute upper respiratory " infection 06/19/2012     Priority: Medium     Lactose intolerance 08/30/2011     Priority: Medium     Anxiety state 03/08/2011     Priority: Medium     Otitis media, serous 12/28/2010     Priority: Medium     Pain in joint, lower leg 08/11/2010     Priority: Medium     Lateral epicondylitis 08/11/2010     Priority: Medium     Seborrheic keratosis 08/11/2010     Priority: Medium     Depressive disorder, not elsewhere classified 01/08/2008     Priority: Medium     Myalgia and myositis 06/13/2006     Priority: Medium     Overview:   Muscle aches  IMO Update 10/11       Disturbance of skin sensation 08/11/2005     Priority: Medium     Overview:   Foot paresthesia, right leg with standing       Diverticulosis of colon 03/01/2005     Priority: Medium     Overview:   Few sigmoid diverticula, one benign polyp, needs repeat in 2009  IMO Update 10/11       History of colonic polyps 03/01/2005     Priority: Medium     Overview:   IMO Update 10/11       Obstructive sleep apnea 04/02/2003     Priority: Medium     Overview:   on CPAP, uses  IMO Update 10/11  Overview:   On CPAP          Past Medical History:    Past Medical History:   Diagnosis Date     Anxiety disorder      Complete tear of right rotator cuff      Dependence on other enabling machines and devices      Encounter for prescription of emergency contraception      Essential tremor      Impingement syndrome of right shoulder      Other specified postprocedural states      Primary osteoarthritis of shoulder      Sciatica      Status post shoulder surgery 4/19/2017     Trigger thumb of right hand        Past Surgical History:    Past Surgical History:   Procedure Laterality Date     COLONOSCOPY  03/2000     COLONOSCOPY  03/01/2005    normal by patient report     COLONOSCOPY  01/03/2014     OTHER SURGICAL HISTORY      50355.0,CT CORONARY ANGIOGRAM ([place])     OTHER SURGICAL HISTORY      04/19/2017,580335,OTHER,Right     VASECTOMY      No Comments Provided       Family  "History:    Family History   Problem Relation Age of Onset     Breast Cancer Mother         Cancer-breast     Substance Abuse Father         Alcohol/Drug     Diabetes Daughter         Diabetes       Social History:  Marital Status:   [2]  Social History     Tobacco Use     Smoking status: Former Smoker     Packs/day: 2.00     Years: 20.00     Pack years: 40.00     Types: Cigarettes     Last attempt to quit: 1984     Years since quittin.4     Smokeless tobacco: Former User     Types: Chew   Substance Use Topics     Alcohol use: No     Alcohol/week: 0.0 oz     Drug use: No     Types: Other        Medications:      order for DME         Review of Systems   Constitutional: Negative for fever.   HENT: Positive for congestion, postnasal drip, rhinorrhea, sneezing and tinnitus. Negative for facial swelling, hearing loss, mouth sores and nosebleeds.    Eyes: Negative.    Respiratory: Negative for shortness of breath, wheezing and stridor.    Cardiovascular: Negative for chest pain and leg swelling.   Gastrointestinal: Negative.  Negative for abdominal pain.   Genitourinary: Negative.    Musculoskeletal: Negative.    Allergic/Immunologic: Positive for environmental allergies.   Neurological: Positive for dizziness, light-headedness and headaches.   Psychiatric/Behavioral: Negative.    All other systems reviewed and are negative.      Physical Exam   BP: 176/80  Pulse: 77  Temp: 97.6  F (36.4  C)  Resp: 12  Height: 188 cm (6' 2\")  Weight: 104.3 kg (230 lb)  SpO2: 98 %      Physical Exam   Constitutional: He is oriented to person, place, and time. He appears well-developed and well-nourished.   HENT:   Head: Normocephalic and atraumatic.    Right TM with copious fluid posteriorly .   Eyes:   Right horizontal nystagmus    Neck: Normal range of motion. Neck supple. No JVD present. No tracheal deviation present. No thyromegaly present.   Cardiovascular: Normal rate, regular rhythm, normal heart sounds and intact " distal pulses. Exam reveals no gallop and no friction rub.   No murmur heard.  Pulmonary/Chest: Effort normal and breath sounds normal. No stridor. No respiratory distress. He has no wheezes. He has no rales. He exhibits no tenderness.   Abdominal: Soft. Bowel sounds are normal. He exhibits no distension and no mass. There is no tenderness. There is no rebound and no guarding.   Musculoskeletal: Normal range of motion.   Lymphadenopathy:     He has no cervical adenopathy.   Neurological: He is alert and oriented to person, place, and time. He displays normal reflexes. No cranial nerve deficit or sensory deficit. He exhibits normal muscle tone.   Skin: Skin is warm.   Nursing note and vitals reviewed.      ED Course        Procedures    Patient presents to ER with concern of episode of high blood pressure associated with sensation of dizziness and imbalance veering to right when he got up to use the bathroom this evening Patient triaged to exam room . Initial blood pressure elevated but improving by time patient settling into exam room . History and exam most consistent with middle ear source of symptoms but discussed with patient recommendation for further work up including labs , and diagnostics including CT scan assessing for vascular occlusion Patient states that at this time he declines work up . He feels symptoms are related to inner ear as he has experienced before Recommend patient schedule a follow up appointment with his primary care provider   For follow up ER visit and blood pressure and advised he should return to ER to complete recommended work up should he develop worsening or concerning symptoms     No results found for this or any previous visit (from the past 24 hour(s)).    Medications - No data to display    Assessments & Plan (with Medical Decision Making)     I have reviewed the nursing notes.    I have reviewed the findings, diagnosis, plan and need for follow up with the patient.          Medication List      There are no discharge medications for this visit.         Final diagnoses:   Labyrinthitis of right ear   Essential hypertension       5/20/2019   [facility]     Cynthia Tanner MD  05/21/19 0150       Cynthia Tanner MD  05/21/19 0152

## 2019-05-21 ASSESSMENT — ENCOUNTER SYMPTOMS
DIZZINESS: 1
STRIDOR: 0
WHEEZING: 0
RHINORRHEA: 1
SHORTNESS OF BREATH: 0
FACIAL SWELLING: 0
HEADACHES: 1
ABDOMINAL PAIN: 0
EYES NEGATIVE: 1
LIGHT-HEADEDNESS: 1
MUSCULOSKELETAL NEGATIVE: 1
PSYCHIATRIC NEGATIVE: 1
FEVER: 0
GASTROINTESTINAL NEGATIVE: 1

## 2019-05-22 NOTE — TELEPHONE ENCOUNTER
Drove himself to the Hospital after having a dizzy spell and high blood pressure, on 5/20. When he was seen the provider stated that he might have allergies after looking in his ears, and talking more she thought that his CPAP tubing might be plugged, that  might be what is causing his heart to work more. After leaving he went to [facility] and picked up an allergy medication, he has felt better and  the blood pressure has stayed low. He also called [employer] were he gets his CPAP supplies to get new tubing. Wondering if you would still like to see him?

## 2019-05-23 NOTE — TELEPHONE ENCOUNTER
I do not need to see him unless he desires it.  I know him well.  Alec Howard MD on 5/23/2019 at 9:31 AM

## 2019-05-24 NOTE — TELEPHONE ENCOUNTER
Left message for patient to return call to clinic.    Dana Horvath LPN............5/24/2019 9:03 AM

## 2019-05-24 NOTE — TELEPHONE ENCOUNTER
Notified patient of response per Alec Howard MD.  He inquired if we received fax from Respiratory Therapy department in [place].  Did not notice any recent scans.  He will contact them to refax.    Dana Horvath LPN............5/24/2019 11:33 AM

## 2019-07-15 ENCOUNTER — APPOINTMENT (OUTPATIENT)
Dept: GENERAL RADIOLOGY | Facility: OTHER | Age: 69
End: 2019-07-15
Attending: FAMILY MEDICINE
Payer: MEDICARE

## 2019-07-15 ENCOUNTER — HOSPITAL ENCOUNTER (EMERGENCY)
Facility: OTHER | Age: 69
Discharge: HOME | End: 2019-07-15
Attending: FAMILY MEDICINE | Admitting: FAMILY MEDICINE
Payer: MEDICARE

## 2019-07-15 VITALS
TEMPERATURE: 100 F | WEIGHT: 230 LBS | RESPIRATION RATE: 16 BRPM | SYSTOLIC BLOOD PRESSURE: 138 MMHG | BODY MASS INDEX: 29.52 KG/M2 | OXYGEN SATURATION: 94 % | HEART RATE: 87 BPM | HEIGHT: 74 IN | DIASTOLIC BLOOD PRESSURE: 65 MMHG

## 2019-07-15 DIAGNOSIS — A93.8 TICK BORNE FEVER: ICD-10-CM

## 2019-07-15 LAB
ABO + RH BLD: NORMAL
ABO + RH BLD: NORMAL
ALBUMIN SERPL-MCNC: 4.3 G/DL (ref 3.5–5.7)
ALBUMIN UR-MCNC: 30 MG/DL
ALP SERPL-CCNC: 40 U/L (ref 34–104)
ALT SERPL W P-5'-P-CCNC: 16 U/L (ref 7–52)
ANION GAP SERPL CALCULATED.3IONS-SCNC: 9 MMOL/L (ref 3–14)
APPEARANCE UR: CLEAR
AST SERPL W P-5'-P-CCNC: 19 U/L (ref 13–39)
BASOPHILS # BLD AUTO: 0 10E9/L (ref 0–0.2)
BASOPHILS NFR BLD AUTO: 0.2 %
BILIRUB SERPL-MCNC: 0.7 MG/DL (ref 0.3–1)
BILIRUB UR QL STRIP: ABNORMAL
BLD GP AB SCN SERPL QL: NORMAL
BLOOD BANK CMNT PATIENT-IMP: NORMAL
BUN SERPL-MCNC: 13 MG/DL (ref 7–25)
CALCIUM SERPL-MCNC: 9.3 MG/DL (ref 8.6–10.3)
CHLORIDE SERPL-SCNC: 100 MMOL/L (ref 98–107)
CO2 SERPL-SCNC: 26 MMOL/L (ref 21–31)
COLOR UR AUTO: YELLOW
CREAT SERPL-MCNC: 1.02 MG/DL (ref 0.7–1.3)
CRP SERPL-MCNC: 7 MG/L
DEPRECATED S PYO AG THROAT QL EIA: NORMAL
DIFFERENTIAL METHOD BLD: NORMAL
EOSINOPHIL # BLD AUTO: 0 10E9/L (ref 0–0.7)
EOSINOPHIL NFR BLD AUTO: 0 %
ERYTHROCYTE [DISTWIDTH] IN BLOOD BY AUTOMATED COUNT: 13 % (ref 10–15)
ERYTHROCYTE [SEDIMENTATION RATE] IN BLOOD BY WESTERGREN METHOD: 14 MM/H (ref 1–10)
GFR SERPL CREATININE-BSD FRML MDRD: 72 ML/MIN/{1.73_M2}
GLUCOSE SERPL-MCNC: 120 MG/DL (ref 70–105)
GLUCOSE UR STRIP-MCNC: NEGATIVE MG/DL
HCT VFR BLD AUTO: 45.4 % (ref 40–53)
HGB BLD-MCNC: 15.3 G/DL (ref 13.3–17.7)
HGB UR QL STRIP: ABNORMAL
IMM GRANULOCYTES # BLD: 0 10E9/L (ref 0–0.4)
IMM GRANULOCYTES NFR BLD: 0.3 %
KETONES UR STRIP-MCNC: NEGATIVE MG/DL
LACTATE SERPL-SCNC: 1.4 MMOL/L (ref 0.5–2.2)
LEUKOCYTE ESTERASE UR QL STRIP: NEGATIVE
LYMPHOCYTES # BLD AUTO: 0.9 10E9/L (ref 0.8–5.3)
LYMPHOCYTES NFR BLD AUTO: 9.2 %
MCH RBC QN AUTO: 32.1 PG (ref 26.5–33)
MCHC RBC AUTO-ENTMCNC: 33.7 G/DL (ref 31.5–36.5)
MCV RBC AUTO: 95 FL (ref 78–100)
MONOCYTES # BLD AUTO: 0.4 10E9/L (ref 0–1.3)
MONOCYTES NFR BLD AUTO: 4.1 %
MUCOUS THREADS #/AREA URNS LPF: PRESENT /LPF
NEUTROPHILS # BLD AUTO: 8.1 10E9/L (ref 1.6–8.3)
NEUTROPHILS NFR BLD AUTO: 86.2 %
NITRATE UR QL: NEGATIVE
PH UR STRIP: 6 PH (ref 5–9)
PLATELET # BLD AUTO: 182 10E9/L (ref 150–450)
POTASSIUM SERPL-SCNC: 3.6 MMOL/L (ref 3.5–5.1)
PROT SERPL-MCNC: 7.2 G/DL (ref 6.4–8.9)
RBC # BLD AUTO: 4.76 10E12/L (ref 4.4–5.9)
RBC #/AREA URNS AUTO: ABNORMAL /HPF
SODIUM SERPL-SCNC: 135 MMOL/L (ref 134–144)
SOURCE: ABNORMAL
SP GR UR STRIP: 1.02 (ref 1–1.03)
SPECIMEN EXP DATE BLD: NORMAL
SPECIMEN SOURCE: NORMAL
UROBILINOGEN UR STRIP-ACNC: 0.2 EU/DL (ref 0.2–1)
WBC # BLD AUTO: 9.4 10E9/L (ref 4–11)
WBC #/AREA URNS AUTO: ABNORMAL /HPF

## 2019-07-15 PROCEDURE — 87040 BLOOD CULTURE FOR BACTERIA: CPT | Performed by: FAMILY MEDICINE

## 2019-07-15 PROCEDURE — 25000128 H RX IP 250 OP 636: Performed by: EMERGENCY MEDICINE

## 2019-07-15 PROCEDURE — 25800030 ZZH RX IP 258 OP 636: Performed by: FAMILY MEDICINE

## 2019-07-15 PROCEDURE — 71046 X-RAY EXAM CHEST 2 VIEWS: CPT

## 2019-07-15 PROCEDURE — 86850 RBC ANTIBODY SCREEN: CPT | Performed by: FAMILY MEDICINE

## 2019-07-15 PROCEDURE — 25000128 H RX IP 250 OP 636: Performed by: FAMILY MEDICINE

## 2019-07-15 PROCEDURE — 85025 COMPLETE CBC W/AUTO DIFF WBC: CPT | Performed by: FAMILY MEDICINE

## 2019-07-15 PROCEDURE — 93005 ELECTROCARDIOGRAM TRACING: CPT | Performed by: FAMILY MEDICINE

## 2019-07-15 PROCEDURE — 86140 C-REACTIVE PROTEIN: CPT | Performed by: FAMILY MEDICINE

## 2019-07-15 PROCEDURE — 99284 EMERGENCY DEPT VISIT MOD MDM: CPT | Mod: Z6 | Performed by: FAMILY MEDICINE

## 2019-07-15 PROCEDURE — 86618 LYME DISEASE ANTIBODY: CPT | Performed by: EMERGENCY MEDICINE

## 2019-07-15 PROCEDURE — 80053 COMPREHEN METABOLIC PANEL: CPT | Performed by: FAMILY MEDICINE

## 2019-07-15 PROCEDURE — 96365 THER/PROPH/DIAG IV INF INIT: CPT | Mod: XU | Performed by: FAMILY MEDICINE

## 2019-07-15 PROCEDURE — 87880 STREP A ASSAY W/OPTIC: CPT | Performed by: FAMILY MEDICINE

## 2019-07-15 PROCEDURE — 81001 URINALYSIS AUTO W/SCOPE: CPT | Performed by: FAMILY MEDICINE

## 2019-07-15 PROCEDURE — 93010 ELECTROCARDIOGRAM REPORT: CPT | Performed by: INTERNAL MEDICINE

## 2019-07-15 PROCEDURE — 87040 BLOOD CULTURE FOR BACTERIA: CPT | Mod: 91 | Performed by: FAMILY MEDICINE

## 2019-07-15 PROCEDURE — 87086 URINE CULTURE/COLONY COUNT: CPT | Mod: GZ | Performed by: FAMILY MEDICINE

## 2019-07-15 PROCEDURE — 96375 TX/PRO/DX INJ NEW DRUG ADDON: CPT | Mod: XU | Performed by: FAMILY MEDICINE

## 2019-07-15 PROCEDURE — 86901 BLOOD TYPING SEROLOGIC RH(D): CPT | Performed by: FAMILY MEDICINE

## 2019-07-15 PROCEDURE — 25000132 ZZH RX MED GY IP 250 OP 250 PS 637: Performed by: EMERGENCY MEDICINE

## 2019-07-15 PROCEDURE — 86900 BLOOD TYPING SEROLOGIC ABO: CPT | Performed by: FAMILY MEDICINE

## 2019-07-15 PROCEDURE — 85652 RBC SED RATE AUTOMATED: CPT | Performed by: FAMILY MEDICINE

## 2019-07-15 PROCEDURE — 96366 THER/PROPH/DIAG IV INF ADDON: CPT | Mod: XU | Performed by: FAMILY MEDICINE

## 2019-07-15 PROCEDURE — 36415 COLL VENOUS BLD VENIPUNCTURE: CPT | Performed by: FAMILY MEDICINE

## 2019-07-15 PROCEDURE — 25000125 ZZHC RX 250: Performed by: EMERGENCY MEDICINE

## 2019-07-15 PROCEDURE — 81001 URINALYSIS AUTO W/SCOPE: CPT | Mod: XU | Performed by: FAMILY MEDICINE

## 2019-07-15 PROCEDURE — 83605 ASSAY OF LACTIC ACID: CPT | Performed by: FAMILY MEDICINE

## 2019-07-15 PROCEDURE — 96361 HYDRATE IV INFUSION ADD-ON: CPT | Mod: XU | Performed by: FAMILY MEDICINE

## 2019-07-15 PROCEDURE — 99285 EMERGENCY DEPT VISIT HI MDM: CPT | Mod: 25 | Performed by: FAMILY MEDICINE

## 2019-07-15 RX ORDER — SODIUM CHLORIDE 9 MG/ML
1000 INJECTION, SOLUTION INTRAVENOUS CONTINUOUS
Status: DISCONTINUED | OUTPATIENT
Start: 2019-07-15 | End: 2019-07-16 | Stop reason: HOSPADM

## 2019-07-15 RX ORDER — DOXYCYCLINE 100 MG/1
100 CAPSULE ORAL 2 TIMES DAILY
Qty: 28 CAPSULE | Refills: 0 | Status: SHIPPED | OUTPATIENT
Start: 2019-07-15 | End: 2019-07-15

## 2019-07-15 RX ORDER — ACETAMINOPHEN 325 MG/1
975 TABLET ORAL ONCE
Status: COMPLETED | OUTPATIENT
Start: 2019-07-15 | End: 2019-07-15

## 2019-07-15 RX ORDER — SODIUM CHLORIDE 9 MG/ML
1000 INJECTION, SOLUTION INTRAVENOUS CONTINUOUS
Status: DISCONTINUED | OUTPATIENT
Start: 2019-07-15 | End: 2019-07-15

## 2019-07-15 RX ORDER — DOXYCYCLINE 100 MG/10ML
100 INJECTION, POWDER, LYOPHILIZED, FOR SOLUTION INTRAVENOUS ONCE
Status: COMPLETED | OUTPATIENT
Start: 2019-07-15 | End: 2019-07-15

## 2019-07-15 RX ORDER — KETOROLAC TROMETHAMINE 15 MG/ML
15 INJECTION, SOLUTION INTRAMUSCULAR; INTRAVENOUS ONCE
Status: COMPLETED | OUTPATIENT
Start: 2019-07-15 | End: 2019-07-15

## 2019-07-15 RX ORDER — DOXYCYCLINE 100 MG/1
100 CAPSULE ORAL 2 TIMES DAILY
Qty: 28 CAPSULE | Refills: 0 | Status: SHIPPED | OUTPATIENT
Start: 2019-07-15 | End: 2019-07-29

## 2019-07-15 RX ADMIN — SODIUM CHLORIDE 1000 ML: 9 INJECTION, SOLUTION INTRAVENOUS at 15:20

## 2019-07-15 RX ADMIN — DOXYCYCLINE 100 MG: 100 INJECTION, POWDER, LYOPHILIZED, FOR SOLUTION INTRAVENOUS at 20:38

## 2019-07-15 RX ADMIN — ACETAMINOPHEN 975 MG: 325 TABLET, FILM COATED ORAL at 19:28

## 2019-07-15 RX ADMIN — SODIUM CHLORIDE 1000 ML: 9 INJECTION, SOLUTION INTRAVENOUS at 14:00

## 2019-07-15 RX ADMIN — SODIUM CHLORIDE 1000 ML: 9 INJECTION, SOLUTION INTRAVENOUS at 19:23

## 2019-07-15 RX ADMIN — KETOROLAC TROMETHAMINE 15 MG: 15 INJECTION, SOLUTION INTRAMUSCULAR; INTRAVENOUS at 20:27

## 2019-07-15 ASSESSMENT — ENCOUNTER SYMPTOMS
NECK STIFFNESS: 0
MYALGIAS: 1
DIFFICULTY URINATING: 0
FEVER: 1
DECREASED CONCENTRATION: 1
RESPIRATORY NEGATIVE: 1
EYES NEGATIVE: 1
NECK PAIN: 0
DIAPHORESIS: 1
ABDOMINAL PAIN: 0
LIGHT-HEADEDNESS: 1
COUGH: 0
PALPITATIONS: 0
CHILLS: 1
NAUSEA: 1
CHEST TIGHTNESS: 0

## 2019-07-15 ASSESSMENT — MIFFLIN-ST. JEOR: SCORE: 1873.02

## 2019-07-15 NOTE — ED NOTES
Pt was in EMS to have his blood drawn when he told the  that he felt nauseous. The  went to grab him a basket incase he needed to vomit and the pt became unresponsive. RN came into room and sternal rubbed pt which pt woke up too. He was diaphoretic and stated that he has passed out in the past from blood draws. He continues to yawn. Vitals were taken and MD was in the room at 1340 to assess the pt.

## 2019-07-15 NOTE — ED PROVIDER NOTES
History     Chief Complaint   Patient presents with     Flu Symptoms     HPI  Kaushik Ansari is a 68 year old male who began feeling poorly yesterday morning that continues today with significant postural hypotension and near syncopal symptoms presenting to the emergency department with profuse diaphoresis pale complexion and presyncopal symptoms.  He is initially evaluated in the EMS 1 because all rooms are full and has brief syncopal episode when working with nursing.  He eventually transferred to room 6.  He reports that his dog bit him in the finger on Saturday which bled but has not become red or swollen or abnormally tender.  His dog has its shots up-to-date.  Yesterday he started having decreased appetite and generalized malaise with some nausea and fevers chills and sweats.  This morning he was unable to eat and felt very weak and tired and and had fever to 101.7.  He presents to the emergency department as above with fever bradycardia severe diaphoresis and presyncopal symptoms.  He denies shortness of breath or chest pain or cough.    Allergies:  No Known Allergies    Problem List:    Patient Active Problem List    Diagnosis Date Noted     Benign prostatic hyperplasia with urinary frequency 10/16/2018     Priority: Medium     Bunion 10/16/2018     Priority: Medium     Familial tremor 10/16/2018     Priority: Medium     Overview:   Benign       Plantar fascial fibromatosis 10/16/2018     Priority: Medium     Overview:   2006 to present       Sciatica 10/16/2018     Priority: Medium     Overview:   Mild       Trigger finger of right thumb 07/24/2017     Priority: Medium     Status post shoulder surgery 04/19/2017     Priority: Medium     AC (acromioclavicular) joint arthritis 03/23/2017     Priority: Medium     Complete tear of right rotator cuff 03/23/2017     Priority: Medium     Impingement syndrome of right shoulder 03/23/2017     Priority: Medium     Degeneration of intervertebral disc of lumbar  region 10/23/2015     Priority: Medium     Allergic rhinitis due to pollen 05/27/2015     Priority: Medium     Acute upper respiratory infection 06/19/2012     Priority: Medium     Lactose intolerance 08/30/2011     Priority: Medium     Anxiety state 03/08/2011     Priority: Medium     Otitis media, serous 12/28/2010     Priority: Medium     Pain in joint, lower leg 08/11/2010     Priority: Medium     Lateral epicondylitis 08/11/2010     Priority: Medium     Seborrheic keratosis 08/11/2010     Priority: Medium     Depressive disorder, not elsewhere classified 01/08/2008     Priority: Medium     Myalgia and myositis 06/13/2006     Priority: Medium     Overview:   Muscle aches  IMO Update 10/11       Disturbance of skin sensation 08/11/2005     Priority: Medium     Overview:   Foot paresthesia, right leg with standing       Diverticulosis of colon 03/01/2005     Priority: Medium     Overview:   Few sigmoid diverticula, one benign polyp, needs repeat in 2009  IMO Update 10/11       History of colonic polyps 03/01/2005     Priority: Medium     Overview:   IMO Update 10/11       Obstructive sleep apnea 04/02/2003     Priority: Medium     Overview:   on CPAP, uses  IMO Update 10/11  Overview:   On CPAP          Past Medical History:    Past Medical History:   Diagnosis Date     Anxiety disorder      Complete tear of right rotator cuff      Dependence on other enabling machines and devices      Encounter for prescription of emergency contraception      Essential tremor      Impingement syndrome of right shoulder      Other specified postprocedural states      Primary osteoarthritis of shoulder      Sciatica      Status post shoulder surgery 4/19/2017     Trigger thumb of right hand        Past Surgical History:    Past Surgical History:   Procedure Laterality Date     COLONOSCOPY  03/2000     COLONOSCOPY  03/01/2005    normal by patient report     COLONOSCOPY  01/03/2014     OTHER SURGICAL HISTORY      77600.0,CT CORONARY  "ANGIOGRAM ([place])     OTHER SURGICAL HISTORY      2017,[id number],OTHER,Right     VASECTOMY      No Comments Provided       Family History:    Family History   Problem Relation Age of Onset     Breast Cancer Mother         Cancer-breast     Substance Abuse Father         Alcohol/Drug     Diabetes Daughter         Diabetes       Social History:  Marital Status:   [2]  Social History     Tobacco Use     Smoking status: Former Smoker     Packs/day: 2.00     Years: 20.00     Pack years: 40.00     Types: Cigarettes     Last attempt to quit: 1984     Years since quittin.5     Smokeless tobacco: Former User     Types: Chew   Substance Use Topics     Alcohol use: No     Alcohol/week: 0.0 oz     Drug use: No     Types: Other        Medications:      doxycycline hyclate (VIBRAMYCIN) 100 MG capsule   order for DME         Review of Systems   Constitutional: Positive for chills, diaphoresis and fever.   HENT: Negative.    Eyes: Negative.    Respiratory: Negative.  Negative for cough and chest tightness.    Cardiovascular: Negative for chest pain, palpitations and leg swelling.   Gastrointestinal: Positive for nausea. Negative for abdominal pain.   Genitourinary: Positive for decreased urine volume. Negative for difficulty urinating.   Musculoskeletal: Positive for myalgias (Pain in his buttocks bilaterally and in his back muscles.). Negative for neck pain and neck stiffness.   Skin: Positive for pallor. Negative for rash.   Neurological: Positive for syncope and light-headedness.   Psychiatric/Behavioral: Positive for decreased concentration.       Physical Exam   BP: 133/79  Pulse: 95  Heart Rate: 105  Temp: 99.1  F (37.3  C)  Resp: 17  Height: 188 cm (6' 2\")  Weight: 104.3 kg (230 lb)  SpO2: 93 %      Physical Exam   Constitutional: He appears well-developed and well-nourished. He appears distressed.   Pale profusely diaphoretic and febrile 68-year-old male with presyncope and brief syncopal episode in front of " nursing.  He has decreased concentration and has had trouble tracking and providing a history initially.   HENT:   Head: Normocephalic and atraumatic.   Right Ear: External ear normal.   Left Ear: External ear normal.   Mouth/Throat: Oropharynx is clear and moist.   Eyes: Pupils are equal, round, and reactive to light. Conjunctivae and EOM are normal. No scleral icterus.   Neck: Normal range of motion. Neck supple. No tracheal deviation present. No thyromegaly present.   Cardiovascular: Regular rhythm and normal heart sounds.   Bradycardia.   Pulmonary/Chest: Effort normal.   Mild rhonchi left greater than right base clearing with deep breathing.   Abdominal: Soft. Bowel sounds are normal. He exhibits no distension. There is no tenderness.   Musculoskeletal: He exhibits no edema, tenderness or deformity.   Lymphadenopathy:     He has no cervical adenopathy.   Neurological: He is alert. He exhibits normal muscle tone. Coordination normal.   Skin: Skin is warm. Capillary refill takes 2 to 3 seconds. He is diaphoretic. There is pallor.   Psychiatric:   Distant affect initially.  Much more engaging after rehydration.   Nursing note and vitals reviewed.      ED Course        Procedures          EKG:      Critical Care time:  none               No results found for this or any previous visit (from the past 24 hour(s)).    Medications   0.9% sodium chloride BOLUS (0 mLs Intravenous Stopped 7/15/19 2214)   0.9% sodium chloride BOLUS (0 mLs Intravenous Stopped 7/15/19 2213)   acetaminophen (TYLENOL) tablet 975 mg (975 mg Oral Given 7/15/19 1928)   doxycycline (VIBRAMYCIN) 100 mg vial to attach to  mL bag (0 mg Intravenous Stopped 7/15/19 2213)   ketorolac (TORADOL) injection 15 mg (15 mg Intravenous Given 7/15/19 2027)     6:43 PM recheck patient and he is looking tremendously better with much improved color and better mentation.  He has non-focal neuro exam and no nuchal rigidity or meningismus.  We reviewed his  negative work-up so far without obvious focus of infection.  We will check a chest x-ray and continued IV fluids and consider empiric antibiotics.  EKG overlooked at presentation and will obtain.  He continues without any CP.  I will sign patient out to Dr. Lopez at shift change.  Ayaz Nieto MD.    Assessments & Plan (with Medical Decision Making)   68 year old male with fever, malaise, presyncope, and syncope in ED. Dog bite on Saturday.  No known tick bite.        I have reviewed the nursing notes.    I have reviewed the findings, diagnosis, plan and need for follow up with the patient.          Medication List      Started    doxycycline hyclate 100 MG capsule  Commonly known as:  VIBRAMYCIN  100 mg, Oral, 2 TIMES DAILY            Final diagnoses:   Tick borne fever       7/15/2019   [facility] AND [facility]     Ayaz Nieto MD  07/15/19 [id number]    Patient here with fevers chills body aches and no obvious source of his fever.  Have to assume something like Lyme disease.  He is able to take liquids.  Fever has come down somewhat.  He is given an initial dose of IV doxycycline.  He is able to get up and move about the room without much difficulty.  I am going to discharge him to home on oral doxycycline at this time.  We will also send out Lyme study.  Return if worse.       Jerry Lopez MD  07/15/19 3783       Jerry Lopez MD  07/21/19 3346

## 2019-07-15 NOTE — ED AVS SNAPSHOT
[facility]  [address]  [place] [address]  Phone:  [phone]  Fax:  [phone]                                    Kaushik Ansari   MRN: [id number]    Department:  [facility]   Date of Visit:  7/15/2019           After Visit Summary Signature Page    I have received my discharge instructions, and my questions have been answered. I have discussed any challenges I see with this plan with the nurse or doctor.    ..........................................................................................................................................  Patient/Patient Representative Signature      ..........................................................................................................................................  Patient Representative Print Name and Relationship to Patient    ..................................................               ................................................  Date                                   Time    ..........................................................................................................................................  Reviewed by Signature/Title    ...................................................              ..............................................  Date                                               Time          22EPIC Rev 08/18

## 2019-07-15 NOTE — ED TRIAGE NOTES
"Pt presents to the ER with friend via private car complaining of \"feeling lousy\". Pt has felt this way since yesterday morning. Pt reports having the chills and temp of 101.7. Feels achy all over \"my eyes even hurt\", rating pain 6/10. Denies nausea/vominiting, diarrhea.   "

## 2019-07-15 NOTE — ED NOTES
Ambulated to bathroom with stand by assist. Denies dizziness, lightheadedness. States he does feel chilled when out from under blankets.

## 2019-07-16 NOTE — ED NOTES
Forwarded phone call from wife into room..   Daughter in law called as well, patient refused to talk with er and asked she call her mother.  Message relayed.

## 2019-07-17 LAB
B BURGDOR IGG+IGM SER QL: 0.14 (ref 0–0.89)
BACTERIA SPEC CULT: NO GROWTH
SPECIMEN SOURCE: NORMAL

## 2019-07-17 NOTE — RESULT ENCOUNTER NOTE
Final result for Lyme Disease Renate with reflex to WB Serum is NEGATIVE.    No change in treatment per [place] ED Lab Result protocol.

## 2019-07-17 NOTE — RESULT ENCOUNTER NOTE
Final urine culture report is NEGATIVE per [place] ED Lab Result protocol.    If NEGATIVE result, no change in treatment, per [place] ED Lab Result protocol.

## 2019-07-21 LAB
BACTERIA SPEC CULT: NORMAL
BACTERIA SPEC CULT: NORMAL
SPECIMEN SOURCE: NORMAL
SPECIMEN SOURCE: NORMAL

## 2019-07-29 DIAGNOSIS — R50.9 FEVER, UNKNOWN ORIGIN: Primary | ICD-10-CM

## 2019-07-29 RX ORDER — DOXYCYCLINE 100 MG/1
100 CAPSULE ORAL 2 TIMES DAILY
Qty: 14 CAPSULE | Refills: 0 | Status: SHIPPED | OUTPATIENT
Start: 2019-07-29 | End: 2019-08-22

## 2019-07-29 NOTE — TELEPHONE ENCOUNTER
"Pt still having some shoulder pain and has improved on doxycycline (14 day course) after ED visit 7/15/2019 (suspected tick borne illness).   He has one dose left and would like to try one more week of med.  Also inquiring about test results and if he should take almond milk with the medication if he is able to have on more week.    Pt ED visit 7/15/2019 for flu like symptoms (fever bradycardia severe diaphoresis and presyncopal symptoms). He was bit by his dog 7/13/2019 (UTD on vaccines).    Pt wondering about tick lab test results and blood cultures (noted they are all resulted-no growth and neg lyme ED visit 7/15/2019).  CRP and sed rate elevated.  Abnormal UA but culture revealed no growth.    Pt also wondering if almond milk is okay to take with doxycycline as concerned that it has more calcium than cow's milk.  Advised pt to try toast without butter and small amt of jam instead of cereal.   Pt will try that.       Pt state he will schedule a f/u with PCP \"as soon as I am able to\"  He would like to try one more week of doxycycline first to see if he improves further as he states he has improved greatly but not 100% as still has pain in his shoulder (noted hx of osteoarthritis of shoulder and impingement syndrome R shoulder along with rotator cuff tear).    Will route to PCP high priority and kanika up med for review/consideration.    Mary Ann Palomo RN  ....................  7/29/2019   9:13 AM        "

## 2019-07-29 NOTE — TELEPHONE ENCOUNTER
Left message alerting pt requested med was filled.    Mary Ann Palomo RN  ....................  7/29/2019   3:02 PM

## 2019-08-22 ENCOUNTER — OFFICE VISIT (OUTPATIENT)
Dept: FAMILY MEDICINE | Facility: OTHER | Age: 69
End: 2019-08-22
Attending: NURSE PRACTITIONER
Payer: MEDICARE

## 2019-08-22 VITALS
RESPIRATION RATE: 16 BRPM | BODY MASS INDEX: 30.29 KG/M2 | HEIGHT: 74 IN | OXYGEN SATURATION: 97 % | HEART RATE: 77 BPM | SYSTOLIC BLOOD PRESSURE: 138 MMHG | WEIGHT: 236 LBS | DIASTOLIC BLOOD PRESSURE: 80 MMHG | TEMPERATURE: 97.8 F

## 2019-08-22 DIAGNOSIS — Z23 NEED FOR PNEUMOCOCCAL VACCINATION: ICD-10-CM

## 2019-08-22 DIAGNOSIS — G47.33 OBSTRUCTIVE SLEEP APNEA: ICD-10-CM

## 2019-08-22 DIAGNOSIS — H00.012 HORDEOLUM EXTERNUM OF RIGHT LOWER EYELID: Primary | ICD-10-CM

## 2019-08-22 PROCEDURE — 90732 PPSV23 VACC 2 YRS+ SUBQ/IM: CPT

## 2019-08-22 PROCEDURE — G0463 HOSPITAL OUTPT CLINIC VISIT: HCPCS

## 2019-08-22 PROCEDURE — 99213 OFFICE O/P EST LOW 20 MIN: CPT | Performed by: NURSE PRACTITIONER

## 2019-08-22 PROCEDURE — G0463 HOSPITAL OUTPT CLINIC VISIT: HCPCS | Mod: 25

## 2019-08-22 PROCEDURE — G0009 ADMIN PNEUMOCOCCAL VACCINE: HCPCS

## 2019-08-22 SDOH — HEALTH STABILITY: MENTAL HEALTH: HOW OFTEN DO YOU HAVE A DRINK CONTAINING ALCOHOL?: NEVER

## 2019-08-22 ASSESSMENT — PAIN SCALES - GENERAL: PAINLEVEL: NO PAIN (0)

## 2019-08-22 ASSESSMENT — MIFFLIN-ST. JEOR: SCORE: 1900.24

## 2019-08-22 ASSESSMENT — PATIENT HEALTH QUESTIONNAIRE - PHQ9: SUM OF ALL RESPONSES TO PHQ QUESTIONS 1-9: 0

## 2019-08-22 NOTE — PROGRESS NOTES
HPI:    Kaushik Ansari is a 68 year old male who presents to clinic today for lower eyelid concerns.  He reports he feels he has had a stye on his right lower eyelid.  The area is tender and irritated.  He will wake up in the morning with some dried drainage around his eye.  This is been present for about 1 week.  He is used warm washcloth over the last 2 days which has seemed to be somewhat helpful.  He has not had any pain or changes in his vision.    He also has questions regarding getting a new CPAP supplies.  He would like to change over to [facility] for ease of getting supplies.    Past Medical History:   Diagnosis Date     Anxiety disorder     No Comments Provided     Complete tear of right rotator cuff     3/23/2017     Dependence on other enabling machines and devices     No Comments Provided     Encounter for prescription of emergency contraception     No Comments Provided     Essential tremor     No Comments Provided     Impingement syndrome of right shoulder     3/23/2017     Other specified postprocedural states     4/19/2017     Primary osteoarthritis of shoulder     3/23/2017     Sciatica     No Comments Provided     Status post shoulder surgery 4/19/2017     Trigger thumb of right hand     7/24/2017       Past Surgical History:   Procedure Laterality Date     COLONOSCOPY  03/2000     COLONOSCOPY  03/01/2005    normal by patient report     COLONOSCOPY  01/03/2014     OTHER SURGICAL HISTORY      63351.0,CT CORONARY ANGIOGRAM ([place])     OTHER SURGICAL HISTORY      04/19/2017,[id number],OTHER,Right     VASECTOMY      No Comments Provided       Family History   Problem Relation Age of Onset     Breast Cancer Mother         Cancer-breast     Substance Abuse Father         Alcohol/Drug     Diabetes Daughter         Diabetes       Social History     Socioeconomic History     Marital status:      Spouse name: Not on file     Number of children: Not on file     Years of education: Not on file     Highest  education level: Not on file   Occupational History     Not on file   Social Needs     Financial resource strain: Not on file     Food insecurity:     Worry: Not on file     Inability: Not on file     Transportation needs:     Medical: Not on file     Non-medical: Not on file   Tobacco Use     Smoking status: Former Smoker     Packs/day: 2.00     Years: 20.00     Pack years: 40.00     Types: Cigarettes     Last attempt to quit: 1984     Years since quittin.6     Smokeless tobacco: Former User     Types: Chew   Substance and Sexual Activity     Alcohol use: No     Alcohol/week: 0.0 oz     Frequency: Never     Drug use: No     Types: Other     Sexual activity: Not Currently   Lifestyle     Physical activity:     Days per week: Not on file     Minutes per session: Not on file     Stress: Not on file   Relationships     Social connections:     Talks on phone: Not on file     Gets together: Not on file     Attends [employer] service: Not on file     Active member of club or organization: Not on file     Attends meetings of clubs or organizations: Not on file     Relationship status: Not on file     Intimate partner violence:     Fear of current or ex partner: Not on file     Emotionally abused: Not on file     Physically abused: Not on file     Forced sexual activity: Not on file   Other Topics Concern     Not on file   Social History Narrative    His wife  about , now remarried.     Works as a [employer] .  Now retired as of .    Sarah (Dennie) - Wife    Updated  2013    **pre upload 2012**       Current Outpatient Medications   Medication Sig Dispense Refill     order for DME Equipment being ordered: CPAP supplies 1 each 1     order for DME Equipment being ordered: CPAP replacement supplies 1 each 3       No Known Allergies    ROS:  Pertinent positives and negatives are noted in HPI.    EXAM:  /80 (BP Location: Right arm, Patient Position: Sitting, Cuff Size: Adult Regular)    "Pulse 77   Temp 97.8  F (36.6  C) (Tympanic)   Resp 16   Ht 1.88 m (6' 2\")   Wt 107 kg (236 lb)   SpO2 97%   BMI 30.30 kg/m    General appearance: well appearing male, in no acute distress  Head: normocephalic, atraumatic  Ears: TM's with cone of light, no erythema, canals clear bilaterally  Eyes: conjunctivae normal, stye noted to right lower lid with mild erythema  Oropharynx: moist mucous membranes, tonsils without erythema, exudates or petechiae, no post nasal drip seen  Neck: supple without adenopathy  Respiratory: clear to auscultation bilaterally  Cardiac: RRR with no murmurs  Psychological: normal affect, alert and pleasant    ASSESSMENT AND PLAN:    1. Hordeolum externum of right lower eyelid    2. Need for pneumococcal vaccination    3. Obstructive sleep apnea      1.  Recommend warm washcloth several times a day to help with management of the stye.  If symptoms progressively worsen or do not improve he can follow-up for possible antibiotic eyedrops.  2.  Pneumonia vaccine was updated today.  3.  CPAP supplies are ordered.      RAVEN Gaytan CNP..................8/22/2019 10:14 AM      This document was prepared using voice generated software.  While every attempt was made for accuracy, grammatical errors may exist.  "

## 2019-08-22 NOTE — PATIENT INSTRUCTIONS
Patient Education     Sty (or Stye)  A sty is an infection of the oil gland of the eyelid. It may develop into a small pocket of pus (an abscess). This can cause pain, redness, and swelling. In early stages, a sty is treated with antibiotic cream, eye drops, or a small towel soaked in warm water (a warm compress). More severe cases may need to be opened and drained by a healthcare provider.  Home care    Eye drops or ointment are usually prescribed to treat the infection. Use these as directed.     Artificial tears may also be used to lubricate the eye and make it more comfortable. You can buy these over the counter without a prescription. Talk with your healthcare provider before using any over-the-counter treatment for a sty.    Apply a warm, damp towel to the affected eye for at least 5 minutes, 3 to 4 times a day for a week. Warm compresses open the pores and speed the healing. But if the compresses are too hot, they may burn your eyelid.    Sometimes the sty will drain with this treatment alone. If this happens, keep using the antibiotic until all the redness and swelling are gone.    Wash your hands before and after touching the infected eyelid to avoid spreading the infection.    Don t squeeze or try to break open the sty.  Follow-up care  Follow up with your healthcare provider, or as advised.   When to seek medical advice  Call your healthcare provider right away if any of these occur:    Increase in swelling or redness around the eyelid after 48 to 72 hours    Increase in eye pain or the eyelid blisters    Increase in warmth--the eyelid feels hot    Drainage of blood or thick pus from the sty    Blister on the eyelid    Inability to open the eyelid due to swelling    Fever of 100.4 F (38 C) or above, or as directed by your provider    Vision changes    Headache or stiff neck    The sty comes back  Date Last Reviewed: 8/1/2017 2000-2018 [employer]. [address] [facility], [place]  [address]. All rights reserved. This information is not intended as a substitute for professional medical care. Always follow your healthcare professional's instructions.

## 2019-08-22 NOTE — NURSING NOTE
Patient presents to clinic today for possible stye in right eye. He states he also has concerns about pneumonia and how he got it.     No LMP for male patient.  Medication Reconciliation: complete    Armida Fox LPN  8/22/2019 9:58 AM

## 2019-09-19 ENCOUNTER — OFFICE VISIT (OUTPATIENT)
Dept: FAMILY MEDICINE | Facility: OTHER | Age: 69
End: 2019-09-19
Attending: FAMILY MEDICINE
Payer: COMMERCIAL

## 2019-09-19 VITALS
DIASTOLIC BLOOD PRESSURE: 72 MMHG | RESPIRATION RATE: 16 BRPM | TEMPERATURE: 98.3 F | OXYGEN SATURATION: 99 % | HEART RATE: 90 BPM | BODY MASS INDEX: 29.61 KG/M2 | SYSTOLIC BLOOD PRESSURE: 130 MMHG | WEIGHT: 230.6 LBS

## 2019-09-19 DIAGNOSIS — F43.22 ADJUSTMENT DISORDER WITH ANXIOUS MOOD: ICD-10-CM

## 2019-09-19 DIAGNOSIS — G47.33 OBSTRUCTIVE SLEEP APNEA: Primary | ICD-10-CM

## 2019-09-19 PROCEDURE — 99214 OFFICE O/P EST MOD 30 MIN: CPT | Performed by: FAMILY MEDICINE

## 2019-09-19 PROCEDURE — G0463 HOSPITAL OUTPT CLINIC VISIT: HCPCS

## 2019-09-19 ASSESSMENT — ENCOUNTER SYMPTOMS
SHORTNESS OF BREATH: 0
FATIGUE: 0
COUGH: 0
FEVER: 0

## 2019-09-19 ASSESSMENT — PAIN SCALES - GENERAL: PAINLEVEL: NO PAIN (0)

## 2019-09-19 NOTE — NURSING NOTE
"Coming in for a f/u from ER pneumonia and needs an Rx for CPAP    Chief Complaint   Patient presents with     RECHECK     ER Dx pneumonia       Initial /72 (BP Location: Right arm, Patient Position: Sitting, Cuff Size: Adult Large)   Pulse 90   Temp 98.3  F (36.8  C) (Tympanic)   Resp 16   Wt 104.6 kg (230 lb 9.6 oz)   SpO2 99%   BMI 29.61 kg/m   Estimated body mass index is 29.61 kg/m  as calculated from the following:    Height as of 8/22/19: 1.88 m (6' 2\").    Weight as of this encounter: 104.6 kg (230 lb 9.6 oz).  Medication Reconciliation: complete    Caridad Hilliard LPN  "

## 2019-09-19 NOTE — PROGRESS NOTES
SUBJECTIVE:   Kaushik Ansari is a 68 year old male who presents to clinic today for the following health issues:    HPI  Emergency department follow up.  In July he felt very ill.  Fevers to 101.7.  After a day or so he presented to the emergency department.  With drawing blood he had a spell of syncope.  The work up showed hematuria on the UA but none on microscopy.  I reviewed his labs. CRP was 7,  Had a chest x ray as well, was normal.  Was given amoxicillin on the presumption this was tick borne illness.  It took him 3 weeks to resolve.  He was told to get new CPAP supplies now.    Also wants to talk about stress related to this wife's cancer.  She is now having lots of hallucinations.  High ammonia level and liver mets along with bone mets.  Has had EMS there, but she refuses transport and they then do not take her in.  Her children continue to deny the degree of her illness and want her to come to [place] to see them.  He has been blocking her from driving, but she gets angry about this.  Overall it has caused him more anxiety and worry.  Concerned he is not doing the right things for her.  She has not wanted hospice involved either.     Patient Active Problem List    Diagnosis Date Noted     Benign prostatic hyperplasia with urinary frequency 10/16/2018     Priority: Medium     Bunion 10/16/2018     Priority: Medium     Familial tremor 10/16/2018     Priority: Medium     Overview:   Benign       Plantar fascial fibromatosis 10/16/2018     Priority: Medium     Overview:   2006 to present       Sciatica 10/16/2018     Priority: Medium     Overview:   Mild       Trigger finger of right thumb 07/24/2017     Priority: Medium     Status post shoulder surgery 04/19/2017     Priority: Medium     AC (acromioclavicular) joint arthritis 03/23/2017     Priority: Medium     Complete tear of right rotator cuff 03/23/2017     Priority: Medium     Impingement syndrome of right shoulder 03/23/2017     Priority: Medium      Degeneration of intervertebral disc of lumbar region 10/23/2015     Priority: Medium     Allergic rhinitis due to pollen 2015     Priority: Medium     Acute upper respiratory infection 2012     Priority: Medium     Lactose intolerance 2011     Priority: Medium     Anxiety state 2011     Priority: Medium     Otitis media, serous 2010     Priority: Medium     Pain in joint, lower leg 2010     Priority: Medium     Lateral epicondylitis 2010     Priority: Medium     Seborrheic keratosis 2010     Priority: Medium     Depressive disorder, not elsewhere classified 2008     Priority: Medium     Myalgia and myositis 2006     Priority: Medium     Overview:   Muscle aches  IMO Update 10/11       Disturbance of skin sensation 2005     Priority: Medium     Overview:   Foot paresthesia, right leg with standing       Diverticulosis of colon 2005     Priority: Medium     Overview:   Few sigmoid diverticula, one benign polyp, needs repeat in   IMO Update 10/11       History of colonic polyps 2005     Priority: Medium     Overview:   IMO Update 10/11       Obstructive sleep apnea 2003     Priority: Medium     Overview:   on CPAP, uses  IMO Update 10/11  Overview:   On CPAP       Past Surgical History:   Procedure Laterality Date     COLONOSCOPY  2000     COLONOSCOPY  2005    normal by patient report     COLONOSCOPY  2014     OTHER SURGICAL HISTORY      37419.0,CT CORONARY ANGIOGRAM ([place])     OTHER SURGICAL HISTORY      2017,772102,OTHER,Right     VASECTOMY      No Comments Provided     Social History     Tobacco Use     Smoking status: Former Smoker     Packs/day: 2.00     Years: 20.00     Pack years: 40.00     Types: Cigarettes     Last attempt to quit: 1984     Years since quittin.7     Smokeless tobacco: Former User     Types: Chew   Substance Use Topics     Alcohol use: No     Alcohol/week: 0.0 oz     Frequency:  Never     Current Outpatient Medications   Medication Sig Dispense Refill     order for DME Equipment being ordered: CPAP supplies 1 each 1     order for DME Equipment being ordered: CPAP replacement supplies 1 each 3     No Known Allergies    Review of Systems   Constitutional: Negative for fatigue and fever.   Respiratory: Negative for cough and shortness of breath.    Cardiovascular: Negative for chest pain.        OBJECTIVE:     /72 (BP Location: Right arm, Patient Position: Sitting, Cuff Size: Adult Large)   Pulse 90   Temp 98.3  F (36.8  C) (Tympanic)   Resp 16   Wt 104.6 kg (230 lb 9.6 oz)   SpO2 99%   BMI 29.61 kg/m    Body mass index is 29.61 kg/m .  Physical Exam   Constitutional: He is oriented to person, place, and time. He appears well-developed. No distress.   Cardiovascular: Normal rate, regular rhythm and normal heart sounds. Exam reveals no friction rub.   No murmur heard.  Pulmonary/Chest: Effort normal and breath sounds normal. No stridor. No respiratory distress. He has no wheezes. He has no rales.   Neurological: He is alert and oriented to person, place, and time.   Skin: Skin is warm and dry. He is not diaphoretic.       Diagnostic Test Results:  none     ASSESSMENT/PLAN:         (G47.33) Obstructive sleep apnea  (primary encounter diagnosis)  Comment: will write the order for new equipment.  It is possible the pneumonia was from dirty CPAP supplies.  Plan: order for DME             (F43.22) Adjustment disorder with anxious mood  Comment: ongoing  Plan: 25 minutes with him, over half in counseling.  He felt quite a bit better simply hearing form me that his wife's behavior is consistent with hepatic encephalopathy, and she will hallucinate, make poor decisions and he has a responsibility to keep her as safe as possible, including not driving.  He also understands that she is unlikely to live for very much longer.  Continue to follow up with me.  Hospice would be able to provide him  some respite, once she is unconscious then might be able to involve them.      Alec Howard MD  [facility]

## 2019-09-24 ENCOUNTER — TELEPHONE (OUTPATIENT)
Dept: FAMILY MEDICINE | Facility: OTHER | Age: 69
End: 2019-09-24

## 2019-09-24 NOTE — TELEPHONE ENCOUNTER
Patient would like a work in this week for an er follow up.     Giana Shankar on 9/24/2019 at 7:16 AM

## 2019-09-24 NOTE — TELEPHONE ENCOUNTER
Have her call CRT or 911.  It cannot be done over the phone.  Alec Howard MD on 9/24/2019 at 5:12 PM

## 2019-10-11 ENCOUNTER — TRANSFERRED RECORDS (OUTPATIENT)
Dept: HEALTH INFORMATION MANAGEMENT | Facility: OTHER | Age: 69
End: 2019-10-11

## 2019-11-07 ENCOUNTER — HOSPITAL ENCOUNTER (EMERGENCY)
Facility: OTHER | Age: 69
Discharge: HOME | End: 2019-11-08
Attending: FAMILY MEDICINE | Admitting: FAMILY MEDICINE
Payer: MEDICARE

## 2019-11-07 DIAGNOSIS — I10 BENIGN ESSENTIAL HYPERTENSION: ICD-10-CM

## 2019-11-07 LAB
ANION GAP SERPL CALCULATED.3IONS-SCNC: 9 MMOL/L (ref 3–14)
BASOPHILS # BLD AUTO: 0.1 10E9/L (ref 0–0.2)
BASOPHILS NFR BLD AUTO: 0.6 %
BUN SERPL-MCNC: 14 MG/DL (ref 7–25)
CALCIUM SERPL-MCNC: 9.5 MG/DL (ref 8.6–10.3)
CHLORIDE SERPL-SCNC: 104 MMOL/L (ref 98–107)
CO2 SERPL-SCNC: 27 MMOL/L (ref 21–31)
CREAT SERPL-MCNC: 1.31 MG/DL (ref 0.7–1.3)
DIFFERENTIAL METHOD BLD: NORMAL
EOSINOPHIL # BLD AUTO: 0.2 10E9/L (ref 0–0.7)
EOSINOPHIL NFR BLD AUTO: 1.9 %
ERYTHROCYTE [DISTWIDTH] IN BLOOD BY AUTOMATED COUNT: 13.1 % (ref 10–15)
GFR SERPL CREATININE-BSD FRML MDRD: 54 ML/MIN/{1.73_M2}
GLUCOSE SERPL-MCNC: 141 MG/DL (ref 70–105)
HCT VFR BLD AUTO: 44.3 % (ref 40–53)
HGB BLD-MCNC: 14.9 G/DL (ref 13.3–17.7)
IMM GRANULOCYTES # BLD: 0 10E9/L (ref 0–0.4)
IMM GRANULOCYTES NFR BLD: 0.2 %
LYMPHOCYTES # BLD AUTO: 3 10E9/L (ref 0.8–5.3)
LYMPHOCYTES NFR BLD AUTO: 35.2 %
MCH RBC QN AUTO: 32.2 PG (ref 26.5–33)
MCHC RBC AUTO-ENTMCNC: 33.6 G/DL (ref 31.5–36.5)
MCV RBC AUTO: 96 FL (ref 78–100)
MONOCYTES # BLD AUTO: 0.7 10E9/L (ref 0–1.3)
MONOCYTES NFR BLD AUTO: 7.5 %
NEUTROPHILS # BLD AUTO: 4.7 10E9/L (ref 1.6–8.3)
NEUTROPHILS NFR BLD AUTO: 54.6 %
PLATELET # BLD AUTO: 237 10E9/L (ref 150–450)
POTASSIUM SERPL-SCNC: 3.6 MMOL/L (ref 3.5–5.1)
RBC # BLD AUTO: 4.63 10E12/L (ref 4.4–5.9)
SODIUM SERPL-SCNC: 140 MMOL/L (ref 134–144)
TROPONIN I SERPL-MCNC: 7.3 PG/ML
WBC # BLD AUTO: 8.6 10E9/L (ref 4–11)

## 2019-11-07 PROCEDURE — 80048 BASIC METABOLIC PNL TOTAL CA: CPT | Performed by: FAMILY MEDICINE

## 2019-11-07 PROCEDURE — 99284 EMERGENCY DEPT VISIT MOD MDM: CPT | Performed by: FAMILY MEDICINE

## 2019-11-07 PROCEDURE — 85025 COMPLETE CBC W/AUTO DIFF WBC: CPT | Performed by: FAMILY MEDICINE

## 2019-11-07 PROCEDURE — 25000132 ZZH RX MED GY IP 250 OP 250 PS 637: Mod: GY | Performed by: FAMILY MEDICINE

## 2019-11-07 PROCEDURE — 99284 EMERGENCY DEPT VISIT MOD MDM: CPT | Mod: Z6 | Performed by: FAMILY MEDICINE

## 2019-11-07 PROCEDURE — 81003 URINALYSIS AUTO W/O SCOPE: CPT | Performed by: FAMILY MEDICINE

## 2019-11-07 PROCEDURE — 36415 COLL VENOUS BLD VENIPUNCTURE: CPT | Performed by: FAMILY MEDICINE

## 2019-11-07 PROCEDURE — 93005 ELECTROCARDIOGRAM TRACING: CPT | Performed by: FAMILY MEDICINE

## 2019-11-07 PROCEDURE — 93010 ELECTROCARDIOGRAM REPORT: CPT | Performed by: INTERNAL MEDICINE

## 2019-11-07 PROCEDURE — 84484 ASSAY OF TROPONIN QUANT: CPT | Performed by: FAMILY MEDICINE

## 2019-11-07 RX ORDER — ACETAMINOPHEN 500 MG
1000 TABLET ORAL EVERY 6 HOURS PRN
COMMUNITY

## 2019-11-07 RX ORDER — UBIDECARENONE 75 MG
100 CAPSULE ORAL DAILY
COMMUNITY
End: 2024-06-21

## 2019-11-07 RX ORDER — ASPIRIN 81 MG/1
81 TABLET ORAL DAILY
COMMUNITY
End: 2024-04-25

## 2019-11-07 RX ORDER — LISINOPRIL 10 MG/1
10 TABLET ORAL ONCE
Status: COMPLETED | OUTPATIENT
Start: 2019-11-07 | End: 2019-11-07

## 2019-11-07 RX ORDER — METOPROLOL SUCCINATE 25 MG/1
25 TABLET, EXTENDED RELEASE ORAL ONCE
Status: COMPLETED | OUTPATIENT
Start: 2019-11-07 | End: 2019-11-07

## 2019-11-07 RX ADMIN — LISINOPRIL 10 MG: 10 TABLET ORAL at 23:58

## 2019-11-07 RX ADMIN — METOPROLOL SUCCINATE 25 MG: 25 TABLET, EXTENDED RELEASE ORAL at 23:58

## 2019-11-07 ASSESSMENT — ENCOUNTER SYMPTOMS
NAUSEA: 0
BACK PAIN: 0
NUMBNESS: 0
WEAKNESS: 0
FATIGUE: 0
CHILLS: 0
COLOR CHANGE: 0
SORE THROAT: 0
FEVER: 0
ABDOMINAL PAIN: 0
LIGHT-HEADEDNESS: 0
DIARRHEA: 0
NECK PAIN: 0
DYSURIA: 0
COUGH: 0
PALPITATIONS: 0
VOMITING: 0
SHORTNESS OF BREATH: 0
HEADACHES: 0
BRUISES/BLEEDS EASILY: 0

## 2019-11-07 ASSESSMENT — MIFFLIN-ST. JEOR: SCORE: 1867.13

## 2019-11-07 NOTE — ED AVS SNAPSHOT
[facility]  [address]  [place] [address]  Phone:  [phone]  Fax:  [phone]                                    Kaushik Ansari   MRN: [id number]    Department:  [facility]   Date of Visit:  11/7/2019           After Visit Summary Signature Page    I have received my discharge instructions, and my questions have been answered. I have discussed any challenges I see with this plan with the nurse or doctor.    ..........................................................................................................................................  Patient/Patient Representative Signature      ..........................................................................................................................................  Patient Representative Print Name and Relationship to Patient    ..................................................               ................................................  Date                                   Time    ..........................................................................................................................................  Reviewed by Signature/Title    ...................................................              ..............................................  Date                                               Time          22EPIC Rev 08/18

## 2019-11-08 VITALS
WEIGHT: 228.7 LBS | TEMPERATURE: 97.5 F | SYSTOLIC BLOOD PRESSURE: 157 MMHG | OXYGEN SATURATION: 97 % | RESPIRATION RATE: 9 BRPM | HEIGHT: 74 IN | BODY MASS INDEX: 29.35 KG/M2 | DIASTOLIC BLOOD PRESSURE: 85 MMHG | HEART RATE: 78 BPM

## 2019-11-08 LAB
ALBUMIN UR-MCNC: NEGATIVE MG/DL
APPEARANCE UR: CLEAR
BILIRUB UR QL STRIP: NEGATIVE
COLOR UR AUTO: YELLOW
GLUCOSE UR STRIP-MCNC: NEGATIVE MG/DL
HGB UR QL STRIP: NEGATIVE
KETONES UR STRIP-MCNC: NEGATIVE MG/DL
LEUKOCYTE ESTERASE UR QL STRIP: NEGATIVE
NITRATE UR QL: NEGATIVE
PH UR STRIP: 6 PH (ref 5–9)
SOURCE: NORMAL
SP GR UR STRIP: 1.02 (ref 1–1.03)
UROBILINOGEN UR STRIP-ACNC: 0.2 EU/DL (ref 0.2–1)

## 2019-11-08 RX ORDER — METOPROLOL SUCCINATE 25 MG/1
50 TABLET, EXTENDED RELEASE ORAL DAILY
Qty: 60 TABLET | Refills: 0 | Status: SHIPPED | OUTPATIENT
Start: 2019-11-08 | End: 2019-11-14

## 2019-11-08 NOTE — ED TRIAGE NOTES
"Patient presents to ER and ambulates to room stating that his BP has been unusually high today only.  Patient states that he usually runs systolically in the 140's, however today has gotten readings 166/96 and 166/88.  He denies chest pain, however does believe he has had slight blurred vision and slight headaches (when asked).  Otherwise he states tonight he had a \"hot flash\" while at his friend's for supper.  He has had high stress in his life lately.  He has also been in therapy for gait issues and does state he mis-stepped last night and fell. No injuries from fall.    "

## 2019-11-08 NOTE — ED PROVIDER NOTES
History     Chief Complaint   Patient presents with     Hypertension     HPI  Kaushik Ansari is a 68 year old male who presents to the emergency room for concern over his blood pressure.  He states that he noticed that his blood pressure was unusually high today.  He states that he usually runs in the 140s systolically however today his readings were 166/96 and he is concerned about that.  Patient denies any other symptoms at all.  He specifically denies any headache, focal neurologic symptoms, chest pain, shortness of breath, abdominal pain, nausea or vomiting.  He states that he did have a brief hot flash while he was eating dinner with a friend tonight.  He has not had any recurrence of that.  He does report that he is in therapy for gait problems and had a misstep earlier tonight and fell.  He denies any injuries from the fall.  He is not on any anticoagulation.  No further questions or complaints today.    Allergies:  No Known Allergies    Problem List:    Patient Active Problem List    Diagnosis Date Noted     Benign prostatic hyperplasia with urinary frequency 10/16/2018     Priority: Medium     Bunion 10/16/2018     Priority: Medium     Familial tremor 10/16/2018     Priority: Medium     Overview:   Benign       Plantar fascial fibromatosis 10/16/2018     Priority: Medium     Overview:   2006 to present       Sciatica 10/16/2018     Priority: Medium     Overview:   Mild       Trigger finger of right thumb 07/24/2017     Priority: Medium     Status post shoulder surgery 04/19/2017     Priority: Medium     AC (acromioclavicular) joint arthritis 03/23/2017     Priority: Medium     Complete tear of right rotator cuff 03/23/2017     Priority: Medium     Impingement syndrome of right shoulder 03/23/2017     Priority: Medium     Degeneration of intervertebral disc of lumbar region 10/23/2015     Priority: Medium     Allergic rhinitis due to pollen 05/27/2015     Priority: Medium     Acute upper respiratory  infection 06/19/2012     Priority: Medium     Lactose intolerance 08/30/2011     Priority: Medium     Anxiety state 03/08/2011     Priority: Medium     Otitis media, serous 12/28/2010     Priority: Medium     Pain in joint, lower leg 08/11/2010     Priority: Medium     Lateral epicondylitis 08/11/2010     Priority: Medium     Seborrheic keratosis 08/11/2010     Priority: Medium     Depressive disorder, not elsewhere classified 01/08/2008     Priority: Medium     Myalgia and myositis 06/13/2006     Priority: Medium     Overview:   Muscle aches  IMO Update 10/11       Disturbance of skin sensation 08/11/2005     Priority: Medium     Overview:   Foot paresthesia, right leg with standing       Diverticulosis of colon 03/01/2005     Priority: Medium     Overview:   Few sigmoid diverticula, one benign polyp, needs repeat in 2009  IMO Update 10/11       History of colonic polyps 03/01/2005     Priority: Medium     Overview:   IMO Update 10/11       Obstructive sleep apnea 04/02/2003     Priority: Medium     Overview:   on CPAP, uses  IMO Update 10/11  Overview:   On CPAP          Past Medical History:    Past Medical History:   Diagnosis Date     Anxiety disorder      Complete tear of right rotator cuff      Dependence on other enabling machines and devices      Encounter for prescription of emergency contraception      Essential tremor      Impingement syndrome of right shoulder      Other specified postprocedural states      Primary osteoarthritis of shoulder      Sciatica      Status post shoulder surgery 4/19/2017     Trigger thumb of right hand        Past Surgical History:    Past Surgical History:   Procedure Laterality Date     COLONOSCOPY  03/2000     COLONOSCOPY  03/01/2005    normal by patient report     COLONOSCOPY  01/03/2014     OTHER SURGICAL HISTORY      96826.0,CT CORONARY ANGIOGRAM ([place])     OTHER SURGICAL HISTORY      04/19/2017,[id number],OTHER,Right     VASECTOMY      No Comments Provided       Family  "History:    Family History   Problem Relation Age of Onset     Breast Cancer Mother         Cancer-breast     Substance Abuse Father         Alcohol/Drug     Diabetes Daughter         Diabetes       Social History:  Marital Status:   [2]  Social History     Tobacco Use     Smoking status: Former Smoker     Packs/day: 2.00     Years: 20.00     Pack years: 40.00     Types: Cigarettes     Last attempt to quit: 1984     Years since quittin.8     Smokeless tobacco: Former User     Types: Chew   Substance Use Topics     Alcohol use: No     Alcohol/week: 0.0 standard drinks     Frequency: Never     Drug use: No     Types: Other        Medications:    acetaminophen (TYLENOL) 500 MG tablet  aspirin 81 MG EC tablet  cyanocobalamin (VITAMIN B-12) 100 MCG tablet  metoprolol succinate ER (TOPROL-XL) 25 MG 24 hr tablet  order for DME  order for DME          Review of Systems   Constitutional: Negative for chills, fatigue and fever.   HENT: Negative for congestion and sore throat.    Eyes: Negative for visual disturbance.   Respiratory: Negative for cough and shortness of breath.    Cardiovascular: Negative for chest pain, palpitations and leg swelling.   Gastrointestinal: Negative for abdominal pain, diarrhea, nausea and vomiting.   Genitourinary: Negative for dysuria.   Musculoskeletal: Negative for back pain and neck pain.   Skin: Negative for color change.   Neurological: Negative for syncope, weakness, light-headedness, numbness and headaches.   Hematological: Does not bruise/bleed easily.   All other systems reviewed and are negative.      Physical Exam   BP: (!) 189/129  Pulse: 96  Heart Rate: 94  Temp: 97.5  F (36.4  C)  Resp: 24  Height: 188 cm (6' 2\")  Weight: 103.7 kg (228 lb 11.2 oz)  SpO2: 100 %      Physical Exam  Vitals signs and nursing note reviewed.   Constitutional:       General: He is not in acute distress.     Appearance: He is well-developed. He is not diaphoretic.   HENT:      Head: " Normocephalic and atraumatic.      Right Ear: External ear normal.      Left Ear: External ear normal.      Nose: Nose normal.      Mouth/Throat:      Lips: Pink.      Mouth: Mucous membranes are moist.      Pharynx: Oropharynx is clear.   Eyes:      Extraocular Movements: Extraocular movements intact.      Conjunctiva/sclera: Conjunctivae normal.      Pupils: Pupils are equal, round, and reactive to light.   Neck:      Musculoskeletal: Full passive range of motion without pain, normal range of motion and neck supple.      Thyroid: No thyromegaly.   Cardiovascular:      Rate and Rhythm: Normal rate and regular rhythm.      Pulses: Normal pulses.      Heart sounds: Normal heart sounds, S1 normal and S2 normal. No murmur.   Pulmonary:      Effort: Pulmonary effort is normal.      Breath sounds: Normal breath sounds. No decreased breath sounds.   Abdominal:      General: Bowel sounds are normal.      Palpations: Abdomen is soft.   Musculoskeletal: Normal range of motion.         General: No tenderness.      Right lower leg: No edema.      Left lower leg: No edema.   Lymphadenopathy:      Cervical: No cervical adenopathy.   Skin:     General: Skin is warm.      Capillary Refill: Capillary refill takes less than 2 seconds.   Neurological:      Mental Status: He is alert and oriented to person, place, and time.   Psychiatric:         Mood and Affect: Mood normal.         Behavior: Behavior normal. Behavior is cooperative.         ED Course     Procedures       EKG Interpretation:      Interpreted by Edin Alvarez MD, MD  Time reviewed: 2258  Symptoms at time of EKG: None   Rhythm: sinus tachycardia  Rate: 101  Axis: Normal  Ectopy: none  Conduction: normal  ST Segments/ T Waves: Non-specific ST-T wave changes  Q Waves: none  Comparison to prior: Unchanged    Clinical Impression: no acute changes and sinus tachycardia    Critical Care time:  none  Results for orders placed or performed during the hospital encounter of  11/07/19 (from the past 24 hour(s))   CBC with platelets differential   Result Value Ref Range    WBC 8.6 4.0 - 11.0 10e9/L    RBC Count 4.63 4.4 - 5.9 10e12/L    Hemoglobin 14.9 13.3 - 17.7 g/dL    Hematocrit 44.3 40.0 - 53.0 %    MCV 96 78 - 100 fl    MCH 32.2 26.5 - 33.0 pg    MCHC 33.6 31.5 - 36.5 g/dL    RDW 13.1 10.0 - 15.0 %    Platelet Count 237 150 - 450 10e9/L    Diff Method Automated Method     % Neutrophils 54.6 %    % Lymphocytes 35.2 %    % Monocytes 7.5 %    % Eosinophils 1.9 %    % Basophils 0.6 %    % Immature Granulocytes 0.2 %    Absolute Neutrophil 4.7 1.6 - 8.3 10e9/L    Absolute Lymphocytes 3.0 0.8 - 5.3 10e9/L    Absolute Monocytes 0.7 0.0 - 1.3 10e9/L    Absolute Eosinophils 0.2 0.0 - 0.7 10e9/L    Absolute Basophils 0.1 0.0 - 0.2 10e9/L    Abs Immature Granulocytes 0.0 0 - 0.4 10e9/L   Basic metabolic panel   Result Value Ref Range    Sodium 140 134 - 144 mmol/L    Potassium 3.6 3.5 - 5.1 mmol/L    Chloride 104 98 - 107 mmol/L    Carbon Dioxide 27 21 - 31 mmol/L    Anion Gap 9 3 - 14 mmol/L    Glucose 141 (H) 70 - 105 mg/dL    Urea Nitrogen 14 7 - 25 mg/dL    Creatinine 1.31 (H) 0.70 - 1.30 mg/dL    GFR Estimate 54 (L) >60 mL/min/[1.73_m2]    GFR Estimate If Black 65 >60 mL/min/[1.73_m2]    Calcium 9.5 8.6 - 10.3 mg/dL   Troponin GH   Result Value Ref Range    Troponin 7.3 <34.0 pg/mL       Medications   metoprolol succinate ER (TOPROL-XL) 24 hr tablet 25 mg (25 mg Oral Given 11/7/19 2358)   lisinopril (PRINIVIL/ZESTRIL) tablet 10 mg (10 mg Oral Given 11/7/19 2358)       Assessments & Plan (with Medical Decision Making)     I have reviewed the nursing notes.    EKG was obtained. There is no evidence of acute ischemia. Troponin was negative.    Wells score is 0.    Lab tests and X-rays were reviewed as above. Results were consistent with benign essential hypertension.    Patient received Metoprolol 25 mg PO and Lisinopril 10 mg PO with good improvement of his BP.     I had a discussion with  the patient regarding the symptoms, exam, laboratory, EKG results, diagnosis, and plan.    I answered all questions to the best of my ability.  The patient is discharged home in good condition.  Follow-up with primary care physician on Monday.  Trial of metoprolol 50 mg daily.    The patient voiced understanding of the plan, was agreeable, and had no further questions or concerns. Advised to return for any worsening symptoms.      New Prescriptions    METOPROLOL SUCCINATE ER (TOPROL-XL) 25 MG 24 HR TABLET    Take 2 tablets (50 mg) by mouth daily       Final diagnoses:   Benign essential hypertension       11/7/2019   [facility] AND [facility]     Edin Alvarez MD  11/08/19 0117

## 2019-11-11 ENCOUNTER — MEDICAL CORRESPONDENCE (OUTPATIENT)
Dept: HEALTH INFORMATION MANAGEMENT | Facility: OTHER | Age: 69
End: 2019-11-11

## 2019-11-13 ENCOUNTER — TRANSFERRED RECORDS (OUTPATIENT)
Dept: HEALTH INFORMATION MANAGEMENT | Facility: OTHER | Age: 69
End: 2019-11-13

## 2019-11-14 ENCOUNTER — OFFICE VISIT (OUTPATIENT)
Dept: FAMILY MEDICINE | Facility: OTHER | Age: 69
End: 2019-11-14
Attending: FAMILY MEDICINE
Payer: COMMERCIAL

## 2019-11-14 VITALS
HEART RATE: 96 BPM | OXYGEN SATURATION: 97 % | WEIGHT: 228.8 LBS | TEMPERATURE: 98.6 F | SYSTOLIC BLOOD PRESSURE: 164 MMHG | DIASTOLIC BLOOD PRESSURE: 102 MMHG | RESPIRATION RATE: 16 BRPM | BODY MASS INDEX: 29.38 KG/M2

## 2019-11-14 DIAGNOSIS — I10 ESSENTIAL HYPERTENSION: Primary | ICD-10-CM

## 2019-11-14 PROCEDURE — 99213 OFFICE O/P EST LOW 20 MIN: CPT | Performed by: FAMILY MEDICINE

## 2019-11-14 PROCEDURE — G0463 HOSPITAL OUTPT CLINIC VISIT: HCPCS

## 2019-11-14 RX ORDER — LISINOPRIL AND HYDROCHLOROTHIAZIDE 20; 25 MG/1; MG/1
1 TABLET ORAL DAILY
Qty: 90 TABLET | Refills: 3 | Status: SHIPPED | OUTPATIENT
Start: 2019-11-14 | End: 2020-04-03

## 2019-11-14 ASSESSMENT — ENCOUNTER SYMPTOMS
FATIGUE: 0
HEADACHES: 0
SHORTNESS OF BREATH: 0

## 2019-11-14 ASSESSMENT — ANXIETY QUESTIONNAIRES
6. BECOMING EASILY ANNOYED OR IRRITABLE: NOT AT ALL
GAD7 TOTAL SCORE: 0
IF YOU CHECKED OFF ANY PROBLEMS ON THIS QUESTIONNAIRE, HOW DIFFICULT HAVE THESE PROBLEMS MADE IT FOR YOU TO DO YOUR WORK, TAKE CARE OF THINGS AT HOME, OR GET ALONG WITH OTHER PEOPLE: NOT DIFFICULT AT ALL
2. NOT BEING ABLE TO STOP OR CONTROL WORRYING: NOT AT ALL
1. FEELING NERVOUS, ANXIOUS, OR ON EDGE: NOT AT ALL
7. FEELING AFRAID AS IF SOMETHING AWFUL MIGHT HAPPEN: NOT AT ALL
5. BEING SO RESTLESS THAT IT IS HARD TO SIT STILL: NOT AT ALL
3. WORRYING TOO MUCH ABOUT DIFFERENT THINGS: NOT AT ALL

## 2019-11-14 ASSESSMENT — PATIENT HEALTH QUESTIONNAIRE - PHQ9: 5. POOR APPETITE OR OVEREATING: NOT AT ALL

## 2019-11-14 ASSESSMENT — PAIN SCALES - GENERAL: PAINLEVEL: NO PAIN (0)

## 2019-11-14 NOTE — PROGRESS NOTES
SUBJECTIVE:   Kaushik Ansari is a 68 year old male who presents to clinic today for the following health issues:    HPI  Emergency department follow up.  Was seen for hypertension.  Since then he is checking blood pressure very often, 6 or more times daily.  Ranges from 123/68 up to 170/80 for a max.  Emergency department notes reviewed.  Was sent home with metoprolol. Is now out.  Has not missed doses.  Blood pressure in emergency department was  189/129. Has remained active, walking and shoveling snow. Had no chest pain or shortness of breath, but some mild headache.  Brief.  Lots of stress related to his wife's health.  His wife's family came and got her and she is now living in [place].  He has a grief counselor and suspect she might have bipolar 2.      In the emergency department his labs were normal apart from a glucose of 141.    Patient Active Problem List    Diagnosis Date Noted     Benign prostatic hyperplasia with urinary frequency 10/16/2018     Priority: Medium     Bunion 10/16/2018     Priority: Medium     Familial tremor 10/16/2018     Priority: Medium     Overview:   Benign       Plantar fascial fibromatosis 10/16/2018     Priority: Medium     Overview:   2006 to present       Sciatica 10/16/2018     Priority: Medium     Overview:   Mild       Trigger finger of right thumb 07/24/2017     Priority: Medium     Status post shoulder surgery 04/19/2017     Priority: Medium     AC (acromioclavicular) joint arthritis 03/23/2017     Priority: Medium     Complete tear of right rotator cuff 03/23/2017     Priority: Medium     Impingement syndrome of right shoulder 03/23/2017     Priority: Medium     Degeneration of intervertebral disc of lumbar region 10/23/2015     Priority: Medium     Allergic rhinitis due to pollen 05/27/2015     Priority: Medium     Acute upper respiratory infection 06/19/2012     Priority: Medium     Lactose intolerance 08/30/2011     Priority: Medium     Anxiety state 03/08/2011      Priority: Medium     Otitis media, serous 2010     Priority: Medium     Pain in joint, lower leg 2010     Priority: Medium     Lateral epicondylitis 2010     Priority: Medium     Seborrheic keratosis 2010     Priority: Medium     Depressive disorder, not elsewhere classified 2008     Priority: Medium     Myalgia and myositis 2006     Priority: Medium     Overview:   Muscle aches  IMO Update 10/11       Disturbance of skin sensation 2005     Priority: Medium     Overview:   Foot paresthesia, right leg with standing       Diverticulosis of colon 2005     Priority: Medium     Overview:   Few sigmoid diverticula, one benign polyp, needs repeat in   IMO Update 10/11       History of colonic polyps 2005     Priority: Medium     Overview:   IMO Update 10/11       Obstructive sleep apnea 2003     Priority: Medium     Overview:   on CPAP, uses  IMO Update 10/11  Overview:   On CPAP       Past Surgical History:   Procedure Laterality Date     COLONOSCOPY  2000     COLONOSCOPY  2005    normal by patient report     COLONOSCOPY  2014     OTHER SURGICAL HISTORY      93799.0,CT CORONARY ANGIOGRAM ([place])     OTHER SURGICAL HISTORY      2017,[id number],OTHER,Right     VASECTOMY      No Comments Provided     Social History     Tobacco Use     Smoking status: Former Smoker     Packs/day: 2.00     Years: 20.00     Pack years: 40.00     Types: Cigarettes     Last attempt to quit: 1984     Years since quittin.8     Smokeless tobacco: Former User     Types: Chew   Substance Use Topics     Alcohol use: No     Alcohol/week: 0.0 standard drinks     Frequency: Never     Current Outpatient Medications   Medication Sig Dispense Refill     acetaminophen (TYLENOL) 500 MG tablet Take 1,000 mg by mouth every 6 hours as needed for mild pain       aspirin 81 MG EC tablet Take 81 mg by mouth daily       cyanocobalamin (VITAMIN B-12) 100 MCG tablet Take 100 mcg by  mouth daily       metoprolol succinate ER (TOPROL-XL) 25 MG 24 hr tablet Take 2 tablets (50 mg) by mouth daily 60 tablet 0     order for DME Equipment being ordered: CPAP supplies 1 each 1     order for DME Equipment being ordered: CPAP replacement supplies 1 each 3     No Known Allergies    Review of Systems   Constitutional: Negative for fatigue.   Eyes: Negative for visual disturbance.   Respiratory: Negative for shortness of breath.    Cardiovascular: Negative for chest pain.   Neurological: Negative for headaches.        OBJECTIVE:     BP (!) 164/102 (BP Location: Right arm, Patient Position: Sitting, Cuff Size: Adult Large)   Pulse 96   Temp 98.6  F (37  C) (Tympanic)   Resp 16   Wt 103.8 kg (228 lb 12.8 oz)   SpO2 97%   BMI 29.38 kg/m    Body mass index is 29.38 kg/m .  Physical Exam  Constitutional:       Appearance: Normal appearance.   Cardiovascular:      Rate and Rhythm: Normal rate and regular rhythm.      Heart sounds: No murmur. No friction rub.   Pulmonary:      Effort: Pulmonary effort is normal. No respiratory distress.      Breath sounds: No stridor.   Neurological:      Mental Status: He is alert.   Psychiatric:         Mood and Affect: Mood normal.         Behavior: Behavior normal.         Diagnostic Test Results:      ASSESSMENT/PLAN:         (I10) Essential hypertension  (primary encounter diagnosis)  Comment: is not at goal.  Will stop the toprol and change over to zestoretic, based on [facility]   Plan: lisinopril-hydrochlorothiazide         (PRINZIDE/ZESTORETIC) 20-25 MG tablet        Follow up in 6 weeks or so.  Will need a BMP and an A1c then, given single elevated A1c.          Alec Howard MD  [facility] AND [facility]

## 2019-11-14 NOTE — NURSING NOTE
"Coming in for a f/u on an ER visit for high blood pressure    Chief Complaint   Patient presents with     Hospital F/U     was seen the ER, high blood pressure       Initial BP (!) 164/102 (BP Location: Right arm, Patient Position: Sitting, Cuff Size: Adult Large)   Pulse 96   Temp 98.6  F (37  C) (Tympanic)   Resp 16   Wt 103.8 kg (228 lb 12.8 oz)   SpO2 97%   BMI 29.38 kg/m   Estimated body mass index is 29.38 kg/m  as calculated from the following:    Height as of 11/7/19: 1.88 m (6' 2\").    Weight as of this encounter: 103.8 kg (228 lb 12.8 oz).  Medication Reconciliation: complete    Caridad Hilliard LPN  "

## 2019-11-15 ENCOUNTER — TELEPHONE (OUTPATIENT)
Dept: FAMILY MEDICINE | Facility: OTHER | Age: 69
End: 2019-11-15

## 2019-11-15 ASSESSMENT — ANXIETY QUESTIONNAIRES: GAD7 TOTAL SCORE: 0

## 2019-11-15 NOTE — TELEPHONE ENCOUNTER
Talked to Dr Howard about this and he said that the patients is not to take this medication    Caridad Hilliard LPN on 11/15/2019 at 12:15 PM

## 2019-11-25 ENCOUNTER — MEDICAL CORRESPONDENCE (OUTPATIENT)
Dept: HEALTH INFORMATION MANAGEMENT | Facility: OTHER | Age: 69
End: 2019-11-25

## 2019-12-04 ENCOUNTER — TRANSFERRED RECORDS (OUTPATIENT)
Dept: HEALTH INFORMATION MANAGEMENT | Facility: OTHER | Age: 69
End: 2019-12-04

## 2019-12-23 ENCOUNTER — OFFICE VISIT (OUTPATIENT)
Dept: FAMILY MEDICINE | Facility: OTHER | Age: 69
End: 2019-12-23
Attending: FAMILY MEDICINE
Payer: COMMERCIAL

## 2019-12-23 VITALS
RESPIRATION RATE: 18 BRPM | WEIGHT: 224.8 LBS | TEMPERATURE: 96.8 F | HEART RATE: 80 BPM | DIASTOLIC BLOOD PRESSURE: 68 MMHG | SYSTOLIC BLOOD PRESSURE: 134 MMHG | BODY MASS INDEX: 28.86 KG/M2

## 2019-12-23 DIAGNOSIS — F43.21 GRIEF REACTION: ICD-10-CM

## 2019-12-23 DIAGNOSIS — I10 ESSENTIAL HYPERTENSION: Primary | ICD-10-CM

## 2019-12-23 PROCEDURE — G0463 HOSPITAL OUTPT CLINIC VISIT: HCPCS

## 2019-12-23 PROCEDURE — 99214 OFFICE O/P EST MOD 30 MIN: CPT | Performed by: FAMILY MEDICINE

## 2019-12-23 ASSESSMENT — PAIN SCALES - GENERAL: PAINLEVEL: MILD PAIN (2)

## 2019-12-23 NOTE — NURSING NOTE
"Patient here for medication management.   Shanice Mondragon LPN ..........12/23/2019 9:29 AM   Chief Complaint   Patient presents with     Recheck Medication     follow up       Initial /68 (BP Location: Right arm, Patient Position: Sitting, Cuff Size: Adult Regular)   Pulse 80   Temp 96.8  F (36  C) (Tympanic)   Resp 18   Wt 102 kg (224 lb 12.8 oz)   BMI 28.86 kg/m   Estimated body mass index is 28.86 kg/m  as calculated from the following:    Height as of 11/7/19: 1.88 m (6' 2\").    Weight as of this encounter: 102 kg (224 lb 12.8 oz).  Medication Reconciliation: complete    Shanice Mondragon LPN    "

## 2019-12-23 NOTE — PROGRESS NOTES
SUBJECTIVE:   Kaushik Ansari is a 69 year old male who presents to clinic today for the following health issues:    HPI  Med follow up and new uri.    Has 3 weeks of a sore throat, cough and rhinorrhea.      Blood pressure checks 3-7 times a day, has them graphed on an Excel spreadsheet.  Running 108/61 to 133/66 or so since he started the lisinopril.    He has brought in many family artifacts diary of his wife's liver transplant, family pictures.  Has them arrayed out on the table for me to look at.     Patient Active Problem List    Diagnosis Date Noted     Essential hypertension 11/14/2019     Priority: Medium     Benign prostatic hyperplasia with urinary frequency 10/16/2018     Priority: Medium     Bunion 10/16/2018     Priority: Medium     Familial tremor 10/16/2018     Priority: Medium     Overview:   Benign       Plantar fascial fibromatosis 10/16/2018     Priority: Medium     Overview:   2006 to present       Sciatica 10/16/2018     Priority: Medium     Overview:   Mild       Trigger finger of right thumb 07/24/2017     Priority: Medium     Status post shoulder surgery 04/19/2017     Priority: Medium     AC (acromioclavicular) joint arthritis 03/23/2017     Priority: Medium     Complete tear of right rotator cuff 03/23/2017     Priority: Medium     Impingement syndrome of right shoulder 03/23/2017     Priority: Medium     Degeneration of intervertebral disc of lumbar region 10/23/2015     Priority: Medium     Allergic rhinitis due to pollen 05/27/2015     Priority: Medium     Acute upper respiratory infection 06/19/2012     Priority: Medium     Lactose intolerance 08/30/2011     Priority: Medium     Anxiety state 03/08/2011     Priority: Medium     Otitis media, serous 12/28/2010     Priority: Medium     Pain in joint, lower leg 08/11/2010     Priority: Medium     Lateral epicondylitis 08/11/2010     Priority: Medium     Seborrheic keratosis 08/11/2010     Priority: Medium     Depressive disorder, not  elsewhere classified 2008     Priority: Medium     Myalgia and myositis 2006     Priority: Medium     Overview:   Muscle aches  IMO Update 10/11       Disturbance of skin sensation 2005     Priority: Medium     Overview:   Foot paresthesia, right leg with standing       Diverticulosis of colon 2005     Priority: Medium     Overview:   Few sigmoid diverticula, one benign polyp, needs repeat in   IMO Update 10/11       History of colonic polyps 2005     Priority: Medium     Overview:   IMO Update 10/11       Obstructive sleep apnea 2003     Priority: Medium     Overview:   on CPAP, uses  IMO Update 10/11  Overview:   On CPAP       Past Surgical History:   Procedure Laterality Date     COLONOSCOPY  2000     COLONOSCOPY  2005    normal by patient report     COLONOSCOPY  2014     OTHER SURGICAL HISTORY      08211.0,CT CORONARY ANGIOGRAM ([place])     OTHER SURGICAL HISTORY      2017,[id number],OTHER,Right     VASECTOMY      No Comments Provided     Social History     Tobacco Use     Smoking status: Former Smoker     Packs/day: 2.00     Years: 20.00     Pack years: 40.00     Types: Cigarettes     Last attempt to quit: 1984     Years since quittin.0     Smokeless tobacco: Former User     Types: Chew   Substance Use Topics     Alcohol use: No     Alcohol/week: 0.0 standard drinks     Frequency: Never     Current Outpatient Medications   Medication Sig Dispense Refill     acetaminophen (TYLENOL) 500 MG tablet Take 1,000 mg by mouth every 6 hours as needed for mild pain       aspirin 81 MG EC tablet Take 81 mg by mouth daily       cyanocobalamin (VITAMIN B-12) 100 MCG tablet Take 100 mcg by mouth daily       lisinopril-hydrochlorothiazide (PRINZIDE/ZESTORETIC) 20-25 MG tablet Take 1 tablet by mouth daily 90 tablet 3     order for DME Equipment being ordered: CPAP supplies 1 each 1     order for DME Equipment being ordered: CPAP replacement supplies 1 each 3     No  "Known Allergies    Review of Systems     OBJECTIVE:     /68 (BP Location: Right arm, Patient Position: Sitting, Cuff Size: Adult Regular)   Pulse 80   Temp 96.8  F (36  C) (Tympanic)   Resp 18   Wt 102 kg (224 lb 12.8 oz)   BMI 28.86 kg/m    Body mass index is 28.86 kg/m .  Physical Exam  Constitutional:       Appearance: Normal appearance.   Neurological:      General: No focal deficit present.      Mental Status: He is alert and oriented to person, place, and time.     Psych: moderately tangential includes lots of extraneous detail  .  Some emotional lability.  Mildly inappropriate, tells me he is gong to call me \"Eddie\".  Cries briefly several times while talking about his children and death of first wife.  He gave me a DVD, but insisted on leaving it in the garbage so it was not considered a gift.    Diagnostic Test Results:  none     ASSESSMENT/PLAN:         (I10) Essential hypertension  (primary encounter diagnosis)  Comment: stable ad at goal  Plan:      (F43.21) Grief reaction  Comment: he is seeing a counselor currently, but the visit today was odd.  I wonder if he has attachment issues and with current wife's family taking her away, if he has loneliness.  Perhaps an underlying personality disorder (dependant?) that I was not previously aware of.  Plan: Will try to maintain clear professional borders.  I very much support the ongoing psychology care.  No meds at this time and follow up with me as needed.  30 minutes with him (he was timing this), over half in counseling and coordination of care.      Alec Howard MD  [facility] AND [facility]    "

## 2020-01-09 ENCOUNTER — MEDICAL CORRESPONDENCE (OUTPATIENT)
Dept: HEALTH INFORMATION MANAGEMENT | Facility: OTHER | Age: 70
End: 2020-01-09

## 2020-02-26 ENCOUNTER — TRANSFERRED RECORDS (OUTPATIENT)
Dept: HEALTH INFORMATION MANAGEMENT | Facility: OTHER | Age: 70
End: 2020-02-26

## 2020-03-03 ENCOUNTER — TRANSFERRED RECORDS (OUTPATIENT)
Dept: HEALTH INFORMATION MANAGEMENT | Facility: OTHER | Age: 70
End: 2020-03-03

## 2020-03-05 ENCOUNTER — APPOINTMENT (OUTPATIENT)
Dept: CT IMAGING | Facility: OTHER | Age: 70
End: 2020-03-05
Attending: PHYSICIAN ASSISTANT
Payer: MEDICARE

## 2020-03-05 ENCOUNTER — HOSPITAL ENCOUNTER (EMERGENCY)
Facility: OTHER | Age: 70
Discharge: HOME | End: 2020-03-05
Attending: PHYSICIAN ASSISTANT | Admitting: PHYSICIAN ASSISTANT
Payer: MEDICARE

## 2020-03-05 VITALS
TEMPERATURE: 97.9 F | HEART RATE: 94 BPM | HEIGHT: 74 IN | BODY MASS INDEX: 27.85 KG/M2 | DIASTOLIC BLOOD PRESSURE: 90 MMHG | OXYGEN SATURATION: 95 % | SYSTOLIC BLOOD PRESSURE: 130 MMHG | WEIGHT: 217 LBS | RESPIRATION RATE: 16 BRPM

## 2020-03-05 DIAGNOSIS — S09.90XA INJURY OF HEAD, INITIAL ENCOUNTER: ICD-10-CM
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
## 2020-03-05 PROCEDURE — 25000132 ZZH RX MED GY IP 250 OP 250 PS 637: Mod: GY | Performed by: PHYSICIAN ASSISTANT

## 2020-03-05 PROCEDURE — 72125 CT NECK SPINE W/O DYE: CPT

## 2020-03-05 PROCEDURE — 99283 EMERGENCY DEPT VISIT LOW MDM: CPT | Mod: Z6 | Performed by: PHYSICIAN ASSISTANT

## 2020-03-05 PROCEDURE — 99284 EMERGENCY DEPT VISIT MOD MDM: CPT | Mod: 25 | Performed by: PHYSICIAN ASSISTANT

## 2020-03-05 PROCEDURE — 70450 CT HEAD/BRAIN W/O DYE: CPT

## 2020-03-05 RX ORDER — ACETAMINOPHEN 500 MG
1000 TABLET ORAL ONCE
Status: COMPLETED | OUTPATIENT
Start: 2020-03-05 | End: 2020-03-05

## 2020-03-05 RX ADMIN — ACETAMINOPHEN 1000 MG: 500 TABLET, FILM COATED ORAL at 11:50

## 2020-03-05 ASSESSMENT — MIFFLIN-ST. JEOR: SCORE: 1809.06

## 2020-03-05 NOTE — ED AVS SNAPSHOT
[facility]  [address]  [place] [address]  Phone:  [phone]  Fax:  [phone]                                    Kaushik Ansari   MRN: [id number]    Department:  [facility]   Date of Visit:  3/5/2020           After Visit Summary Signature Page    I have received my discharge instructions, and my questions have been answered. I have discussed any challenges I see with this plan with the nurse or doctor.    ..........................................................................................................................................  Patient/Patient Representative Signature      ..........................................................................................................................................  Patient Representative Print Name and Relationship to Patient    ..................................................               ................................................  Date                                   Time    ..........................................................................................................................................  Reviewed by Signature/Title    ...................................................              ..............................................  Date                                               Time          22EPIC Rev 08/18

## 2020-03-05 NOTE — ED PROVIDER NOTES
History     Chief Complaint   Patient presents with     Fall     HPI  Kaushik Ansari is a 69 year old male who presents to the ER today for evaluation after slip and fall on the ice falling backwards striking his head no loss of consciousness he has occipital pain and some neck discomfort no visual disturbances no diplopia he does not have any headache.  No nausea vomiting no chest pain shortness of breath no abdominal pain diarrhea constipation no loss bowel bladder function rashes or additional trauma his GCS is 15.    Allergies:  No Known Allergies    Problem List:    Patient Active Problem List    Diagnosis Date Noted     Essential hypertension 11/14/2019     Priority: Medium     Benign prostatic hyperplasia with urinary frequency 10/16/2018     Priority: Medium     Bunion 10/16/2018     Priority: Medium     Familial tremor 10/16/2018     Priority: Medium     Overview:   Benign       Plantar fascial fibromatosis 10/16/2018     Priority: Medium     Overview:   2006 to present       Sciatica 10/16/2018     Priority: Medium     Overview:   Mild       Trigger finger of right thumb 07/24/2017     Priority: Medium     Status post shoulder surgery 04/19/2017     Priority: Medium     AC (acromioclavicular) joint arthritis 03/23/2017     Priority: Medium     Complete tear of right rotator cuff 03/23/2017     Priority: Medium     Impingement syndrome of right shoulder 03/23/2017     Priority: Medium     Degeneration of intervertebral disc of lumbar region 10/23/2015     Priority: Medium     Allergic rhinitis due to pollen 05/27/2015     Priority: Medium     Acute upper respiratory infection 06/19/2012     Priority: Medium     Lactose intolerance 08/30/2011     Priority: Medium     Anxiety state 03/08/2011     Priority: Medium     Otitis media, serous 12/28/2010     Priority: Medium     Pain in joint, lower leg 08/11/2010     Priority: Medium     Lateral epicondylitis 08/11/2010     Priority: Medium     Seborrheic  keratosis 08/11/2010     Priority: Medium     Depressive disorder, not elsewhere classified 01/08/2008     Priority: Medium     Myalgia and myositis 06/13/2006     Priority: Medium     Overview:   Muscle aches  IMO Update 10/11       Disturbance of skin sensation 08/11/2005     Priority: Medium     Overview:   Foot paresthesia, right leg with standing       Diverticulosis of colon 03/01/2005     Priority: Medium     Overview:   Few sigmoid diverticula, one benign polyp, needs repeat in 2009  IMO Update 10/11       History of colonic polyps 03/01/2005     Priority: Medium     Overview:   IMO Update 10/11       Obstructive sleep apnea 04/02/2003     Priority: Medium     Overview:   on CPAP, uses  IMO Update 10/11  Overview:   On CPAP          Past Medical History:    Past Medical History:   Diagnosis Date     Anxiety disorder      Complete tear of right rotator cuff      Dependence on other enabling machines and devices      Encounter for prescription of emergency contraception      Essential tremor      Impingement syndrome of right shoulder      Other specified postprocedural states      Primary osteoarthritis of shoulder      Sciatica      Status post shoulder surgery 4/19/2017     Trigger thumb of right hand        Past Surgical History:    Past Surgical History:   Procedure Laterality Date     COLONOSCOPY  03/2000     COLONOSCOPY  03/01/2005    normal by patient report     COLONOSCOPY  01/03/2014     OTHER SURGICAL HISTORY      75999.0,CT CORONARY ANGIOGRAM ([place])     OTHER SURGICAL HISTORY      04/19/2017,605884,OTHER,Right     VASECTOMY      No Comments Provided       Family History:    Family History   Problem Relation Age of Onset     Breast Cancer Mother         Cancer-breast     Substance Abuse Father         Alcohol/Drug     Diabetes Daughter         Diabetes       Social History:  Marital Status:   [5]  Social History     Tobacco Use     Smoking status: Former Smoker     Packs/day: 2.00     Years:  "20.00     Pack years: 40.00     Types: Cigarettes     Last attempt to quit: 1984     Years since quittin.2     Smokeless tobacco: Former User     Types: Chew   Substance Use Topics     Alcohol use: No     Alcohol/week: 0.0 standard drinks     Frequency: Never     Drug use: No     Types: Other        Medications:    acetaminophen (TYLENOL) 500 MG tablet  aspirin 81 MG EC tablet  cyanocobalamin (VITAMIN B-12) 100 MCG tablet  lisinopril-hydrochlorothiazide (PRINZIDE/ZESTORETIC) 20-25 MG tablet  order for DME  order for DME          Review of Systems     Pertinent positives and negatives are as above in the HPI. 10 point review of systems is otherwise negative.      Physical Exam   BP: (!) 134/93  Pulse: 90  Temp: 97.9  F (36.6  C)  Resp: 16  Height: 188 cm (6' 2\")  Weight: 98.4 kg (217 lb)  SpO2: 98 %      Physical Exam   Exam:  Constitutional: healthy, alert and no distress  Head: Normocephalic. No masses, lesions, tenderness or abnormalities there is no crew in size and there is no curtis signs noted.  Neck: Neck supple. No adenopathy. Thyroid symmetric, normal size,, no significant tenderness along the cervical spine no crepitus and he is full range of motion carotids without bruits.  ENT: ENT exam normal, no neck nodes or sinus tenderness  Cardiovascular: negative, PMI normal. No lifts, heaves, or thrills. RRR. No murmurs, clicks gallops or rub  Respiratory: negative, Percussion normal. Good diaphragmatic excursion. Lungs clear  Gastrointestinal: Abdomen soft, non-tender. BS normal. No masses, organomegaly  : Deferred  Musculoskeletal: extremities normal- no gross deformities noted, gait normal and normal muscle tone  Skin: no suspicious lesions or rashes  Neurologic: Gait normal. Reflexes normal and symmetric. Sensation grossly WNL.  Psychiatric: mentation appears normal and affect normal/bright  Hematologic/Lymphatic/Immunologic: Normal cervical lymph nodes      ED Course        Procedures        "       Results for orders placed or performed during the hospital encounter of 03/05/20 (from the past 24 hour(s))   CT Head w/o Contrast    Narrative    PROCEDURE: CT HEAD W/O CONTRAST     HISTORY: fall.    COMPARISON: None.    TECHNIQUE:  Helical images of the head from the foramen magnum to the  vertex were obtained without contrast.    FINDINGS: The ventricles and sulci are prominent, compatible with  mild, generalized volume loss. No acute intracranial hemorrhage, mass  effect, midline shift, hydrocephalus or basilar cystern effacement are  present.    The grey-white matter interface is preserved.     The calvarium is intact. The mastoid air cells are clear.  The  visualized paranasal sinuses are clear.      Impression    IMPRESSION: No acute intracranial hemorrhage or calvarial fracture.      EDGARDO RIVERO MD   CT Cervical Spine w/o Contrast    Narrative    PROCEDURE: CT CERVICAL SPINE W/O CONTRAST     HISTORY: fall.    TECHNIQUE: Helical noncontrast CT images of the cervical spine.    COMPARISON: None.    FINDINGS:     Multiple fragmented osteophytes are present, including at the  posterior margin of the left C5 inferior articular facet surface,  almost certainly chronic. Multiple The cervical lordosis is reversed.  The craniocervical junction is degenerated but intact.    Variable advanced degenerative changes include bulky facet hypertrophy  on the left at C3-4.     The paravertebral soft tissues are unremarkable. The lung apices are  clear.      Impression    IMPRESSION: Multiple fragmentary osteophytes are likely chronic. No  evidence of acute cervical spine fracture.    EDGARDO RIVERO MD       Medications   acetaminophen (TYLENOL) tablet 1,000 mg (1,000 mg Oral Given 3/5/20 1150)       Assessments & Plan (with Medical Decision Making)     I have reviewed the nursing notes.    I have reviewed the findings, diagnosis, plan and need for follow up with the patient.  Differential diagnosis  considerations included concussion, intracranial bleed, migraine headache, tension headache, meningitis/encephalitis, cephalgia, hypoglycemia, metabolic derangements, overdose-substance abuse, seizure, CVA-TIA, sepsis, hepatic encephalopathy, Bell's palsy, labyrinthitis, cardiac dysrhythmias, vasovagal episode, dementia-delirium, acute blood loss.  Pleasant 69-year-old gentleman walking the dog today slipped and fell on the ice CT imaging nonrevealing for any acute pathology.  Patient is alert he is oriented answers questions appropriately he has a lunch meeting at 1230 he is excited to make.  Head injury instructions provided if symptoms worsen if this further concerns he should return to the emergency department.  I explained my diagnostic considerations and recommendations and the patient voiced an understanding and was in agreement with the treatment plan. All questions were answered. We discussed potential side effects of any prescribed or recommended therapies, as well as expectations for response to treatments.      New Prescriptions    No medications on file       Final diagnoses:   Injury of head, initial encounter   Fall, initial encounter       3/5/2020   [facility]     Alec Thibodeaux PA-C  03/05/20 [id number]

## 2020-03-05 NOTE — DISCHARGE INSTRUCTIONS
FOLLOW UP WITH YOUR DOCTOR IN THE NEXT 24 HOURS FOR A RECHECK  RETURN TO THE ER IF SYMPTOMS WORSEN OR IF YOU HAVE FURTHER CONCERNS   TAKE ALL PREVIOUS AND ANY NEW MEDS AS PRESCRIBED       THE DISCHARGE INSTRUCTIONS ARE INTENDED AS A COMPLEMENT TO AND NOT A REPLACEMENT FOR THE VERBAL INSTRUCTIONS THAT I HAVE PROVIDED YOU TONIGHT. AFTER GOING OVER THE PLAN OF CARE TONIGHT, INCLUDING SIDE EFFECTS, ADVERSE REACTIONS OF ALL MEDICATIONS PRESCRIBED (THIS WILL BE PROVIDED TO YOU AT THE PHARMACY ALSO) AND PROVIDING YOU WITH THE VERBAL INSTRUCTIONS AT DISCHARGE YOU HAVE HAD THE OPPORTUNITY TO ASK FURTHER QUESTIONS AND TO CLARIFY UNCERTAINTIES. SINCE YOU HAVE NO FURTHER QUESTIONS PLEASE HAVE A WONDERFUL SAFE EVENING THANK YOU.

## 2020-03-11 ENCOUNTER — HEALTH MAINTENANCE LETTER (OUTPATIENT)
Age: 70
End: 2020-03-11

## 2020-03-12 ENCOUNTER — OFFICE VISIT (OUTPATIENT)
Dept: FAMILY MEDICINE | Facility: OTHER | Age: 70
End: 2020-03-12
Attending: FAMILY MEDICINE
Payer: MEDICARE

## 2020-03-12 ENCOUNTER — HOSPITAL ENCOUNTER (OUTPATIENT)
Dept: GENERAL RADIOLOGY | Facility: OTHER | Age: 70
End: 2020-03-12
Attending: FAMILY MEDICINE
Payer: MEDICARE

## 2020-03-12 VITALS
OXYGEN SATURATION: 98 % | BODY MASS INDEX: 28.71 KG/M2 | DIASTOLIC BLOOD PRESSURE: 66 MMHG | RESPIRATION RATE: 16 BRPM | SYSTOLIC BLOOD PRESSURE: 122 MMHG | WEIGHT: 223.6 LBS | HEART RATE: 91 BPM | TEMPERATURE: 98.6 F

## 2020-03-12 DIAGNOSIS — S06.0X0D CONCUSSION WITHOUT LOSS OF CONSCIOUSNESS, SUBSEQUENT ENCOUNTER: ICD-10-CM

## 2020-03-12 DIAGNOSIS — S43.002A SUBLUXATION OF LEFT SHOULDER JOINT, INITIAL ENCOUNTER: ICD-10-CM

## 2020-03-12 DIAGNOSIS — S43.002A SUBLUXATION OF LEFT SHOULDER JOINT, INITIAL ENCOUNTER: Primary | ICD-10-CM

## 2020-03-12 PROCEDURE — G0463 HOSPITAL OUTPT CLINIC VISIT: HCPCS

## 2020-03-12 PROCEDURE — 73030 X-RAY EXAM OF SHOULDER: CPT | Mod: LT

## 2020-03-12 PROCEDURE — G0463 HOSPITAL OUTPT CLINIC VISIT: HCPCS | Mod: 25

## 2020-03-12 PROCEDURE — 99214 OFFICE O/P EST MOD 30 MIN: CPT | Performed by: FAMILY MEDICINE

## 2020-03-12 ASSESSMENT — ENCOUNTER SYMPTOMS
FEVER: 0
HEADACHES: 1
ARTHRALGIAS: 1
WEAKNESS: 0
FATIGUE: 0

## 2020-03-12 ASSESSMENT — PAIN SCALES - GENERAL: PAINLEVEL: SEVERE PAIN (6)

## 2020-03-12 NOTE — PROGRESS NOTES
SUBJECTIVE:   Kaushik Ansari is a 69 year old male who presents to clinic today for the following health issues:    HPI  EMERGENCY DEPARTMENT FOLLOW UP.  Note reviewed, checking blood pressure daily now and usually in 120's/80's.  Trying a low sodium diet.  He fell very hard, and has ongoing pain in his head still.  No loss of consciousness, but had nausea. Was able to walk back to his home, then went to the emergency department.  CT of head was normal.  Now applying hot moist heat, and nausea is now gone.  Aching in occipital area of skull. He was able to drive himself here.      He is doing PT on his right shoulder form prior surgery.  Getting some pains in the left shoulder and would like an MRI of it.  Has significant claustrophobia.  Needs an open scan.  Has pain with reaching overhead.  Aching type. Had been swimming over 30 minutes at a time and cannot do it now.  No known dislocations.    Patient Active Problem List    Diagnosis Date Noted     Essential hypertension 11/14/2019     Priority: Medium     Benign prostatic hyperplasia with urinary frequency 10/16/2018     Priority: Medium     Bunion 10/16/2018     Priority: Medium     Familial tremor 10/16/2018     Priority: Medium     Overview:   Benign       Plantar fascial fibromatosis 10/16/2018     Priority: Medium     Overview:   2006 to present       Sciatica 10/16/2018     Priority: Medium     Overview:   Mild       Trigger finger of right thumb 07/24/2017     Priority: Medium     Status post shoulder surgery 04/19/2017     Priority: Medium     AC (acromioclavicular) joint arthritis 03/23/2017     Priority: Medium     Complete tear of right rotator cuff 03/23/2017     Priority: Medium     Impingement syndrome of right shoulder 03/23/2017     Priority: Medium     Degeneration of intervertebral disc of lumbar region 10/23/2015     Priority: Medium     Allergic rhinitis due to pollen 05/27/2015     Priority: Medium     Acute upper respiratory infection  2012     Priority: Medium     Lactose intolerance 2011     Priority: Medium     Anxiety state 2011     Priority: Medium     Otitis media, serous 2010     Priority: Medium     Pain in joint, lower leg 2010     Priority: Medium     Lateral epicondylitis 2010     Priority: Medium     Seborrheic keratosis 2010     Priority: Medium     Depressive disorder, not elsewhere classified 2008     Priority: Medium     Myalgia and myositis 2006     Priority: Medium     Overview:   Muscle aches  IMO Update 10/11       Disturbance of skin sensation 2005     Priority: Medium     Overview:   Foot paresthesia, right leg with standing       Diverticulosis of colon 2005     Priority: Medium     Overview:   Few sigmoid diverticula, one benign polyp, needs repeat in   IMO Update 10/11       History of colonic polyps 2005     Priority: Medium     Overview:   IMO Update 10/11       Obstructive sleep apnea 2003     Priority: Medium     Overview:   on CPAP, uses  IMO Update 10/11  Overview:   On CPAP       Past Surgical History:   Procedure Laterality Date     COLONOSCOPY  2000     COLONOSCOPY  2005    normal by patient report     COLONOSCOPY  2014     OTHER SURGICAL HISTORY      50338.0,CT CORONARY ANGIOGRAM ([place])     OTHER SURGICAL HISTORY      2017,390677,OTHER,Right     VASECTOMY      No Comments Provided     Social History     Tobacco Use     Smoking status: Former Smoker     Packs/day: 2.00     Years: 20.00     Pack years: 40.00     Types: Cigarettes     Last attempt to quit: 1984     Years since quittin.2     Smokeless tobacco: Former User     Types: Chew   Substance Use Topics     Alcohol use: No     Alcohol/week: 0.0 standard drinks     Frequency: Never     Current Outpatient Medications   Medication Sig Dispense Refill     acetaminophen (TYLENOL) 500 MG tablet Take 1,000 mg by mouth every 6 hours as needed for mild pain        aspirin 81 MG EC tablet Take 81 mg by mouth daily       cyanocobalamin (VITAMIN B-12) 100 MCG tablet Take 100 mcg by mouth daily       lisinopril-hydrochlorothiazide (PRINZIDE/ZESTORETIC) 20-25 MG tablet Take 1 tablet by mouth daily 90 tablet 3     order for DME Equipment being ordered: CPAP supplies 1 each 1     order for DME Equipment being ordered: CPAP replacement supplies 1 each 3     No Known Allergies    Review of Systems   Constitutional: Negative for fatigue and fever.   Eyes: Negative for visual disturbance.   Cardiovascular: Negative for chest pain.   Musculoskeletal: Positive for arthralgias.   Neurological: Positive for headaches. Negative for weakness.   Psychiatric/Behavioral: Negative for suicidal ideas.        OBJECTIVE:     /66   Pulse 91   Temp 98.6  F (37  C) (Tympanic)   Resp 16   Wt 101.4 kg (223 lb 9.6 oz)   SpO2 98%   BMI 28.71 kg/m    Body mass index is 28.71 kg/m .  Physical Exam  Constitutional:       Appearance: Normal appearance.   Cardiovascular:      Rate and Rhythm: Normal rate and regular rhythm.      Pulses: Normal pulses.      Heart sounds: No murmur.   Pulmonary:      Effort: Pulmonary effort is normal. No respiratory distress.      Breath sounds: No stridor.   Musculoskeletal:      Comments: Left shoulder with full range of motion. Mild pain with impingement testing, negative empty can. Has instability and mild pain with apprehension testing.     Neurological:      Mental Status: He is alert.   Psychiatric:         Mood and Affect: Mood normal.         Behavior: Behavior normal.         Thought Content: Thought content normal.             ASSESSMENT/PLAN:         (S43.002A) Subluxation of left shoulder joint, initial encounter  (primary encounter diagnosis)  Comment: exam is consistent with this diagnosis.  Will proceed with an MRI, he would be amenable to a surgery if needed.  Plan: XR Shoulder Left G/E 3 Views, MR Shoulder Left         w/o Contrast              (S06.0X0D) Concussion without loss of consciousness, subsequent encounter  Comment: appears to be improving as expected.  Follow up just as needed on this   Plan:        Alec Howard MD  [facility]

## 2020-03-12 NOTE — NURSING NOTE
"Coming in for a f/u from an ER visit for a fall. Has two more issues    Shoulder problem-MRI   Doing PT    Chief Complaint   Patient presents with     RECHECK     ER was seen for a fall       Initial /66   Pulse 91   Temp 98.6  F (37  C) (Tympanic)   Resp 16   Wt 101.4 kg (223 lb 9.6 oz)   SpO2 98%   BMI 28.71 kg/m   Estimated body mass index is 28.71 kg/m  as calculated from the following:    Height as of 3/5/20: 1.88 m (6' 2\").    Weight as of this encounter: 101.4 kg (223 lb 9.6 oz).  Medication Reconciliation: complete    Caridad Hilliard LPN  "

## 2020-04-03 ENCOUNTER — TELEPHONE (OUTPATIENT)
Dept: FAMILY MEDICINE | Facility: OTHER | Age: 70
End: 2020-04-03

## 2020-04-03 DIAGNOSIS — I10 ESSENTIAL HYPERTENSION: ICD-10-CM

## 2020-04-03 DIAGNOSIS — S43.002A SUBLUXATION OF LEFT SHOULDER JOINT, INITIAL ENCOUNTER: Primary | ICD-10-CM

## 2020-04-03 RX ORDER — LISINOPRIL/HYDROCHLOROTHIAZIDE 10-12.5 MG
1 TABLET ORAL DAILY
Qty: 90 TABLET | Refills: 3 | Status: SHIPPED | OUTPATIENT
Start: 2020-04-03 | End: 2020-07-27

## 2020-04-03 NOTE — TELEPHONE ENCOUNTER
"Let him know I sent in the lowered dose for his hypertension.  MRI would be done after COVID, we are delaying \"non essential\" testing.    Alec Howard MD on 4/3/2020 at 8:41 AM    "

## 2020-04-03 NOTE — TELEPHONE ENCOUNTER
Reason for call: Request for results.    Name of test or procedure: MRI    Date of test or procedure: A couple of weeks ago    Location of test or procedure: [facility]    Preferred method for responding to this message: Telephone Call    Phone number patient can be reached at: Cell number on file:    Telephone Information:   Mobile [phone]       If we can't reach you directly, may we leave a detailed response at the number you provided?Yes    Pt would also like to get the dosage of his lisinopril lowered

## 2020-04-03 NOTE — TELEPHONE ENCOUNTER
After verifying pts name and date of birth with pt, pt notified of message below and states understanding.  Chetna Nieto LPN

## 2020-05-12 ENCOUNTER — MEDICAL CORRESPONDENCE (OUTPATIENT)
Dept: HEALTH INFORMATION MANAGEMENT | Facility: OTHER | Age: 70
End: 2020-05-12

## 2020-06-29 ENCOUNTER — TELEPHONE (OUTPATIENT)
Dept: FAMILY MEDICINE | Facility: OTHER | Age: 70
End: 2020-06-29

## 2020-06-29 DIAGNOSIS — S43.002A SUBLUXATION OF LEFT SHOULDER JOINT, INITIAL ENCOUNTER: Primary | ICD-10-CM

## 2020-06-29 NOTE — TELEPHONE ENCOUNTER
Pt would like to know the status of the MRI and shoulder surgery.  This was talked about before co-vid.  Wants to know the next step

## 2020-06-29 NOTE — TELEPHONE ENCOUNTER
Left message that the MRI order was made and that [facility] well be giving him a call to set up  Caridad Hilliard LPN on 6/29/2020 at 10:25 AM

## 2020-07-14 ENCOUNTER — APPOINTMENT (OUTPATIENT)
Dept: LAB | Facility: OTHER | Age: 70
End: 2020-07-14
Attending: FAMILY MEDICINE
Payer: MEDICARE

## 2020-07-27 ENCOUNTER — OFFICE VISIT (OUTPATIENT)
Dept: FAMILY MEDICINE | Facility: OTHER | Age: 70
End: 2020-07-27
Attending: FAMILY MEDICINE
Payer: MEDICARE

## 2020-07-27 ENCOUNTER — TELEPHONE (OUTPATIENT)
Dept: FAMILY MEDICINE | Facility: OTHER | Age: 70
End: 2020-07-27

## 2020-07-27 VITALS
HEART RATE: 83 BPM | WEIGHT: 216.2 LBS | HEIGHT: 74 IN | OXYGEN SATURATION: 99 % | RESPIRATION RATE: 16 BRPM | BODY MASS INDEX: 27.75 KG/M2 | DIASTOLIC BLOOD PRESSURE: 80 MMHG | TEMPERATURE: 98.2 F | SYSTOLIC BLOOD PRESSURE: 138 MMHG

## 2020-07-27 DIAGNOSIS — M25.511 CHRONIC RIGHT SHOULDER PAIN: Primary | ICD-10-CM

## 2020-07-27 DIAGNOSIS — R73.9 BLOOD GLUCOSE ELEVATED: ICD-10-CM

## 2020-07-27 DIAGNOSIS — I10 ESSENTIAL HYPERTENSION: ICD-10-CM

## 2020-07-27 DIAGNOSIS — G89.29 CHRONIC RIGHT SHOULDER PAIN: Primary | ICD-10-CM

## 2020-07-27 PROCEDURE — 99214 OFFICE O/P EST MOD 30 MIN: CPT | Performed by: FAMILY MEDICINE

## 2020-07-27 PROCEDURE — G0463 HOSPITAL OUTPT CLINIC VISIT: HCPCS

## 2020-07-27 RX ORDER — LISINOPRIL/HYDROCHLOROTHIAZIDE 10-12.5 MG
1 TABLET ORAL DAILY
Qty: 90 TABLET | Refills: 3 | Status: CANCELLED | OUTPATIENT
Start: 2020-07-27

## 2020-07-27 RX ORDER — LISINOPRIL/HYDROCHLOROTHIAZIDE 10-12.5 MG
TABLET ORAL
COMMUNITY
Start: 2020-06-23 | End: 2021-03-23

## 2020-07-27 ASSESSMENT — PAIN SCALES - GENERAL: PAINLEVEL: NO PAIN (1)

## 2020-07-27 ASSESSMENT — MIFFLIN-ST. JEOR: SCORE: 1805.43

## 2020-07-27 ASSESSMENT — PATIENT HEALTH QUESTIONNAIRE - PHQ9: SUM OF ALL RESPONSES TO PHQ QUESTIONS 1-9: 0

## 2020-07-27 NOTE — TELEPHONE ENCOUNTER
The patient came in to see Dr Dan today.  The patient stated at his annual physical  A few weeks with Dr Howard he had labs drawn.  He was not given results.  At his appointment today with Dr Dan he was told no labs results were in there from his lab draw at the time of his physical.  He was wondering where they are as he stated they sotero a few tubes of blood.  Dr Dan reordered it but the patient wanted to go home and check on it first.  He would appreciate a call as to where the blood work results are and what happened with that.

## 2020-07-27 NOTE — NURSING NOTE
Patient here for MRI results done in [place] on the left shoulder,he would like referral to set up surgery with . His fasting blood sugars 199 borderline he is working on his BMI. He has his health care directive with him to be signed.  Medication Reconciliation: complete.    Laura Mendoza LPN  7/27/2020 2:01 PM

## 2020-07-28 ASSESSMENT — ENCOUNTER SYMPTOMS
EYES NEGATIVE: 1
RESPIRATORY NEGATIVE: 1
PSYCHIATRIC NEGATIVE: 1
FEVER: 0
CHILLS: 0
DIZZINESS: 0
ARTHRALGIAS: 1

## 2020-07-28 NOTE — TELEPHONE ENCOUNTER
Let him know the day he had the labs drawn I had to leave suddenly for personal reasons and have been out for the last 3 weeks.  No one else was able to figure out which labs were needed so they did not get run.  My apologies.    Alec Howard MD on 7/28/2020 at 8:05 AM

## 2020-07-28 NOTE — PROGRESS NOTES
SUBJECTIVE:   Kaushik Ansari is a 69 year old male who presents to clinic today for the following health issues: Shoulder pain    Patient recently attempted to make appointment because of left shoulder pain in the follow-up MRI.  He had an MRI back in March showing a tear.  He attempted recently to make an appoint with Dr. Schneider was not able to.  After some discussion it appears he is in need of a referral.  Patient also reports history of elevated blood sugar.  He believes he recently got lab work done.  But chart reviewed and he finally figured out that the labs were not ordered prior to having his blood drawn.  Bill diabetic check was followed up.  And he has questions about Viagra or Cialis.  Patient is concerned about his blood pressure being elevated in the past.        Patient Active Problem List    Diagnosis Date Noted     Essential hypertension 11/14/2019     Priority: Medium     Benign prostatic hyperplasia with urinary frequency 10/16/2018     Priority: Medium     Bunion 10/16/2018     Priority: Medium     Familial tremor 10/16/2018     Priority: Medium     Overview:   Benign       Plantar fascial fibromatosis 10/16/2018     Priority: Medium     Overview:   2006 to present       Sciatica 10/16/2018     Priority: Medium     Overview:   Mild       Trigger finger of right thumb 07/24/2017     Priority: Medium     Status post shoulder surgery 04/19/2017     Priority: Medium     AC (acromioclavicular) joint arthritis 03/23/2017     Priority: Medium     Complete tear of right rotator cuff 03/23/2017     Priority: Medium     Impingement syndrome of right shoulder 03/23/2017     Priority: Medium     Degeneration of intervertebral disc of lumbar region 10/23/2015     Priority: Medium     Allergic rhinitis due to pollen 05/27/2015     Priority: Medium     Acute upper respiratory infection 06/19/2012     Priority: Medium     Lactose intolerance 08/30/2011     Priority: Medium     Anxiety state 03/08/2011      Priority: Medium     Otitis media, serous 12/28/2010     Priority: Medium     Pain in joint, lower leg 08/11/2010     Priority: Medium     Lateral epicondylitis 08/11/2010     Priority: Medium     Seborrheic keratosis 08/11/2010     Priority: Medium     Depressive disorder, not elsewhere classified 01/08/2008     Priority: Medium     Myalgia and myositis 06/13/2006     Priority: Medium     Overview:   Muscle aches  IMO Update 10/11       Disturbance of skin sensation 08/11/2005     Priority: Medium     Overview:   Foot paresthesia, right leg with standing       Diverticulosis of colon 03/01/2005     Priority: Medium     Overview:   Few sigmoid diverticula, one benign polyp, needs repeat in 2009  IMO Update 10/11       History of colonic polyps 03/01/2005     Priority: Medium     Overview:   IMO Update 10/11       Obstructive sleep apnea 04/02/2003     Priority: Medium     Overview:   on CPAP, uses  IMO Update 10/11  Overview:   On CPAP       Past Medical History:   Diagnosis Date     Anxiety disorder     No Comments Provided     Complete tear of right rotator cuff     3/23/2017     Dependence on other enabling machines and devices     No Comments Provided     Encounter for prescription of emergency contraception     No Comments Provided     Essential tremor     No Comments Provided     Impingement syndrome of right shoulder     3/23/2017     Other specified postprocedural states     4/19/2017     Primary osteoarthritis of shoulder     3/23/2017     Sciatica     No Comments Provided     Status post shoulder surgery 4/19/2017     Trigger thumb of right hand     7/24/2017      Past Surgical History:   Procedure Laterality Date     COLONOSCOPY  03/2000     COLONOSCOPY  03/01/2005    normal by patient report     COLONOSCOPY  01/03/2014     OTHER SURGICAL HISTORY      62217.0,CT CORONARY ANGIOGRAM ([place])     OTHER SURGICAL HISTORY      04/19/2017,622780,OTHER,Right     VASECTOMY      No Comments Provided     Current  "Outpatient Medications   Medication Sig Dispense Refill     acetaminophen (TYLENOL) 500 MG tablet Take 1,000 mg by mouth every 6 hours as needed for mild pain       aspirin 81 MG EC tablet Take 81 mg by mouth daily       cyanocobalamin (VITAMIN B-12) 100 MCG tablet Take 100 mcg by mouth daily       order for DME Equipment being ordered: CPAP supplies 1 each 1     order for DME Equipment being ordered: CPAP replacement supplies 1 each 3     lisinopril-hydrochlorothiazide (ZESTORETIC) 10-12.5 MG tablet        No Known Allergies    Review of Systems   Constitutional: Negative for chills and fever.   Eyes: Negative.    Respiratory: Negative.    Cardiovascular: Negative for chest pain.   Genitourinary: Negative.    Musculoskeletal: Positive for arthralgias.   Neurological: Negative for dizziness.   Psychiatric/Behavioral: Negative.         OBJECTIVE:     /80   Pulse 83   Temp 98.2  F (36.8  C)   Resp 16   Ht 1.88 m (6' 2\")   Wt 98.1 kg (216 lb 3.2 oz)   SpO2 99%   BMI 27.76 kg/m    Body mass index is 27.76 kg/m .  Physical Exam  Constitutional:       Appearance: Normal appearance.   HENT:      Right Ear: Tympanic membrane normal.      Left Ear: Tympanic membrane normal.      Mouth/Throat:      Mouth: Mucous membranes are moist.   Cardiovascular:      Rate and Rhythm: Normal rate and regular rhythm.   Pulmonary:      Effort: Pulmonary effort is normal.      Breath sounds: Normal breath sounds.   Musculoskeletal: Normal range of motion.   Skin:     General: Skin is warm.   Neurological:      General: No focal deficit present.      Mental Status: He is alert.         Diagnostic Test Results:  No results found for any visits on 07/27/20.    ASSESSMENT/PLAN:         1. Essential hypertension  Advised patient his blood pressure satisfactory this visit.      2. Chronic right shoulder pain  Referral to orthopedics.  Per patient's request.  - Orthopedic & Spine  Referral; Future    3. Blood glucose " elevated  Patient will obtain this on a fasting basis.  - Hemoglobin A1c; Future  - Basic Metabolic Panel; Future      Gordon Dan MD  [facility] AND [facility]

## 2020-07-28 NOTE — TELEPHONE ENCOUNTER
He went to [place] and has an appointment with Dr Mcbride, seeing him on 8/7/20 for a consultation , then get an appointment for surgery.    CHERI Hilliard LPN on 7/28/2020 at 2:18 PM

## 2020-07-31 ENCOUNTER — NURSE TRIAGE (OUTPATIENT)
Dept: FAMILY MEDICINE | Facility: OTHER | Age: 70
End: 2020-07-31

## 2020-07-31 NOTE — TELEPHONE ENCOUNTER
"Wanted to leave a message for TJP letting him know that the pt is \"95 % sure\" he has covid-19 but he is not alarmed. He said he is having symptoms and has been quarantining for about five days and will continue until he reaches 10 days. He would like a call back from a nurse, preferably [facility]'s nurse. He said he believes he contracted it at the clinic when he had an appt with MBL on 7/27. Pt does not want a test done  "

## 2020-07-31 NOTE — TELEPHONE ENCOUNTER
Was seen by Monday by Sy, by Wednesday he got a hot flash, 114/71-97.1 temp.    Thursday-headache, weakness of the legs, can only walk one block, throat hurts-96 temp 114/60. ? Increase in heart rate.  Has flu like symptoms, achy back. Labored breathing    Friday slightly better, 50%, some labored breather, walking is better.    Looking at 8/10/20, to come out of his house    May be getting , told his bride not to come over and see him until the end of august.    Patient goes to the post office to mail a letter, sees a car with  [place] plates on the car and it was one of the local dentist. He asked the dentist if he was crazy coming home, with the huge outbreak in that region.

## 2020-08-04 ENCOUNTER — TELEPHONE (OUTPATIENT)
Dept: FAMILY MEDICINE | Facility: OTHER | Age: 70
End: 2020-08-04

## 2020-08-04 DIAGNOSIS — N52.9 ERECTILE DYSFUNCTION, UNSPECIFIED ERECTILE DYSFUNCTION TYPE: Primary | ICD-10-CM

## 2020-08-04 RX ORDER — SILDENAFIL 100 MG/1
100 TABLET, FILM COATED ORAL DAILY PRN
Qty: 12 TABLET | Refills: 3 | Status: SHIPPED | OUTPATIENT
Start: 2020-08-04 | End: 2022-03-21

## 2020-08-04 NOTE — TELEPHONE ENCOUNTER
I do not know of any reasons to not get , but offer him a vide visit with me if he wants. I will fax in the Viagra.  Alec Howard MD on 8/4/2020 at 2:42 PM

## 2020-08-04 NOTE — TELEPHONE ENCOUNTER
Patient states he called 7/31/2020 in regards to possibly having COVID. He is guarantying until 8/10/2020. He states his symptoms are getting better so he believes he will get through this. His oxygen has been increasing (is now between 93-98) and he is able to walk longer. He plans on getting  ASAP (some time in August). He has friends that ask him why he is getting . He is wondering if Dr. Howard has any reasons on why he should not get . He also states that he has talked to Dr. Dan in regards to Cialis and/or Viagra and would like a prescription for one or both of them sent to [facility].     Sakina Betts CMA on 8/4/2020 at 11:34 AM

## 2020-08-05 NOTE — TELEPHONE ENCOUNTER
After birth date and last name were verified, patient was given the information from Dr Howard.  Wanted to let  Us know he is doing better every day.  Will delay shoulder surgery for now.  Did not feel he needed a visit at this time.    Ava Bhagat CMA ([facility])................ 8/5/2020 10:12 AM

## 2020-08-08 ENCOUNTER — MEDICAL CORRESPONDENCE (OUTPATIENT)
Dept: HEALTH INFORMATION MANAGEMENT | Facility: OTHER | Age: 70
End: 2020-08-08

## 2020-08-21 ENCOUNTER — TRANSFERRED RECORDS (OUTPATIENT)
Dept: HEALTH INFORMATION MANAGEMENT | Facility: OTHER | Age: 70
End: 2020-08-21

## 2020-09-23 ENCOUNTER — TELEPHONE (OUTPATIENT)
Dept: FAMILY MEDICINE | Facility: OTHER | Age: 70
End: 2020-09-23

## 2020-09-23 DIAGNOSIS — G47.33 OBSTRUCTIVE SLEEP APNEA: Primary | ICD-10-CM

## 2020-09-28 ENCOUNTER — TELEPHONE (OUTPATIENT)
Dept: FAMILY MEDICINE | Facility: OTHER | Age: 70
End: 2020-09-28

## 2020-09-28 NOTE — TELEPHONE ENCOUNTER
DME orders were faxed to [facility]  Bell Fischer LPN ....................  9/28/2020   10:56 AM

## 2020-10-01 ENCOUNTER — DOCUMENTATION ONLY (OUTPATIENT)
Dept: OTHER | Facility: CLINIC | Age: 70
End: 2020-10-01

## 2020-10-27 ENCOUNTER — HOSPITAL ENCOUNTER (OUTPATIENT)
Dept: GENERAL RADIOLOGY | Facility: OTHER | Age: 70
End: 2020-10-27
Attending: FAMILY MEDICINE
Payer: MEDICARE

## 2020-10-27 ENCOUNTER — OFFICE VISIT (OUTPATIENT)
Dept: FAMILY MEDICINE | Facility: OTHER | Age: 70
End: 2020-10-27
Attending: FAMILY MEDICINE
Payer: MEDICARE

## 2020-10-27 VITALS
BODY MASS INDEX: 28.77 KG/M2 | HEIGHT: 74 IN | HEART RATE: 92 BPM | SYSTOLIC BLOOD PRESSURE: 126 MMHG | WEIGHT: 224.2 LBS | RESPIRATION RATE: 16 BRPM | TEMPERATURE: 96 F | OXYGEN SATURATION: 98 % | DIASTOLIC BLOOD PRESSURE: 66 MMHG

## 2020-10-27 DIAGNOSIS — G89.29 CHRONIC LEFT HIP PAIN: ICD-10-CM

## 2020-10-27 DIAGNOSIS — M16.12 OSTEOARTHRITIS OF LEFT HIP, UNSPECIFIED OSTEOARTHRITIS TYPE: ICD-10-CM

## 2020-10-27 DIAGNOSIS — M25.552 CHRONIC LEFT HIP PAIN: Primary | ICD-10-CM

## 2020-10-27 DIAGNOSIS — G89.29 CHRONIC LEFT HIP PAIN: Primary | ICD-10-CM

## 2020-10-27 DIAGNOSIS — M25.552 CHRONIC LEFT HIP PAIN: ICD-10-CM

## 2020-10-27 PROCEDURE — G0463 HOSPITAL OUTPT CLINIC VISIT: HCPCS | Mod: 25

## 2020-10-27 PROCEDURE — 73502 X-RAY EXAM HIP UNI 2-3 VIEWS: CPT

## 2020-10-27 PROCEDURE — G0463 HOSPITAL OUTPT CLINIC VISIT: HCPCS

## 2020-10-27 PROCEDURE — 99213 OFFICE O/P EST LOW 20 MIN: CPT | Performed by: FAMILY MEDICINE

## 2020-10-27 ASSESSMENT — ANXIETY QUESTIONNAIRES
7. FEELING AFRAID AS IF SOMETHING AWFUL MIGHT HAPPEN: NOT AT ALL
2. NOT BEING ABLE TO STOP OR CONTROL WORRYING: NOT AT ALL
6. BECOMING EASILY ANNOYED OR IRRITABLE: NOT AT ALL
3. WORRYING TOO MUCH ABOUT DIFFERENT THINGS: NOT AT ALL
GAD7 TOTAL SCORE: 0
IF YOU CHECKED OFF ANY PROBLEMS ON THIS QUESTIONNAIRE, HOW DIFFICULT HAVE THESE PROBLEMS MADE IT FOR YOU TO DO YOUR WORK, TAKE CARE OF THINGS AT HOME, OR GET ALONG WITH OTHER PEOPLE: NOT DIFFICULT AT ALL
1. FEELING NERVOUS, ANXIOUS, OR ON EDGE: NOT AT ALL
5. BEING SO RESTLESS THAT IT IS HARD TO SIT STILL: NOT AT ALL

## 2020-10-27 ASSESSMENT — MIFFLIN-ST. JEOR: SCORE: 1841.71

## 2020-10-27 ASSESSMENT — ENCOUNTER SYMPTOMS
FATIGUE: 0
SLEEP DISTURBANCE: 1
ARTHRALGIAS: 1
FEVER: 0

## 2020-10-27 ASSESSMENT — PAIN SCALES - GENERAL: PAINLEVEL: SEVERE PAIN (6)

## 2020-10-27 ASSESSMENT — PATIENT HEALTH QUESTIONNAIRE - PHQ9: 5. POOR APPETITE OR OVEREATING: NOT AT ALL
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
## 2020-10-27 NOTE — NURSING NOTE
"Coming in for a pre-op    Wants an x-ray of his left hip    Chief Complaint   Patient presents with     Pre-Op Exam     Reed, 11/12, [facility]       Initial /66   Pulse 92   Temp 96  F (35.6  C)   Resp 16   Ht 1.88 m (6' 2\")   Wt 101.7 kg (224 lb 3.2 oz)   SpO2 98%   BMI 28.79 kg/m   Estimated body mass index is 28.79 kg/m  as calculated from the following:    Height as of this encounter: 1.88 m (6' 2\").    Weight as of this encounter: 101.7 kg (224 lb 3.2 oz).  Medication Reconciliation: complete    Caridad Hilliard LPN  "

## 2020-10-28 ASSESSMENT — ANXIETY QUESTIONNAIRES: GAD7 TOTAL SCORE: 0

## 2020-11-25 ENCOUNTER — TRANSFERRED RECORDS (OUTPATIENT)
Dept: HEALTH INFORMATION MANAGEMENT | Facility: OTHER | Age: 70
End: 2020-11-25

## 2020-12-27 ENCOUNTER — HEALTH MAINTENANCE LETTER (OUTPATIENT)
Age: 70
End: 2020-12-27

## 2021-01-12 ENCOUNTER — OFFICE VISIT (OUTPATIENT)
Dept: FAMILY MEDICINE | Facility: OTHER | Age: 71
End: 2021-01-12
Attending: FAMILY MEDICINE
Payer: MEDICARE

## 2021-01-12 VITALS
BODY MASS INDEX: 29.61 KG/M2 | HEIGHT: 72 IN | TEMPERATURE: 97.1 F | WEIGHT: 218.6 LBS | DIASTOLIC BLOOD PRESSURE: 68 MMHG | HEART RATE: 70 BPM | OXYGEN SATURATION: 97 % | SYSTOLIC BLOOD PRESSURE: 126 MMHG | RESPIRATION RATE: 14 BRPM

## 2021-01-12 DIAGNOSIS — R35.0 BENIGN PROSTATIC HYPERPLASIA WITH URINARY FREQUENCY: ICD-10-CM

## 2021-01-12 DIAGNOSIS — N40.1 BENIGN PROSTATIC HYPERPLASIA WITH URINARY FREQUENCY: ICD-10-CM

## 2021-01-12 DIAGNOSIS — I10 ESSENTIAL HYPERTENSION: ICD-10-CM

## 2021-01-12 DIAGNOSIS — R73.09 ELEVATED GLUCOSE: ICD-10-CM

## 2021-01-12 DIAGNOSIS — M75.112 INCOMPLETE TEAR OF LEFT ROTATOR CUFF, UNSPECIFIED WHETHER TRAUMATIC: Primary | ICD-10-CM

## 2021-01-12 DIAGNOSIS — Z12.5 SCREENING FOR PROSTATE CANCER: ICD-10-CM

## 2021-01-12 LAB
ANION GAP SERPL CALCULATED.3IONS-SCNC: 7 MMOL/L (ref 3–14)
BUN SERPL-MCNC: 21 MG/DL (ref 7–25)
CALCIUM SERPL-MCNC: 9.7 MG/DL (ref 8.6–10.3)
CHLORIDE SERPL-SCNC: 100 MMOL/L (ref 98–107)
CO2 SERPL-SCNC: 29 MMOL/L (ref 21–31)
CREAT SERPL-MCNC: 0.97 MG/DL (ref 0.7–1.3)
GFR SERPL CREATININE-BSD FRML MDRD: 76 ML/MIN/{1.73_M2}
GLUCOSE SERPL-MCNC: 115 MG/DL (ref 70–105)
HBA1C MFR BLD: 6 % (ref 4–6)
POTASSIUM SERPL-SCNC: 3.7 MMOL/L (ref 3.5–5.1)
PSA SERPL-ACNC: 1.18 NG/ML
SODIUM SERPL-SCNC: 136 MMOL/L (ref 134–144)

## 2021-01-12 PROCEDURE — 93005 ELECTROCARDIOGRAM TRACING: CPT

## 2021-01-12 PROCEDURE — G0103 PSA SCREENING: HCPCS | Mod: ZL | Performed by: FAMILY MEDICINE

## 2021-01-12 PROCEDURE — 80048 BASIC METABOLIC PNL TOTAL CA: CPT | Mod: ZL | Performed by: FAMILY MEDICINE

## 2021-01-12 PROCEDURE — 93010 ELECTROCARDIOGRAM REPORT: CPT | Performed by: INTERNAL MEDICINE

## 2021-01-12 PROCEDURE — 99214 OFFICE O/P EST MOD 30 MIN: CPT | Performed by: FAMILY MEDICINE

## 2021-01-12 PROCEDURE — 83036 HEMOGLOBIN GLYCOSYLATED A1C: CPT | Mod: ZL | Performed by: FAMILY MEDICINE

## 2021-01-12 PROCEDURE — G0463 HOSPITAL OUTPT CLINIC VISIT: HCPCS

## 2021-01-12 PROCEDURE — 36415 COLL VENOUS BLD VENIPUNCTURE: CPT | Mod: ZL | Performed by: FAMILY MEDICINE

## 2021-01-12 ASSESSMENT — ANXIETY QUESTIONNAIRES
5. BEING SO RESTLESS THAT IT IS HARD TO SIT STILL: NOT AT ALL
7. FEELING AFRAID AS IF SOMETHING AWFUL MIGHT HAPPEN: NOT AT ALL
IF YOU CHECKED OFF ANY PROBLEMS ON THIS QUESTIONNAIRE, HOW DIFFICULT HAVE THESE PROBLEMS MADE IT FOR YOU TO DO YOUR WORK, TAKE CARE OF THINGS AT HOME, OR GET ALONG WITH OTHER PEOPLE: NOT DIFFICULT AT ALL
1. FEELING NERVOUS, ANXIOUS, OR ON EDGE: NOT AT ALL
2. NOT BEING ABLE TO STOP OR CONTROL WORRYING: NOT AT ALL
GAD7 TOTAL SCORE: 0
3. WORRYING TOO MUCH ABOUT DIFFERENT THINGS: NOT AT ALL
6. BECOMING EASILY ANNOYED OR IRRITABLE: NOT AT ALL

## 2021-01-12 ASSESSMENT — PAIN SCALES - GENERAL: PAINLEVEL: SEVERE PAIN (6)

## 2021-01-12 ASSESSMENT — PATIENT HEALTH QUESTIONNAIRE - PHQ9: 5. POOR APPETITE OR OVEREATING: NOT AT ALL

## 2021-01-12 ASSESSMENT — MIFFLIN-ST. JEOR: SCORE: 1771.62

## 2021-01-12 NOTE — NURSING NOTE
"Coming in for a pre-op    Chief Complaint   Patient presents with     Pre-Op Exam     1/29/21, Meena Schneider       Initial /68   Pulse 70   Temp 97.1  F (36.2  C)   Resp 14   Ht 1.816 m (5' 11.5\")   Wt 99.2 kg (218 lb 9.6 oz)   SpO2 97%   BMI 30.06 kg/m   Estimated body mass index is 30.06 kg/m  as calculated from the following:    Height as of this encounter: 1.816 m (5' 11.5\").    Weight as of this encounter: 99.2 kg (218 lb 9.6 oz).  Medication Reconciliation: complete    Caridad Hilliard LPN  "

## 2021-01-12 NOTE — PROGRESS NOTES
[facility] [place]  [address]  [place] [address]  Phone: [phone]  Fax: [phone]  Primary Provider: Alec Howard  Pre-op Performing Provider: ALEC HOWARD    PREOPERATIVE EVALUATION:  Today's date: 1/12/2021    Kaushik Ansari is a 70 year old male who presents for a preoperative evaluation.    Surgical Information:  Surgery/Procedure: left shoulder surgery  Surgery Location: [place]  Surgeon: Jennie  Surgery Date: 1/29/21  Time of Surgery: unknown  Where patient plans to recover: At home alone, ?   Fax number for surgical facility:     Type of Anesthesia Anticipated: General    Subjective     HPI related to upcoming procedure: left shoulder problems for years. Has a partial tear of the rotator cuff.  He can reach overhead, but not with any weight.  Pain when he rests on the elbow.  Cannot swim with it and wants to continue swimming for health benefit.      Has stable hypertension.  Last glucose was over 1 year ago and up at 141.        Preop Questions 1/12/2021   1. Have you ever had a heart attack or stroke? No   2. Have you ever had surgery on your heart or blood vessels, such as a stent placement, a coronary artery bypass, or surgery on an artery in your head, neck, heart, or legs? No   3. Do you have chest pain with activity? No   4. Do you have a history of  heart failure? No   5. Do you currently have a cold, bronchitis or symptoms of other infection? No   6. Do you have a cough, shortness of breath, or wheezing? No   7. Do you or anyone in your family have previous history of blood clots? No   8. Do you or does anyone in your family have a serious bleeding problem such as prolonged bleeding following surgeries or cuts? No   9. Have you ever had problems with anemia or been told to take iron pills? No   10. Have you had any abnormal blood loss such as black, tarry or bloody stools? No   11. Have you ever had a blood transfusion? No   12. Are you willing to have  a blood transfusion if it is medically needed before, during, or after your surgery? Yes   13. Have you or any of your relatives ever had problems with anesthesia? No   14. Do you have sleep apnea, excessive snoring or daytime drowsiness? YES -     14a. Do you have a CPAP machine? Yes   15. Do you have any artifical heart valves or other implanted medical devices like a pacemaker, defibrillator, or continuous glucose monitor? No   16. Do you have artificial joints? No   17. Are you allergic to latex? No     Health Care Directive:  Patient has a Health Care Directive on file      Preoperative Review of :   reviewed - no record of controlled substances prescribed.       Status of Chronic Conditions:  HYPERTENSION - Patient has longstanding history of HTN , currently denies any symptoms referable to elevated blood pressure. Specifically denies chest pain, palpitations, dyspnea, orthopnea, PND or peripheral edema. Blood pressure readings have been in normal range. Current medication regimen is as listed below. Patient denies any side effects of medication.       Review of Systems  Constitutional, neuro, ENT, endocrine, pulmonary, cardiac, gastrointestinal, genitourinary, musculoskeletal, integument and psychiatric systems are negative, except as otherwise noted.    Patient Active Problem List    Diagnosis Date Noted     Essential hypertension 11/14/2019     Priority: Medium     Benign prostatic hyperplasia with urinary frequency 10/16/2018     Priority: Medium     Bunion 10/16/2018     Priority: Medium     Familial tremor 10/16/2018     Priority: Medium     Overview:   Benign       Plantar fascial fibromatosis 10/16/2018     Priority: Medium     Overview:   2006 to present       Sciatica 10/16/2018     Priority: Medium     Overview:   Mild       Trigger finger of right thumb 07/24/2017     Priority: Medium     Status post shoulder surgery 04/19/2017     Priority: Medium     AC (acromioclavicular) joint arthritis  03/23/2017     Priority: Medium     Complete tear of right rotator cuff 03/23/2017     Priority: Medium     Impingement syndrome of right shoulder 03/23/2017     Priority: Medium     Degeneration of intervertebral disc of lumbar region 10/23/2015     Priority: Medium     Allergic rhinitis due to pollen 05/27/2015     Priority: Medium     Acute upper respiratory infection 06/19/2012     Priority: Medium     Lactose intolerance 08/30/2011     Priority: Medium     Anxiety state 03/08/2011     Priority: Medium     Otitis media, serous 12/28/2010     Priority: Medium     Pain in joint, lower leg 08/11/2010     Priority: Medium     Lateral epicondylitis 08/11/2010     Priority: Medium     Seborrheic keratosis 08/11/2010     Priority: Medium     Depressive disorder, not elsewhere classified 01/08/2008     Priority: Medium     Myalgia and myositis 06/13/2006     Priority: Medium     Overview:   Muscle aches  IMO Update 10/11       Disturbance of skin sensation 08/11/2005     Priority: Medium     Overview:   Foot paresthesia, right leg with standing       Diverticulosis of colon 03/01/2005     Priority: Medium     Overview:   Few sigmoid diverticula, one benign polyp, needs repeat in 2009  IMO Update 10/11       History of colonic polyps 03/01/2005     Priority: Medium     Overview:   IMO Update 10/11       Obstructive sleep apnea 04/02/2003     Priority: Medium     Overview:   on CPAP, uses  IMO Update 10/11  Overview:   On CPAP        Past Medical History:   Diagnosis Date     Anxiety disorder     No Comments Provided     Complete tear of right rotator cuff     3/23/2017     Dependence on other enabling machines and devices     No Comments Provided     Encounter for prescription of emergency contraception     No Comments Provided     Essential tremor     No Comments Provided     Impingement syndrome of right shoulder     3/23/2017     Other specified postprocedural states     4/19/2017     Primary osteoarthritis of  "shoulder     3/23/2017     Sciatica     No Comments Provided     Status post shoulder surgery 2017     Trigger thumb of right hand     2017     Past Surgical History:   Procedure Laterality Date     COLONOSCOPY  2000     COLONOSCOPY  2005    normal by patient report     COLONOSCOPY  2014     OTHER SURGICAL HISTORY      63988.0,CT CORONARY ANGIOGRAM ([place])     OTHER SURGICAL HISTORY      2017,536402,OTHER,Right     VASECTOMY      No Comments Provided     Current Outpatient Medications   Medication Sig Dispense Refill     acetaminophen (TYLENOL) 500 MG tablet Take 1,000 mg by mouth every 6 hours as needed for mild pain       aspirin 81 MG EC tablet Take 81 mg by mouth daily       cyanocobalamin (VITAMIN B-12) 100 MCG tablet Take 100 mcg by mouth daily       lisinopril-hydrochlorothiazide (ZESTORETIC) 10-12.5 MG tablet        order for DME Equipment being ordered: CPAP supplies 1 each 1     order for DME Equipment being ordered: CPAP replacement supplies 1 each 3     sildenafil (VIAGRA) 100 MG tablet Take 1 tablet (100 mg) by mouth daily as needed (erections) 12 tablet 3       No Known Allergies     Social History     Tobacco Use     Smoking status: Former Smoker     Packs/day: 2.00     Years: 20.00     Pack years: 40.00     Types: Cigarettes     Quit date: 1984     Years since quittin.0     Smokeless tobacco: Former User     Types: Chew   Substance Use Topics     Alcohol use: No     Alcohol/week: 0.0 standard drinks     Frequency: Never       History   Drug Use No         Objective     /68   Pulse 70   Temp 97.1  F (36.2  C)   Resp 14   Ht 1.816 m (5' 11.5\")   Wt 99.2 kg (218 lb 9.6 oz)   SpO2 97%   BMI 30.06 kg/m      Physical Exam    GENERAL APPEARANCE: healthy, alert and no distress     EYES: EOMI,  PERRL     HENT: ear canals and TM's normal and nose and mouth without ulcers or lesions     NECK: no adenopathy, no asymmetry, masses, or scars and thyroid normal to " palpation     RESP: lungs clear to auscultation - no rales, rhonchi or wheezes     CV: regular rates and rhythm, normal S1 S2, no S3 or S4 and no murmur, click or rub     ABDOMEN:  soft, nontender, no HSM or masses and bowel sounds normal     MS: extremities normal- no gross deformities noted, no evidence of inflammation in joints, FROM in all extremities.     SKIN: no suspicious lesions or rashes     NEURO: Normal strength and tone, sensory exam grossly normal, mentation intact and speech normal     PSYCH: mentation appears normal. and affect normal/bright     LYMPHATICS: No cervical adenopathy    Recent Labs   Lab Test 11/07/19  2301 07/15/19  1337   HGB 14.9 15.3    182    135   POTASSIUM 3.6 3.6   CR 1.31* 1.02        Diagnostics:  Recent Results (from the past 48 hour(s))   PSA Screen GH    Collection Time: 01/12/21 12:04 PM   Result Value Ref Range    PSA Screen 1.184 <3.100 ng/mL   Hemoglobin A1c    Collection Time: 01/12/21 12:04 PM   Result Value Ref Range    Hemoglobin A1C 6.0 4.0 - 6.0 %   Basic Metabolic Panel    Collection Time: 01/12/21 12:04 PM   Result Value Ref Range    Sodium 136 134 - 144 mmol/L    Potassium 3.7 3.5 - 5.1 mmol/L    Chloride 100 98 - 107 mmol/L    Carbon Dioxide 29 21 - 31 mmol/L    Anion Gap 7 3 - 14 mmol/L    Glucose 115 (H) 70 - 105 mg/dL    Urea Nitrogen 21 7 - 25 mg/dL    Creatinine 0.97 0.70 - 1.30 mg/dL    GFR Estimate 76 >60 mL/min/[1.73_m2]    GFR Estimate If Black >90 >60 mL/min/[1.73_m2]    Calcium 9.7 8.6 - 10.3 mg/dL   EKG 12-lead complete w/read - Clinics    Collection Time: 01/12/21 12:04 PM   Result Value Ref Range    Interpretation ECG Click View Image link to view waveform and result       EKG: appears normal, NSR, normal axis, normal intervals, no acute ST/T changes c/w ischemia, no LVH by voltage criteria, unchanged from previous tracings    Revised Cardiac Risk Index (RCRI):  The patient has the following serious cardiovascular risks for  perioperative complications:   - No serious cardiac risks = 0 points     RCRI Interpretation: 0 points: Class I (very low risk - 0.4% complication rate)           Assessment & Plan   The proposed surgical procedure is considered LOW risk.    Problem List Items Addressed This Visit        Circulatory    Essential hypertension    Relevant Orders    Basic Metabolic Panel (Completed)    EKG 12-lead complete w/read - Clinics (Completed)       Urinary    Benign prostatic hyperplasia with urinary frequency      Other Visit Diagnoses     Incomplete tear of left rotator cuff, unspecified whether traumatic    -  Primary    Screening for prostate cancer        Relevant Orders    PSA Screen GH (Completed)    Elevated glucose        Relevant Orders    Hemoglobin A1c (Completed)             Risks and Recommendations:  The patient has the following additional risks and recommendations for perioperative complications:   - No identified additional risk factors other than previously addressed    Medication Instructions:  Patient is to take all scheduled medications on the day of surgery   Hold ASA 2 weeks prior.     RECOMMENDATION:  APPROVAL GIVEN to proceed with proposed procedure, without further diagnostic evaluation.    Signed Electronically by: Alec Howard MD    Copy of this evaluation report is provided to requesting physician.    Preop [facility] Preop Guidelines    Revised Cardiac Risk Index

## 2021-01-13 ASSESSMENT — ANXIETY QUESTIONNAIRES: GAD7 TOTAL SCORE: 0

## 2021-01-19 LAB — INTERPRETATION ECG - MUSE: NORMAL

## 2021-01-29 ENCOUNTER — TRANSFERRED RECORDS (OUTPATIENT)
Dept: HEALTH INFORMATION MANAGEMENT | Facility: OTHER | Age: 71
End: 2021-01-29

## 2021-02-03 ENCOUNTER — TELEPHONE (OUTPATIENT)
Dept: FAMILY MEDICINE | Facility: OTHER | Age: 71
End: 2021-02-03

## 2021-02-03 DIAGNOSIS — M75.112 INCOMPLETE TEAR OF LEFT ROTATOR CUFF, UNSPECIFIED WHETHER TRAUMATIC: Primary | ICD-10-CM

## 2021-02-03 NOTE — TELEPHONE ENCOUNTER
Patient called and requested just an FYI get sent to [facility].  He states he had left rotator cuff surgery 2/5/21 with Dr. Schneider at [facility]. He states he had some extreme pain, however, received a prescription for Oxycotin and that has improved the pain. He has to be in sling for six weeks and has his follow up with Dr. Schneider on 3/12/21. He is able to start doing PT on 3/01/21 and would like to have that done at [facility]. He states he will need an order placed for that. No need for a call back unless there is more questions.    Mumtaz Byrne on 2/3/2021 at 7:47 AM

## 2021-03-02 ENCOUNTER — TRANSFERRED RECORDS (OUTPATIENT)
Dept: HEALTH INFORMATION MANAGEMENT | Facility: OTHER | Age: 71
End: 2021-03-02

## 2021-03-23 DIAGNOSIS — I10 ESSENTIAL HYPERTENSION: Primary | ICD-10-CM

## 2021-03-23 RX ORDER — LISINOPRIL/HYDROCHLOROTHIAZIDE 10-12.5 MG
TABLET ORAL
Qty: 90 TABLET | Refills: 3 | Status: SHIPPED | OUTPATIENT
Start: 2021-03-23 | End: 2022-06-02

## 2021-03-23 NOTE — TELEPHONE ENCOUNTER
"[facility] sent Rx request for the following:   lisinopril-hydrochlorothiazide (ZESTORETIC) 10-12.5 MG tablet  Sig Take 1 tablet by mouth once daily    Last Prescription Date:   06/23/2020 (Historical)  Last Fill Qty/Refills:         0, R-0    Last Office Visit:              01/12/2021 ([facility])   Future Office visit:           None noted   Diuretics (Including Combos) Protocol Passed - 3/23/2021  4:15 PM        Passed - Blood pressure under 140/90 in past 12 months     BP Readings from Last 3 Encounters:   01/12/21 126/68   10/27/20 126/66   07/27/20 138/80                 Passed - Recent (12 mo) or future (30 days) visit within the authorizing provider's specialty     Patient has had an office visit with the authorizing provider or a provider within the authorizing providers department within the previous 12 mos or has a future within next 30 days. See \"Patient Info\" tab in inbasket, or \"Choose Columns\" in Meds & Orders section of the refill encounter.              Passed - Medication is active on med list        Passed - Patient is age 18 or older        Passed - Normal serum creatinine on file in past 12 months     Recent Labs   Lab Test 01/12/21  1204   CR 0.97              Passed - Normal serum potassium on file in past 12 months     Recent Labs   Lab Test 01/12/21  1204   POTASSIUM 3.7                    Passed - Normal serum sodium on file in past 12 months     Recent Labs   Lab Test 01/12/21  1204                ACE Inhibitors (Including Combos) Protocol Passed - 3/23/2021  4:15 PM        Passed - Blood pressure under 140/90 in past 12 months     BP Readings from Last 3 Encounters:   01/12/21 126/68   10/27/20 126/66   07/27/20 138/80                 Passed - Recent (12 mo) or future (30 days) visit within the authorizing provider's specialty     Patient has had an office visit with the authorizing provider or a provider within the authorizing providers department within the previous 12 mos or " "has a future within next 30 days. See \"Patient Info\" tab in inbasket, or \"Choose Columns\" in Meds & Orders section of the refill encounter.              Passed - Medication is active on med list        Passed - Patient is age 18 or older        Passed - Normal serum creatinine on file in past 12 months     Recent Labs   Lab Test 01/12/21  1204   CR 0.97       Ok to refill medication if creatinine is low          Passed - Normal serum potassium on file in past 12 months     Recent Labs   Lab Test 01/12/21  1204   POTASSIUM 3.7                Routing for PCP review. Most recent visit to establish care, medication listed as historical. Unable to complete prescription refill per RN Medication Refill Policy.................... Alicia Grace RN ....................  3/23/2021   4:20 PM        "

## 2021-04-01 ENCOUNTER — TRANSFERRED RECORDS (OUTPATIENT)
Dept: HEALTH INFORMATION MANAGEMENT | Facility: OTHER | Age: 71
End: 2021-04-01

## 2021-04-21 ENCOUNTER — HOSPITAL ENCOUNTER (EMERGENCY)
Facility: OTHER | Age: 71
Discharge: HOME | End: 2021-04-21
Attending: PHYSICIAN ASSISTANT | Admitting: PHYSICIAN ASSISTANT
Payer: MEDICARE

## 2021-04-21 ENCOUNTER — APPOINTMENT (OUTPATIENT)
Dept: GENERAL RADIOLOGY | Facility: OTHER | Age: 71
End: 2021-04-21
Attending: PHYSICIAN ASSISTANT
Payer: MEDICARE

## 2021-04-21 VITALS
HEART RATE: 87 BPM | DIASTOLIC BLOOD PRESSURE: 76 MMHG | RESPIRATION RATE: 17 BRPM | WEIGHT: 218 LBS | TEMPERATURE: 99.5 F | OXYGEN SATURATION: 99 % | BODY MASS INDEX: 29.98 KG/M2 | SYSTOLIC BLOOD PRESSURE: 146 MMHG

## 2021-04-21 DIAGNOSIS — R52 GENERALIZED BODY ACHES: ICD-10-CM

## 2021-04-21 DIAGNOSIS — U07.1 CLINICAL DIAGNOSIS OF COVID-19: ICD-10-CM

## 2021-04-21 DIAGNOSIS — R05.9 COUGH: ICD-10-CM

## 2021-04-21 LAB
ALBUMIN SERPL-MCNC: 4.1 G/DL (ref 3.5–5.7)
ALBUMIN UR-MCNC: NEGATIVE MG/DL
ALP SERPL-CCNC: 39 U/L (ref 34–104)
ALT SERPL W P-5'-P-CCNC: 17 U/L (ref 7–52)
ANION GAP SERPL CALCULATED.3IONS-SCNC: 8 MMOL/L (ref 3–14)
APPEARANCE UR: CLEAR
AST SERPL W P-5'-P-CCNC: 21 U/L (ref 13–39)
BASOPHILS # BLD AUTO: 0 10E9/L (ref 0–0.2)
BASOPHILS NFR BLD AUTO: 0.4 %
BILIRUB SERPL-MCNC: 0.4 MG/DL (ref 0.3–1)
BILIRUB UR QL STRIP: NEGATIVE
BUN SERPL-MCNC: 14 MG/DL (ref 7–25)
CALCIUM SERPL-MCNC: 9.3 MG/DL (ref 8.6–10.3)
CHLORIDE SERPL-SCNC: 99 MMOL/L (ref 98–107)
CO2 SERPL-SCNC: 26 MMOL/L (ref 21–31)
COLOR UR AUTO: NORMAL
CREAT SERPL-MCNC: 0.84 MG/DL (ref 0.7–1.3)
CRP SERPL-MCNC: 3.4 MG/L
D DIMER PPP FEU-MCNC: 0.4 UG/ML FEU (ref 0–0.5)
DIFFERENTIAL METHOD BLD: ABNORMAL
EOSINOPHIL # BLD AUTO: 0.1 10E9/L (ref 0–0.7)
EOSINOPHIL NFR BLD AUTO: 1 %
ERYTHROCYTE [DISTWIDTH] IN BLOOD BY AUTOMATED COUNT: 12.4 % (ref 10–15)
ERYTHROCYTE [SEDIMENTATION RATE] IN BLOOD BY WESTERGREN METHOD: 28 MM/H (ref 1–10)
FERRITIN SERPL-MCNC: 187 NG/ML (ref 23.9–336.2)
FLUAV RNA RESP QL NAA+PROBE: NEGATIVE
FLUBV RNA RESP QL NAA+PROBE: NEGATIVE
GFR SERPL CREATININE-BSD FRML MDRD: 90 ML/MIN/{1.73_M2}
GLUCOSE SERPL-MCNC: 102 MG/DL (ref 70–105)
GLUCOSE UR STRIP-MCNC: NEGATIVE MG/DL
HCT VFR BLD AUTO: 41.4 % (ref 40–53)
HGB BLD-MCNC: 14.1 G/DL (ref 13.3–17.7)
HGB UR QL STRIP: NEGATIVE
IMM GRANULOCYTES # BLD: 0 10E9/L (ref 0–0.4)
IMM GRANULOCYTES NFR BLD: 0.4 %
INR PPP: 0.9 (ref 0.86–1.14)
KETONES UR STRIP-MCNC: NEGATIVE MG/DL
LABORATORY COMMENT REPORT: ABNORMAL
LACTATE BLD-SCNC: 1.5 MMOL/L (ref 0.7–2)
LEUKOCYTE ESTERASE UR QL STRIP: NEGATIVE
LYMPHOCYTES # BLD AUTO: 1.3 10E9/L (ref 0.8–5.3)
LYMPHOCYTES NFR BLD AUTO: 26.4 %
MCH RBC QN AUTO: 32.6 PG (ref 26.5–33)
MCHC RBC AUTO-ENTMCNC: 34.1 G/DL (ref 31.5–36.5)
MCV RBC AUTO: 96 FL (ref 78–100)
MONOCYTES # BLD AUTO: 0.6 10E9/L (ref 0–1.3)
MONOCYTES NFR BLD AUTO: 12 %
NEUTROPHILS # BLD AUTO: 3 10E9/L (ref 1.6–8.3)
NEUTROPHILS NFR BLD AUTO: 59.8 %
NITRATE UR QL: NEGATIVE
NT-PROBNP SERPL-MCNC: 39 PG/ML (ref 0–100)
PH UR STRIP: 6.5 PH (ref 5–7)
PLATELET # BLD AUTO: 155 10E9/L (ref 150–450)
POTASSIUM SERPL-SCNC: 3.7 MMOL/L (ref 3.5–5.1)
PROT SERPL-MCNC: 7.2 G/DL (ref 6.4–8.9)
RBC # BLD AUTO: 4.33 10E12/L (ref 4.4–5.9)
RSV RNA SPEC QL NAA+PROBE: NEGATIVE
SARS-COV-2 RNA RESP QL NAA+PROBE: POSITIVE
SODIUM SERPL-SCNC: 133 MMOL/L (ref 134–144)
SOURCE: NORMAL
SP GR UR STRIP: 1.01 (ref 1–1.03)
SPECIMEN SOURCE: ABNORMAL
TROPONIN I SERPL-MCNC: 5.3 PG/ML
TSH SERPL DL<=0.05 MIU/L-ACNC: 0.76 IU/ML (ref 0.34–5.6)
UROBILINOGEN UR STRIP-MCNC: NORMAL MG/DL (ref 0–2)
WBC # BLD AUTO: 5.1 10E9/L (ref 4–11)

## 2021-04-21 PROCEDURE — 87636 SARSCOV2 & INF A&B AMP PRB: CPT | Performed by: PHYSICIAN ASSISTANT

## 2021-04-21 PROCEDURE — 84443 ASSAY THYROID STIM HORMONE: CPT | Performed by: PHYSICIAN ASSISTANT

## 2021-04-21 PROCEDURE — 85610 PROTHROMBIN TIME: CPT | Performed by: PHYSICIAN ASSISTANT

## 2021-04-21 PROCEDURE — 96372 THER/PROPH/DIAG INJ SC/IM: CPT | Performed by: PHYSICIAN ASSISTANT

## 2021-04-21 PROCEDURE — 85652 RBC SED RATE AUTOMATED: CPT | Performed by: PHYSICIAN ASSISTANT

## 2021-04-21 PROCEDURE — 93005 ELECTROCARDIOGRAM TRACING: CPT | Performed by: PHYSICIAN ASSISTANT

## 2021-04-21 PROCEDURE — 86140 C-REACTIVE PROTEIN: CPT | Performed by: PHYSICIAN ASSISTANT

## 2021-04-21 PROCEDURE — 93010 ELECTROCARDIOGRAM REPORT: CPT | Performed by: INTERNAL MEDICINE

## 2021-04-21 PROCEDURE — 83605 ASSAY OF LACTIC ACID: CPT | Performed by: PHYSICIAN ASSISTANT

## 2021-04-21 PROCEDURE — 85025 COMPLETE CBC W/AUTO DIFF WBC: CPT | Performed by: PHYSICIAN ASSISTANT

## 2021-04-21 PROCEDURE — 36415 COLL VENOUS BLD VENIPUNCTURE: CPT | Performed by: PHYSICIAN ASSISTANT

## 2021-04-21 PROCEDURE — 83880 ASSAY OF NATRIURETIC PEPTIDE: CPT | Mod: GZ | Performed by: PHYSICIAN ASSISTANT

## 2021-04-21 PROCEDURE — 84484 ASSAY OF TROPONIN QUANT: CPT | Performed by: PHYSICIAN ASSISTANT

## 2021-04-21 PROCEDURE — 87040 BLOOD CULTURE FOR BACTERIA: CPT | Mod: GY | Performed by: PHYSICIAN ASSISTANT

## 2021-04-21 PROCEDURE — 250N000013 HC RX MED GY IP 250 OP 250 PS 637: Mod: 91 | Performed by: PHYSICIAN ASSISTANT

## 2021-04-21 PROCEDURE — 71045 X-RAY EXAM CHEST 1 VIEW: CPT

## 2021-04-21 PROCEDURE — 85379 FIBRIN DEGRADATION QUANT: CPT | Performed by: PHYSICIAN ASSISTANT

## 2021-04-21 PROCEDURE — 99283 EMERGENCY DEPT VISIT LOW MDM: CPT | Performed by: PHYSICIAN ASSISTANT

## 2021-04-21 PROCEDURE — 82728 ASSAY OF FERRITIN: CPT | Performed by: PHYSICIAN ASSISTANT

## 2021-04-21 PROCEDURE — 99285 EMERGENCY DEPT VISIT HI MDM: CPT | Mod: 25 | Performed by: PHYSICIAN ASSISTANT

## 2021-04-21 PROCEDURE — C9803 HOPD COVID-19 SPEC COLLECT: HCPCS | Performed by: PHYSICIAN ASSISTANT

## 2021-04-21 PROCEDURE — 81003 URINALYSIS AUTO W/O SCOPE: CPT | Performed by: PHYSICIAN ASSISTANT

## 2021-04-21 PROCEDURE — 250N000011 HC RX IP 250 OP 636: Performed by: PHYSICIAN ASSISTANT

## 2021-04-21 PROCEDURE — 80053 COMPREHEN METABOLIC PANEL: CPT | Performed by: PHYSICIAN ASSISTANT

## 2021-04-21 RX ORDER — PROCHLORPERAZINE MALEATE 5 MG
5 TABLET ORAL EVERY 6 HOURS PRN
Qty: 10 TABLET | Refills: 0 | Status: SHIPPED | OUTPATIENT
Start: 2021-04-21 | End: 2022-07-11

## 2021-04-21 RX ORDER — CODEINE PHOSPHATE AND GUAIFENESIN 10; 100 MG/5ML; MG/5ML
1-2 SOLUTION ORAL EVERY 4 HOURS PRN
Qty: 120 ML | Refills: 1 | Status: SHIPPED | OUTPATIENT
Start: 2021-04-21 | End: 2023-08-18

## 2021-04-21 RX ORDER — IBUPROFEN 800 MG/1
800 TABLET, FILM COATED ORAL EVERY 8 HOURS PRN
Qty: 60 TABLET | Refills: 0 | Status: SHIPPED | OUTPATIENT
Start: 2021-04-21 | End: 2023-08-18

## 2021-04-21 RX ORDER — PROCHLORPERAZINE MALEATE 5 MG
5 TABLET ORAL ONCE
Status: COMPLETED | OUTPATIENT
Start: 2021-04-21 | End: 2021-04-21

## 2021-04-21 RX ORDER — KETOROLAC TROMETHAMINE 30 MG/ML
30 INJECTION, SOLUTION INTRAMUSCULAR; INTRAVENOUS ONCE
Status: COMPLETED | OUTPATIENT
Start: 2021-04-21 | End: 2021-04-21

## 2021-04-21 RX ADMIN — PROCHLORPERAZINE MALEATE 5 MG: 5 TABLET ORAL at 17:05

## 2021-04-21 RX ADMIN — KETOROLAC TROMETHAMINE 30 MG: 30 INJECTION, SOLUTION INTRAMUSCULAR; INTRAVENOUS at 17:05

## 2021-04-21 ASSESSMENT — ENCOUNTER SYMPTOMS
ADENOPATHY: 0
BRUISES/BLEEDS EASILY: 0
CONFUSION: 0
VOICE CHANGE: 0
FACIAL SWELLING: 0
HEADACHES: 0
NAUSEA: 0
DYSURIA: 0
SORE THROAT: 0
DIZZINESS: 0
WOUND: 0
SINUS PRESSURE: 0
COUGH: 1
TROUBLE SWALLOWING: 0
EYE PAIN: 0
CHILLS: 0
DIARRHEA: 0
ACTIVITY CHANGE: 1
SEIZURES: 0
WEAKNESS: 1
VOMITING: 0
HEMATURIA: 0
FEVER: 0
NECK PAIN: 0
LIGHT-HEADEDNESS: 0
FREQUENCY: 0
APPETITE CHANGE: 1
BACK PAIN: 0
ABDOMINAL PAIN: 0
TREMORS: 0
SHORTNESS OF BREATH: 0
FATIGUE: 1
CHEST TIGHTNESS: 0

## 2021-04-21 NOTE — ED TRIAGE NOTES
ED Nursing Triage Note (General)   ________________________________    Kaushik OMAR Ansari is a 70 year old Male that presents to triage via private vehicle with complaints of cough and fatigue.  Patient states approx 1 week ago he felt like he was getting a cold/URI.  Patient states since then he has felt achy, fatigued, and has had a productive cough.  Patient states he is also experiencing decreased appetite and today had brown colored phlegm. Patient denies fevers at home.  No known exposure to covid, however, patient has been going to [facility] for OT.  Significant symptoms had onset 1 week ago.  There were no vitals taken for this visit.t  Patient appears alert and no acute distress  GCS-15  Airway: intact  Circulation normal  Skin CDI  Action taken:  Level 3      PRE HOSPITAL PRIOR LIVING SITUATION -home

## 2021-04-21 NOTE — ED PROVIDER NOTES
History     Chief Complaint   Patient presents with     Cough     Fatigue     HPI  Kaushik Ansari is a 70 year old male who reports having fatigue and a cough for almost a week.  He feels like he is coming down with an upper respiratory infection or a cold.  He denies any underlying respiratory issues.  No COPD or asthma.  He has had some body aches and a productive cough.  Today he noted some brownish sputum with his cough.  He has had slightly decreased appetite and activity.  Denies any exposure to Covid however he has been undergoing occupational therapy through "[employer].  Does not appear to be in any distress is here for further evaluation at this time.    Allergies:  No Known Allergies    Problem List:    Patient Active Problem List    Diagnosis Date Noted     Essential hypertension 11/14/2019     Priority: Medium     Benign prostatic hyperplasia with urinary frequency 10/16/2018     Priority: Medium     Bunion 10/16/2018     Priority: Medium     Familial tremor 10/16/2018     Priority: Medium     Overview:   Benign       Plantar fascial fibromatosis 10/16/2018     Priority: Medium     Overview:   2006 to present       Sciatica 10/16/2018     Priority: Medium     Overview:   Mild       Trigger finger of right thumb 07/24/2017     Priority: Medium     Status post shoulder surgery 04/19/2017     Priority: Medium     AC (acromioclavicular) joint arthritis 03/23/2017     Priority: Medium     Complete tear of right rotator cuff 03/23/2017     Priority: Medium     Impingement syndrome of right shoulder 03/23/2017     Priority: Medium     Degeneration of intervertebral disc of lumbar region 10/23/2015     Priority: Medium     Allergic rhinitis due to pollen 05/27/2015     Priority: Medium     Acute upper respiratory infection 06/19/2012     Priority: Medium     Lactose intolerance 08/30/2011     Priority: Medium     Anxiety state 03/08/2011     Priority: Medium     Otitis media, serous 12/28/2010      Priority: Medium     Pain in joint, lower leg 08/11/2010     Priority: Medium     Lateral epicondylitis 08/11/2010     Priority: Medium     Seborrheic keratosis 08/11/2010     Priority: Medium     Depressive disorder, not elsewhere classified 01/08/2008     Priority: Medium     Myalgia and myositis 06/13/2006     Priority: Medium     Overview:   Muscle aches  IMO Update 10/11       Disturbance of skin sensation 08/11/2005     Priority: Medium     Overview:   Foot paresthesia, right leg with standing       Diverticulosis of colon 03/01/2005     Priority: Medium     Overview:   Few sigmoid diverticula, one benign polyp, needs repeat in 2009  IMO Update 10/11       History of colonic polyps 03/01/2005     Priority: Medium     Overview:   IMO Update 10/11       Obstructive sleep apnea 04/02/2003     Priority: Medium     Overview:   on CPAP, uses  IMO Update 10/11  Overview:   On CPAP          Past Medical History:    Past Medical History:   Diagnosis Date     Anxiety disorder      Complete tear of right rotator cuff      Dependence on other enabling machines and devices      Encounter for prescription of emergency contraception      Essential tremor      Impingement syndrome of right shoulder      Other specified postprocedural states      Primary osteoarthritis of shoulder      Sciatica      Status post shoulder surgery 4/19/2017     Trigger thumb of right hand        Past Surgical History:    Past Surgical History:   Procedure Laterality Date     COLONOSCOPY  03/2000     COLONOSCOPY  03/01/2005    normal by patient report     COLONOSCOPY  01/03/2014     OTHER SURGICAL HISTORY      88394.0,CT CORONARY ANGIOGRAM (IA)     OTHER SURGICAL HISTORY      04/19/2017,469748,OTHER,Right     VASECTOMY      No Comments Provided       Family History:    Family History   Problem Relation Age of Onset     Breast Cancer Mother         Cancer-breast     Substance Abuse Father         Alcohol/Drug     Diabetes Daughter         Diabetes        Social History:  Marital Status:   [5]  Social History     Tobacco Use     Smoking status: Former Smoker     Packs/day: 2.00     Years: 20.00     Pack years: 40.00     Types: Cigarettes     Quit date: 1984     Years since quittin.3     Smokeless tobacco: Former User     Types: Chew   Substance Use Topics     Alcohol use: No     Alcohol/week: 0.0 standard drinks     Frequency: Never     Drug use: No     Types: Other        Medications:    acetaminophen (TYLENOL) 500 MG tablet  aspirin 81 MG EC tablet  cyanocobalamin (VITAMIN B-12) 100 MCG tablet  lisinopril-hydrochlorothiazide (ZESTORETIC) 10-12.5 MG tablet  order for DME  order for DME  sildenafil (VIAGRA) 100 MG tablet          Review of Systems   Constitutional: Positive for activity change, appetite change and fatigue. Negative for chills and fever.   HENT: Negative for congestion, facial swelling, sinus pressure, sore throat, trouble swallowing and voice change.    Eyes: Negative for pain and visual disturbance.   Respiratory: Positive for cough. Negative for chest tightness and shortness of breath.    Cardiovascular: Negative for chest pain.   Gastrointestinal: Negative for abdominal pain, diarrhea, nausea and vomiting.   Genitourinary: Negative for dysuria, frequency, hematuria and urgency.   Musculoskeletal: Negative for back pain and neck pain.   Skin: Negative for rash and wound.   Neurological: Positive for weakness. Negative for dizziness, tremors, seizures, syncope, light-headedness and headaches.   Hematological: Negative for adenopathy. Does not bruise/bleed easily.   Psychiatric/Behavioral: Negative for confusion.   All other systems reviewed and are negative.      Physical Exam   BP: (!) 146/76  Pulse: 87  Resp: 17  Weight: 98.9 kg (218 lb)  SpO2: 99 %      Physical Exam  Constitutional:       General: He is not in acute distress.     Appearance: He is not ill-appearing, toxic-appearing or diaphoretic.   HENT:      Head:  Atraumatic.      Right Ear: Tympanic membrane normal. No drainage or tenderness.      Left Ear: Tympanic membrane normal. No drainage or tenderness.      Nose: Nose normal. No rhinorrhea.   Eyes:      General: No scleral icterus.     Extraocular Movements: Extraocular movements intact.      Right eye: Normal extraocular motion and no nystagmus.      Left eye: Normal extraocular motion and no nystagmus.      Pupils: Pupils are equal, round, and reactive to light. Pupils are equal.      Funduscopic exam:     Right eye: No AV nicking or papilledema. Red reflex present.         Left eye: No AV nicking or papilledema. Red reflex present.  Neck:      Musculoskeletal: Normal range of motion. No neck rigidity, pain with movement or muscular tenderness.      Vascular: No JVD.      Trachea: No tracheal deviation.   Cardiovascular:      Rate and Rhythm: Normal rate and regular rhythm.      Heart sounds: Normal heart sounds. No friction rub.   Pulmonary:      Effort: No respiratory distress.      Breath sounds: Normal breath sounds. No stridor.   Abdominal:      General: Bowel sounds are normal.      Palpations: Abdomen is soft.      Tenderness: There is no abdominal tenderness. There is no guarding or rebound.   Musculoskeletal: Normal range of motion.         General: No tenderness.   Lymphadenopathy:      Cervical: No cervical adenopathy.      Right cervical: No superficial cervical adenopathy.     Left cervical: No superficial cervical adenopathy.   Skin:     General: Skin is warm.      Capillary Refill: Capillary refill takes less than 2 seconds.      Findings: No rash.   Neurological:      General: No focal deficit present.      Mental Status: He is alert and oriented to person, place, and time.   Psychiatric:         Mood and Affect: Mood normal.         Behavior: Behavior normal.         Thought Content: Thought content normal.         Judgment: Judgment normal.         ED Course     EKG shows sinus rhythm with PAC's  and apparent conduction.  Heart rate is 94.    Results for orders placed or performed during the hospital encounter of 04/21/21 (from the past 24 hour(s))   Symptomatic Influenza A/B & SARS-CoV2 (COVID-19) Virus PCR Multiplex    Specimen: Nasopharyngeal   Result Value Ref Range    Flu A/B & SARS-COV-2 PCR Source Nasopharyngeal     SARS-CoV-2 PCR Result POSITIVE (AA)     Influenza A PCR Negative NEG^Negative    Influenza B PCR Negative NEG^Negative    Respiratory Syncytial Virus PCR Negative NEG^Negative    Flu A/B & SARS-CoV-2 PCR Comment       Testing was performed using the Xpert Xpress SARS-CoV2/Flu/RSV Assay on the Cepheid   GeneXpert Instrument. Additional information about the Emergency Use Authorization (EUA)   assay can be found via the Lab Guide.     CBC with platelets differential   Result Value Ref Range    WBC 5.1 4.0 - 11.0 10e9/L    RBC Count 4.33 (L) 4.4 - 5.9 10e12/L    Hemoglobin 14.1 13.3 - 17.7 g/dL    Hematocrit 41.4 40.0 - 53.0 %    MCV 96 78 - 100 fl    MCH 32.6 26.5 - 33.0 pg    MCHC 34.1 31.5 - 36.5 g/dL    RDW 12.4 10.0 - 15.0 %    Platelet Count 155 150 - 450 10e9/L    Diff Method Automated Method     % Neutrophils 59.8 %    % Lymphocytes 26.4 %    % Monocytes 12.0 %    % Eosinophils 1.0 %    % Basophils 0.4 %    % Immature Granulocytes 0.4 %    Absolute Neutrophil 3.0 1.6 - 8.3 10e9/L    Absolute Lymphocytes 1.3 0.8 - 5.3 10e9/L    Absolute Monocytes 0.6 0.0 - 1.3 10e9/L    Absolute Eosinophils 0.1 0.0 - 0.7 10e9/L    Absolute Basophils 0.0 0.0 - 0.2 10e9/L    Abs Immature Granulocytes 0.0 0 - 0.4 10e9/L   D dimer quantitative   Result Value Ref Range    D Dimer 0.4 0.0 - 0.50 ug/ml FEU   INR   Result Value Ref Range    INR 0.90 0.86 - 1.14   Comprehensive metabolic panel   Result Value Ref Range    Sodium 133 (L) 134 - 144 mmol/L    Potassium 3.7 3.5 - 5.1 mmol/L    Chloride 99 98 - 107 mmol/L    Carbon Dioxide 26 21 - 31 mmol/L    Anion Gap 8 3 - 14 mmol/L    Glucose 102 70 - 105 mg/dL     Urea Nitrogen 14 7 - 25 mg/dL    Creatinine 0.84 0.70 - 1.30 mg/dL    GFR Estimate 90 >60 mL/min/[1.73_m2]    GFR Estimate If Black >90 >60 mL/min/[1.73_m2]    Calcium 9.3 8.6 - 10.3 mg/dL    Bilirubin Total 0.4 0.3 - 1.0 mg/dL    Albumin 4.1 3.5 - 5.7 g/dL    Protein Total 7.2 6.4 - 8.9 g/dL    Alkaline Phosphatase 39 34 - 104 U/L    ALT 17 7 - 52 U/L    AST 21 13 - 39 U/L   Troponin GH   Result Value Ref Range    Troponin 5.3 <34.0 pg/mL   TSH Reflex GH   Result Value Ref Range    TSH Reflex 0.76 0.34 - 5.60 IU/mL   Nt probnp inpatient (BNP)   Result Value Ref Range    N-Terminal Pro BNP Inpatient 39 0 - 100 pg/mL   CRP inflammation   Result Value Ref Range    CRP Inflammation 3.4 <10.0 mg/L   Erythrocyte sedimentation rate auto   Result Value Ref Range    Sed Rate 28 (H) 1 - 10 mm/h   Ferritin   Result Value Ref Range    Ferritin 187 23.9 - 336.2 ng/mL   Lactic acid whole blood   Result Value Ref Range    Lactic Acid 1.5 0.7 - 2.0 mmol/L   XR Chest Port 1 View    Narrative    XR CHEST PORT 1 VIEW    HISTORY: 70 yearsMale cough and fatigue    TECHNIQUE: A single view of the chest was performed    COMPARISON: 7/15/2019    FINDINGS: Heart size and pulmonary vascularity are within normal  limits. Lungs are clear. No consolidating airspace opacities are  present.        Impression    IMPRESSION: Clear chest    BREE TOTH MD   UA reflex to Microscopic   Result Value Ref Range    Color Urine Light Yellow     Appearance Urine Clear     Glucose Urine Negative NEG^Negative mg/dL    Bilirubin Urine Negative NEG^Negative    Ketones Urine Negative NEG^Negative mg/dL    Specific Gravity Urine 1.012 1.003 - 1.035    Blood Urine Negative NEG^Negative    pH Urine 6.5 5.0 - 7.0 pH    Protein Albumin Urine Negative NEG^Negative mg/dL    Urobilinogen mg/dL Normal 0.0 - 2.0 mg/dL    Nitrite Urine Negative NEG^Negative    Leukocyte Esterase Urine Negative NEG^Negative    Source Midstream Urine        Medications   ketorolac  (TORADOL) injection 30 mg (30 mg Intramuscular Given 4/21/21 1705)   prochlorperazine (COMPAZINE) tablet 5 mg (5 mg Oral Given 4/21/21 1705)       Assessments & Plan (with Medical Decision Making)     I have reviewed the nursing notes.    I have reviewed the findings, diagnosis, plan and need for follow up with the patient.      New Prescriptions    GUAIFENESIN-CODEINE (ROBITUSSIN AC) 100-10 MG/5ML SOLUTION    Take 5-10 mLs by mouth every 4 hours as needed for cough    IBUPROFEN (ADVIL/MOTRIN) 800 MG TABLET    Take 1 tablet (800 mg) by mouth every 8 hours as needed for moderate pain    PROCHLORPERAZINE (COMPAZINE) 5 MG TABLET    Take 1 tablet (5 mg) by mouth every 6 hours as needed for nausea or vomiting       Final diagnoses:   Clinical diagnosis of COVID-19   Cough   Generalized body aches     Afebrile initially and then demonstrated a low-grade fever of 99.5.  Vital signs stable.  Patient with body aches back pain and cough over the past week or so.  Has been undergoing PT at [facility].  CBC shows normal white blood cells and no left shift.  CMP is unremarkable.  BNP is normal.  D-dimer is normal.  TSH is normal.  ESR is elevated at 28 but CRP remains normal.  Ferritin levels are normal.  His Covid returns positive.  UA shows no signs UTI.  Chest x-ray shows no acute consolidation.  He requests something for his back pain as well as some Compazine and he was given Toradol for pain relief.  I discussed symptomatic support and return if he has increasing shortness of breath for further evaluation.  Discussed isolation of 14 days.  Rx for Robitussin-AC for cough, ibuprofen for body aches, and Compazine as needed.  Follow-up with there is any concerns for further evaluation as needed.  4/21/2021   [facility]     Vini Fox PA-C  04/21/21 [id number]

## 2021-04-22 LAB — INTERPRETATION ECG - MUSE: NORMAL

## 2021-04-25 ENCOUNTER — HEALTH MAINTENANCE LETTER (OUTPATIENT)
Age: 71
End: 2021-04-25

## 2021-05-05 ENCOUNTER — TRANSFERRED RECORDS (OUTPATIENT)
Dept: HEALTH INFORMATION MANAGEMENT | Facility: OTHER | Age: 71
End: 2021-05-05

## 2021-05-19 ENCOUNTER — TRANSFERRED RECORDS (OUTPATIENT)
Dept: HEALTH INFORMATION MANAGEMENT | Facility: OTHER | Age: 71
End: 2021-05-19

## 2021-07-21 ENCOUNTER — TRANSFERRED RECORDS (OUTPATIENT)
Dept: HEALTH INFORMATION MANAGEMENT | Facility: OTHER | Age: 71
End: 2021-07-21

## 2021-09-08 ENCOUNTER — MYC MEDICAL ADVICE (OUTPATIENT)
Dept: FAMILY MEDICINE | Facility: OTHER | Age: 71
End: 2021-09-08

## 2021-09-08 DIAGNOSIS — G62.9 NEUROPATHY: Primary | ICD-10-CM

## 2021-09-15 ENCOUNTER — TELEPHONE (OUTPATIENT)
Dept: FAMILY MEDICINE | Facility: OTHER | Age: 71
End: 2021-09-15

## 2021-09-15 ENCOUNTER — TELEPHONE (OUTPATIENT)
Dept: FAMILY MEDICINE | Facility: OTHER | Age: 71
End: 2021-09-15
Payer: MEDICARE

## 2021-09-15 ENCOUNTER — LAB (OUTPATIENT)
Dept: LAB | Facility: OTHER | Age: 71
End: 2021-09-15
Attending: FAMILY MEDICINE
Payer: MEDICARE

## 2021-09-15 DIAGNOSIS — Z79.899 OTHER LONG TERM (CURRENT) DRUG THERAPY: ICD-10-CM

## 2021-09-15 DIAGNOSIS — G62.9 NEUROPATHY: ICD-10-CM

## 2021-09-15 DIAGNOSIS — G62.9 NEUROPATHY: Primary | ICD-10-CM

## 2021-09-15 LAB
FASTING STATUS PATIENT QL REPORTED: NORMAL
GLUCOSE BLD-MCNC: 102 MG/DL (ref 70–105)
HBA1C MFR BLD: 6.1 % (ref 4–6.2)
VIT B12 SERPL-MCNC: 914 PG/ML (ref 180–914)

## 2021-09-15 PROCEDURE — 82947 ASSAY GLUCOSE BLOOD QUANT: CPT | Mod: ZL

## 2021-09-15 PROCEDURE — 83036 HEMOGLOBIN GLYCOSYLATED A1C: CPT | Mod: ZL

## 2021-09-15 PROCEDURE — 82607 VITAMIN B-12: CPT | Mod: ZL

## 2021-09-15 PROCEDURE — 36415 COLL VENOUS BLD VENIPUNCTURE: CPT | Mod: ZL

## 2021-09-15 NOTE — TELEPHONE ENCOUNTER
Patient is at Diagnostic labs and needs A1C, glucose    Caridad Hilliard LPN on 9/15/2021 at 10:58 AM

## 2021-10-04 ENCOUNTER — TELEPHONE (OUTPATIENT)
Dept: FAMILY MEDICINE | Facility: OTHER | Age: 71
End: 2021-10-04

## 2021-10-04 DIAGNOSIS — G62.9 NEUROPATHY: Primary | ICD-10-CM

## 2021-10-04 NOTE — TELEPHONE ENCOUNTER
Patient calling and would like lymes test done and would like a prophylactic Doxycycline .    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     Denies any rash , bulls eye stephani.     has arthritis symptoms.    Patient requesting due to symptoms he has, per my chart message sent on 9/8/2021, but states they are progressing.    Patient  has an appointment on 10/26 with Dr. Bhatti.    Irene Lane RN on 10/4/2021 at 8:26 AM

## 2021-10-04 NOTE — TELEPHONE ENCOUNTER
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

## 2021-10-04 NOTE — TELEPHONE ENCOUNTER
Let him know I ordered the lab, but without more info (fevers, joint pains, rash) I would not want to just fax in antibiotics without seeing him.  Alec Howard MD on 10/4/2021 at 9:07 AM

## 2021-10-04 NOTE — TELEPHONE ENCOUNTER
Offer him a phone visit or virtual visit, but I do not want to treat for Lyme without significantly more information.  This is not a cimple call in for antibiotics disease, can be complicated.  Alec Howard MD on 10/4/2021 at 12:47 PM

## 2021-10-04 NOTE — TELEPHONE ENCOUNTER
"Patient stated \"he was was seen by me in [facility] and that he has joint pain all over\". Feels like he has lymes due to having pulled deer ticks off himself all summer    Caridad Hilliard LPN on 10/4/2021 at 9:23 AM    "

## 2021-10-05 ENCOUNTER — LAB (OUTPATIENT)
Dept: LAB | Facility: OTHER | Age: 71
End: 2021-10-05
Attending: FAMILY MEDICINE
Payer: MEDICARE

## 2021-10-05 ENCOUNTER — VIRTUAL VISIT (OUTPATIENT)
Dept: FAMILY MEDICINE | Facility: OTHER | Age: 71
End: 2021-10-05
Attending: FAMILY MEDICINE
Payer: MEDICARE

## 2021-10-05 DIAGNOSIS — M25.50 MULTIPLE JOINT PAIN: ICD-10-CM

## 2021-10-05 DIAGNOSIS — M25.50 MULTIPLE JOINT PAIN: Primary | ICD-10-CM

## 2021-10-05 LAB
CRP SERPL-MCNC: <1 MG/L
ERYTHROCYTE [SEDIMENTATION RATE] IN BLOOD BY WESTERGREN METHOD: 7 MM/HR (ref 0–20)
RHEUMATOID FACT SER NEPH-ACNC: <14 IU/ML

## 2021-10-05 PROCEDURE — 86200 CCP ANTIBODY: CPT | Mod: ZL

## 2021-10-05 PROCEDURE — 99442 PR PHYSICIAN TELEPHONE EVALUATION 11-20 MIN: CPT | Mod: 95 | Performed by: FAMILY MEDICINE

## 2021-10-05 PROCEDURE — 36415 COLL VENOUS BLD VENIPUNCTURE: CPT | Mod: ZL

## 2021-10-05 PROCEDURE — 86618 LYME DISEASE ANTIBODY: CPT | Mod: ZL

## 2021-10-05 PROCEDURE — 86140 C-REACTIVE PROTEIN: CPT | Mod: ZL

## 2021-10-05 PROCEDURE — 85652 RBC SED RATE AUTOMATED: CPT | Mod: ZL

## 2021-10-05 PROCEDURE — 86431 RHEUMATOID FACTOR QUANT: CPT | Mod: ZL

## 2021-10-05 RX ORDER — DOXYCYCLINE 100 MG/1
100 CAPSULE ORAL 2 TIMES DAILY
Qty: 28 CAPSULE | Refills: 0 | Status: SHIPPED | OUTPATIENT
Start: 2021-10-05 | End: 2021-10-19

## 2021-10-05 NOTE — NURSING NOTE
Patient is experiencing fatigue, stiffness in extremities and body aches for past 3 months.  He is inquiring if he might have Lyme's disease.  Medication Reconciliation: complete    Dana Horvath LPN    
no

## 2021-10-05 NOTE — PROGRESS NOTES
"Kaushik is a 70 year old who is being evaluated via a billable telephone visit.      What phone number would you like to be contacted at? [phone]  How would you like to obtain your AVS? [facility]    Assessment & Plan   Problem List Items Addressed This Visit     None      Visit Diagnoses     Multiple joint pain    -  Primary    Relevant Medications    doxycycline hyclate (VIBRAMYCIN) 100 MG capsule    Other Relevant Orders    Lyme Disease Ab with reflex to WB Serum    CRP inflammation    Sedimentation Rate (ESR)    Cyclic Citrullinated Peptide Antibody IgG    Rheumatoid factor         This of course could be long haul COVID.  Discussed with this with him too.  Significant risks for Lyme, and will treat for this based on symptoms while waiting for labs. He is willing to come in for labs.              BMI:   Estimated body mass index is 29.98 kg/m  as calculated from the following:    Height as of 1/12/21: 1.816 m (5' 11.5\").    Weight as of 4/21/21: 98.9 kg (218 lb).           No follow-ups on file.    Alec Howard MD  [facility]    Subjective   Kaushik is a 70 year old who presents for the following health issues:  Possible Lymes    HPI very stiff.  He feels \"mentally good\".  Physically \"not good\".  He feels it is Lyme disease. walks in the woods daily and has removed many ticks, usually 5 or so daily.  Has worked at the [employer] and is able to tell the difference between deer ticks and wood ticks.  He has not found a bobby bull's eye, but had a rash in the early summer.  Around the same time had some cold sensations in his feet.  For the last 2 weeks or so he has had aching into both calves, \"like I ran a marathon\" but is also very tired.  Some shortness of breath, neck pain and nasal congestion he feels is allergic in origin.  Now has a hard time holding a pen and some reduced fine motor skill.  Feels like a \"systemic arthritis\".  No fevers at all.  No hypoxia (home oximeter) despite the sense of " labored breathing. Uses CPAP at night.  Had hypoxia in the past, he feels from 3 prior COVID infections.  Says sats were as low as 83% 1 year ago.  Last winter got ill again, sats were 89% with that one.  In May 2021 he was seen in our emergency department and had a positive COVID nasal swab.  Has a pending EMG late this month for his neuropathy.             Review of Systems         Objective           Vitals:  No vitals were obtained today due to virtual visit.    Physical Exam   healthy, alert and no distress  PSYCH: Alert and oriented times 3; coherent speech, normal   rate and volume, able to articulate logical thoughts, able   to abstract reason, no tangential thoughts, no hallucinations   or delusions  His affect is normal  RESP: No cough, no audible wheezing, able to talk in full sentences  Remainder of exam unable to be completed due to telephone visits                Phone call duration: 14 minutes

## 2021-10-06 ENCOUNTER — TELEPHONE (OUTPATIENT)
Dept: FAMILY MEDICINE | Facility: OTHER | Age: 71
End: 2021-10-06

## 2021-10-06 LAB
B BURGDOR IGG+IGM SER QL: 0.05
CCP AB SER IA-ACNC: 1.7 U/ML

## 2021-10-06 NOTE — TELEPHONE ENCOUNTER
Took this medication doxycycline, he has a lot of improvement     Asked about his 02, he was at 94 with labored breathing with covid, now back to the 97-98 level       BP 90/48, really low with the covid     Blood pressure now 120/85 with lisinopril    He feels clearer, more energy with only two pills     CHERI Hilliard, LPN on 10/6/2021 at 12:44 PM

## 2021-10-09 ENCOUNTER — HEALTH MAINTENANCE LETTER (OUTPATIENT)
Age: 71
End: 2021-10-09

## 2021-10-19 ENCOUNTER — OFFICE VISIT (OUTPATIENT)
Dept: NEUROLOGY | Facility: OTHER | Age: 71
End: 2021-10-19
Attending: PSYCHIATRY & NEUROLOGY
Payer: MEDICARE

## 2021-10-19 VITALS
SYSTOLIC BLOOD PRESSURE: 122 MMHG | TEMPERATURE: 97.6 F | BODY MASS INDEX: 28.66 KG/M2 | HEIGHT: 72 IN | OXYGEN SATURATION: 97 % | DIASTOLIC BLOOD PRESSURE: 66 MMHG | WEIGHT: 211.6 LBS | RESPIRATION RATE: 16 BRPM | HEART RATE: 69 BPM

## 2021-10-19 DIAGNOSIS — G60.3 IDIOPATHIC PROGRESSIVE POLYNEUROPATHY: Primary | ICD-10-CM

## 2021-10-19 PROCEDURE — 95886 MUSC TEST DONE W/N TEST COMP: CPT | Mod: 26 | Performed by: PSYCHIATRY & NEUROLOGY

## 2021-10-19 PROCEDURE — G0463 HOSPITAL OUTPT CLINIC VISIT: HCPCS | Mod: 25

## 2021-10-19 PROCEDURE — 95923 AUTONOMIC NRV SYST FUNJ TEST: CPT | Mod: 26 | Performed by: PSYCHIATRY & NEUROLOGY

## 2021-10-19 PROCEDURE — 95913 NRV CNDJ TEST 13/> STUDIES: CPT | Performed by: PSYCHIATRY & NEUROLOGY

## 2021-10-19 PROCEDURE — 95923 AUTONOMIC NRV SYST FUNJ TEST: CPT

## 2021-10-19 PROCEDURE — 99205 OFFICE O/P NEW HI 60 MIN: CPT | Mod: 25 | Performed by: PSYCHIATRY & NEUROLOGY

## 2021-10-19 PROCEDURE — 95886 MUSC TEST DONE W/N TEST COMP: CPT | Performed by: PSYCHIATRY & NEUROLOGY

## 2021-10-19 PROCEDURE — 95913 NRV CNDJ TEST 13/> STUDIES: CPT | Mod: 26 | Performed by: PSYCHIATRY & NEUROLOGY

## 2021-10-19 ASSESSMENT — PAIN SCALES - GENERAL: PAINLEVEL: MILD PAIN (3)

## 2021-10-19 ASSESSMENT — MIFFLIN-ST. JEOR: SCORE: 1739.87

## 2021-10-19 NOTE — NURSING NOTE
"Chief Complaint   Patient presents with     Consult     BLE EMG Nueropathy     Patient presents to the clinic for BLE EMG nueropathy.    Initial /66   Pulse 69   Temp 97.6  F (36.4  C) (Tympanic)   Resp 16   Ht 1.816 m (5' 11.5\")   Wt 96 kg (211 lb 9.6 oz)   SpO2 97%   BMI 29.10 kg/m   Estimated body mass index is 29.1 kg/m  as calculated from the following:    Height as of this encounter: 1.816 m (5' 11.5\").    Weight as of this encounter: 96 kg (211 lb 9.6 oz).  Medication Reconciliation: complete    Sully Hoffman LPN    "

## 2021-10-19 NOTE — LETTER
10/19/2021         RE: Kaushik Ansari  [address]   [place] [address]        Dear Colleague,    Thank you for referring your patient, Kaushik Ansari, to the [facility]. Please see a copy of my visit note below.    Visit Date: 10/19/2021    NEURODIAGNOSTICS CONSULTATION    REFERRING PHYSICIAN:  Dr. Alec Howard    HISTORY OF PRESENT ILLNESS:  The patient is a pleasant 70-year-old gentleman who relates at least a 1-year history of ascending sensory loss and paresthesia affecting the feet as well as a feeling that the feet are excessively cool.  This is superimposed on a greater than 2-decade history of recurrent sciatica-like symptoms on the right.  Over the last few months, he has developed recurrent paresthesia in the hands, often present on awakening.  There is no definite history of weakness.    PAST MEDICAL HISTORY:  Seems to be noncontributory.  The patient has obstructive sleep apnea.  He is not significantly overweight and is not diabetic.  He is on a B12 supplement, but I do not know if he was ever found to be B12 deficient.    A 10-system review of systems other than the above is negative.  The patient has had a little bit of neck pain, but does not describe radicular quality pain and he does not describe Lhermitte sign.    SOCIAL HISTORY:  The patient has a very remote history of tobacco use.  There is no history of meaningful alcohol consumption.    FAMILY HISTORY:  Negative for neuropathy.    PHYSICAL EXAMINATION:  Reveals an alert 70-year-old.  His speech is somewhat pressured.  He seems slightly anxious.  He is 73 inches tall and weighs 212 pounds.  His gait is normal.  Reflexes are 1/4 at the knees and ankles.  Vibratory extinction is 8-9 seconds at each ankle.  Cold sensation is preserved in the feet.  There is grossly normal strength throughout in the lower extremities.  In the upper extremities, there is weakness bilaterally for the abductors of the  thumb, but not for the interossei.  Plantar responses are downgoing.    NERVE CONDUCTION STUDIES:  Motor nerve conduction testing was performed bilaterally for the peroneal and tibial nerves.  Latencies were within normal limits.  There was mild amplitude reduction throughout and borderline slowing for most of the tested nerves.  No H reflex could be obtained for the right peroneal nerve, but this may well have been secondary to the presence of an accessory peroneal branch innervating the extensor digitorum brevis.    Antidromic sensory nerve conduction studies were performed bilaterally for the sural, peroneal, and saphenous nerves.  Only the sural nerves produced waveforms and the amplitudes were moderately reduced.    Sympathetic skin response testing was performed for the right lower extremity with stimulation at the knee and recording from the plantar aspect of the foot.  A biphasic waveform with latency and amplitude within normal limits was obtained.    MONOPOLAR EMG NEEDLE EXAMINATION:  Monopolar EMG needle examination was performed bilaterally for the tibialis anterior, gastrocnemius, extensor hallucis longus, abductor hallucis, and flexor digitorum longus.  Bilaterally, there were mild chronic denervation changes in a distal to proximal gradient.  On the right side, there were moderate chronic denervation changes in the L5 myotome.    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  This is likely to prove idiopathic.  The pattern is nonspecific.  Extensive laboratory workup is not warranted, but if not checked within the last year, he should be screened for B12 deficiency, paraproteinemia and diabetes.    Superimposed in the generalized neuropathy, the patient has EMG abnormalities to suggest very chronic right-sided mild to moderate grade L5 radiculopathy.  This presumably  accounts for the sciatica-like symptoms the patient has described over the last 2 decades.    The patient complains of paresthesia in the hands and, on physical exam, he has a pattern of weakness suspicious for carpal tunnel syndrome.  At some point, it may be worthwhile to perform neurodiagnostic testing to evaluate this further.    Findings were reviewed with the patient.    Kaushik Argueta MD        D: 10/19/2021   T: 10/19/2021   MT: [id number]    Name:     KAUSHIK CEBALLOS  MRN:      [id number]        Account:    [id number]   :      1950           Visit Date: 10/19/2021     Document: [id number]      Again, thank you for allowing me to participate in the care of your patient.        Sincerely,        Kaushik Argueta MD

## 2021-10-19 NOTE — PROGRESS NOTES
Visit Date: 10/19/2021    NEURODIAGNOSTICS CONSULTATION    REFERRING PHYSICIAN:  Dr. Alec Howard    HISTORY OF PRESENT ILLNESS:  The patient is a pleasant 70-year-old gentleman who relates at least a 1-year history of ascending sensory loss and paresthesia affecting the feet as well as a feeling that the feet are excessively cool.  This is superimposed on a greater than 2-decade history of recurrent sciatica-like symptoms on the right.  Over the last few months, he has developed recurrent paresthesia in the hands, often present on awakening.  There is no definite history of weakness.    PAST MEDICAL HISTORY:  Seems to be noncontributory.  The patient has obstructive sleep apnea.  He is not significantly overweight and is not diabetic.  He is on a B12 supplement, but I do not know if he was ever found to be B12 deficient.    A 10-system review of systems other than the above is negative.  The patient has had a little bit of neck pain, but does not describe radicular quality pain and he does not describe Lhermitte sign.    SOCIAL HISTORY:  The patient has a very remote history of tobacco use.  There is no history of meaningful alcohol consumption.    FAMILY HISTORY:  Negative for neuropathy.    PHYSICAL EXAMINATION:  Reveals an alert 70-year-old.  His speech is somewhat pressured.  He seems slightly anxious.  He is 73 inches tall and weighs 212 pounds.  His gait is normal.  Reflexes are 1/4 at the knees and ankles.  Vibratory extinction is 8-9 seconds at each ankle.  Cold sensation is preserved in the feet.  There is grossly normal strength throughout in the lower extremities.  In the upper extremities, there is weakness bilaterally for the abductors of the thumb, but not for the interossei.  Plantar responses are downgoing.    NERVE CONDUCTION STUDIES:  Motor nerve conduction testing was performed bilaterally for the peroneal and tibial nerves.  Latencies were within normal limits.  There was mild amplitude  reduction throughout and borderline slowing for most of the tested nerves.  No H reflex could be obtained for the right peroneal nerve, but this may well have been secondary to the presence of an accessory peroneal branch innervating the extensor digitorum brevis.    Antidromic sensory nerve conduction studies were performed bilaterally for the sural, peroneal, and saphenous nerves.  Only the sural nerves produced waveforms and the amplitudes were moderately reduced.    Sympathetic skin response testing was performed for the right lower extremity with stimulation at the knee and recording from the plantar aspect of the foot.  A biphasic waveform with latency and amplitude within normal limits was obtained.    MONOPOLAR EMG NEEDLE EXAMINATION:  Monopolar EMG needle examination was performed bilaterally for the tibialis anterior, gastrocnemius, extensor hallucis longus, abductor hallucis, and flexor digitorum longus.  Bilaterally, there were mild chronic denervation changes in a distal to proximal gradient.  On the right side, there were moderate chronic denervation changes in the L5 myotome.    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  This is likely to prove idiopathic.  The pattern is nonspecific.  Extensive laboratory workup is not warranted, but if not checked within the last year, he should be screened for B12 deficiency, paraproteinemia and diabetes.    Superimposed in the generalized neuropathy, the patient has EMG abnormalities to suggest very chronic right-sided mild to moderate grade L5 radiculopathy.  This presumably accounts for the sciatica-like symptoms the patient has described over the last 2 decades.    The patient complains of paresthesia in the hands and, on physical exam, he has a pattern of weakness suspicious for carpal tunnel syndrome.  At some point, it may be  worthwhile to perform neurodiagnostic testing to evaluate this further.    Findings were reviewed with the patient.    Kaushik Argueta MD        D: 10/19/2021   T: 10/19/2021   MT: [id number]    Name:     KAUSHIK CEBALLOS  MRN:      [id number]        Account:    [id number]   :      1950           Visit Date: 10/19/2021     Document: [id number]

## 2021-11-05 ENCOUNTER — MYC MEDICAL ADVICE (OUTPATIENT)
Dept: FAMILY MEDICINE | Facility: OTHER | Age: 71
End: 2021-11-05
Payer: MEDICARE

## 2021-12-09 ENCOUNTER — OFFICE VISIT (OUTPATIENT)
Dept: FAMILY MEDICINE | Facility: OTHER | Age: 71
End: 2021-12-09
Attending: FAMILY MEDICINE
Payer: MEDICARE

## 2021-12-09 VITALS
WEIGHT: 211 LBS | RESPIRATION RATE: 16 BRPM | DIASTOLIC BLOOD PRESSURE: 66 MMHG | HEART RATE: 53 BPM | SYSTOLIC BLOOD PRESSURE: 124 MMHG | BODY MASS INDEX: 29.02 KG/M2 | TEMPERATURE: 98 F | OXYGEN SATURATION: 98 %

## 2021-12-09 DIAGNOSIS — M54.17 LUMBOSACRAL RADICULOPATHY AT L5: ICD-10-CM

## 2021-12-09 DIAGNOSIS — J30.89 NON-SEASONAL ALLERGIC RHINITIS DUE TO FUNGAL SPORES: Primary | ICD-10-CM

## 2021-12-09 DIAGNOSIS — G62.89 NEUROPATHY, PERIPHERAL AXONAL: ICD-10-CM

## 2021-12-09 PROCEDURE — G0463 HOSPITAL OUTPT CLINIC VISIT: HCPCS

## 2021-12-09 PROCEDURE — 99213 OFFICE O/P EST LOW 20 MIN: CPT | Performed by: FAMILY MEDICINE

## 2021-12-09 ASSESSMENT — PATIENT HEALTH QUESTIONNAIRE - PHQ9
SUM OF ALL RESPONSES TO PHQ QUESTIONS 1-9: 3
10. IF YOU CHECKED OFF ANY PROBLEMS, HOW DIFFICULT HAVE THESE PROBLEMS MADE IT FOR YOU TO DO YOUR WORK, TAKE CARE OF THINGS AT HOME, OR GET ALONG WITH OTHER PEOPLE: SOMEWHAT DIFFICULT
SUM OF ALL RESPONSES TO PHQ QUESTIONS 1-9: 3

## 2021-12-09 ASSESSMENT — ANXIETY QUESTIONNAIRES
4. TROUBLE RELAXING: MORE THAN HALF THE DAYS
GAD7 TOTAL SCORE: 6
GAD7 TOTAL SCORE: 6
3. WORRYING TOO MUCH ABOUT DIFFERENT THINGS: NOT AT ALL
GAD7 TOTAL SCORE: 6
7. FEELING AFRAID AS IF SOMETHING AWFUL MIGHT HAPPEN: NOT AT ALL
1. FEELING NERVOUS, ANXIOUS, OR ON EDGE: SEVERAL DAYS
5. BEING SO RESTLESS THAT IT IS HARD TO SIT STILL: SEVERAL DAYS
6. BECOMING EASILY ANNOYED OR IRRITABLE: MORE THAN HALF THE DAYS
7. FEELING AFRAID AS IF SOMETHING AWFUL MIGHT HAPPEN: NOT AT ALL
2. NOT BEING ABLE TO STOP OR CONTROL WORRYING: NOT AT ALL

## 2021-12-09 ASSESSMENT — PAIN SCALES - GENERAL: PAINLEVEL: MILD PAIN (3)

## 2021-12-09 NOTE — NURSING NOTE
"Coming in with concerns and f/u Davy    Breathing fiberglass     Neuropathy, feet up,needs referrals    Chief Complaint   Patient presents with     RECHECK     Davy       Initial /66   Pulse 53   Temp 98  F (36.7  C)   Resp 16   Wt 95.7 kg (211 lb)   SpO2 98%   BMI 29.02 kg/m   Estimated body mass index is 29.02 kg/m  as calculated from the following:    Height as of 10/19/21: 1.816 m (5' 11.5\").    Weight as of this encounter: 95.7 kg (211 lb).  Medication Reconciliation: complete.  FOOD SECURITY SCREENING QUESTIONS  Hunger Vital Signs:  Within the past 12 months we worried whether our food would run out before we got money to buy more. Never  Within the past 12 months the food we bought just didn't last and we didn't have money to get more. Never  Caridad Hilliard LPN 12/9/2021 12:48 PM      Caridad Hilliard LPN  "

## 2021-12-09 NOTE — LETTER
December 9, 2021      Kaushik Ansari  [address] [place] [address]        Dear Kaushik,     You have new allergy type symptoms, such as sneezing and post nasal drip that have all started since you moved into your new home.  I understand that the home is not properly vented and there may be black mold present.  I feel that it is more likely than not that this is contributing to the new symptoms.        Sincerely,        Alec Howard MD

## 2021-12-09 NOTE — PROGRESS NOTES
"  Assessment & Plan   Problem List Items Addressed This Visit        Nervous and Auditory    Neuropathy, peripheral axonal    Lumbosacral radiculopathy at L5      Other Visit Diagnoses     Non-seasonal allergic rhinitis due to fungal spores    -  Primary         Letter given.  We can in the future get skin testing to see which exact allergens are triggering the symptoms, but black mold can.  It is also possible the duct work is contaminated with other allergens.      Regarding the neuropathy, the symptoms are slowly improving.  It is reasonable his COVID triggered this.  He does not want any further work up for either of these at this time.  Follow up as needed therefore.             BMI:   Estimated body mass index is 29.02 kg/m  as calculated from the following:    Height as of 10/19/21: 1.816 m (5' 11.5\").    Weight as of this encounter: 95.7 kg (211 lb).           No follow-ups on file.    Alec Howard MD  [facility]    [facility]   Kaushik is a 70 year old who presents for the following health issues     HPI breathing issues.  He has had some issues with his new home, which was not vented properly.  Has black discoloration in the insulation and with this he has had ongoing cough,  Post nasal drip.  Would like a letter from me for insurance purposes.  He has not had prior issues with allergies, excessive sneezing or rhinorrhea.  All of these symptoms get worse when the furnace is turned on.     Also follow up in recent EMG.   This was both chronic distal axonal large fiber as well as right moderate L5 radiculopathy.  He says he has had COVID 3 times.  This seemed to have triggered the neuro symptoms.  including trouble with bilateral hand  strength.  Last imaging was 2015, with an x-ray showing mild L5/S1 stenosis.  The hand symptoms are much improved now, and he says the radiculopathy has been present for decades.  Has his sense of smell and taste back.  His B12 and A1c were normal in " September of this year.     Answers for HPI/ROS submitted by the patient on 12/9/2021  If you checked off any problems, how difficult have these problems made it for you to do your work, take care of things at home, or get along with other people?: Somewhat difficult  PHQ9 TOTAL SCORE: 3  SHUKRI 7 TOTAL SCORE: 6            Review of Systems         Objective    /66   Pulse 53   Temp 98  F (36.7  C)   Resp 16   Wt 95.7 kg (211 lb)   SpO2 98%   BMI 29.02 kg/m    Body mass index is 29.02 kg/m .  Physical Exam  Constitutional:       Appearance: Normal appearance.   Neurological:      General: No focal deficit present.      Mental Status: He is alert and oriented to person, place, and time.   Psychiatric:         Mood and Affect: Mood normal.         Behavior: Behavior normal.         Thought Content: Thought content normal.

## 2021-12-10 ASSESSMENT — PATIENT HEALTH QUESTIONNAIRE - PHQ9: SUM OF ALL RESPONSES TO PHQ QUESTIONS 1-9: 3

## 2021-12-10 ASSESSMENT — ANXIETY QUESTIONNAIRES: GAD7 TOTAL SCORE: 6

## 2021-12-14 ENCOUNTER — MYC MEDICAL ADVICE (OUTPATIENT)
Dept: FAMILY MEDICINE | Facility: OTHER | Age: 71
End: 2021-12-14
Payer: MEDICARE

## 2021-12-14 DIAGNOSIS — Z91.048 ALLERGY TO MOLD: Primary | ICD-10-CM

## 2021-12-15 ENCOUNTER — THERAPY VISIT (OUTPATIENT)
Dept: CHIROPRACTIC MEDICINE | Facility: OTHER | Age: 71
End: 2021-12-15
Attending: CHIROPRACTOR
Payer: MEDICARE

## 2021-12-15 VITALS
TEMPERATURE: 97.3 F | RESPIRATION RATE: 24 BRPM | SYSTOLIC BLOOD PRESSURE: 142 MMHG | HEART RATE: 92 BPM | DIASTOLIC BLOOD PRESSURE: 78 MMHG | OXYGEN SATURATION: 95 %

## 2021-12-15 DIAGNOSIS — G89.29 CHRONIC LOW BACK PAIN WITH RIGHT-SIDED SCIATICA, UNSPECIFIED BACK PAIN LATERALITY: Primary | ICD-10-CM

## 2021-12-15 DIAGNOSIS — G56.90 NEUROPATHY OF HAND, UNSPECIFIED LATERALITY: ICD-10-CM

## 2021-12-15 DIAGNOSIS — M54.41 CHRONIC LOW BACK PAIN WITH RIGHT-SIDED SCIATICA, UNSPECIFIED BACK PAIN LATERALITY: Primary | ICD-10-CM

## 2021-12-15 PROCEDURE — 99213 OFFICE O/P EST LOW 20 MIN: CPT | Mod: GY | Performed by: CHIROPRACTOR

## 2021-12-15 PROCEDURE — 97810 ACUP 1/> WO ESTIM 1ST 15 MIN: CPT | Mod: GA | Performed by: CHIROPRACTOR

## 2021-12-15 PROCEDURE — G0463 HOSPITAL OUTPT CLINIC VISIT: HCPCS

## 2021-12-15 NOTE — PROGRESS NOTES
Received booster covid vaccine yesterday. Lower back right side. 2/10 W24 3/10.Radiates left leg. Having pins and needles feeling in both feet and radiating up legs to calfs.Started in January. Both hands intermittent cramping. 5/10 W24 8/10. Tylenol, Ibuprofen and shower has helped to decrease pain.    PATIENT:  Kaushik Ansari is a 71 year old male presenting for chronic lower back pain with right-sided sciatica, neuropathy of the hands bilaterally    PROBLEM:   Date of Initial Visit for this Episode:  12/15/2021    Visit #1    SUBJECTIVE / HPI: Patient referred to our office by Dr. Lee AUGUSTINE for possible acupuncture application to help patient with chronic low back, right-sided sciatica symptoms, bilateral neuropathy of the hands.    Sciatica symptoms have been present for past 30 years.  Reports working with [employer] therapy, patient reports therapy has been helpful with managing the symptoms.    Legs reportedly feel quite tired.  No reported loss of bowel or bladder.  Some weakness of the lower extremities.    Patient very concerned about bilateral hand cramping/neuropathy.  Reports that in June/July 2021 he began waking up noticing stiffness in his hands.  Patient notes that about the same time he began getting Covid vaccines.  The symptoms seem to present after initial vaccines.  On today's visit patient recently got his third Covid shot.  Reports that today he is reacting to this and feeling side effects.    Reports having Covid 3 times over the past year.    Has been attempting to figure out hand stiffness/neuropathy symptoms with Dr. Lee AUGUSTINE. review of patient's chart shows test performed for Lyme's disease, negative, C-reactive protein appears within normal limits, B12 WNL.  Reports trying B12 supplementation to help with symptoms.  Patient also interested in other forms of supplementation to help with the symptoms.  Inquires about erna.    Patient most interested in trial of acupuncture to  help with the symptoms.  States he has tried chiropractic care in the past, does not wish to be treated with chiropractic care at this time.    Radiation of pain: right lateral calf, current neuropathy of hands bilaterally  Worse with:  unsure  Improved by: Tylenol, showers, stretches through [place]  Other Health Care Providers seen for this: Dr. Lee AUGUSTINE, Dr. Kendall AUGUSTINE, [facility] physical therapy  Previous treatment: medication, vitamin supplementation, physical therapy  Previous injury:April, 2019 feel on ice and sustained a concussion      See flowsheets in chart for details.  12/15/2021   Oswestry (CAMERON) Questionnaire    OSWESTRY DISABILITY INDEX 12/15/2021   Count 9   Sum 14   Oswestry Score (%) 31.11   Some recent data might be hidden      Upper Extremity Functional Index (  1996 [facility]) 12/15/2021   a.  Any of your usual work, housework or school activities 0-Extreme Difficulty   b.  Your usual hobbies, recreational or sporting activities 2-Moderate Difficulty   c.  Lifting a bag of groceries to waist level 4-No Difficulty   d.  Placing an object onto, or removing it from an overhead shelf 1-Quite a bit of Difficulty   e.  Washing your hair or scalp 4-No Difficulty   f.   Pushing up on your hands (e.g., from bathtub or chair) 2-Moderate Difficulty   g.  Preparing food (e.g., peeling, cutting) 0-Extreme Difficulty   h.  Driving 4-No Difficulty   I.   Vacuuming, sweeping, or raking 1-Quite a bit of Difficulty   j.   Dressing 2-Moderate Difficulty   k.  Doing up buttons 1-Quite a bit of Difficulty   l.   Using tools or appliances 1-Quite a bit of Difficulty   m. Opening doors 2-Moderate Difficulty   n.  Cleaning 1-Quite a bit of Difficulty   o.  Tying or lacing shoes 0-Extreme Difficulty   p.  Sleeping 1-Quite a bit of Difficulty   q.  Laundering clothes. (e.g., washing, ironing, folding) 1-Quite a bit of Difficulty   r.   Opening a jar 1-Quite a bit of Difficulty   s.  Throwing a ball 1-Quite a  bit of Difficulty   t.   Carrying a small suitcase with your affected limb  2-Moderate Difficulty   Column Totals: /80 31   Functional limitations:  Standing >10 minutes, walking 1/4 mile, sleeping    Past D.C. Care: yes,    PAST MEDICAL HISTORY:  Past Medical History:   Diagnosis Date     Anxiety disorder     No Comments Provided     Complete tear of right rotator cuff     3/23/2017     Dependence on other enabling machines and devices     No Comments Provided     Encounter for prescription of emergency contraception     No Comments Provided     Essential tremor     No Comments Provided     Impingement syndrome of right shoulder     3/23/2017     Other specified postprocedural states     4/19/2017     Primary osteoarthritis of shoulder     3/23/2017     Sciatica     No Comments Provided     Status post shoulder surgery 4/19/2017     Trigger thumb of right hand     7/24/2017       PAST SURGICAL HISTORY:  Past Surgical History:   Procedure Laterality Date     COLONOSCOPY  03/2000     COLONOSCOPY  03/01/2005    normal by patient report     COLONOSCOPY  01/03/2014     OTHER SURGICAL HISTORY      71290.0,CT CORONARY ANGIOGRAM (IA)     OTHER SURGICAL HISTORY      04/19/2017,050067,OTHER,Right     VASECTOMY      No Comments Provided       ALLERGIES:  No Known Allergies    CURRENT MEDICATIONS:  Current Outpatient Medications   Medication Sig Dispense Refill     acetaminophen (TYLENOL) 500 MG tablet Take 1,000 mg by mouth every 6 hours as needed for mild pain       aspirin 81 MG EC tablet Take 81 mg by mouth daily       cyanocobalamin (VITAMIN B-12) 100 MCG tablet Take 100 mcg by mouth daily       guaiFENesin-codeine (ROBITUSSIN AC) 100-10 MG/5ML solution Take 5-10 mLs by mouth every 4 hours as needed for cough 120 mL 1     ibuprofen (ADVIL/MOTRIN) 800 MG tablet Take 1 tablet (800 mg) by mouth every 8 hours as needed for moderate pain 60 tablet 0     lisinopril-hydrochlorothiazide (ZESTORETIC) 10-12.5 MG tablet Take 1  tablet by mouth once daily 90 tablet 3     order for DME Equipment being ordered: CPAP supplies 1 each 1     order for DME Equipment being ordered: CPAP replacement supplies 1 each 3     prochlorperazine (COMPAZINE) 5 MG tablet Take 1 tablet (5 mg) by mouth every 6 hours as needed for nausea or vomiting 10 tablet 0     sildenafil (VIAGRA) 100 MG tablet Take 1 tablet (100 mg) by mouth daily as needed (erections) 12 tablet 3       SOCIAL HISTORY:  Marital Status:  and remarried.  Children: yes.  Occupation: Retired DNR .  Alcohol use:none.  Tobacco use: Smoker: no.      FAMILY HISTORY:  Family History   Problem Relation Age of Onset     Breast Cancer Mother         Cancer-breast     Substance Abuse Father         Alcohol/Drug     Diabetes Daughter         Diabetes       Patient Active Problem List   Diagnosis     Status post shoulder surgery     Benign prostatic hyperplasia with urinary frequency     AC (acromioclavicular) joint arthritis     Allergic rhinitis due to pollen     Anxiety state     Bunion     Complete tear of right rotator cuff     Degeneration of intervertebral disc of lumbar region     Disturbance of skin sensation     Diverticulosis of colon     Depressive disorder, not elsewhere classified     Familial tremor     Impingement syndrome of right shoulder     Pain in joint, lower leg     Lactose intolerance     Lateral epicondylitis     Myalgia and myositis     Obstructive sleep apnea     Otitis media, serous     History of colonic polyps     Plantar fascial fibromatosis     Sciatica     Seborrheic keratosis     Trigger finger of right thumb     Acute upper respiratory infection     Essential hypertension     Neuropathy, peripheral axonal     Lumbosacral radiculopathy at L5         ROS:  The patient denies any fevers, chills, nausea, vomiting, diarrhea, constipation,dysuria, hematuria, or urinary hesitancy or incontinence.  No shortness of breath, chest pain, or rashes.    OBJECTIVE:  BP  (!) 142/78 (BP Location: Right arm, Patient Position: Sitting)   Pulse 92   Temp 97.3  F (36.3  C) (Tympanic)   Resp 24   SpO2 95%   DIAGNOSTICS:   Study Result    Narrative & Impression   Procedure: XR SPINE LUMBAR 3 VIEWS     HISTORY:  Radiculopathy of lumbar region.     TECHNIQUE: AP and lateral.     COMPARISON: 03/03/2006     FINDINGS:     Vertebral bodies are well aligned and well maintained. Mild disc disease at L5-S1. Mild facet arthrosis at the lower levels with only mild narrowing of L5-S1. No fractures or displacement.     IMPRESSION:     1. Mild degenerative disc disease at L5-S1.  2. Mild facet arthrosis the lower lumbar region the bony a mild neural foraminal stenosis at L5-S1.  3. No acute fractures or displacement.     Electronically Signed By: Jose Navas M.D. on 5/27/2015 2:49 PM       PHYSICAL EXAM:     GENERAL APPEARANCE: healthy, alert, active, no distress and cooperative   GAIT: NORMAL  NEURO: Sharp/dull sensory intact upper extremities bilaterally, upper extremity strength WNL bilaterally, lower extremity strength WNL bilaterally      MUSCULOSKELETAL:   Posture: Anterior head carriage, rounded shoulders.   Gait:  unremarkable.     Thoracic and Lumbar performed actively, measured approximately  ROM:  60/60 flexion 50/60 extension    45/45 RLF    45/45 LLF     Kemps: negative  Straight leg raise:-right, -left    +Tenderness: minimal and transient left PSIS  +Muscle spasm: lumbar paraspinals bilaterally,   +Joint asymmetry and restriction: none apparent    ASSESSMENT: Kaushik Ansari is a 71 year old male presenting with primary complaints of chronic low back pain with right-sided sciatica along with neuropathy of the hands.  I do believe trial of acupuncture is appropriate for patient's symptoms.  No contraindications precluding patient from receiving care today.  As per patient request no chiropractic care will be utilized without his permission.  Chiropractic assessment of patient's  lumbopelvic spine does not show signs of segmental/somatic dysfunction however we will continue to monitor this.  Initial trial of care to include 4 chiropractic acupuncture visits.  If patient shows progress continue with care, however if progress is quite minimal discuss further care with patient as well as primary provider.    Discussed use of erna as an anti-inflammatory herbal supplementation.  Also discussed turmeric, B12 and magnesium to help with neuropathic type pain.     1. Chronic low back pain with right-sided sciatica, unspecified back pain laterality    2. Neuropathy of hand, unspecified laterality        PLAN    Evaluation and Management:  94469 Moderate exam established patient 20 min    Procedures:  Modalities:  02393: Acupuncture, for 15 minutes:  Points: Bl 22, 23, 24, 25, 46, 47, 55, LI 11, TW 9, Baxie points   For 15 minutes    CMT:  None performed      Therapeutic procedures:  None    Response to Treatment  No Change in symptoms as reported by the patient    Prognosis: Good    12/15/2021 Plan of Care:  6-8 visits of Chiropractic Care including Spinal Adjustments, chiropractic and/or physiotherapy and active rehabilitation, to include exercises in the office and/or at home to meet care plan goals.     Frequency: 2xweek for up to 4 weeks. A reevaluation would be clinically appropriate in 6-8 visits, to determine progress and further course of care. Initial trial for 4 visits.    POC discussed and patient agreeable to plan of care.      12/15/2021 Goals:      Patient will report improved pain/ neruopathy symptoms by 25%.   Patient will report able to sleep with 50% improvement.    Patient will report able to walk > 1/4 mile.   Patient will demonstrate an improved ability to complete Activities of Daily Living  as shown by a reported 10-30% reduced score on upper extremity and/or back index.    Patient will demonstrate improved ROM.        INSTRUCTIONS   monitor symptoms    Follow-up:  Return to  care in 5 days.

## 2021-12-22 ENCOUNTER — THERAPY VISIT (OUTPATIENT)
Dept: CHIROPRACTIC MEDICINE | Facility: OTHER | Age: 71
End: 2021-12-22
Attending: CHIROPRACTOR
Payer: MEDICARE

## 2021-12-22 VITALS
TEMPERATURE: 96.9 F | OXYGEN SATURATION: 93 % | HEART RATE: 91 BPM | RESPIRATION RATE: 20 BRPM | SYSTOLIC BLOOD PRESSURE: 108 MMHG | DIASTOLIC BLOOD PRESSURE: 62 MMHG

## 2021-12-22 DIAGNOSIS — M54.41 CHRONIC LOW BACK PAIN WITH RIGHT-SIDED SCIATICA, UNSPECIFIED BACK PAIN LATERALITY: Primary | ICD-10-CM

## 2021-12-22 DIAGNOSIS — G56.90 NEUROPATHY OF HAND, UNSPECIFIED LATERALITY: ICD-10-CM

## 2021-12-22 DIAGNOSIS — G89.29 CHRONIC LOW BACK PAIN WITH RIGHT-SIDED SCIATICA, UNSPECIFIED BACK PAIN LATERALITY: Primary | ICD-10-CM

## 2021-12-22 PROCEDURE — 97810 ACUP 1/> WO ESTIM 1ST 15 MIN: CPT | Mod: GA | Performed by: CHIROPRACTOR

## 2021-12-22 PROCEDURE — G0463 HOSPITAL OUTPT CLINIC VISIT: HCPCS

## 2021-12-22 NOTE — PROGRESS NOTES
Both hands intermittent cramping. 7/10 W24 7/10. Lower back feels better. Still some tingling into right foot.   Nica De La Cruz on 12/22/2021 at 1:32 PM    Visit #:  2    Subjective:  Kaushik Ansari is a 71 year old male who is seen in f/u up for:        Chronic low back pain with right-sided sciatica, unspecified back pain laterality  Neuropathy of hand, unspecified laterality.     Since last visit on 12/15/2021,  Kaushik Ansari reports: Noticed some relief of low back and right-sided sciatica symptoms they are present today.    Patient continuing to have quite a bit of difficulty with hands bilaterally.  Noticing that gripping actions are most problematic.  Noticed some slight changes after initial treatment however these are quite minor at this time.    Has been attempting to utilize supplementation to help with symptoms.  Tried turmeric which seem to cause GI distress, has not attempted erna at this time. Recommended to discontinue turmeric.     Objective:  The following was observed:  /62 (BP Location: Right arm, Patient Position: Sitting)   Pulse 91   Temp 96.9  F (36.1  C) (Tympanic)   Resp 20   SpO2 93%      P: palpatory tenderness none reported:    A: asymmetric joints, none apparent  R: restricted motion , none noted  T: muscle spasm at level(s):  lumbar paraspinals and quadratus lumborum on right:      Segmental spinal dysfunction/restrictions found at:  None apparent.      Assessment: Provided patient reassurance that it is not abnormal for symptoms to slowly progress for first handful of visits.  Chiropractic assessment does not show signs of segmental/somatic dysfunction.  Per patient request no chiropractic adjustments are being performed at this time.    Diagnoses:      1. Chronic low back pain with right-sided sciatica, unspecified back pain laterality    2. Neuropathy of hand, unspecified laterality        Patient's condition:  Symptoms come and go    Treatment  effectiveness:  Symptoms appear to be decreasing      Procedures:  CMT:  None performed per patient request    Modalities:  42525: Acupuncture, for 15 minutes:  Points:  Bl 22, 23, 24, 25, 46, 47,Li 4, 11, TW 9, SI 3, Baxie points  For 15 minutes    Therapeutic procedures:  None    Response to Treatment  No Change in symptoms     Prognosis: Good    Progress towards Goals: Patient is making progress towards the goal.   Patient will report improved pain/ neruopathy symptoms by 25%.              Patient will report able to sleep with 50% improvement.               Patient will report able to walk > 1/4 mile.              Patient will demonstrate an improved ability to complete Activities of Daily Living   as shown by a reported 10-30% reduced score on upper extremity and/or back index.               Patient will demonstrate improved ROM.    Recommendations:    Instructions:none    Follow-up:  Return to care in 5 days.

## 2021-12-28 ENCOUNTER — THERAPY VISIT (OUTPATIENT)
Dept: CHIROPRACTIC MEDICINE | Facility: OTHER | Age: 71
End: 2021-12-28
Attending: FAMILY MEDICINE
Payer: MEDICARE

## 2021-12-28 VITALS
OXYGEN SATURATION: 94 % | SYSTOLIC BLOOD PRESSURE: 126 MMHG | HEART RATE: 75 BPM | TEMPERATURE: 97.2 F | RESPIRATION RATE: 20 BRPM | DIASTOLIC BLOOD PRESSURE: 74 MMHG

## 2021-12-28 DIAGNOSIS — M54.41 CHRONIC LOW BACK PAIN WITH RIGHT-SIDED SCIATICA, UNSPECIFIED BACK PAIN LATERALITY: Primary | ICD-10-CM

## 2021-12-28 DIAGNOSIS — G89.29 CHRONIC LOW BACK PAIN WITH RIGHT-SIDED SCIATICA, UNSPECIFIED BACK PAIN LATERALITY: Primary | ICD-10-CM

## 2021-12-28 DIAGNOSIS — G56.90 NEUROPATHY OF HAND, UNSPECIFIED LATERALITY: ICD-10-CM

## 2021-12-28 PROCEDURE — 97810 ACUP 1/> WO ESTIM 1ST 15 MIN: CPT | Mod: GA | Performed by: CHIROPRACTOR

## 2021-12-28 PROCEDURE — G0463 HOSPITAL OUTPT CLINIC VISIT: HCPCS

## 2021-12-28 NOTE — PROGRESS NOTES
Low back right side going down right leg. Rates 1/10 W24 1/10. Occassional tightness and spasm. Exercise and stretching gives relief. Both hands frequent cramping-tingling- and tightness. Rates 2/10 W24 2/10.  Alejandra Rushseferino on 12/28/2021 at 1:41 PM    Visit #:  3    Subjective:  Kaushik Ansari is a 71 year old male who is seen in f/u up for:        Chronic low back pain with right-sided sciatica, unspecified back pain laterality  Neuropathy of hand, unspecified laterality.     Since last visit on 12/22/2021,  Kaushik Ansari reports: noticed a slight aggravation of lower back symptoms temporarily after last visit. This seemed to improve however. Continues to have neuropathic type symptoms of the hands bilaterally. Unsure if he has seen much improvement with treatment, currently dealing with allergies which makes it difficult to discern changes in hands.    Area of chief complaint:  Lumbar :  Symptoms are graded at 1/10. The quality is described as occassionally tight and spasmed.    Bilateral hands :  Symptoms are graded at 2/10. The quality is described as cramping, tingling and numb.     Objective:  The following was observed:  /74 (BP Location: Right arm, Patient Position: Sitting)   Pulse 75   Temp 97.2  F (36.2  C) (Tympanic)   Resp 20   SpO2 94%      P: palpatory tenderness none elicited:    A: asymmetric joints, none apparent of the lumbar or pelvic regions  R: motion palpation notes restricted motion is not present  T: hypertonicity of the lumbar paraspinals and quadratus lumborum bilaterally    Segmental spinal dysfunction/restrictions found at:  None apparent.      Assessment: Review of patient's most recent visit does show decrease of intensity of symptoms based off of number scale.  I do believe that patient is showing signs of improvement.  We will plan to follow-up with patient in 1 week at which time care will either be continued or patient will be discharged from course of  treatment.    Diagnoses:      1. Chronic low back pain with right-sided sciatica, unspecified back pain laterality    2. Neuropathy of hand, unspecified laterality        Patient's condition:  uncertain    Procedures:  CMT:  Not performed    Modalities:  36518: Acupuncture, for 15 minutes:  Points: Bl 22, 23, 24, 25, 46, 47, 58, Sp 6, TW 12, 9,Navya 10, SI 3, LI 4, 11, Baxie points  For 15 minutes    Therapeutic procedures:  None    Response to Treatment  No Change in symptoms as reported by the patient    Prognosis: Good    Progress towards Goals: in progress    Recommendations:    Instructions:monitor symptoms    Follow-up:  Return to care in 1 week.

## 2022-01-04 ENCOUNTER — THERAPY VISIT (OUTPATIENT)
Dept: CHIROPRACTIC MEDICINE | Facility: OTHER | Age: 72
End: 2022-01-04
Attending: CHIROPRACTOR
Payer: MEDICARE

## 2022-01-04 VITALS
TEMPERATURE: 95.7 F | DIASTOLIC BLOOD PRESSURE: 66 MMHG | HEART RATE: 76 BPM | SYSTOLIC BLOOD PRESSURE: 130 MMHG | RESPIRATION RATE: 17 BRPM | OXYGEN SATURATION: 92 %

## 2022-01-04 DIAGNOSIS — M54.41 CHRONIC LOW BACK PAIN WITH RIGHT-SIDED SCIATICA, UNSPECIFIED BACK PAIN LATERALITY: Primary | ICD-10-CM

## 2022-01-04 DIAGNOSIS — G89.29 CHRONIC LOW BACK PAIN WITH RIGHT-SIDED SCIATICA, UNSPECIFIED BACK PAIN LATERALITY: Primary | ICD-10-CM

## 2022-01-04 DIAGNOSIS — G56.90 NEUROPATHY OF HAND, UNSPECIFIED LATERALITY: ICD-10-CM

## 2022-01-04 PROCEDURE — 97810 ACUP 1/> WO ESTIM 1ST 15 MIN: CPT | Mod: GA | Performed by: CHIROPRACTOR

## 2022-01-04 PROCEDURE — G0463 HOSPITAL OUTPT CLINIC VISIT: HCPCS | Mod: 25

## 2022-01-04 NOTE — PROGRESS NOTES
Lower back 1/10 W24 1/10.   Bilateral hand feels tingling and numb. 1/10 W24 3/10.  Elva Cee on 1/4/2022 at 1:34 PM    Visit #:  4    Subjective:  Kaushik Ansari is a 71 year old male who is seen in f/u up for:        Chronic low back pain with right-sided sciatica, unspecified back pain laterality  Neuropathy of hand, unspecified laterality.     Since last visit on 12/28/2021,  Kaushik Ansari reports: Through trial of acupuncture symptoms have shown some improvement primarily of the patient's lower back and sciatica symptoms.  Unsure however if he has noticed much change in his hands.  Patient has noticed however that sense of taste and smell have been improving since contraction of Covid.    Area of chief complaint:  Lumbar :  Symptoms are graded at 1/10. The quality was not described    Bilateral hands:  Symptoms are graded at 1-3/10. The quality is described as numb and tingling.       Objective:  The following was observed:  /66 (BP Location: Right arm, Patient Position: Sitting)   Pulse 76   Temp (!) 95.7  F (35.4  C) (Tympanic)   Resp 17   SpO2 92%      P: palpatory tenderness none elicited:    A: asymmetric joints, none apparent  R: restricted motion , none noted  T: hypertonicity gastrocnemius bilaterally and lumbar paraspinals bilaterally    Segmental spinal dysfunction/restrictions found at:  None apparent.      Assessment: Symptoms continue to remain.  I do believe that some progress has been made based off of intensity of symptoms subjectively ranked by the patient for objective numbers.  See prior charts for further details regarding this.  It does seem that patient's low back symptoms have shown improvement through course of treatment.  Patient's primary concern however is his hands.  At this time we will attempt trial away from care.  Patient finds that symptoms worsen due to time away chiropractic acupuncture will continue however if no change is noticed with time away from care  no further care is necessary for with our office.    Chiropractic assessment of patient's lumbar spine does not suggest segmental/somatic dysfunction present.    Diagnoses:      1. Chronic low back pain with right-sided sciatica, unspecified back pain laterality    2. Neuropathy of hand, unspecified laterality        Patient's condition:  unclear    Treatment effectiveness:  Symptoms appear to be decreasing      Procedures:  CMT:  None performed      Modalities:  23011: Acupuncture, for 15 minutes:  Points: Bl 22, 23, 24, 25, 55, GENARO 10, TW 9, 12, LI 4, 11, SI 3, baxie points  For 15 minutes    Therapeutic procedures:  None    Response to Treatment  No reported change    Prognosis: Guarded    Progress towards Goals: In progress    Recommendations:    Instructions:monitor symptoms    Follow-up:  Return to care if symptoms show improvement with treatment.

## 2022-01-13 ENCOUNTER — TRANSFERRED RECORDS (OUTPATIENT)
Dept: HEALTH INFORMATION MANAGEMENT | Facility: OTHER | Age: 72
End: 2022-01-13
Payer: MEDICARE

## 2022-01-14 ENCOUNTER — MYC MEDICAL ADVICE (OUTPATIENT)
Dept: FAMILY MEDICINE | Facility: OTHER | Age: 72
End: 2022-01-14
Payer: MEDICARE

## 2022-01-26 DIAGNOSIS — G62.9 NEUROPATHY: Primary | ICD-10-CM

## 2022-01-31 ENCOUNTER — MYC MEDICAL ADVICE (OUTPATIENT)
Dept: FAMILY MEDICINE | Facility: OTHER | Age: 72
End: 2022-01-31
Payer: MEDICARE

## 2022-01-31 DIAGNOSIS — Z11.3 ROUTINE SCREENING FOR STI (SEXUALLY TRANSMITTED INFECTION): Primary | ICD-10-CM

## 2022-01-31 DIAGNOSIS — Z11.59 ENCOUNTER FOR HEPATITIS C SCREENING TEST FOR LOW RISK PATIENT: ICD-10-CM

## 2022-01-31 DIAGNOSIS — Z11.4 ENCOUNTER FOR SCREENING FOR HIV: ICD-10-CM

## 2022-02-02 ENCOUNTER — LAB (OUTPATIENT)
Dept: LAB | Facility: OTHER | Age: 72
End: 2022-02-02
Attending: FAMILY MEDICINE
Payer: MEDICARE

## 2022-02-02 DIAGNOSIS — Z11.4 ENCOUNTER FOR SCREENING FOR HIV: ICD-10-CM

## 2022-02-02 DIAGNOSIS — G62.9 NEUROPATHY: ICD-10-CM

## 2022-02-02 DIAGNOSIS — Z11.59 ENCOUNTER FOR HEPATITIS C SCREENING TEST FOR LOW RISK PATIENT: ICD-10-CM

## 2022-02-02 DIAGNOSIS — Z11.3 ROUTINE SCREENING FOR STI (SEXUALLY TRANSMITTED INFECTION): ICD-10-CM

## 2022-02-02 PROCEDURE — 86803 HEPATITIS C AB TEST: CPT | Mod: ZL

## 2022-02-02 PROCEDURE — 86780 TREPONEMA PALLIDUM: CPT | Mod: ZL

## 2022-02-02 PROCEDURE — 36415 COLL VENOUS BLD VENIPUNCTURE: CPT | Mod: ZL

## 2022-02-02 PROCEDURE — 87389 HIV-1 AG W/HIV-1&-2 AB AG IA: CPT | Mod: ZL

## 2022-02-03 LAB
HCV AB SERPL QL IA: NONREACTIVE
HIV 1+2 AB+HIV1 P24 AG SERPL QL IA: NONREACTIVE
T PALLIDUM AB SER QL: NONREACTIVE

## 2022-02-17 ENCOUNTER — HOSPITAL ENCOUNTER (OUTPATIENT)
Dept: PHYSICAL THERAPY | Facility: OTHER | Age: 72
Setting detail: THERAPIES SERIES
End: 2022-02-17
Attending: FAMILY MEDICINE
Payer: MEDICARE

## 2022-02-17 DIAGNOSIS — G62.9 NEUROPATHY: ICD-10-CM

## 2022-02-17 PROCEDURE — 97110 THERAPEUTIC EXERCISES: CPT | Mod: GP | Performed by: PHYSICAL THERAPIST

## 2022-02-17 PROCEDURE — 97162 PT EVAL MOD COMPLEX 30 MIN: CPT | Mod: GP | Performed by: PHYSICAL THERAPIST

## 2022-02-24 NOTE — PROGRESS NOTES
[facility]    OUTPATIENT PHYSICAL THERAPY ORTHOPEDIC EVALUATION  PLAN OF TREATMENT FOR OUTPATIENT REHABILITATION  (COMPLETE FOR INITIAL CLAIMS ONLY)  Patient's Last Name, First Name, M.I.  YOB: 1950  Kaushik Ansari    Provider s Name:  [facility]   Medical Record No.  [id number]   Start of Care Date:  02/17/22   Onset Date:      Type:     _X__PT   ___OT   ___SLP Medical Diagnosis:  Neuropathy G62.9      PT Diagnosis:  possible vaccine reaction increased radiculopathy, possible cervical radiculopathy, cuff weakness,    Visits from SOC:  1      _________________________________________________________________________________  Plan of Treatment/Functional Goals:  balance training, gait training, joint mobilization, manual therapy, neuromuscular re-education, ROM, strengthening, stretching     Cryotherapy, Fluidotherapy, Hot packs, Ultrasound, Traction, Electrical stimulation  per PT clinical judgement.   Goals  Goal Identifier: strength  Goal Description: shoulder cuff strength will improve to 5/5 for better shoulder tolerance of swimm and ADL tasks.   Target Date: 04/29/22    Goal Identifier: balance  Goal Description: patient will have improved single leg ablance to improve gait and stairs reducing neuropathy falls risk. able to single leg balance 5 sec.   Target Date: 04/22/22    Goal Identifier: swim  Goal Description: patiient will toelrate breast stroke 50% better to allow exercise.   Target Date: 04/22/22                      Therapy Frequency:     Predicted Duration of Therapy Intervention:  1-2x/wk 8-12 weeks, pending progress,     Ga Pan, PT                 I CERTIFY THE NEED FOR THESE SERVICES FURNISHED UNDER        THIS PLAN OF TREATMENT AND WHILE UNDER MY CARE     (Physician co-signature of this document indicates review and certification of  the therapy plan).                       Certification Date From:  02/17/22   Certification Date To:  04/22/22    Referring Provider:  SHAY Howard    Initial Assessment        See Epic Evaluation Start of Care Date: 02/17/22

## 2022-02-24 NOTE — INITIAL ASSESSMENTS
02/17/22 1000   General Information   Type of Visit Initial OP Ortho PT Evaluation   Start of Care Date 02/17/22   Referring Physician SHAY Howard   Patient/Family Goals Statement reduce pain, reduce neuropathy, improve shoulder pain/strength   Orders Evaluate and Treat   Certification Required? Yes   Medical Diagnosis Neuropathy G62.9    Surgical/Medical history reviewed Yes   Body Part(s)   Body Part(s) Shoulder;Lumbar Spine/SI;Cervical Spine   Presentation and Etiology   Pertinent history of current problem (include personal factors and/or comorbidities that impact the POC) patient has had many issues in the last few years, but most noteabley are the neuropathy, that is claimed to be advanced since getting the covid vaccines. He has had COVID 3 times and has had vaccines 2x, with increasd neuropahty of both hands and feet after the vaccines. He has seen MD's, and is pursing other DX's and treatements as well to reduce the neuropthy and weakness. He has a hard time writing/gripping with his right hand, has shoulder pain with swimming, has regualar numbness in his feet which causes some balance deficites. He is retired, has 2 dogs, is active with stretches and exercises, including swimming a coupel times a week. back pain is mild, neck pain minimal, shoulder pain and weakness often.    Fall Risk Screen   Fall screen completed by PT   Is patient a fall risk? No   Abuse Screen (yes response referral indicated)   Feels Unsafe at Home or Work/School no   Feels Threatened by Someone no   Does Anyone Try to Keep You From Having Contact with Others or Doing Things Outside Your Home? no   Physical Signs of Abuse Present no   Patient needs abuse support services and resources No   Lumbar Spine/SI Objective Findings   Observation decreased lordosis, fair to good ROM.    Pelvic Screen neg   Lumbar/Hip/Knee/Foot Strength Comments has functional strenght throughout with no noted ankel DF weakness.    Hamstring Flexibility mild  tight   Piriformis Flexibility mild tight   SLR neg   Palpation tight paraspinals.   Cervical Spine   Observation slight decreased Rotation motion.   Posture some rounded/forward shoulders.   Shoulder AROM Screen has full shoulder ROM, sore with left abd/flexion and ER.    Shoulder/Wrist/Hand Strength Comments mild weakness in cuff, supra and infra.    Pectoralis Minor Flexibility mil tight.   Vertebral Artery Test neg    Alar Ligament Test neg   Transverse Ligament Test neg   Spurling Test mild sore. no increased radicular issues.   Planned Therapy Interventions   Planned Therapy Interventions balance training;gait training;joint mobilization;manual therapy;neuromuscular re-education;ROM;strengthening;stretching   Planned Modality Interventions   Planned Modality Interventions Cryotherapy;Fluidotherapy;Hot packs;Ultrasound;Traction;Electrical stimulation   Planned Modality Interventions Comments per PT clinical judgement.    Clinical Impression   Criteria for Skilled Therapeutic Interventions Met yes, treatment indicated   PT Diagnosis possible vaccine reaction increased radiculopathy, possible cervical radiculopathy, cuff weakness,    Influenced by the following impairments numbness/weakness, pain   Functional limitations due to impairments swim, , write,    Clinical Presentation Evolving/Changing   Clinical Decision Making (Complexity) Moderate complexity   Predicted Duration of Therapy Intervention (days/wks) 1-2x/wk 8-12 weeks, pending progress,    Risk & Benefits of therapy have been explained Yes   Patient, Family & other staff in agreement with plan of care Yes   Clinical Impression Comments needs to strat stretching for improved posture and strengthen cuff more.    ORTHO GOALS   PT Ortho Eval Goals 1;2;3   Ortho Goal 1   Goal Identifier strength   Goal Description shoulder cuff strength will improve to 5/5 for better shoulder tolerance of swimm and ADL tasks.    Target Date 04/29/22   Ortho Goal 2    Goal Identifier balance   Goal Description patient will have improved single leg ablance to improve gait and stairs reducing neuropathy falls risk. able to single leg balance 5 sec.    Target Date 04/22/22   Ortho Goal 3   Goal Identifier swim   Goal Description patiient will toelrate breast stroke 50% better to allow exercise.    Target Date 04/22/22   Total Evaluation Time   PT Eval, Moderate Complexity Minutes (73025) 35   Therapy Certification   Certification date from 02/17/22   Certification date to 04/22/22   Medical Diagnosis Neuropathy G62.9

## 2022-02-25 ENCOUNTER — HOSPITAL ENCOUNTER (OUTPATIENT)
Dept: PHYSICAL THERAPY | Facility: OTHER | Age: 72
Setting detail: THERAPIES SERIES
End: 2022-02-25
Attending: FAMILY MEDICINE
Payer: MEDICARE

## 2022-02-25 PROCEDURE — 97140 MANUAL THERAPY 1/> REGIONS: CPT | Mod: GP | Performed by: PHYSICAL THERAPIST

## 2022-02-25 PROCEDURE — 97110 THERAPEUTIC EXERCISES: CPT | Mod: GP | Performed by: PHYSICAL THERAPIST

## 2022-03-01 ENCOUNTER — HOSPITAL ENCOUNTER (OUTPATIENT)
Dept: PHYSICAL THERAPY | Facility: OTHER | Age: 72
Setting detail: THERAPIES SERIES
End: 2022-03-01
Attending: FAMILY MEDICINE
Payer: MEDICARE

## 2022-03-01 PROCEDURE — 97140 MANUAL THERAPY 1/> REGIONS: CPT | Mod: GP | Performed by: PHYSICAL THERAPIST

## 2022-03-01 PROCEDURE — 97110 THERAPEUTIC EXERCISES: CPT | Mod: GP | Performed by: PHYSICAL THERAPIST

## 2022-03-03 ENCOUNTER — HOSPITAL ENCOUNTER (OUTPATIENT)
Dept: PHYSICAL THERAPY | Facility: OTHER | Age: 72
Setting detail: THERAPIES SERIES
End: 2022-03-03
Attending: FAMILY MEDICINE
Payer: MEDICARE

## 2022-03-03 PROCEDURE — 97140 MANUAL THERAPY 1/> REGIONS: CPT | Mod: GP | Performed by: PHYSICAL THERAPIST

## 2022-03-03 PROCEDURE — 97110 THERAPEUTIC EXERCISES: CPT | Mod: GP | Performed by: PHYSICAL THERAPIST

## 2022-03-20 ENCOUNTER — MYC MEDICAL ADVICE (OUTPATIENT)
Dept: FAMILY MEDICINE | Facility: OTHER | Age: 72
End: 2022-03-20
Payer: MEDICARE

## 2022-04-10 ENCOUNTER — MYC MEDICAL ADVICE (OUTPATIENT)
Dept: FAMILY MEDICINE | Facility: OTHER | Age: 72
End: 2022-04-10
Payer: MEDICARE

## 2022-04-10 DIAGNOSIS — L30.9 ECZEMA, UNSPECIFIED TYPE: Primary | ICD-10-CM

## 2022-04-11 RX ORDER — TRIAMCINOLONE ACETONIDE 1 MG/G
CREAM TOPICAL 2 TIMES DAILY
Qty: 80 G | Refills: 3 | Status: SHIPPED | OUTPATIENT
Start: 2022-04-11 | End: 2022-07-11

## 2022-04-20 ENCOUNTER — OFFICE VISIT (OUTPATIENT)
Dept: FAMILY MEDICINE | Facility: OTHER | Age: 72
End: 2022-04-20
Attending: FAMILY MEDICINE
Payer: MEDICARE

## 2022-04-20 ENCOUNTER — HOSPITAL ENCOUNTER (OUTPATIENT)
Dept: GENERAL RADIOLOGY | Facility: OTHER | Age: 72
Discharge: HOME | End: 2022-04-20
Attending: FAMILY MEDICINE
Payer: MEDICARE

## 2022-04-20 VITALS
OXYGEN SATURATION: 99 % | HEART RATE: 88 BPM | WEIGHT: 218 LBS | RESPIRATION RATE: 20 BRPM | DIASTOLIC BLOOD PRESSURE: 80 MMHG | SYSTOLIC BLOOD PRESSURE: 124 MMHG | BODY MASS INDEX: 29.98 KG/M2 | TEMPERATURE: 97 F

## 2022-04-20 DIAGNOSIS — M65.341 TRIGGER RING FINGER OF RIGHT HAND: Primary | ICD-10-CM

## 2022-04-20 PROCEDURE — 99214 OFFICE O/P EST MOD 30 MIN: CPT | Performed by: FAMILY MEDICINE

## 2022-04-20 PROCEDURE — G0463 HOSPITAL OUTPT CLINIC VISIT: HCPCS

## 2022-04-20 PROCEDURE — 73130 X-RAY EXAM OF HAND: CPT | Mod: RT

## 2022-04-20 ASSESSMENT — PAIN SCALES - GENERAL: PAINLEVEL: NO PAIN (1)

## 2022-04-20 NOTE — NURSING NOTE
Pt here for right hand pain.  Base of 4th finger.        Medication Reconciliation: complete    Sarah Diego, [facility]  4/20/2022 9:20 AM

## 2022-04-20 NOTE — PROGRESS NOTES
HPI:  71-year-old male coming in for evaluation of right hand pain with catching/snapping of his fourth digit.  His symptoms have been present for approximately 3 months.  He feels like repetitive use of his hand exacerbates his symptoms.  He rates his pain is 4-2010.  He localizes the pain to the volar aspect of the fourth MCP joint.  He characterizes the pain as stabbing and burning.  He will occasionally wake up at night and feel like his fourth digit is being held in a flexed position requiring significant extension effort to release.  He has a history of bilateral trigger thumb.  He is also currently being treated for long COVID.      EXAM:  /80 (BP Location: Right arm, Patient Position: Sitting, Cuff Size: Adult Large)   Pulse 88   Temp 97  F (36.1  C) (Tympanic)   Resp 20   Wt 98.9 kg (218 lb)   SpO2 99%   BMI 29.98 kg/m    MUSCULOSKELETAL EXAM:  RIGHT HAND  Inspection:  -No gross deformity  -No bruising or swelling  -Scars:  None    Tenderness to palpation of the:  -Ulnar styloid:  Negative  -Distal radius:  Negative  -Yuniel's tubercle:  Negative  -Scapholunate ligament:  Negative  -Scaphoid in anatomical snuffbox:  Negative  -CMC joint:  Negative  -1st MCP joint:  Negative  -Metacarpals: Positive to the distal aspect of the fourth metacarpal into the fourth MCP joint  -Fourth MCP joint: Positive volarly    Range of Motion:  -Able to fully extend fingers  -Able to make full fist - with pain in 4th digit    Strength:  - strength:  5/5    Sensation:  -Intact to light touch in the radial, median, and ulnar nerve distributions    Motor:  -Intact AIN, PIN, and IO    Special Tests:  -Evaluation for triggering: Negative    Other:  -No signs of cyanosis. Normal skin temperature of the upper extremity.  -Elbow:  No gross deformity. Full range of motion.  -Left hand:  No gross deformity. No palpable tenderness. Normal strength and ROM.      IMAGIN2022: 3 view right hand  x-ray  -Mild-moderate arthritic changes throughout.  No acute bony abnormalities.      ASSESSMENT/PLAN:  Diagnoses and all orders for this visit:  Trigger ring finger of right hand  -     XR Hand Right G/E 3 Views  -     Occupational Therapy Referral; Future    71-year-old male with trigger finger involving the right fourth digit.  X-rays were performed in the office today and personally reviewed by me with the findings as demonstrated above by my interpretation.  We discussed treatment options for this condition to include rest, activity modification, hand therapy, bracing, topical medications, oral medications, CSI, and surgical referral.  - Referral placed to occupational therapy for application/fitting of a trigger finger splint to promote rest of the flexor tendon as well as therapy  - OTC analgesics/topical medications as needed  - Discussed the use of ice to help calm down inflammation  - If the patient is still having persistent/worsening symptoms with the above interventions, follow-up in the office for an ultrasound-guided trigger finger injection      Eduardo Blanchard MD  4/20/2022  9:16 AM    Total time spent with this patient was 37 minutes which included chart review, visualization and interpretation of images, time spent with the patient, and documentation.

## 2022-04-21 ENCOUNTER — HOSPITAL ENCOUNTER (OUTPATIENT)
Dept: OCCUPATIONAL THERAPY | Facility: OTHER | Age: 72
Setting detail: THERAPIES SERIES
Discharge: HOME | End: 2022-04-21
Attending: FAMILY MEDICINE
Payer: MEDICARE

## 2022-04-21 DIAGNOSIS — M65.341 TRIGGER RING FINGER OF RIGHT HAND: ICD-10-CM

## 2022-04-21 PROCEDURE — 97760 ORTHOTIC MGMT&TRAING 1ST ENC: CPT | Mod: GO | Performed by: OCCUPATIONAL THERAPIST

## 2022-04-21 NOTE — PROGRESS NOTES
04/21/22 1000   Quick Adds   Quick Adds Certification   Therapy Certification   Certification date from 04/21/22   Certification date to 06/02/22   Medical Diagnosis M65.341 (ICD-10-CM) - Trigger ring finger of right hand   Certification I certify the need for these services furnished under this plan of treatment and while under my care.  (Physician co-signature of this document indicates review and certification of the therapy plan).   Splint Fabrication   Type of Splint MCP/PIP blocking for Rt. Ring finger   Wearing schedule at all times off for hygeine   Comments educate in donning/doffing skin assessment   Minutes of treatment 32   General Information/History   Start Of Care Date 04/21/22   Referring Physician Eduardo Boateng   Orders Evaluate And Treat As Indicated   Orders Date 04/20/22   Medical Diagnosis M65.341 (ICD-10-CM) - Trigger ring finger of right hand   Additional Occupational Profile Info/Pertinent history of current problem Pt reports he has had trigger of his finger for about 3 months.  He is looking fo ra splint to limit trigger adn facilitate healing. He reports he is very active outdoors with swimming, Throwing balls for his dogs. He notices the locking when opening the kennel, grsing the leash or collar of the dogs and when making a fist.   Previous treatment or current condition nessa taping,   Past medical history long COVID   How/Where did it occur With repetition/overuse   Chronicity New   Hand Dominance Right   Affected side Right   Functional limitations perform activities of daily living;perform desired leisure / sports activities   Reported Symptoms Locking;Pain   Prior level of function Independent ADL;Independent IADL   Important Activities holding dog watkins/collar, throwing balls,   Patient role/Employment history Retired   Patient/Family goals statement Decrease triggering and locking, decrease pain   General observations Pt has ring and middle finger duct taped together in full  extension. Fingers sre stiff when tape is removed and fingers flexed.   Fall Risk Screen   Fall screen completed by OT   Have you fallen 2 or more times in the past year? No   Have you fallen and had an injury in the past year? No   Is patient a fall risk? No

## 2022-05-09 ENCOUNTER — OFFICE VISIT (OUTPATIENT)
Dept: FAMILY MEDICINE | Facility: OTHER | Age: 72
End: 2022-05-09
Attending: FAMILY MEDICINE
Payer: MEDICARE

## 2022-05-09 VITALS
SYSTOLIC BLOOD PRESSURE: 118 MMHG | OXYGEN SATURATION: 98 % | HEART RATE: 88 BPM | RESPIRATION RATE: 18 BRPM | DIASTOLIC BLOOD PRESSURE: 70 MMHG | BODY MASS INDEX: 29.57 KG/M2 | WEIGHT: 215 LBS | TEMPERATURE: 97.1 F

## 2022-05-09 DIAGNOSIS — B35.3 TINEA PEDIS, UNSPECIFIED LATERALITY: ICD-10-CM

## 2022-05-09 DIAGNOSIS — M65.341 TRIGGER RING FINGER OF RIGHT HAND: Primary | ICD-10-CM

## 2022-05-09 PROCEDURE — 99214 OFFICE O/P EST MOD 30 MIN: CPT | Mod: 25 | Performed by: FAMILY MEDICINE

## 2022-05-09 PROCEDURE — G0463 HOSPITAL OUTPT CLINIC VISIT: HCPCS

## 2022-05-09 PROCEDURE — 20550 NJX 1 TENDON SHEATH/LIGAMENT: CPT | Performed by: FAMILY MEDICINE

## 2022-05-09 PROCEDURE — 250N000009 HC RX 250: Performed by: FAMILY MEDICINE

## 2022-05-09 PROCEDURE — G0463 HOSPITAL OUTPT CLINIC VISIT: HCPCS | Mod: 25

## 2022-05-09 PROCEDURE — 250N000011 HC RX IP 250 OP 636: Performed by: FAMILY MEDICINE

## 2022-05-09 RX ORDER — LIDOCAINE HYDROCHLORIDE 10 MG/ML
0.5 INJECTION, SOLUTION EPIDURAL; INFILTRATION; INTRACAUDAL; PERINEURAL ONCE
Status: COMPLETED | OUTPATIENT
Start: 2022-05-09 | End: 2022-05-09

## 2022-05-09 RX ORDER — DEXAMETHASONE SODIUM PHOSPHATE 10 MG/ML
5 INJECTION, SOLUTION INTRAMUSCULAR; INTRAVENOUS ONCE
Status: COMPLETED | OUTPATIENT
Start: 2022-05-09 | End: 2022-05-09

## 2022-05-09 RX ADMIN — LIDOCAINE HYDROCHLORIDE 0.5 ML: 10 INJECTION, SOLUTION INFILTRATION; PERINEURAL at 15:44

## 2022-05-09 RX ADMIN — DEXAMETHASONE SODIUM PHOSPHATE 5 MG: 10 INJECTION, SOLUTION INTRAMUSCULAR; INTRAVENOUS at 15:44

## 2022-05-09 ASSESSMENT — PAIN SCALES - GENERAL: PAINLEVEL: SEVERE PAIN (7)

## 2022-05-09 NOTE — NURSING NOTE
"Chief Complaint   Patient presents with     Imm/Inj     Ultrasound guided trigger finger injection right ring finger     Patient presents for ultrasound guided trigger finger injection right ring finger. Pain 7/10.    Initial /70 (BP Location: Right arm, Patient Position: Sitting, Cuff Size: Adult Regular)   Pulse 88   Temp 97.1  F (36.2  C) (Tympanic)   Resp 18   Wt 97.5 kg (215 lb)   SpO2 98%   BMI 29.57 kg/m   Estimated body mass index is 29.57 kg/m  as calculated from the following:    Height as of 10/19/21: 1.816 m (5' 11.5\").    Weight as of this encounter: 97.5 kg (215 lb).       Medication Reconciliation: Complete    Mary Rizzo LPN .......  5/9/2022  2:05 PM   "

## 2022-05-09 NOTE — PROGRESS NOTES
"HPI:  71-year-old male coming in for follow-up evaluation of right hand pain with catching/snapping of his fourth digit.  His symptoms have been present for approximately 3.5 months.  He was last seen in this office on .  At that time he was referred to hand therapy for fitting/application of a trigger finger splint.  He has had this evaluation.  He is also been using OTC topical and oral analgesics.  His current symptoms are rated as 7/10.  He characterizes the pain as aching and throbbing.    He also endorses a history of \"athlete's foot\" that has been present for several months over the winter.  He has had difficulty getting this to go away.  He has been inconsistent with OTC medications.  It has improved.  He has contacted his PCP.  He has a history of a similar condition approximately 20 years ago and was prescribed an oral medication.      EXAM:  /70 (BP Location: Right arm, Patient Position: Sitting, Cuff Size: Adult Regular)   Pulse 88   Temp 97.1  F (36.2  C) (Tympanic)   Resp 18   Wt 97.5 kg (215 lb)   SpO2 98%   BMI 29.57 kg/m    MUSCULOSKELETAL EXAM:  RIGHT HAND  Inspection:  -No gross deformity  -No bruising or swelling  -Scars:  None     Tenderness to palpation of the:  -Metacarpals: Positive to the distal aspect of the fourth metacarpal into the fourth MCP joint  -Fourth MCP joint: Positive volarly     Range of Motion:  -Able to fully extend fingers  -Able to make full fist - with pain in 4th digit     Special Tests:  -Evaluation for triggering: Negative     Other:  -No signs of cyanosis. Normal skin temperature of the upper extremity.  -Elbow:  No gross deformity. Full range of motion.  -Left hand:  No gross deformity. No palpable tenderness. Normal strength and ROM.      IMAGIN2022: 3 view right hand x-ray  -Mild-moderate arthritic changes throughout.  No acute bony abnormalities.      ASSESSMENT/PLAN:  Diagnoses and all orders for this visit:  Trigger ring finger of right " hand  -     dexamethasone PF (DECADRON) injection 5 mg  -     lidocaine (PF) (XYLOCAINE) 1 % injection 0.5 mL  -     lidocaine (PF) (XYLOCAINE) 1 % injection 0.5 mL  -     POC US SOFT TISSUE  -     US Guided Needle Placement  -     INJECTION SINGLE TENDON SHEATH/LIGAMENT  Tinea pedis, unspecified laterality    71-year-old male with trigger finger involving the right fourth digit.  X-rays from 4/20 were personally reviewed in the office with the findings as demonstrated above by my interpretation.  After reviewing the risks/benefits, patient elects to proceed with an ultrasound-guided flexor tendon sheath injection at the A1 pulley site (trigger finger injection).  See procedure note below:    PROCEDURE:  Ultrasound Guided Injection of the Left 4th Flexor Tendon Sheath      EQUIPMENT:  Rufus Affiniti 70 with Linear [id number] (7-15MHz) Transducer (Hockey Stick).      PROCEDURAL PAUSE:    A procedural pause was conducted to verify:  correct patient identity, procedure to be performed, and as applicable, correct side, site, and correct patient position.      INFORMED CONSENT:   I discussed the risks, possible benefits, and alternatives to injection.  Following denial of allergy and review of potential side effects and complications (including but not necessarily limited to infection, allergic reaction, fat necrosis, skin depigmentation, local tissue breakdown, systemic effects of corticosteroids, elevation of blood glucose, injury to soft tissue and/or nerves, and seizure), all questions were answered, and consent was given to proceed.  Patient verbalized understanding.      PROCEDURE DETAILS:    The use of direct ultrasound visualization of the needle (rather than a non-guided ultrasound procedure) was required to increase patient safety by excluding inadvertent intramuscular or intratendinous placement and minimizing bleeding and injury by avoiding osteochondral and nearby neurovascular structures.  Guidance also  maximizes accurate injection placement and likely clinical benefit beyond that obtained with a non-guided injection.  This allows increased diagnostic specificity when evaluating effectiveness of the injection.      Prior to the procedure, the left 4th digit was examined with a 15MHz linear hockey stick transducer to determine the optimal needle path and visualize the 4th flexor tendon and A1 pulley.  Following this, the skin was marked and the left 4th digit and palm were prepped with chlorhexidine.  Local anesthesia was obtained with 0.5mL of 1% lidocaine.  Then this area was re-examined using the same transducer, a sterile ultrasound transducer cover, sterile gloves, and sterile gel.  Under ultrasound guidance, a 1.5-inch 25-gauge needle was advanced from distal to proximal using and in-plane approach at the level of the A1 pulley and superficial to the tendon.  One needle pass was performed.  After ultrasound visualization of the needle tip in the target area and negative aspiration for blood, a mixture of 0.5mL of 1% lidocaine and 0.5mL of dexamethasone (10 mg/ml) was injected into the left 4th flexor tendon sheath at the A1 pulley site.  0mL was wasted.  Images have been saved on the musculoskeletal ultrasound in clinic.      The patient tolerated the procedure without complication and was discharged in good condition after a short observation period.  The patient was instructed to contact me regarding any questions pertaining to the procedure.      DIAGNOSIS:    -Successful ultrasound guided injection of the left 4th flexor tendon sheath without immediate complication      PLAN:   -Post-procedure care reviewed, including avoiding submersion of the injection site for 48 hours   -Return precautions reviewed for signs/symptoms that would be concerning for infection   -The patient was instructed to ice and take APAP this evening if needed  - Continue to wear splint in the coming weeks to help promote  flaring/worsening of symptoms  - Continue with hand therapy as desired  -Return to clinic as needed  -Could consider repeat injection if this was not effective or referral to hand surgery for A1 pulley release    With regards to his tinea pedis, he endorses improvement in his symptoms.  - Optimize use of OTC medications, Lotrimin or Lamisil  - Follow with PCP      Eduardo Blanchard MD  5/9/2022  2:12 PM    Total time spent with this patient was 49 minutes which included chart review, visualization and interpretation of images, time spent with the patient, and documentation.

## 2022-06-01 DIAGNOSIS — I10 ESSENTIAL HYPERTENSION: ICD-10-CM

## 2022-06-02 RX ORDER — LISINOPRIL/HYDROCHLOROTHIAZIDE 10-12.5 MG
TABLET ORAL
Qty: 90 TABLET | Refills: 4 | Status: SHIPPED | OUTPATIENT
Start: 2022-06-02 | End: 2022-11-23

## 2022-06-02 NOTE — TELEPHONE ENCOUNTER
"[facility] sent Rx request for the following:   lisinopril-hydrochlorothiazide (ZESTORETIC) 10-12.5 MG tablet  SigTake 1 tablet by mouth once daily    Last Prescription Date:   03/23/2021  Last Fill Qty/Refills:         90, R-3    Last Office Visit:              12/09/2021 ([facility])   Future Office visit:           None noted   Diuretics (Including Combos) Protocol Failed - 6/1/2022  1:32 PM        Failed - Normal serum creatinine on file in past 12 months     Recent Labs   Lab Test 04/21/21  1559   CR 0.84              Failed - Normal serum potassium on file in past 12 months     Recent Labs   Lab Test 04/21/21  1559   POTASSIUM 3.7                    Failed - Normal serum sodium on file in past 12 months     Recent Labs   Lab Test 04/21/21  1559   *              Passed - Blood pressure under 140/90 in past 12 months     BP Readings from Last 3 Encounters:   05/09/22 118/70   04/20/22 124/80   01/04/22 130/66                 Passed - Recent (12 mo) or future (30 days) visit within the authorizing provider's specialty     Patient has had an office visit with the authorizing provider or a provider within the authorizing providers department within the previous 12 mos or has a future within next 30 days. See \"Patient Info\" tab in inbasket, or \"Choose Columns\" in Meds & Orders section of the refill encounter.              Passed - Medication is active on med list        Passed - Patient is age 18 or older       ACE Inhibitors (Including Combos) Protocol Failed - 6/1/2022  1:32 PM        Failed - Normal serum creatinine on file in past 12 months     Recent Labs   Lab Test 04/21/21  1559   CR 0.84       Ok to refill medication if creatinine is low          Failed - Normal serum potassium on file in past 12 months     Recent Labs   Lab Test 04/21/21  1559   POTASSIUM 3.7             Passed - Blood pressure under 140/90 in past 12 months     BP Readings from Last 3 Encounters:   05/09/22 118/70   04/20/22 " "124/80   01/04/22 130/66                 Passed - Recent (12 mo) or future (30 days) visit within the authorizing provider's specialty     Patient has had an office visit with the authorizing provider or a provider within the authorizing providers department within the previous 12 mos or has a future within next 30 days. See \"Patient Info\" tab in inbasket, or \"Choose Columns\" in Meds & Orders section of the refill encounter.              Passed - Medication is active on med list        Passed - Patient is age 18 or older         Unable to complete prescription refill per RN Medication Refill Policy.................... Alicia Grace RN ....................  6/2/2022   10:46 AM          "

## 2022-06-29 ENCOUNTER — MYC MEDICAL ADVICE (OUTPATIENT)
Dept: FAMILY MEDICINE | Facility: OTHER | Age: 72
End: 2022-06-29

## 2022-06-30 ENCOUNTER — OFFICE VISIT (OUTPATIENT)
Dept: FAMILY MEDICINE | Facility: OTHER | Age: 72
End: 2022-06-30
Attending: FAMILY MEDICINE
Payer: MEDICARE

## 2022-06-30 VITALS
OXYGEN SATURATION: 97 % | BODY MASS INDEX: 29.54 KG/M2 | RESPIRATION RATE: 18 BRPM | DIASTOLIC BLOOD PRESSURE: 76 MMHG | HEART RATE: 62 BPM | WEIGHT: 214.8 LBS | SYSTOLIC BLOOD PRESSURE: 130 MMHG | TEMPERATURE: 96.7 F

## 2022-06-30 DIAGNOSIS — R43.0 LOSS OF SMELL: ICD-10-CM

## 2022-06-30 DIAGNOSIS — G62.9 GENERALIZED NEUROPATHY: ICD-10-CM

## 2022-06-30 DIAGNOSIS — M65.341 TRIGGER RING FINGER OF RIGHT HAND: Primary | ICD-10-CM

## 2022-06-30 DIAGNOSIS — R29.90 POST-COVID CHRONIC NEUROLOGIC SYMPTOMS: ICD-10-CM

## 2022-06-30 DIAGNOSIS — U09.9 POST-COVID CHRONIC NEUROLOGIC SYMPTOMS: ICD-10-CM

## 2022-06-30 DIAGNOSIS — R43.2 LOSS OF TASTE: ICD-10-CM

## 2022-06-30 PROCEDURE — 99214 OFFICE O/P EST MOD 30 MIN: CPT | Performed by: FAMILY MEDICINE

## 2022-06-30 PROCEDURE — G0463 HOSPITAL OUTPT CLINIC VISIT: HCPCS

## 2022-06-30 ASSESSMENT — PAIN SCALES - GENERAL: PAINLEVEL: NO PAIN (1)

## 2022-06-30 NOTE — PROGRESS NOTES
Sports Medicine Office Note    HPI:  71-year-old male coming in for follow-up evaluation of right fourth digit trigger finger.  He was last seen in this office on .  At that time we performed an ultrasound-guided flexor tendon sheath injection at the A1 pulley site (trigger finger injection).  He reports 75% improvement with this intervention.  He still endorses 1/10 pain.  He characterizes the pain as dull.  He continues to nessa tape his third and fourth digits together.  He wears a wrist brace.  He has not resumed normal activities with the use of his right hand following the injection.  He comes in today for evaluation of possible repeat injection.    This patient has a history of COVID in May 2021.  Since that time he has lost taste/smell and developed neuropathy in all 4 limbs.  He had an evaluation with neurology in 2021.  Nerve testing revealed generalized neuropathy with some right-sided sciatica and possible carpal tunnel syndrome (though the carpal tunnel was not fully evaluated during that evaluation.  He still experiences some numbness.  He has had lab testing to look for underlying systemic causes that have been unremarkable.  His taste and smell are beginning to return.  He is interested in proceeding with further work-up of his ongoing symptoms.  The symptoms have been previously managed by his PCP.      EXAM:  /76 (BP Location: Right arm, Patient Position: Sitting, Cuff Size: Adult Regular)   Pulse 62   Temp (!) 96.7  F (35.9  C) (Tympanic)   Resp 18   Wt 97.4 kg (214 lb 12.8 oz)   SpO2 97%   BMI 29.54 kg/m    Musculoskeletal exam: Right hand  - No gross deformity  - Patient has a neoprene wrist brace and [place] nessa taping his third and fourth digits together  - Normal skin temperature without cyanosis  - Does not fully bend digits due to the wrist brace and taping      IMAGIN2022: 3 view right hand x-ray  -Mild-moderate arthritic changes throughout.  No acute bony  abnormalities.      ASSESSMENT/PLAN:  Diagnoses and all orders for this visit:  Trigger ring finger of right hand  Generalized neuropathy  Post-COVID chronic neurologic symptoms  Loss of taste  Loss of smell    71-year-old male with trigger finger involving the right fourth digit.  X-rays from 4/20 were again personally reviewed in the office with the findings as demonstrated above by my interpretation.  At this time I feel that the patient has not fully tested his hand/finger to evaluate the full effectiveness of the injection.  - Begin to transition into normal activities  - Patient made an appointment to see hand surgery to discuss surgical options  - If over the next couple weeks his pain worsens, consider a repeat injection  - If pain/symptoms are significantly worsened, likely forego subsequent injection and proceed with surgical evaluation    This patient's neuropathy does appear to be generalized and likely is related to COVID.  As he is starting to get his taste/smell back I would hope that his other generalized neuropathy would also improved.  He was asking about follow-up evaluation with another neurologist.  - As this patient has been managed for this through his PCP, I would recommend that he continues to follow-up with that office regarding further work-up of this condition      Eduardo Blanchard MD  6/30/2022  10:20 AM    Total time spent with this patient was 32 minutes which included chart review, visualization and independent interpretation of images, time spent with the patient, and documentation.    Procedure time:  0 minute(s)

## 2022-06-30 NOTE — NURSING NOTE
"Chief Complaint   Patient presents with     Imm/Inj     Ultrasound guided right 4th flexor tendon sheath     Patient presents for ultrasound guided right 4th flexor tendon sheath injection. Pain 1/10. He reports 75% improvement since last injection.     Initial /76 (BP Location: Right arm, Patient Position: Sitting, Cuff Size: Adult Regular)   Pulse 62   Temp (!) 96.7  F (35.9  C) (Tympanic)   Resp 18   Wt 97.4 kg (214 lb 12.8 oz)   SpO2 97%   BMI 29.54 kg/m   Estimated body mass index is 29.54 kg/m  as calculated from the following:    Height as of 10/19/21: 1.816 m (5' 11.5\").    Weight as of this encounter: 97.4 kg (214 lb 12.8 oz).       Medication Reconciliation: Complete    Mary Rizzo LPN .......  6/30/2022  9:09 AM   "

## 2022-07-05 ENCOUNTER — MYC MEDICAL ADVICE (OUTPATIENT)
Dept: FAMILY MEDICINE | Facility: OTHER | Age: 72
End: 2022-07-05

## 2022-07-05 DIAGNOSIS — G47.33 OBSTRUCTIVE SLEEP APNEA: Primary | ICD-10-CM

## 2022-07-14 ENCOUNTER — APPOINTMENT (OUTPATIENT)
Dept: ORTHOPEDICS | Facility: OTHER | Age: 72
End: 2022-07-14
Attending: SPECIALIST
Payer: MEDICARE

## 2022-07-15 ENCOUNTER — MYC MEDICAL ADVICE (OUTPATIENT)
Dept: FAMILY MEDICINE | Facility: OTHER | Age: 72
End: 2022-07-15

## 2022-07-15 DIAGNOSIS — M65.341 TRIGGER RING FINGER OF RIGHT HAND: Primary | ICD-10-CM

## 2022-10-13 ENCOUNTER — MYC MEDICAL ADVICE (OUTPATIENT)
Dept: FAMILY MEDICINE | Facility: OTHER | Age: 72
End: 2022-10-13

## 2022-10-17 ENCOUNTER — MYC MEDICAL ADVICE (OUTPATIENT)
Dept: FAMILY MEDICINE | Facility: OTHER | Age: 72
End: 2022-10-17

## 2022-10-17 DIAGNOSIS — U07.1 INFECTION DUE TO 2019 NOVEL CORONAVIRUS: Primary | ICD-10-CM

## 2022-10-18 NOTE — TELEPHONE ENCOUNTER
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  Patient could modify this to the afternoon if needed.  Virtual Visit link should be sent to email for a visit between [phone].  Patient would also like to see a Pulmonologist.    Darshana Lozano LPN 10/18/2022 8:46 AM      "

## 2022-11-23 ENCOUNTER — OFFICE VISIT (OUTPATIENT)
Dept: FAMILY MEDICINE | Facility: OTHER | Age: 72
End: 2022-11-23
Attending: FAMILY MEDICINE
Payer: MEDICARE

## 2022-11-23 VITALS
OXYGEN SATURATION: 100 % | BODY MASS INDEX: 31.05 KG/M2 | RESPIRATION RATE: 17 BRPM | HEART RATE: 82 BPM | WEIGHT: 225.8 LBS | SYSTOLIC BLOOD PRESSURE: 128 MMHG | DIASTOLIC BLOOD PRESSURE: 80 MMHG | TEMPERATURE: 96.9 F

## 2022-11-23 DIAGNOSIS — Z13.220 SCREENING FOR HYPERLIPIDEMIA: ICD-10-CM

## 2022-11-23 DIAGNOSIS — I10 ESSENTIAL HYPERTENSION: ICD-10-CM

## 2022-11-23 DIAGNOSIS — Z11.4 ENCOUNTER FOR SCREENING FOR HIV: ICD-10-CM

## 2022-11-23 DIAGNOSIS — G47.33 OBSTRUCTIVE SLEEP APNEA: ICD-10-CM

## 2022-11-23 DIAGNOSIS — Z13.6 SCREENING FOR AAA (ABDOMINAL AORTIC ANEURYSM): ICD-10-CM

## 2022-11-23 DIAGNOSIS — G62.9 GENERALIZED NEUROPATHY: ICD-10-CM

## 2022-11-23 DIAGNOSIS — Z87.891 HISTORY OF SMOKING: ICD-10-CM

## 2022-11-23 DIAGNOSIS — Z12.5 SCREENING FOR PROSTATE CANCER: ICD-10-CM

## 2022-11-23 DIAGNOSIS — Z00.00 ENCOUNTER FOR MEDICARE ANNUAL WELLNESS EXAM: Primary | ICD-10-CM

## 2022-11-23 LAB
ALBUMIN SERPL BCG-MCNC: 4.7 G/DL (ref 3.5–5.2)
ALP SERPL-CCNC: 48 U/L (ref 40–129)
ALT SERPL W P-5'-P-CCNC: 12 U/L (ref 10–50)
ANION GAP SERPL CALCULATED.3IONS-SCNC: 11 MMOL/L (ref 7–15)
AST SERPL W P-5'-P-CCNC: 18 U/L (ref 10–50)
BILIRUB SERPL-MCNC: 0.7 MG/DL
BUN SERPL-MCNC: 16.5 MG/DL (ref 8–23)
CALCIUM SERPL-MCNC: 9.9 MG/DL (ref 8.8–10.2)
CHLORIDE SERPL-SCNC: 102 MMOL/L (ref 98–107)
CHOLEST SERPL-MCNC: 189 MG/DL
CREAT SERPL-MCNC: 0.92 MG/DL (ref 0.67–1.17)
DEPRECATED HCO3 PLAS-SCNC: 27 MMOL/L (ref 22–29)
FOLATE SERPL-MCNC: >20 NG/ML (ref 4.6–34.8)
GFR SERPL CREATININE-BSD FRML MDRD: 88 ML/MIN/1.73M2
GLUCOSE SERPL-MCNC: 103 MG/DL (ref 70–99)
HDLC SERPL-MCNC: 50 MG/DL
LDLC SERPL CALC-MCNC: 116 MG/DL
NONHDLC SERPL-MCNC: 139 MG/DL
POTASSIUM SERPL-SCNC: 3.8 MMOL/L (ref 3.4–5.3)
PROT SERPL-MCNC: 7.4 G/DL (ref 6.4–8.3)
PSA SERPL-MCNC: 2.15 NG/ML (ref 0–6.5)
SODIUM SERPL-SCNC: 140 MMOL/L (ref 136–145)
TRIGL SERPL-MCNC: 117 MG/DL

## 2022-11-23 PROCEDURE — 99213 OFFICE O/P EST LOW 20 MIN: CPT | Mod: 25 | Performed by: FAMILY MEDICINE

## 2022-11-23 PROCEDURE — 80053 COMPREHEN METABOLIC PANEL: CPT | Mod: ZL | Performed by: FAMILY MEDICINE

## 2022-11-23 PROCEDURE — 82746 ASSAY OF FOLIC ACID SERUM: CPT | Mod: ZL | Performed by: FAMILY MEDICINE

## 2022-11-23 PROCEDURE — G0103 PSA SCREENING: HCPCS | Mod: ZL | Performed by: FAMILY MEDICINE

## 2022-11-23 PROCEDURE — G0463 HOSPITAL OUTPT CLINIC VISIT: HCPCS | Mod: 25

## 2022-11-23 PROCEDURE — 80061 LIPID PANEL: CPT | Mod: ZL | Performed by: FAMILY MEDICINE

## 2022-11-23 PROCEDURE — 82040 ASSAY OF SERUM ALBUMIN: CPT | Mod: ZL | Performed by: FAMILY MEDICINE

## 2022-11-23 PROCEDURE — 83090 ASSAY OF HOMOCYSTEINE: CPT | Mod: ZL | Performed by: FAMILY MEDICINE

## 2022-11-23 PROCEDURE — 87389 HIV-1 AG W/HIV-1&-2 AB AG IA: CPT | Mod: ZL | Performed by: FAMILY MEDICINE

## 2022-11-23 PROCEDURE — G0008 ADMIN INFLUENZA VIRUS VAC: HCPCS

## 2022-11-23 PROCEDURE — G0439 PPPS, SUBSEQ VISIT: HCPCS | Performed by: FAMILY MEDICINE

## 2022-11-23 PROCEDURE — 36415 COLL VENOUS BLD VENIPUNCTURE: CPT | Mod: ZL | Performed by: FAMILY MEDICINE

## 2022-11-23 RX ORDER — LISINOPRIL/HYDROCHLOROTHIAZIDE 10-12.5 MG
1 TABLET ORAL DAILY
Qty: 90 TABLET | Refills: 4 | Status: SHIPPED | OUTPATIENT
Start: 2022-11-23 | End: 2023-01-05

## 2022-11-23 ASSESSMENT — ACTIVITIES OF DAILY LIVING (ADL)
IN_THE_PAST_7_DAYS,_DID_YOU_NEED_HELP_FROM_OTHERS_TO_PERFORM_EVERYDAY_ACTIVITIES_SUCH_AS_EATING,_GETTING_DRESSED,_GROOMING,_BATHING,_WALKING,_OR_USING_THE_TOILET: N
IN_THE_PAST_7_DAYS,_DID_YOU_NEED_HELP_FROM_OTHERS_TO_TAKE_CARE_OF_THINGS_SUCH_AS_LAUNDRY_AND_HOUSEKEEPING,_BANKING,_SHOPPING,_USING_THE_TELEPHONE,_FOOD_PREPARATION,_TRANSPORTATION,_OR_TAKING_YOUR_OWN_MEDICATIONS?: N

## 2022-11-23 ASSESSMENT — ANXIETY QUESTIONNAIRES
GAD7 TOTAL SCORE: 6
7. FEELING AFRAID AS IF SOMETHING AWFUL MIGHT HAPPEN: NOT AT ALL
3. WORRYING TOO MUCH ABOUT DIFFERENT THINGS: SEVERAL DAYS
6. BECOMING EASILY ANNOYED OR IRRITABLE: SEVERAL DAYS
4. TROUBLE RELAXING: SEVERAL DAYS
7. FEELING AFRAID AS IF SOMETHING AWFUL MIGHT HAPPEN: NOT AT ALL
IF YOU CHECKED OFF ANY PROBLEMS ON THIS QUESTIONNAIRE, HOW DIFFICULT HAVE THESE PROBLEMS MADE IT FOR YOU TO DO YOUR WORK, TAKE CARE OF THINGS AT HOME, OR GET ALONG WITH OTHER PEOPLE: SOMEWHAT DIFFICULT
8. IF YOU CHECKED OFF ANY PROBLEMS, HOW DIFFICULT HAVE THESE MADE IT FOR YOU TO DO YOUR WORK, TAKE CARE OF THINGS AT HOME, OR GET ALONG WITH OTHER PEOPLE?: SOMEWHAT DIFFICULT
5. BEING SO RESTLESS THAT IT IS HARD TO SIT STILL: SEVERAL DAYS
GAD7 TOTAL SCORE: 6
1. FEELING NERVOUS, ANXIOUS, OR ON EDGE: SEVERAL DAYS
2. NOT BEING ABLE TO STOP OR CONTROL WORRYING: SEVERAL DAYS

## 2022-11-23 ASSESSMENT — PAIN SCALES - GENERAL: PAINLEVEL: NO PAIN (0)

## 2022-11-23 NOTE — PATIENT INSTRUCTIONS
Patient Education   Personalized Prevention Plan  You are due for the preventive services outlined below.  Your care team is available to assist you in scheduling these services.  If you have already completed any of these items, please share that information with your care team to update in your medical record.  Health Maintenance Due   Topic Date Due     Zoster (Shingles) Vaccine (1 of 2) Never done     AORTIC ANEURYSM SCREENING (SYSTEM ASSIGNED)  Never done     Cholesterol Lab  04/10/2016     COVID-19 Vaccine (4 - Booster for Moderna series) 02/08/2022     Flu Vaccine (1) 09/01/2022

## 2022-11-23 NOTE — PROGRESS NOTES
SUBJECTIVE:   Kaushik is a 71 year old who presents for Preventive Visit.    Patient has been advised of split billing requirements and indicates understanding: Yes  Are you in the first 12 months of your Medicare coverage?  No    History of Present Illness       Reason for visit:  Physical    He eats 2-3 servings of fruits and vegetables daily.He consumes 2 sweetened beverage(s) daily.He exercises with enough effort to increase his heart rate 30 to 60 minutes per day.  He exercises with enough effort to increase his heart rate 4 days per week.   He is taking medications regularly.  Today's SHUKRI-7 Score: 6      Have you ever done Advance Care Planning? (For example, a Health Directive, POLST, or a discussion with a medical provider or your loved ones about your wishes): Yes, advance care planning is on file.       Fall risk  Fallen 2 or more times in the past year?: No  Any fall with injury in the past year?: No    Cognitive Screening   1) Repeat 3 items (Leader, Season, Winter)    2) Clock draw: NORMAL  3) 3 item recall: Recalls 1 object   Results: NORMAL clock, 1-2 items recalled: COGNITIVE IMPAIRMENT LESS LIKELY    Mini-CogTM Copyright S Aren. Licensed by the author for use in [facility]; reprinted with permission (brynn@[facility]). All rights reserved.      Do you have sleep apnea, excessive snoring or daytime drowsiness?: yes    Reviewed and updated as needed this visit by clinical staff   Tobacco  Allergies  Meds   Med Hx  Surg Hx  Fam Hx  Soc Hx        Reviewed and updated as needed this visit by Provider                 Social History     Tobacco Use     Smoking status: Former     Packs/day: 2.00     Years: 20.00     Pack years: 40.00     Types: Cigarettes     Quit date: 1984     Years since quittin.9     Smokeless tobacco: Former     Types: Chew   Substance Use Topics     Alcohol use: No     Alcohol/week: 0.0 standard drinks     If you drink alcohol do you typically have >3 drinks  per day or >7 drinks per week? No    Alcohol Use 11/23/2022   Prescreen: >3 drinks/day or >7 drinks/week? No               Current providers sharing in care for this patient include:    Patient Care Team:  Alec Howard MD as PCP - General (Family Practice)  Kaushik Argueta MD as Assigned Neuroscience Provider  Eduardo Boateng MD as Assigned PCP    The following health maintenance items are reviewed in Epic and correct as of today:  Health Maintenance   Topic Date Due     ZOSTER IMMUNIZATION (1 of 2) Never done     COVID-19 Vaccine (4 - Booster for Moderna series) 02/08/2022     DTAP/TDAP/TD IMMUNIZATION (2 - Td or Tdap) 12/13/2022     SHUKRI ASSESSMENT  05/23/2023     MEDICARE ANNUAL WELLNESS VISIT  11/23/2023     LIPID  11/23/2023     FALL RISK ASSESSMENT  11/23/2023     COLORECTAL CANCER SCREENING  01/03/2024     ADVANCE CARE PLANNING  11/23/2027     HEPATITIS C SCREENING  Completed     PHQ-2 (once per calendar year)  Completed     INFLUENZA VACCINE  Completed     Pneumococcal Vaccine: 65+ Years  Completed     AORTIC ANEURYSM SCREENING (SYSTEM ASSIGNED)  Completed     IPV IMMUNIZATION  Aged Out     MENINGITIS IMMUNIZATION  Aged Out     Labs [facility]  Current Outpatient Medications   Medication Sig Dispense Refill     acetaminophen (TYLENOL) 500 MG tablet Take 1,000 mg by mouth every 6 hours as needed for mild pain       aspirin 81 MG EC tablet Take 81 mg by mouth daily       cyanocobalamin (VITAMIN B-12) 100 MCG tablet Take 100 mcg by mouth daily       guaiFENesin-codeine (ROBITUSSIN AC) 100-10 MG/5ML solution Take 5-10 mLs by mouth every 4 hours as needed for cough 120 mL 1     ibuprofen (ADVIL/MOTRIN) 800 MG tablet Take 1 tablet (800 mg) by mouth every 8 hours as needed for moderate pain 60 tablet 0     lisinopril-hydrochlorothiazide (ZESTORETIC) 10-12.5 MG tablet Take 1 tablet by mouth daily 90 tablet 4     order for DME Equipment being ordered: CPAP supplies 1 each 1     order for DME Equipment being  "ordered: CPAP replacement supplies 1 each 3     Allergies   Allergen Reactions     Dust Mites Cough and Other (See Comments)         He had a treatment on the right Dupuytren contracture about 3 weeks ago. Still has soreness in it.  Was on the 4th finger. Getting some clicking on the 5th finger now too.       He has sleep apnea, and has really struggled to get a replacement CPAP.  Current machine is cracked and perhaps 10 years old.  Gets this from home medical.      He has significant hand and foot neuropathy form COVID , over a year ago.  Has lost about 20% of his taste.  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      Recently had unprotected intercourse and wants HIV screening again.     Review of Systems  Constitutional, HEENT, cardiovascular, pulmonary, GI, , musculoskeletal, neuro, skin, endocrine and psych systems are negative, except as otherwise noted.    OBJECTIVE:   /80   Pulse 82   Temp 96.9  F (36.1  C) (Tympanic)   Resp 17   Wt 102.4 kg (225 lb 12.8 oz)   SpO2 100%   BMI 31.05 kg/m   Estimated body mass index is 31.05 kg/m  as calculated from the following:    Height as of 10/19/21: 1.816 m (5' 11.5\").    Weight as of this encounter: 102.4 kg (225 lb 12.8 oz).  Physical Exam  GENERAL: healthy, alert and no distress  EYES: Eyes grossly normal to inspection, PERRL and conjunctivae and sclerae normal  HENT: ear canals and TM's normal, nose and mouth without ulcers or lesions  NECK: no adenopathy, no asymmetry, masses, or scars and thyroid normal to palpation  RESP: lungs clear to auscultation - no rales, rhonchi or wheezes  CV: regular rate and rhythm, normal S1 S2, no S3 or S4, no murmur, click or rub, no peripheral edema and peripheral pulses strong  ABDOMEN: soft, nontender, no hepatosplenomegaly, no masses and bowel sounds normal  MS: no gross musculoskeletal defects noted, no edema  SKIN: no suspicious lesions or rashes  NEURO: " Normal strength and tone, mentation intact and speech normal  PSYCH: mentation appears normal, affect normal/bright    Diagnostic Test Results:  Labs reviewed in Epic  Results for orders placed or performed in visit on 11/23/22   Folic Acid     Status: Normal   Result Value Ref Range    Folic Acid >20.0 4.6 - 34.8 ng/mL   Comprehensive Metabolic Panel     Status: Abnormal   Result Value Ref Range    Sodium 140 136 - 145 mmol/L    Potassium 3.8 3.4 - 5.3 mmol/L    Chloride 102 98 - 107 mmol/L    Carbon Dioxide (CO2) 27 22 - 29 mmol/L    Anion Gap 11 7 - 15 mmol/L    Urea Nitrogen 16.5 8.0 - 23.0 mg/dL    Creatinine 0.92 0.67 - 1.17 mg/dL    Calcium 9.9 8.8 - 10.2 mg/dL    Glucose 103 (H) 70 - 99 mg/dL    Alkaline Phosphatase 48 40 - 129 U/L    AST 18 10 - 50 U/L    ALT 12 10 - 50 U/L    Protein Total 7.4 6.4 - 8.3 g/dL    Albumin 4.7 3.5 - 5.2 g/dL    Bilirubin Total 0.7 <=1.2 mg/dL    GFR Estimate 88 >60 mL/min/1.73m2   PSA Screen GH     Status: Normal   Result Value Ref Range    Prostate Specific Antigen Screen 2.15 0.00 - 6.50 ng/mL    Narrative    This result is obtained using the Roche Elecsys total PSA method on the chidi e601 immunoassay analyzer. Results obtained with different assay methods or kits cannot be used interchangeably.   Lipid Profile     Status: Abnormal   Result Value Ref Range    Cholesterol 189 <200 mg/dL    Triglycerides 117 <150 mg/dL    Direct Measure HDL 50 >=40 mg/dL    LDL Cholesterol Calculated 116 (H) <=100 mg/dL    Non HDL Cholesterol 139 (H) <130 mg/dL    Narrative    Cholesterol  Desirable:  <200 mg/dL    Triglycerides  Normal:  Less than 150 mg/dL  Borderline High:  150-199 mg/dL  High:  200-499 mg/dL  Very High:  Greater than or equal to 500 mg/dL    Direct Measure HDL  Female:  Greater than or equal to 50 mg/dL   Male:  Greater than or equal to 40 mg/dL    LDL Cholesterol  Desirable:  <100mg/dL  Above Desirable:  100-129 mg/dL   Borderline High:  130-159 mg/dL   High:  160-189  mg/dL   Very High:  >= 190 mg/dL    Non HDL Cholesterol  Desirable:  130 mg/dL  Above Desirable:  130-159 mg/dL  Borderline High:  160-189 mg/dL  High:  190-219 mg/dL  Very High:  Greater than or equal to 220 mg/dL         ASSESSMENT / PLAN:       ICD-10-CM    1. Encounter for Medicare annual wellness exam  Z00.00       2. Screening for hyperlipidemia  Z13.220 Lipid Profile     Lipid Profile      3. Obstructive sleep apnea  G47.33 CPAP Order for DME - ONLY FOR DME      4. Essential hypertension  I10 Comprehensive Metabolic Panel     lisinopril-hydrochlorothiazide (ZESTORETIC) 10-12.5 MG tablet     Comprehensive Metabolic Panel      5. Generalized neuropathy  G62.9 Homocysteine     Folic Acid     Folic Acid     Homocysteine      6. Screening for prostate cancer  Z12.5 PSA Screen GH     PSA Screen GH      7. Encounter for screening for HIV  Z11.4 HIV Antigen Antibody Combo     HIV Antigen Antibody Combo      8. Screening for AAA (abdominal aortic aneurysm)  Z13.6 Abdominal Aortic Aneurysm Screening/Tracking     Abdominal Aortic Aneurysm Screening/Tracking     US Abdominal Aorta Imaging      9. History of smoking  Z87.891 Abdominal Aortic Aneurysm Screening/Tracking     US Abdominal Aorta Imaging          His conditions are actually quite stable, with the neuropathy almost certainly from the COVID infection he had.  Will rule out other possible causes with the folic acid and homocysteine (per his request). Hypertension is stable med refilled for a year.     New CPAP order placed, at 12 cm water.          COUNSELING:  Reviewed preventive health counseling, as reflected in patient instructions       Regular exercise       Healthy diet/nutrition       Prostate cancer screening        He reports that he quit smoking about 38 years ago. His smoking use included cigarettes. He has a 40.00 pack-year smoking history. He has quit using smokeless tobacco.  His smokeless tobacco use included chew.      Appropriate preventive  services were discussed with this patient, including applicable screening as appropriate for cardiovascular disease, diabetes, osteopenia/osteoporosis, and glaucoma.  As appropriate for age/gender, discussed screening for colorectal cancer, prostate cancer, breast cancer, and cervical cancer. Checklist reviewing preventive services available has been given to the patient.    Reviewed patients plan of care and provided an AVS. The Basic Care Plan (routine screening as documented in Health Maintenance) for Kaushik meets the Care Plan requirement. This Care Plan has been established and reviewed with the Patient.          Alec Howard MD  [facility]    Identified Health Risks:

## 2022-11-23 NOTE — NURSING NOTE
"Chief Complaint   Patient presents with     Medicare Visit       Initial /80   Pulse 82   Temp 96.9  F (36.1  C) (Tympanic)   Resp 17   Wt 102.4 kg (225 lb 12.8 oz)   SpO2 100%   BMI 31.05 kg/m   Estimated body mass index is 31.05 kg/m  as calculated from the following:    Height as of 10/19/21: 1.816 m (5' 11.5\").    Weight as of this encounter: 102.4 kg (225 lb 12.8 oz).  Medication Reconciliation: complete    FOOD SECURITY SCREENING QUESTIONS  Hunger Vital Signs:  Within the past 12 months we worried whether our food would run out before we got money to buy more. Never  Within the past 12 months the food we bought just didn't last and we didn't have money to get more. Never  Sully Hoffman LPN 11/23/2022 9:14 AM    Do you have a healthcare directive? Yes      "

## 2022-11-25 LAB — HIV 1+2 AB+HIV1 P24 AG SERPL QL IA: NONREACTIVE

## 2022-11-29 ENCOUNTER — MYC MEDICAL ADVICE (OUTPATIENT)
Dept: FAMILY MEDICINE | Facility: OTHER | Age: 72
End: 2022-11-29

## 2022-11-30 LAB — HCYS SERPL-SCNC: 11.2 UMOL/L (ref 4–12)

## 2022-12-05 NOTE — TELEPHONE ENCOUNTER
Please call this patient and let him know that I would be happy to see him back to look at possibly doing an injection.  Lets get him scheduled for an evaluation and block out enough time in case we potentially move forward with an injection during that appointment.  Thanks.

## 2022-12-05 NOTE — TELEPHONE ENCOUNTER
Left message stating that I would write patient a MyChart message but that he could also call back if needed.    Mary Rizzo LPN .......  12/5/2022  12:46 PM

## 2022-12-22 ENCOUNTER — HOSPITAL ENCOUNTER (OUTPATIENT)
Dept: ULTRASOUND IMAGING | Facility: OTHER | Age: 72
Discharge: HOME | End: 2022-12-22
Attending: FAMILY MEDICINE | Admitting: FAMILY MEDICINE
Payer: MEDICARE

## 2022-12-22 DIAGNOSIS — Z13.6 SCREENING FOR AAA (ABDOMINAL AORTIC ANEURYSM): ICD-10-CM

## 2022-12-22 DIAGNOSIS — Z87.891 HISTORY OF SMOKING: ICD-10-CM

## 2022-12-22 PROCEDURE — 76706 US ABDL AORTA SCREEN AAA: CPT

## 2022-12-22 PROCEDURE — 76775 US EXAM ABDO BACK WALL LIM: CPT

## 2022-12-27 ENCOUNTER — MYC MEDICAL ADVICE (OUTPATIENT)
Dept: FAMILY MEDICINE | Facility: OTHER | Age: 72
End: 2022-12-27

## 2022-12-27 DIAGNOSIS — F43.21 GRIEF REACTION: Primary | ICD-10-CM

## 2023-01-05 ENCOUNTER — MYC MEDICAL ADVICE (OUTPATIENT)
Dept: FAMILY MEDICINE | Facility: OTHER | Age: 73
End: 2023-01-05

## 2023-01-05 DIAGNOSIS — I10 ESSENTIAL HYPERTENSION: Primary | ICD-10-CM

## 2023-01-05 RX ORDER — LOSARTAN POTASSIUM 25 MG/1
25 TABLET ORAL DAILY
Qty: 90 TABLET | Refills: 4 | Status: SHIPPED | OUTPATIENT
Start: 2023-01-05 | End: 2023-03-24

## 2023-01-31 ENCOUNTER — PATIENT OUTREACH (OUTPATIENT)
Dept: CARE COORDINATION | Facility: CLINIC | Age: 73
End: 2023-01-31
Payer: MEDICARE

## 2023-01-31 NOTE — TELEPHONE ENCOUNTER
Clinic Care Coordination Contact  Care Team Conversations    Called and spoke with Kaushik, he reports the following;    Currently has his house listed and plans on moving to [facility]. Reports that he would like an income based housing but not assisted living. Stated that it would be best to met with Elizabeth from [facility], provided him the number to her. Also called Elizabeth to give update on referral.    Kaushik was provided my contact number to reach out as needed.    DINO Acuña on 1/31/2023 at 3:08 PM

## 2023-02-01 ENCOUNTER — PATIENT OUTREACH (OUTPATIENT)
Dept: CARE COORDINATION | Facility: CLINIC | Age: 73
End: 2023-02-01
Payer: MEDICARE

## 2023-02-01 NOTE — TELEPHONE ENCOUNTER
Clinic Care Coordination Contact  Care Team Conversations    Called [facility] to see if they have apartment available for Kaushik.    They stated the following;    1bedroom ready March 1st at $825 and $925 for 2 bedroom per month with a 12 month lease. They allow pets with $200 deposit and $25 a month pet rent      Called Kaushik and provided that update. He reports the emailed the rental company this morning. However he will stop by the leasing office after he finishes at the [facility].    DINO Acuña on 2/1/2023 at 11:20 AM

## 2023-03-15 ENCOUNTER — MYC MEDICAL ADVICE (OUTPATIENT)
Dept: FAMILY MEDICINE | Facility: OTHER | Age: 73
End: 2023-03-15
Payer: MEDICARE

## 2023-03-23 ENCOUNTER — OFFICE VISIT (OUTPATIENT)
Dept: FAMILY MEDICINE | Facility: OTHER | Age: 73
End: 2023-03-23
Attending: FAMILY MEDICINE
Payer: MEDICARE

## 2023-03-23 VITALS
OXYGEN SATURATION: 98 % | TEMPERATURE: 97 F | RESPIRATION RATE: 16 BRPM | DIASTOLIC BLOOD PRESSURE: 68 MMHG | HEART RATE: 40 BPM | SYSTOLIC BLOOD PRESSURE: 124 MMHG | WEIGHT: 224 LBS | BODY MASS INDEX: 30.81 KG/M2

## 2023-03-23 DIAGNOSIS — U09.9 POST-COVID CHRONIC NEUROLOGIC SYMPTOMS: ICD-10-CM

## 2023-03-23 DIAGNOSIS — R29.90 POST-COVID CHRONIC NEUROLOGIC SYMPTOMS: ICD-10-CM

## 2023-03-23 DIAGNOSIS — M65.341 TRIGGER RING FINGER OF RIGHT HAND: ICD-10-CM

## 2023-03-23 DIAGNOSIS — G62.9 GENERALIZED NEUROPATHY: Primary | ICD-10-CM

## 2023-03-23 PROCEDURE — G0463 HOSPITAL OUTPT CLINIC VISIT: HCPCS

## 2023-03-23 PROCEDURE — 99214 OFFICE O/P EST MOD 30 MIN: CPT | Performed by: FAMILY MEDICINE

## 2023-03-23 ASSESSMENT — PAIN SCALES - GENERAL: PAINLEVEL: MODERATE PAIN (5)

## 2023-03-23 NOTE — PROGRESS NOTES
Sports Medicine Office Note    HPI:  72-year-old male coming in for follow-up evaluation of trigger finger of the right ring finger.  He was last seen in this office in May 2022.  At that time he received an ultrasound-guided injection into the flexor tendon sheath at the A1 pulley site (trigger finger injection).  He notes significant improvement in his symptoms with this intervention, near complete relief.  At that visit he was interested in moving forward with nonsurgical/definitive management of this condition.  He was referred to Dr. Arnaldo Anguiano in [place], [place] and received an ultrasound-guided trigger finger release.  He reports worsening pain following this intervention.  His current pain is 5/10.  He characterizes the pain as achy.  He has tried acupuncture to help with his symptoms.  He has peripheral neuropathy following a COVID-19 infection several years ago and is unsure if this is related to his current symptoms.  His symptoms are particularly bothersome when trying to .  He feels that he is weak in his hands.      EXAM:  /68 (BP Location: Right arm, Patient Position: Sitting, Cuff Size: Adult Regular)   Pulse (!) 40   Temp 97  F (36.1  C) (Temporal)   Resp 16   Wt 101.6 kg (224 lb)   SpO2 98%   BMI 30.81 kg/m    MUSCULOSKELETAL EXAM:  LEFT HAND  Inspection:  -No gross deformity  -No bruising or swelling  -Scars:  None    Tenderness to palpation of the:  -Ulnar styloid:  Negative  -Distal radius:  Negative  -Yuniel's tubercle:  Negative  -Scapholunate ligament:  Negative  -Scaphoid in anatomical snuffbox:  Negative  -1st CMC joint:  Negative  -1st MCP joint:  Negative  -Metacarpals: Mild pain to palpation over the volar aspect of the third-fifth metacarpal heads    Range of Motion:  -Able to fully extend fingers  -Able to make full fist    Strength:  - strength:  5/5, symmetric bilaterally    Sensation:  -Intact to light touch in the radial, median, and ulnar nerve  distributions    Motor:  -Intact AIN, PIN, and IO    Special Tests:  -Evaluation for triggering: Negative  Other:  -No signs of cyanosis. Normal skin temperature of the upper extremity.  -Elbow:  No gross deformity. Full range of motion.  -Right hand:  No gross deformity. No palpable tenderness. Normal strength and ROM.      IMAGIN2022: 3 view right hand x-ray  -Mild-moderate arthritic changes throughout.  No acute bony abnormalities.      ASSESSMENT/PLAN:  Diagnoses and all orders for this visit:  Generalized neuropathy  -     Occupational Therapy Referral; Future  Trigger ring finger of right hand  -     Occupational Therapy Referral; Future  Post-COVID chronic neurologic symptoms    72-year-old male with a history of trigger finger involving the right fourth digit.  He has residual pain in the area of concern.  No triggering noted on today's evaluation.  He feels that his fifth digit may now be somewhat involved but does not give the story of a true triggering mechanism.  I am concerned that his previous trigger finger release may have led to buildup of scar tissue in this area, and in conjunction with the neuropathy symptoms he has due to long COVID, causing his current symptoms.  No findings on my exam today that argue in favor of a repeat injection.  We discussed possibly attempting some soft tissue modalities with hand therapy to address underlying scar tissue in the area of concern and potentially some desensitization therapy with his underlying neuropathy.  Patient is in agreement with this plan.  - Referral placed to hand therapy  - Discussed seeing hand surgery if triggering symptoms recur  - Follow-up as needed      Eduardo Blanchard MD  3/23/2023  2:36 PM    Total time spent with this patient was 34 minutes which included chart review, visualization and independent interpretation of images, time spent with the patient, and documentation.    Procedure time:  0 minute(s)

## 2023-03-24 ENCOUNTER — MYC MEDICAL ADVICE (OUTPATIENT)
Dept: FAMILY MEDICINE | Facility: OTHER | Age: 73
End: 2023-03-24
Payer: MEDICARE

## 2023-03-24 DIAGNOSIS — I10 ESSENTIAL HYPERTENSION: Primary | ICD-10-CM

## 2023-03-24 RX ORDER — LISINOPRIL 10 MG/1
10 TABLET ORAL DAILY
Qty: 90 TABLET | Refills: 4 | Status: SHIPPED | OUTPATIENT
Start: 2023-03-24 | End: 2024-04-05

## 2023-04-07 ENCOUNTER — HOSPITAL ENCOUNTER (OUTPATIENT)
Dept: OCCUPATIONAL THERAPY | Facility: OTHER | Age: 73
Setting detail: THERAPIES SERIES
Discharge: HOME | End: 2023-04-07
Attending: FAMILY MEDICINE
Payer: MEDICARE

## 2023-04-07 DIAGNOSIS — G62.9 GENERALIZED NEUROPATHY: ICD-10-CM

## 2023-04-07 DIAGNOSIS — M65.341 TRIGGER RING FINGER OF RIGHT HAND: ICD-10-CM

## 2023-04-07 PROCEDURE — 97165 OT EVAL LOW COMPLEX 30 MIN: CPT | Mod: GO

## 2023-04-07 PROCEDURE — 97035 APP MDLTY 1+ULTRASOUND EA 15: CPT | Mod: GO

## 2023-04-07 PROCEDURE — 97110 THERAPEUTIC EXERCISES: CPT | Mod: GO

## 2023-04-07 NOTE — PROGRESS NOTES
[facility]          OUTPATIENT OCCUPATIONAL THERAPY  EVALUATION  PLAN OF TREATMENT FOR OUTPATIENT REHABILITATION  (COMPLETE FOR INITIAL CLAIMS ONLY)  Patient's Last Name, First Name, M.I.  YOB: 1950  Kaushik Ansari                        Provider's Name  [facility] Medical Record No.  [id number]                               Onset Date:     10/01/21   Start of Care Date:     04/07/23   Type:     ___PT   _X_OT   ___SLP Medical Diagnosis:     Trigger finger of right ring finger. Generalized neuropathy.                          OT Diagnosis:     Hand weakness and pain Visits from SOC:  1   _________________________________________________________________________________  Plan of Treatment/Functional Goals:  ADL training, IADL training, Joint mobilization, Manual therapy, Orthotic fitting/training, ROM, Self care/Home management, Strengthening, Stretching, Therapeutic activities  Hot/cold packs, Paraffin bath, Ultrasound     Goals  Goal Identifier:  strength  Goal Description: Patient will increase bilateral  strength by 5 pounds in each hand (Eval; right=73, left=69)  Target Date: 06/16/23     Goal Identifier: Sleep  Goal Description: Patient will report being able to sleep without waking from pain and will report reduced stiffness in hands in the mornings upon waking.  Target Date: 06/16/23     Goal Identifier: Kayaking  Goal Description: Patient will be able to kayak for 30 minutes without experiencing hand stiffening or pain.  Target Date: 06/16/23             Therapy Frequency: 0-2x/week     Predicted Duration of Therapy Intervention (days/wks): 10 weeks  GIO Chandler          I CERTIFY THE NEED FOR THESE SERVICES FURNISHED UNDER        THIS PLAN OF TREATMENT AND WHILE UNDER MY CARE     (Physician co-signature of this document indicates review  and certification of the therapy plan).               ,    Certification date from: 04/07/23, Certification date to: 06/16/23               Referring Physician: Dr. Boateng     Initial Assessment        See Epic Evaluation      Start Of Care Date: 04/07/23

## 2023-04-07 NOTE — PROGRESS NOTES
04/07/23 1300   Quick Adds   Quick Adds Certification   General Information   Start Of Care Date 04/07/23   Referring Physician Dr. Boateng   Orders Evaluate and treat as indicated   Medical Diagnosis Trigger finger of right ring finger. Generalized neuropathy.   Onset of Illness/Injury or Date of Surgery 10/01/21   Surgical/Medical History Reviewed Yes   Additional Occupational Profile Info/Pertinent History of Current Problem Patient has had cortisone shots in the right ring finger and then had a trigger finger release in October 2021 and has since had issues with scar tissue and it locking up. Also, is here to see what he can do about his bilateral hand neuropathy and stiffness/pain especially in the mornings.   Role/Living Environment   Patient role/Employment history Retired  (Retired from the [employer])   Current Living Environment House   Pain   Patient currently in pain Yes   Pain location Bilateral hands   Pain rating 2/10 but can get up to 6/10 especially in the mornings.   Pain description Ache;Throbbing;Discomfort   Fall Risk Screen   Fall screen completed by OT   Have you fallen 2 or more times in the past year? Yes   Have you fallen and had an injury in the past year? Yes   Is patient a fall risk? No   Fall screen comments Patient had two slips on the ice this winter.   Abuse Screen (yes response referral indicated)   Feels Threatened by Someone no   Does Anyone Try to Keep You From Having Contact with Others or Doing Things Outside Your Home? no   Physical Signs of Abuse Present no   Patient needs abuse support services and resources No   Hand Strength   Hand Dominance Right   Left Hand  (pounds) 73 pounds   Right Hand  (pounds) 69 pounds   Left Lateral Pinch (pounds) 24 pounds   Right Lateral Pinch (pounds) 22 pounds   Left Three Point Pinch (pounds) 19 pounds   Right Three Point Pinch (pounds) 22 pounds   Planned Therapy Interventions   Planned Therapy Interventions ADL training;IADL  training;Joint mobilization;Manual therapy;Orthotic fitting/training;ROM;Self care/Home management;Strengthening;Stretching;Therapeutic activities   Planned Modalities Hot/cold packs;Paraffin bath;Ultrasound    OT Goal 1   Goal Identifier  strength   Goal Description Patient will increase bilateral  strength by 5 pounds in each hand (Eval; right=73, left=69)   Target Date 06/16/23    OT Goal 2   Goal Identifier Sleep   Goal Description Patient will report being able to sleep without waking from pain and will report reduced stiffness in hands in the mornings upon waking.   Target Date 06/16/23    OT Goal 3   Goal Identifier Kayaking   Goal Description Patient will be able to kayak for 30 minutes without experiencing hand stiffening or pain.   Target Date 06/16/23   Clinical Impression   Criteria for Skilled Therapeutic Interventions Met Yes, treatment indicated   OT Diagnosis Hand weakness and pain   Assessment of Occupational Performance 1-3 Performance Deficits   Clinical Decision Making (Complexity) Low complexity   Therapy Frequency 0-2x/week   Predicted Duration of Therapy Intervention (days/wks) 10 weeks   Risks and Benefits of Treatment have been explained. Yes   Patient, Family & other staff in agreement with plan of care Yes   Clinical Impression Comments Patient would benefit from skilled OT services to address bilateral hand stiffness, pain, and weakness that are impacting his abilities to independently and safely complete ADLs, IADLs, and leisure tasks.   Education Assessment   Barriers To Learning No Barriers   Preferred Learning Style Listening;Demonstration;Pictures/video   Therapy Certification   Certification date from 04/07/23   Certification date to 06/16/23   Certification I certify the need for these services furnished under this plan of treatment and while under my care.  (Physician co-signature of this document indicates review and certification of the therapy plan)   Total Evaluation  Time   OT Eval, Low Complexity Minutes (23186) 15

## 2023-04-11 ENCOUNTER — MYC MEDICAL ADVICE (OUTPATIENT)
Dept: FAMILY MEDICINE | Facility: OTHER | Age: 73
End: 2023-04-11
Payer: MEDICARE

## 2023-04-11 DIAGNOSIS — N52.9 ED (ERECTILE DYSFUNCTION): ICD-10-CM

## 2023-04-11 DIAGNOSIS — G62.9 GENERALIZED NEUROPATHY: Primary | ICD-10-CM

## 2023-04-11 RX ORDER — SILDENAFIL 25 MG/1
25 TABLET, FILM COATED ORAL DAILY PRN
Qty: 9 TABLET | Refills: 11 | Status: SHIPPED | OUTPATIENT
Start: 2023-04-11 | End: 2023-07-07

## 2023-04-14 ENCOUNTER — HOSPITAL ENCOUNTER (OUTPATIENT)
Dept: OCCUPATIONAL THERAPY | Facility: OTHER | Age: 73
Setting detail: THERAPIES SERIES
Discharge: HOME | End: 2023-04-14
Attending: FAMILY MEDICINE
Payer: MEDICARE

## 2023-04-14 PROCEDURE — 97110 THERAPEUTIC EXERCISES: CPT | Mod: GO

## 2023-04-26 ENCOUNTER — MYC MEDICAL ADVICE (OUTPATIENT)
Dept: FAMILY MEDICINE | Facility: OTHER | Age: 73
End: 2023-04-26
Payer: MEDICARE

## 2023-04-26 DIAGNOSIS — G62.9 GENERALIZED NEUROPATHY: ICD-10-CM

## 2023-04-26 RX ORDER — SILDENAFIL 50 MG/1
50 TABLET, FILM COATED ORAL DAILY PRN
Qty: 9 TABLET | Refills: 11 | Status: SHIPPED | OUTPATIENT
Start: 2023-04-26 | End: 2024-02-02

## 2023-05-12 ENCOUNTER — HOSPITAL ENCOUNTER (OUTPATIENT)
Dept: OCCUPATIONAL THERAPY | Facility: OTHER | Age: 73
Setting detail: THERAPIES SERIES
Discharge: HOME | End: 2023-05-12
Attending: FAMILY MEDICINE
Payer: MEDICARE

## 2023-05-12 PROCEDURE — 97760 ORTHOTIC MGMT&TRAING 1ST ENC: CPT | Mod: GO,XU

## 2023-05-12 PROCEDURE — 97110 THERAPEUTIC EXERCISES: CPT | Mod: GO

## 2023-06-20 ENCOUNTER — MYC MEDICAL ADVICE (OUTPATIENT)
Dept: FAMILY MEDICINE | Facility: OTHER | Age: 73
End: 2023-06-20
Payer: MEDICARE

## 2023-06-20 DIAGNOSIS — M19.012 ARTHRITIS OF LEFT ACROMIOCLAVICULAR JOINT: Primary | ICD-10-CM

## 2023-06-22 NOTE — TELEPHONE ENCOUNTER
See MyChart message below. Patient is requesting referral to [facility] orthopedics for long standing history of shoulder surgeries    Please advise on pended referral    Corinne R Thayer, RN on 6/22/2023 at 8:56 AM

## 2023-07-07 ENCOUNTER — MYC MEDICAL ADVICE (OUTPATIENT)
Dept: FAMILY MEDICINE | Facility: OTHER | Age: 73
End: 2023-07-07
Payer: MEDICARE

## 2023-07-07 DIAGNOSIS — G62.9 GENERALIZED NEUROPATHY: ICD-10-CM

## 2023-07-07 RX ORDER — SILDENAFIL 100 MG/1
100 TABLET, FILM COATED ORAL DAILY PRN
Qty: 9 TABLET | Refills: 11 | Status: SHIPPED | OUTPATIENT
Start: 2023-07-07

## 2023-07-07 NOTE — TELEPHONE ENCOUNTER
See patient's MyChart message below. States he found that 100 mg of sildenafil (VIAGRA) is the correct dose for him. Please advise on pended order    Corinne R Thayer, RN on 7/7/2023 at 4:06 PM

## 2023-07-26 ENCOUNTER — MYC MEDICAL ADVICE (OUTPATIENT)
Dept: FAMILY MEDICINE | Facility: OTHER | Age: 73
End: 2023-07-26
Payer: MEDICARE

## 2023-08-18 ENCOUNTER — OFFICE VISIT (OUTPATIENT)
Dept: FAMILY MEDICINE | Facility: OTHER | Age: 73
End: 2023-08-18
Attending: NURSE PRACTITIONER
Payer: MEDICARE

## 2023-08-18 VITALS
HEART RATE: 72 BPM | WEIGHT: 231.2 LBS | RESPIRATION RATE: 16 BRPM | OXYGEN SATURATION: 97 % | BODY MASS INDEX: 31.8 KG/M2 | SYSTOLIC BLOOD PRESSURE: 150 MMHG | DIASTOLIC BLOOD PRESSURE: 82 MMHG | TEMPERATURE: 96.8 F

## 2023-08-18 DIAGNOSIS — M79.674 PAIN OF TOE OF RIGHT FOOT: ICD-10-CM

## 2023-08-18 DIAGNOSIS — M20.61 DEFORMITY OF TOE OF RIGHT FOOT: ICD-10-CM

## 2023-08-18 DIAGNOSIS — L03.031 PARONYCHIA OF GREAT TOE, RIGHT: Primary | ICD-10-CM

## 2023-08-18 DIAGNOSIS — L28.2 PRURITIC RASH: ICD-10-CM

## 2023-08-18 PROCEDURE — G0463 HOSPITAL OUTPT CLINIC VISIT: HCPCS

## 2023-08-18 PROCEDURE — 99213 OFFICE O/P EST LOW 20 MIN: CPT | Performed by: NURSE PRACTITIONER

## 2023-08-18 RX ORDER — CEPHALEXIN 500 MG/1
500 CAPSULE ORAL 2 TIMES DAILY
Qty: 14 CAPSULE | Refills: 0 | Status: SHIPPED | OUTPATIENT
Start: 2023-08-18 | End: 2023-08-25

## 2023-08-18 RX ORDER — TRIAMCINOLONE ACETONIDE 1 MG/G
OINTMENT TOPICAL 3 TIMES DAILY PRN
Qty: 30 G | Refills: 0 | Status: SHIPPED | OUTPATIENT
Start: 2023-08-18 | End: 2024-02-02

## 2023-08-18 ASSESSMENT — ANXIETY QUESTIONNAIRES
7. FEELING AFRAID AS IF SOMETHING AWFUL MIGHT HAPPEN: NOT AT ALL
4. TROUBLE RELAXING: SEVERAL DAYS
IF YOU CHECKED OFF ANY PROBLEMS ON THIS QUESTIONNAIRE, HOW DIFFICULT HAVE THESE PROBLEMS MADE IT FOR YOU TO DO YOUR WORK, TAKE CARE OF THINGS AT HOME, OR GET ALONG WITH OTHER PEOPLE: NOT DIFFICULT AT ALL
5. BEING SO RESTLESS THAT IT IS HARD TO SIT STILL: SEVERAL DAYS
2. NOT BEING ABLE TO STOP OR CONTROL WORRYING: SEVERAL DAYS
3. WORRYING TOO MUCH ABOUT DIFFERENT THINGS: SEVERAL DAYS
GAD7 TOTAL SCORE: 5
GAD7 TOTAL SCORE: 5
1. FEELING NERVOUS, ANXIOUS, OR ON EDGE: SEVERAL DAYS
6. BECOMING EASILY ANNOYED OR IRRITABLE: NOT AT ALL

## 2023-08-18 ASSESSMENT — PAIN SCALES - GENERAL: PAINLEVEL: MILD PAIN (3)

## 2023-08-18 NOTE — PATIENT INSTRUCTIONS
Suyapa Mayorga NP  Available in [facility] on 8/27, 8/28, and 8/29 if needed for toenail removal     Once to twice daily epsom salt with warm water for 10 minutes

## 2023-08-18 NOTE — NURSING NOTE
"Chief Complaint   Patient presents with    Toe Pain       FOOD SECURITY SCREENING QUESTIONS  Hunger Vital Signs:  Within the past 12 months we worried whether our food would run out before we got money to buy more. Never  Within the past 12 months the food we bought just didn't last and we didn't have money to get more. Never  Alicia Rios LPN 8/18/2023 10:14 AM      Initial BP (!) 150/82 (BP Location: Left arm, Patient Position: Sitting, Cuff Size: Adult Large)   Pulse 72   Temp 96.8  F (36  C) (Tympanic)   Resp 16   Wt 104.9 kg (231 lb 3.2 oz)   SpO2 97%   BMI 31.80 kg/m   Estimated body mass index is 31.8 kg/m  as calculated from the following:    Height as of 10/19/21: 1.816 m (5' 11.5\").    Weight as of this encounter: 104.9 kg (231 lb 3.2 oz).  Medication Reconciliation: complete    Alicia Rios LPN    "

## 2023-08-18 NOTE — PROGRESS NOTES
ASSESSMENT/PLAN:     I have reviewed the nursing notes.  I have reviewed the findings, diagnosis, plan and need for follow up with the patient.      1. Paronychia of right great toe    - cephALEXin (KEFLEX) 500 MG capsule; Take 1 capsule (500 mg) by mouth 2 times daily for 7 days  Dispense: 14 capsule; Refill: 0    Right great toe along lateral side with minor loosening of skin along the nail boarder with mild localized swelling without noted redness or drainage.    Discussed starting with conservative measures of supportive footwear with inserts which patient will see Johnny Mullins for orthotics, referral to podiatry, twice daily Epson salt soaks, and oral antibiotic.  If does not resolve then would recommend a toenail wedge removal which patient is agreeable with plan.    Discussed warning signs/symptoms indicative of need to f/u  Follow up if symptoms persist or worsen or concerns    2. Pain of toe of right foot    - Orthopedic  Referral; Future    May use over-the-counter Tylenol or ibuprofen PRN    3. Deformity of toe of right foot    - Orthopedic  Referral; Future    Continue to use supportive footwear with inserts.  Follow-up with Johnny Mullins regarding orthotics.  Referral to podiatry.    4. Pruritic rash    - triamcinolone (KENALOG) 0.1 % external ointment; Apply topically 3 times daily as needed for irritation  Dispense: 30 g; Refill: 0    Recent poison ivy rash with some lingering with small areas of pruritus not improving with use of over-the-counter hydrocortisone cream so will increase to triamcinolone.              I explained my diagnostic considerations and recommendations to the patient, who voiced understanding and agreement with the treatment plan. All questions were answered. We discussed potential side effects of any prescribed or recommended therapies, as well as expectations for response to treatments.    Suyapa Mayorga NP  [facility]      SUBJECTIVE:   Kaushik Ansari is a 72 year old male who presents to clinic today for the following health issues:  Toe pain    HPI  Hx of flat feet and uses orthotics and specialist shoes per orthotics.  Seen podiatry in the past, not recently.    Right great toe with pain and burning the past week.  No noted drainage or bleeding.  Burning pain.  No numbness or tingling.  No fevers or chills.  No home treatments.          Past Medical History:   Diagnosis Date    Anxiety disorder     No Comments Provided    Complete tear of right rotator cuff     3/23/2017    Dependence on other enabling machines and devices     No Comments Provided    Encounter for prescription of emergency contraception     No Comments Provided    Essential tremor     No Comments Provided    Impingement syndrome of right shoulder     3/23/2017    Other specified postprocedural states     2017    Primary osteoarthritis of shoulder     3/23/2017    Sciatica     No Comments Provided    Status post shoulder surgery 2017    Trigger thumb of right hand     2017     Past Surgical History:   Procedure Laterality Date    COLONOSCOPY  2000    COLONOSCOPY  2005    normal by patient report    COLONOSCOPY  2014    OTHER SURGICAL HISTORY      70967.0,CT CORONARY ANGIOGRAM ([place])    OTHER SURGICAL HISTORY      2017,[id number],OTHER,Right    VASECTOMY      No Comments Provided     Social History     Tobacco Use    Smoking status: Former     Packs/day: 2.00     Years: 20.00     Pack years: 40.00     Types: Cigarettes     Quit date: 1984     Years since quittin.6    Smokeless tobacco: Former     Types: Chew   Substance Use Topics    Alcohol use: No     Alcohol/week: 0.0 standard drinks of alcohol     Current Outpatient Medications   Medication Sig Dispense Refill    acetaminophen (TYLENOL) 500 MG tablet Take 1,000 mg by mouth every 6 hours as needed for mild pain      aspirin 81 MG EC tablet Take 81 mg by mouth  daily      cephALEXin (KEFLEX) 500 MG capsule Take 1 capsule (500 mg) by mouth 2 times daily for 7 days 14 capsule 0    cyanocobalamin (VITAMIN B-12) 100 MCG tablet Take 100 mcg by mouth daily      lisinopril (ZESTRIL) 10 MG tablet Take 1 tablet (10 mg) by mouth daily 90 tablet 4    sildenafil (VIAGRA) 100 MG tablet Take 1 tablet (100 mg) by mouth daily as needed (For ED) 9 tablet 11    sildenafil (VIAGRA) 50 MG tablet Take 1 tablet (50 mg) by mouth daily as needed (for ED) 9 tablet 11    triamcinolone (KENALOG) 0.1 % external ointment Apply topically 3 times daily as needed for irritation 30 g 0    order for DME Equipment being ordered: CPAP supplies (Patient not taking: Reported on 8/18/2023) 1 each 1    order for DME Equipment being ordered: CPAP replacement supplies (Patient not taking: Reported on 8/18/2023) 1 each 3     Allergies   Allergen Reactions    Dust Mites Cough and Other (See Comments)         Past medical history, past surgical history, current medications and allergies reviewed and accurate to the best of my knowledge.        OBJECTIVE:     BP (!) 150/82 (BP Location: Left arm, Patient Position: Sitting, Cuff Size: Adult Large)   Pulse 72   Temp 96.8  F (36  C) (Tympanic)   Resp 16   Wt 104.9 kg (231 lb 3.2 oz)   SpO2 97%   BMI 31.80 kg/m    Body mass index is 31.8 kg/m .      Physical Exam  General Appearance: Well appearing elderly male, appropriate appearance for age. No acute distress  Cardiac: CMS intact to right lower extremity with brisk capillary refill and palpable pedal and posterior tibial pulses.  No lower extremity edema.  Musculoskeletal:  Equal movement of bilateral upper extremities.  Equal movement of bilateral lower extremities.  Normal gait.  Right great toe and 2nd toe with bony deformities, suspect bunions.    Dermatological: Right great toe along lateral side with minor loosening of skin along the nail boarder with mild localized swelling without noted redness or  drainage.    Psychological: normal affect, alert, oriented, and pleasant.

## 2023-08-21 ENCOUNTER — APPOINTMENT (OUTPATIENT)
Dept: GENERAL RADIOLOGY | Facility: OTHER | Age: 73
End: 2023-08-21
Attending: FAMILY MEDICINE
Payer: MEDICARE

## 2023-08-21 ENCOUNTER — HOSPITAL ENCOUNTER (EMERGENCY)
Facility: OTHER | Age: 73
Discharge: HOME | End: 2023-08-21
Attending: FAMILY MEDICINE | Admitting: FAMILY MEDICINE
Payer: MEDICARE

## 2023-08-21 VITALS
OXYGEN SATURATION: 96 % | HEIGHT: 74 IN | DIASTOLIC BLOOD PRESSURE: 82 MMHG | HEART RATE: 84 BPM | SYSTOLIC BLOOD PRESSURE: 132 MMHG | BODY MASS INDEX: 29.65 KG/M2 | RESPIRATION RATE: 18 BRPM | TEMPERATURE: 98.6 F | WEIGHT: 231 LBS

## 2023-08-21 DIAGNOSIS — R68.83 CHILLS: ICD-10-CM

## 2023-08-21 DIAGNOSIS — R05.9 COUGH, UNSPECIFIED TYPE: ICD-10-CM

## 2023-08-21 LAB
ANION GAP SERPL CALCULATED.3IONS-SCNC: 8 MMOL/L (ref 7–15)
BASOPHILS # BLD AUTO: 0.1 10E3/UL (ref 0–0.2)
BASOPHILS NFR BLD AUTO: 1 %
BUN SERPL-MCNC: 14.6 MG/DL (ref 8–23)
CALCIUM SERPL-MCNC: 9.6 MG/DL (ref 8.8–10.2)
CHLORIDE SERPL-SCNC: 106 MMOL/L (ref 98–107)
CREAT SERPL-MCNC: 0.97 MG/DL (ref 0.67–1.17)
DEPRECATED HCO3 PLAS-SCNC: 26 MMOL/L (ref 22–29)
EOSINOPHIL # BLD AUTO: 0.3 10E3/UL (ref 0–0.7)
EOSINOPHIL NFR BLD AUTO: 3 %
ERYTHROCYTE [DISTWIDTH] IN BLOOD BY AUTOMATED COUNT: 13.2 % (ref 10–15)
FLUAV RNA SPEC QL NAA+PROBE: NEGATIVE
FLUBV RNA RESP QL NAA+PROBE: NEGATIVE
GFR SERPL CREATININE-BSD FRML MDRD: 82 ML/MIN/1.73M2
GLUCOSE SERPL-MCNC: 100 MG/DL (ref 70–99)
HCT VFR BLD AUTO: 40 % (ref 40–53)
HGB BLD-MCNC: 13.7 G/DL (ref 13.3–17.7)
IMM GRANULOCYTES # BLD: 0 10E3/UL
IMM GRANULOCYTES NFR BLD: 0 %
L PNEUMO1 AG UR QL IA: NEGATIVE
LYMPHOCYTES # BLD AUTO: 2 10E3/UL (ref 0.8–5.3)
LYMPHOCYTES NFR BLD AUTO: 18 %
MCH RBC QN AUTO: 33 PG (ref 26.5–33)
MCHC RBC AUTO-ENTMCNC: 34.3 G/DL (ref 31.5–36.5)
MCV RBC AUTO: 96 FL (ref 78–100)
MONOCYTES # BLD AUTO: 1.1 10E3/UL (ref 0–1.3)
MONOCYTES NFR BLD AUTO: 10 %
NEUTROPHILS # BLD AUTO: 7.4 10E3/UL (ref 1.6–8.3)
NEUTROPHILS NFR BLD AUTO: 68 %
NRBC # BLD AUTO: 0 10E3/UL
NRBC BLD AUTO-RTO: 0 /100
PLATELET # BLD AUTO: 162 10E3/UL (ref 150–450)
POTASSIUM SERPL-SCNC: 4 MMOL/L (ref 3.4–5.3)
RBC # BLD AUTO: 4.15 10E6/UL (ref 4.4–5.9)
RSV RNA SPEC NAA+PROBE: NEGATIVE
SARS-COV-2 RNA RESP QL NAA+PROBE: NEGATIVE
SODIUM SERPL-SCNC: 140 MMOL/L (ref 136–145)
WBC # BLD AUTO: 10.9 10E3/UL (ref 4–11)

## 2023-08-21 PROCEDURE — 93005 ELECTROCARDIOGRAM TRACING: CPT

## 2023-08-21 PROCEDURE — 93010 ELECTROCARDIOGRAM REPORT: CPT | Performed by: INTERNAL MEDICINE

## 2023-08-21 PROCEDURE — 80048 BASIC METABOLIC PNL TOTAL CA: CPT | Performed by: FAMILY MEDICINE

## 2023-08-21 PROCEDURE — 99284 EMERGENCY DEPT VISIT MOD MDM: CPT | Performed by: FAMILY MEDICINE

## 2023-08-21 PROCEDURE — 71045 X-RAY EXAM CHEST 1 VIEW: CPT

## 2023-08-21 PROCEDURE — 87637 SARSCOV2&INF A&B&RSV AMP PRB: CPT | Performed by: FAMILY MEDICINE

## 2023-08-21 PROCEDURE — C9803 HOPD COVID-19 SPEC COLLECT: HCPCS

## 2023-08-21 PROCEDURE — 99285 EMERGENCY DEPT VISIT HI MDM: CPT | Mod: 25

## 2023-08-21 PROCEDURE — 87899 AGENT NOS ASSAY W/OPTIC: CPT | Performed by: FAMILY MEDICINE

## 2023-08-21 PROCEDURE — 36415 COLL VENOUS BLD VENIPUNCTURE: CPT | Performed by: FAMILY MEDICINE

## 2023-08-21 PROCEDURE — 85004 AUTOMATED DIFF WBC COUNT: CPT | Performed by: FAMILY MEDICINE

## 2023-08-21 ASSESSMENT — ACTIVITIES OF DAILY LIVING (ADL)
ADLS_ACUITY_SCORE: 35
ADLS_ACUITY_SCORE: 35

## 2023-08-21 ASSESSMENT — ENCOUNTER SYMPTOMS
HEADACHES: 1
COUGH: 1
CHILLS: 1
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
## 2023-08-23 LAB
ATRIAL RATE - MUSE: 84 BPM
DIASTOLIC BLOOD PRESSURE - MUSE: NORMAL MMHG
INTERPRETATION ECG - MUSE: NORMAL
P AXIS - MUSE: 79 DEGREES
PR INTERVAL - MUSE: 160 MS
QRS DURATION - MUSE: 94 MS
QT - MUSE: 396 MS
QTC - MUSE: 467 MS
R AXIS - MUSE: 58 DEGREES
SYSTOLIC BLOOD PRESSURE - MUSE: NORMAL MMHG
T AXIS - MUSE: 55 DEGREES
VENTRICULAR RATE- MUSE: 84 BPM

## 2023-09-01 NOTE — PROGRESS NOTES
05/12/23 1500   Appointment Info   Treating Provider Chetna Mariscal OTR/L   Visits Used 3   Medical Diagnosis Trigger finger of right ring finger. Generalized neuropathy.   Progress Note/Certification   Start Of Care Date 04/07/23   Onset of Illness/Injury or Date of Surgery 10/01/21   Certification date from 04/07/23   OT Goal 1   Goal Identifier  strength   Goal Description Patient will increase bilateral  strength by 5 pounds in each hand (Eval; right=73, left=69)   Target Date 06/16/23   OT Goal 2   Goal Identifier Sleep   Goal Description Patient will report being able to sleep without waking from pain and will report reduced stiffness in hands in the mornings upon waking.   Target Date 06/16/23   OT Goal 3   Goal Identifier Kayaking   Goal Description Patient will be able to kayak for 30 minutes without experiencing hand stiffening or pain.   Target Date 06/16/23   Subjective Report   Subjective Report Right is worse than the left.  Right feels like needles and pins if sleeping on it at night. Most painful/bothersome is sleeping/in the mornings upon waking and when driving.   Objective Measure 1   Objective Measure Strength eval 4/7/2023   Details Right grasp=69, lat=22, 3 point=22. Left grasp=73, lat=24, 3 point=19 pounds   Therapeutic Procedure/Exercise   Therapeutic Procedure: strength, endurance, ROM, flexibillity minutes (66693) 17   Skilled Intervention Educated patient in the UE/hand anatomy, nature of disease, correct completion of exercises and modifications as needed.   Patient Response/Progress Feels a stretch/pull but no pain.   Treatment Detail Completed nerve glide stretches 2 sets of each x5 reps holding for 5-10 seconds.   Orthotics   Orthotic Assessment, Initial session minutes (03738) 15   Type of Orthotic Resting wrist splint   Wear Schedule At night, when driving, with repeatative wrist/hand use.   Skilled Intervention Educated patient on use and benefits, proper size/fit, and  wear and care.   Patient Response/Progress Good fit   Treatment Detail See above   Education   Learner/Method Patient   Readiness Eager   Plan   Plan for next session Left splint? Stretching/ROM, heat, ultrasound   Homework Heat, continue tendon glides, wrist and  strengthening.   Clinical Impression   OT Diagnosis Hand weakness and pain         DISCHARGE  Reason for Discharge: Patient has failed to schedule further appointments.    Equipment Issued: NA    Discharge Plan: Patient to continue home program.    Referring Provider:  Eduardo Boateng

## 2023-09-09 ENCOUNTER — MYC MEDICAL ADVICE (OUTPATIENT)
Dept: FAMILY MEDICINE | Facility: OTHER | Age: 73
End: 2023-09-09
Payer: MEDICARE

## 2023-09-15 ENCOUNTER — MYC MEDICAL ADVICE (OUTPATIENT)
Dept: FAMILY MEDICINE | Facility: OTHER | Age: 73
End: 2023-09-15
Payer: MEDICARE

## 2023-09-21 ENCOUNTER — OFFICE VISIT (OUTPATIENT)
Dept: FAMILY MEDICINE | Facility: OTHER | Age: 73
End: 2023-09-21
Attending: FAMILY MEDICINE
Payer: MEDICARE

## 2023-09-21 VITALS
HEART RATE: 72 BPM | SYSTOLIC BLOOD PRESSURE: 124 MMHG | OXYGEN SATURATION: 98 % | DIASTOLIC BLOOD PRESSURE: 82 MMHG | RESPIRATION RATE: 16 BRPM | BODY MASS INDEX: 29.72 KG/M2 | TEMPERATURE: 97.4 F | HEIGHT: 73 IN | WEIGHT: 224.2 LBS

## 2023-09-21 DIAGNOSIS — Z00.00 ENCOUNTER FOR MEDICARE ANNUAL WELLNESS EXAM: Primary | ICD-10-CM

## 2023-09-21 DIAGNOSIS — R05.3 CHRONIC COUGH: ICD-10-CM

## 2023-09-21 DIAGNOSIS — Z23 NEED FOR TDAP VACCINATION: ICD-10-CM

## 2023-09-21 DIAGNOSIS — Z23 NEED FOR SHINGLES VACCINE: ICD-10-CM

## 2023-09-21 PROCEDURE — G0439 PPPS, SUBSEQ VISIT: HCPCS | Performed by: FAMILY MEDICINE

## 2023-09-21 PROCEDURE — G0008 ADMIN INFLUENZA VIRUS VAC: HCPCS

## 2023-09-21 RX ORDER — MELOXICAM 15 MG/1
TABLET ORAL
COMMUNITY
End: 2024-02-02

## 2023-09-21 ASSESSMENT — ANXIETY QUESTIONNAIRES
7. FEELING AFRAID AS IF SOMETHING AWFUL MIGHT HAPPEN: NOT AT ALL
3. WORRYING TOO MUCH ABOUT DIFFERENT THINGS: SEVERAL DAYS
4. TROUBLE RELAXING: SEVERAL DAYS
IF YOU CHECKED OFF ANY PROBLEMS ON THIS QUESTIONNAIRE, HOW DIFFICULT HAVE THESE PROBLEMS MADE IT FOR YOU TO DO YOUR WORK, TAKE CARE OF THINGS AT HOME, OR GET ALONG WITH OTHER PEOPLE: NOT DIFFICULT AT ALL
GAD7 TOTAL SCORE: 5
6. BECOMING EASILY ANNOYED OR IRRITABLE: SEVERAL DAYS
GAD7 TOTAL SCORE: 5
2. NOT BEING ABLE TO STOP OR CONTROL WORRYING: SEVERAL DAYS
1. FEELING NERVOUS, ANXIOUS, OR ON EDGE: SEVERAL DAYS
5. BEING SO RESTLESS THAT IT IS HARD TO SIT STILL: NOT AT ALL

## 2023-09-21 ASSESSMENT — ENCOUNTER SYMPTOMS
PARESTHESIAS: 0
ABDOMINAL PAIN: 0
JOINT SWELLING: 0
NERVOUS/ANXIOUS: 1
DIZZINESS: 0
MYALGIAS: 0
DYSURIA: 0
HEMATOCHEZIA: 0
SHORTNESS OF BREATH: 0
SORE THROAT: 0
ARTHRALGIAS: 1
COUGH: 1
HEMATURIA: 0
CONSTIPATION: 0
NAUSEA: 0
EYE PAIN: 0
DIARRHEA: 0
PALPITATIONS: 0
HEADACHES: 1
WEAKNESS: 0
CHILLS: 0

## 2023-09-21 ASSESSMENT — PAIN SCALES - GENERAL: PAINLEVEL: MILD PAIN (2)

## 2023-09-21 ASSESSMENT — ACTIVITIES OF DAILY LIVING (ADL): CURRENT_FUNCTION: NO ASSISTANCE NEEDED

## 2023-09-21 NOTE — PATIENT INSTRUCTIONS
Patient Education   Personalized Prevention Plan  You are due for the preventive services outlined below.  Your care team is available to assist you in scheduling these services.  If you have already completed any of these items, please share that information with your care team to update in your medical record.  Health Maintenance Due   Topic Date Due     Zoster (Shingles) Vaccine (1 of 2) Never done     COVID-19 Vaccine (4 - Moderna series) 02/08/2022     Diptheria Tetanus Pertussis (DTAP/TDAP/TD) Vaccine (2 - Td or Tdap) 12/13/2022     Flu Vaccine (1) 09/01/2023     Hearing Loss: Care Instructions  Overview     Hearing loss is a sudden or slow decrease in how well you hear. It can range from slight to profound. Permanent hearing loss can occur with aging. It also can happen when you are exposed long-term to loud noise. Examples include listening to loud music, riding motorcycles, or being around other loud machines.  Hearing loss can affect your work and home life. It can make you feel lonely or depressed. You may feel that you have lost your independence. But hearing aids and other devices can help you hear better and feel connected to others.  Follow-up care is a key part of your treatment and safety. Be sure to make and go to all appointments, and call your doctor if you are having problems. It's also a good idea to know your test results and keep a list of the medicines you take.  How can you care for yourself at home?  Avoid loud noises whenever possible. This helps keep your hearing from getting worse.  Always wear hearing protection around loud noises.  Wear a hearing aid as directed.  A professional can help you pick a hearing aid that will work best for you.  You can also get hearing aids over the counter for mild to moderate hearing loss.  Have hearing tests as your doctor suggests. They can show whether your hearing has changed. Your hearing aid may need to be adjusted.  Use other devices as needed.  "These may include:  Telephone amplifiers and hearing aids that can connect to a television, stereo, radio, or microphone.  Devices that use lights or vibrations. These alert you to the doorbell, a ringing telephone, or a baby monitor.  Television closed-captioning. This shows the words at the bottom of the screen. Most new TVs can do this.  TTY (text telephone). This lets you type messages back and forth on the telephone instead of talking or listening. These devices are also called TDD. When messages are typed on the keyboard, they are sent over the phone line to a receiving TTY. The message is shown on a monitor.  Use text messaging, social media, and email if it is hard for you to communicate by telephone.  Try to learn a listening technique called speechreading. It is not lipreading. You pay attention to people's gestures, expressions, posture, and tone of voice. These clues can help you understand what a person is saying. Face the person you are talking to, and have them face you. Make sure the lighting is good. You need to see the other person's face clearly.  Think about counseling if you need help to adjust to your hearing loss.  When should you call for help?  Watch closely for changes in your health, and be sure to contact your doctor if:    You think your hearing is getting worse.     You have new symptoms, such as dizziness or nausea.   Where can you learn more?  Go to https://www.[employer].net/patiented  Enter R798 in the search box to learn more about \"Hearing Loss: Care Instructions.\"  Current as of: March 1, 2023               Content Version: 13.7    [id number] [employer].   Care instructions adapted under license by your healthcare professional. If you have questions about a medical condition or this instruction, always ask your healthcare professional. [employer] disclaims any warranty or liability for your use of this information.      Preventing Falls: Care " "Instructions    Talk to your doctor about the medicines you take. Ask if any of them increase the risk of falls and whether they can be changed or stopped.   Try to exercise regularly. It can help improve your strength and balance. This can help lower your risk of falling.     Practice fall safety and prevention.    Wear low-heeled shoes that fit well and give your feet good support. Talk to your doctor if you have foot problems that make this hard.  Carry a cellphone or wear a medical alert device that you can use to call for help.  Use stepladders instead of chairs to reach high objects. Don't climb if you're at risk for falls. Ask for help, if needed.  Wear the correct eyeglasses, if you need them.    Make your home safer.    Remove rugs, cords, clutter, and furniture from walkways.  Keep your house well lit. Use night-lights in hallways and bathrooms.  Install and use sturdy handrails on stairways.  Wear nonskid footwear, even inside. Don't walk barefoot or in socks without shoes.    Be safe outside.    Use handrails, curb cuts, and ramps whenever possible.  Keep your hands free by using a shoulder bag or backpack.  Try to walk in well-lit areas. Watch out for uneven ground, changes in pavement, and debris.  Be careful in the winter. Walk on the grass or gravel when sidewalks are slippery. Use de-icer on steps and walkways. Add non-slip devices to shoes.    Put grab bars and nonskid mats in your shower or tub and near the toilet. Try to use a shower chair or bath bench when bathing.   Get into a tub or shower by putting in your weaker leg first. Get out with your strong side first. Have a phone or medical alert device in the bathroom with you.   Where can you learn more?  Go to https://www.[employer].net/patiented  Enter G117 in the search box to learn more about \"Preventing Falls: Care Instructions.\"  Current as of: November 9, 2022               Content Version: 13.7    0149-2048 [employer]. "   Care instructions adapted under license by your healthcare professional. If you have questions about a medical condition or this instruction, always ask your healthcare professional. Healthwise, [employer] disclaims any warranty or liability for your use of this information.      How to Get Up Safely After a Fall: Care Instructions  Overview     If you have injuries, health problems, or other reasons that may make it easy for you to fall at home, it is a good idea to learn how to get up safely after a fall. Learning how to get up correctly can help you avoid making an injury worse.  Also, knowing what to do if you cannot get up can help you stay safe until help arrives.  Follow-up care is a key part of your treatment and safety. Be sure to make and go to all appointments, and call your doctor if you are having problems. It's also a good idea to know your test results and keep a list of the medicines you take.  How can you care for yourself after a fall?  If you think you can get up  First lie still for a few minutes and think about how you feel. If your body feels okay and you think you can get up safely, follow the rest of the steps below:  Look for a chair or other piece of furniture that is close to you.  Roll onto your side and rest. Roll by turning your head in the direction you want to roll, move your shoulder and arm, then hip and leg in the same direction.  Lie still for a moment to let your blood pressure adjust.  Slowly push your upper body up, lift your head, and take a moment to rest.  Slowly get up on your hands and knees, and crawl to the chair or other stable piece of furniture.  Put your hands on the chair.  Move one foot forward, and place it flat on the floor. Your other leg should be bent with the knee on the floor.  Rise slowly, turn your body, and sit in the chair. Stay seated for a bit and think about how you feel. Call for help. Even if you feel okay, let someone know what happened to you.  "You might not know that you have a serious injury.  If you cannot get up  If you think you are injured after a fall or you cannot get up, try not to panic.  Call out for help.  If you have a phone within reach or you have an emergency call device, use it to call for help.  If you do not have a phone within reach, try to slide yourself toward it. If you cannot get to the phone, try to slide toward a door or window or a place where you think you can be heard.  [place] or use an object to make noise so someone might hear you.  If you can reach something that you can use for a pillow, place it under your head. Try to stay warm by covering yourself with a blanket or clothing while you wait for help.  When should you call for help?   Call 911 anytime you think you may need emergency care. For example, call if:    You passed out (lost consciousness).     You cannot get up after a fall.     You have severe pain.   Call your doctor now or seek immediate medical care if:    You have new or worse pain.     You are dizzy or lightheaded.     You hit your head.   Watch closely for changes in your health, and be sure to contact your doctor if:    You do not get better as expected.   Where can you learn more?  Go to https://www.[employer].net/patiented  Enter G513 in the search box to learn more about \"How to Get Up Safely After a Fall: Care Instructions.\"  Current as of: November 14, 2022               Content Version: 13.7    [id number] [employer].   Care instructions adapted under license by your healthcare professional. If you have questions about a medical condition or this instruction, always ask your healthcare professional. [employer] disclaims any warranty or liability for your use of this information.         "

## 2023-09-21 NOTE — PROGRESS NOTES
"SUBJECTIVE:   Kausihk is a 72 year old who presents for Preventive Visit.      9/21/2023     1:37 PM   Additional Questions   Roomed by ALBERTO Virk         9/21/2023     1:37 PM   Patient Reported Additional Medications   Patient reports taking the following new medications N/A       Are you in the first 12 months of your Medicare coverage?  No    Healthy Habits:     In general, how would you rate your overall health?  Good    Frequency of exercise:  2-3 days/week    Duration of exercise:  30-45 minutes    Do you usually eat at least 4 servings of fruit and vegetables a day, include whole grains    & fiber and avoid regularly eating high fat or \"junk\" foods?  Yes    Taking medications regularly:  Yes    Medication side effects:  Not applicable    Ability to successfully perform activities of daily living:  No assistance needed    Home Safety:  No safety concerns identified    Hearing Impairment:  Difficulty following a conversation in a noisy restaurant or crowded room, feel that people are mumbling or not speaking clearly, difficulty following dialogue in the theater, difficult to understand a speaker at a public meeting or [employer] service, need to ask people to speak up or repeat themselves, difficulty understanding soft or whispered speech and difficulty understanding speech on the telephone    In the past 6 months, have you been bothered by leaking of urine?  No    In general, how would you rate your overall mental or emotional health?  Good    Additional concerns today:  Yes      Today's PHQ-2 Score:       9/21/2023     1:26 PM   PHQ-2 ( 1999 Pfizer)   Q1: Little interest or pleasure in doing things 0   Q2: Feeling down, depressed or hopeless 0   PHQ-2 Score 0   Q1: Little interest or pleasure in doing things Not at all   Q2: Feeling down, depressed or hopeless Not at all   PHQ-2 Score 0           Have you ever done Advance Care Planning? (For example, a Health Directive, POLST, or a discussion with a medical " provider or your loved ones about your wishes): Yes, advance care planning is on file.       Fall risk  Fallen 2 or more times in the past year?: Yes  Any fall with injury in the past year?: Yes    Cognitive Screening   1) Repeat 3 items (Leader, Season, Table)    2) Clock draw: NORMAL  3) 3 item recall: Recalls 2 objects   Results: NORMAL clock, 1-2 items recalled: COGNITIVE IMPAIRMENT LESS LIKELY    Mini-CogTM Copyright DEANNA Younger. Licensed by the author for use in [facility]; reprinted with permission ([email or web address]). All rights reserved.      Do you have sleep apnea, excessive snoring or daytime drowsiness? : yes    Reviewed and updated as needed this visit by clinical staff   Tobacco  Allergies  Meds   Med Hx  Surg Hx  Fam Hx  Soc Hx        Reviewed and updated as needed this visit by Provider                 Social History     Tobacco Use     Smoking status: Former     Packs/day: 2.00     Years: 20.00     Pack years: 40.00     Types: Cigarettes     Quit date: 1984     Years since quittin.7     Smokeless tobacco: Former     Types: Chew   Substance Use Topics     Alcohol use: No     Alcohol/week: 0.0 standard drinks of alcohol             2023     1:26 PM   Alcohol Use   Prescreen: >3 drinks/day or >7 drinks/week? Not Applicable     Do you have a current opioid prescription? No  Do you use any other controlled substances or medications that are not prescribed by a provider? None              Current providers sharing in care for this patient include:    Patient Care Team:  Alec Howard MD as PCP - General (Family Practice)  Eduardo Boateng MD as Assigned PCP    The following health maintenance items are reviewed in Epic and correct as of today:  Health Maintenance   Topic Date Due     ZOSTER IMMUNIZATION (1 of 2) Never done     COVID-19 Vaccine (4 - Moderna series) 2022     DTAP/TDAP/TD IMMUNIZATION (2 - Td or Tdap) 2022     INFLUENZA VACCINE (1) 2023     MEDICARE  ANNUAL WELLNESS VISIT  11/23/2023     LIPID  11/23/2023     COLORECTAL CANCER SCREENING  01/03/2024     SHUKRI ASSESSMENT  03/21/2024     FALL RISK ASSESSMENT  09/21/2024     ADVANCE CARE PLANNING  11/23/2027     HEPATITIS C SCREENING  Completed     PHQ-2 (once per calendar year)  Completed     Pneumococcal Vaccine: 65+ Years  Completed     AORTIC ANEURYSM SCREENING (SYSTEM ASSIGNED)  Completed     IPV IMMUNIZATION  Aged Out     HPV IMMUNIZATION  Aged Out     MENINGITIS IMMUNIZATION  Aged Out     Current Outpatient Medications   Medication Sig Dispense Refill     acetaminophen (TYLENOL) 500 MG tablet Take 1,000 mg by mouth every 6 hours as needed for mild pain       aspirin 81 MG EC tablet Take 81 mg by mouth daily       cyanocobalamin (VITAMIN B-12) 100 MCG tablet Take 100 mcg by mouth daily       lisinopril (ZESTRIL) 10 MG tablet Take 1 tablet (10 mg) by mouth daily 90 tablet 4     sildenafil (VIAGRA) 100 MG tablet Take 1 tablet (100 mg) by mouth daily as needed (For ED) 9 tablet 11     Tdap, tetanus-diptheria-acell pertussis, (BOOSTRIX) 5-2.5-18.5 LF-MCG/0.5 RICA injection Inject 0.5 mLs into the muscle once for 1 dose 0.5 mL 0     zoster vaccine recombinant adjuvanted (SHINGRIX) injection Inject 0.5 mLs into the muscle once for 1 dose Pharmacist administered 0.5 mL 0     meloxicam (MOBIC) 15 MG tablet TAKE 1/2 (ONE-HALF) TABLET BY MOUTH ONCE DAILY WITH FOOD (Patient not taking: Reported on 9/21/2023)       order for DME Equipment being ordered: CPAP supplies (Patient not taking: Reported on 8/18/2023) 1 each 1     order for DME Equipment being ordered: CPAP replacement supplies (Patient not taking: Reported on 8/18/2023) 1 each 3     sildenafil (VIAGRA) 50 MG tablet Take 1 tablet (50 mg) by mouth daily as needed (for ED) (Patient not taking: Reported on 9/21/2023) 9 tablet 11     triamcinolone (KENALOG) 0.1 % external ointment Apply topically 3 times daily as needed for irritation (Patient not taking: Reported on  "9/21/2023) 30 g 0     Allergies   Allergen Reactions     Dust Mites Cough and Other (See Comments)             Review of Systems   Constitutional:  Negative for chills.   HENT:  Positive for congestion, ear pain and hearing loss. Negative for sore throat.    Eyes:  Negative for pain and visual disturbance.   Respiratory:  Positive for cough. Negative for shortness of breath.    Cardiovascular:  Negative for chest pain, palpitations and peripheral edema.   Gastrointestinal:  Negative for abdominal pain, constipation, diarrhea, hematochezia and nausea.   Genitourinary:  Positive for impotence. Negative for dysuria, hematuria, penile discharge and urgency.   Musculoskeletal:  Positive for arthralgias. Negative for joint swelling and myalgias.   Skin:  Negative for rash.   Neurological:  Positive for headaches. Negative for dizziness, weakness and paresthesias.   Psychiatric/Behavioral:  Negative for mood changes. The patient is nervous/anxious.      Was in the emergency department 4 weeks ago with a cough and chills, ear pain in the left and come conjunctivitis. Had a chest x ray with possible mild chf noted. Infection work up was negative. Since then he has been concerned this might be his lisinopril. Has had hand tingling since his covid infection. Is also getting a trigger finger despite a prior surgery. Some foot tingling. At one point he had bradycardia, comes and goes as low as 40's. Smoked for 20 years.     Has sleep apnea, wants a new machine but was told he needs a new study which he does not want. His current one is broken, taped together.           OBJECTIVE:   /82   Pulse 72   Temp 97.4  F (36.3  C) (Tympanic)   Resp 16   Ht 1.854 m (6' 1\")   Wt 101.7 kg (224 lb 3.2 oz)   SpO2 98%   BMI 29.58 kg/m   Estimated body mass index is 29.58 kg/m  as calculated from the following:    Height as of this encounter: 1.854 m (6' 1\").    Weight as of this encounter: 101.7 kg (224 lb 3.2 oz).  Physical " "Exam  GENERAL: healthy, alert and no distress  EYES: Eyes grossly normal to inspection, PERRL and conjunctivae and sclerae normal  HENT: ear canals and TM's with right sided effusion nose and mouth without ulcers or lesions  NECK: no adenopathy, no asymmetry, masses, or scars and thyroid normal to palpation  RESP: lungs clear to auscultation - no rales, rhonchi or wheezes  CV: regular rate and rhythm, normal S1 S2, no S3 or S4, no murmur, click or rub, no peripheral edema and peripheral pulses strong  ABDOMEN: soft, nontender, no hepatosplenomegaly, no masses and bowel sounds normal  MS: no gross musculoskeletal defects noted, no edema  SKIN: no suspicious lesions or rashes  NEURO: Normal strength and tone, mentation intact and speech normal  PSYCH: mentation appears normal, affect normal/bright        ASSESSMENT / PLAN:   (Z00.00) Encounter for Medicare annual wellness exam  (primary encounter diagnosis)  Comment:    Plan:      (Z23) Need for shingles vaccine  Comment:    Plan: zoster vaccine recombinant adjuvanted         (SHINGRIX) injection             (Z23) Need for Tdap vaccination  Comment:    Plan: Tdap, tetanus-diptheria-acell pertussis,         (BOOSTRIX) 5-2.5-18.5 LF-MCG/0.5 RICA injection             (R05.3) Chronic cough  Comment: I went over the work up, and offered PFTs. He really just wanted to talk this through. Ear symptoms from the serous otitis media.   Plan: CANCELED: Pulmonary Function Test ()                   COUNSELING:  Reviewed preventive health counseling, as reflected in patient instructions       Regular exercise       Healthy diet/nutrition      BMI:   Estimated body mass index is 29.58 kg/m  as calculated from the following:    Height as of this encounter: 1.854 m (6' 1\").    Weight as of this encounter: 101.7 kg (224 lb 3.2 oz).         He reports that he quit smoking about 39 years ago. His smoking use included cigarettes. He has a 40.00 pack-year smoking history. He has quit " using smokeless tobacco.  His smokeless tobacco use included chew.      Appropriate preventive services were discussed with this patient, including applicable screening as appropriate for cardiovascular disease, diabetes, osteopenia/osteoporosis, and glaucoma.  As appropriate for age/gender, discussed screening for colorectal cancer, prostate cancer, breast cancer, and cervical cancer. Checklist reviewing preventive services available has been given to the patient.    Reviewed patients plan of care and provided an AVS. The Basic Care Plan (routine screening as documented in Health Maintenance) for Kaushik meets the Care Plan requirement. This Care Plan has been established and reviewed with the Patient.          Alec Howard MD  [facility]    Identified Health Risks:  I have reviewed Opioid Use Disorder and Substance Use Disorder risk factors and made any needed referrals. Answers submitted by the patient for this visit:  SHUKRI-7 (Submitted on 9/21/2023)  SHUKRI 7 TOTAL SCORE: 5

## 2023-09-21 NOTE — NURSING NOTE
"Chief Complaint   Patient presents with    Medicare Visit       Initial /82   Pulse 72   Temp 97.4  F (36.3  C) (Tympanic)   Resp 16   Ht 1.854 m (6' 1\")   Wt 101.7 kg (224 lb 3.2 oz)   SpO2 98%   BMI 29.58 kg/m   Estimated body mass index is 29.58 kg/m  as calculated from the following:    Height as of this encounter: 1.854 m (6' 1\").    Weight as of this encounter: 101.7 kg (224 lb 3.2 oz).  Medication Reconciliation: complete    FOOD SECURITY SCREENING QUESTIONS  Hunger Vital Signs:  Within the past 12 months we worried whether our food would run out before we got money to buy more. Never  Within the past 12 months the food we bought just didn't last and we didn't have money to get more. Never  Sully Hoffman LPN 9/21/2023 1:40 PM          "

## 2023-09-27 ENCOUNTER — MYC MEDICAL ADVICE (OUTPATIENT)
Dept: FAMILY MEDICINE | Facility: OTHER | Age: 73
End: 2023-09-27
Payer: MEDICARE

## 2023-09-27 DIAGNOSIS — G56.03 BILATERAL CARPAL TUNNEL SYNDROME: Primary | ICD-10-CM

## 2023-10-04 NOTE — TELEPHONE ENCOUNTER
Called and fax# [phone]     Referral center all referral go to this location.    Elizabeth Marinelli RN on 10/4/2023 at 8:22 AM

## 2023-11-19 ENCOUNTER — MYC MEDICAL ADVICE (OUTPATIENT)
Dept: FAMILY MEDICINE | Facility: OTHER | Age: 73
End: 2023-11-19
Payer: MEDICARE

## 2023-12-03 ENCOUNTER — MYC MEDICAL ADVICE (OUTPATIENT)
Dept: FAMILY MEDICINE | Facility: OTHER | Age: 73
End: 2023-12-03
Payer: MEDICARE

## 2023-12-03 DIAGNOSIS — G56.03 BILATERAL CARPAL TUNNEL SYNDROME: ICD-10-CM

## 2023-12-03 DIAGNOSIS — Z12.11 COLON CANCER SCREENING: Primary | ICD-10-CM

## 2023-12-05 NOTE — TELEPHONE ENCOUNTER
This is common with age. Lotion within 60 seconds of getting out of the shower, tub or pool. Lubriderm or Eucerin are some of the best. Very unlikely due to bowel issues.

## 2024-01-04 ENCOUNTER — TRANSFERRED RECORDS (OUTPATIENT)
Dept: HEALTH INFORMATION MANAGEMENT | Facility: OTHER | Age: 74
End: 2024-01-04
Payer: MEDICARE

## 2024-01-31 ENCOUNTER — MYC MEDICAL ADVICE (OUTPATIENT)
Dept: FAMILY MEDICINE | Facility: OTHER | Age: 74
End: 2024-01-31
Payer: MEDICARE

## 2024-02-02 ENCOUNTER — OFFICE VISIT (OUTPATIENT)
Dept: FAMILY MEDICINE | Facility: OTHER | Age: 74
End: 2024-02-02
Attending: STUDENT IN AN ORGANIZED HEALTH CARE EDUCATION/TRAINING PROGRAM
Payer: MEDICARE

## 2024-02-02 VITALS
OXYGEN SATURATION: 94 % | HEART RATE: 59 BPM | BODY MASS INDEX: 30.25 KG/M2 | RESPIRATION RATE: 20 BRPM | DIASTOLIC BLOOD PRESSURE: 64 MMHG | WEIGHT: 228.25 LBS | TEMPERATURE: 97.1 F | HEIGHT: 73 IN | SYSTOLIC BLOOD PRESSURE: 132 MMHG

## 2024-02-02 DIAGNOSIS — M62.830 SPASM OF MUSCLE OF LOWER BACK: Primary | ICD-10-CM

## 2024-02-02 DIAGNOSIS — L50.9 URTICARIA: ICD-10-CM

## 2024-02-02 DIAGNOSIS — L82.1 SEBORRHEIC KERATOSIS: ICD-10-CM

## 2024-02-02 PROCEDURE — G0463 HOSPITAL OUTPT CLINIC VISIT: HCPCS

## 2024-02-02 PROCEDURE — 99214 OFFICE O/P EST MOD 30 MIN: CPT | Performed by: STUDENT IN AN ORGANIZED HEALTH CARE EDUCATION/TRAINING PROGRAM

## 2024-02-02 RX ORDER — TRIAMCINOLONE ACETONIDE 1 MG/G
CREAM TOPICAL 2 TIMES DAILY
Qty: 80 G | Refills: 3 | Status: SHIPPED | OUTPATIENT
Start: 2024-02-02 | End: 2024-04-25

## 2024-02-02 RX ORDER — HYDROXYZINE PAMOATE 25 MG/1
25 CAPSULE ORAL 3 TIMES DAILY PRN
Qty: 30 CAPSULE | Refills: 3 | Status: SHIPPED | OUTPATIENT
Start: 2024-02-02 | End: 2024-04-25

## 2024-02-02 RX ORDER — CETIRIZINE HYDROCHLORIDE 10 MG/1
10 TABLET ORAL DAILY
Qty: 90 TABLET | Refills: 3 | Status: SHIPPED | OUTPATIENT
Start: 2024-02-02

## 2024-02-02 RX ORDER — VITAMIN B COMPLEX
125 TABLET ORAL DAILY
COMMUNITY

## 2024-02-02 RX ORDER — CYCLOBENZAPRINE HCL 5 MG
5 TABLET ORAL 3 TIMES DAILY PRN
Qty: 30 TABLET | Refills: 3 | Status: SHIPPED | OUTPATIENT
Start: 2024-02-02 | End: 2024-04-25

## 2024-02-02 RX ORDER — ASCORBIC ACID 500 MG
500 TABLET ORAL DAILY
COMMUNITY

## 2024-02-02 ASSESSMENT — ANXIETY QUESTIONNAIRES
8. IF YOU CHECKED OFF ANY PROBLEMS, HOW DIFFICULT HAVE THESE MADE IT FOR YOU TO DO YOUR WORK, TAKE CARE OF THINGS AT HOME, OR GET ALONG WITH OTHER PEOPLE?: NOT DIFFICULT AT ALL
GAD7 TOTAL SCORE: 6
3. WORRYING TOO MUCH ABOUT DIFFERENT THINGS: SEVERAL DAYS
GAD7 TOTAL SCORE: 6
5. BEING SO RESTLESS THAT IT IS HARD TO SIT STILL: SEVERAL DAYS
7. FEELING AFRAID AS IF SOMETHING AWFUL MIGHT HAPPEN: NOT AT ALL
4. TROUBLE RELAXING: SEVERAL DAYS
6. BECOMING EASILY ANNOYED OR IRRITABLE: SEVERAL DAYS
1. FEELING NERVOUS, ANXIOUS, OR ON EDGE: SEVERAL DAYS
7. FEELING AFRAID AS IF SOMETHING AWFUL MIGHT HAPPEN: NOT AT ALL
IF YOU CHECKED OFF ANY PROBLEMS ON THIS QUESTIONNAIRE, HOW DIFFICULT HAVE THESE PROBLEMS MADE IT FOR YOU TO DO YOUR WORK, TAKE CARE OF THINGS AT HOME, OR GET ALONG WITH OTHER PEOPLE: NOT DIFFICULT AT ALL
2. NOT BEING ABLE TO STOP OR CONTROL WORRYING: SEVERAL DAYS

## 2024-02-02 ASSESSMENT — ENCOUNTER SYMPTOMS: BACK PAIN: 1

## 2024-02-02 ASSESSMENT — PAIN SCALES - GENERAL: PAINLEVEL: SEVERE PAIN (6)

## 2024-02-02 NOTE — PROGRESS NOTES
Assessment & Plan     Spasm of muscle of lower back  He has tried muscle relaxers in the past. We did review side effects and risks vs benefits. Discussed on going movement, heat, massage.   - cyclobenzaprine (FLEXERIL) 5 MG tablet; Take 1 tablet (5 mg) by mouth 3 times daily as needed for muscle spasms    Urticaria  Sounds like chlorine likely culprit but discussed that we might not know for sure. Trial kenalog, zyrtec daily, and prn hydroxyzine (discussed increase drowsiness)  - cetirizine (ZYRTEC) 10 MG tablet; Take 1 tablet (10 mg) by mouth daily  - hydrOXYzine marcelo (VISTARIL) 25 MG capsule; Take 1 capsule (25 mg) by mouth 3 times daily as needed for itching  - triamcinolone (KENALOG) 0.1 % external cream; Apply topically 2 times daily For up two weeks at a time. Apply to back    Seborrheic keratosis  Reassured they are benign and are not inflamed                   No follow-ups on file.    Subjective   Kaushik is a 73 year old, presenting for the following health issues:  Back Pain (Low back muscle spasms with shooting pain rated 6)    Back Pain     History of Present Illness       Back Pain:  He presents for follow up of back pain. Patient's back pain is a new problem.    Original cause of back pain: lifting  First noticed back pain: in the last week  Patient feels back pain: 2 to 4 times per dayLocation of back pain:  Right lower back and left lower back  Description of back pain: sharp, shooting and stabbing  Back pain spreads: right buttocks and left buttocks    Since patient first noticed back pain, pain is: gradually improving  Does back pain interfere with his job:  Yes  On a scale of 1-10 (10 being the worst), patient describes pain as:  7  What makes back pain worse: certain positions, standing and twisting   Acetaminophen: helpful  Activity or exercise: helpful  Chiropractor:  Not tried  Cold: helpful  Heat: helpful  Massage: helpful  Muscle relaxants: not tried  NSAIDS: helpful  Opioids: not  "tried  Physical Therapy: not tried  Rest: helpful  TENS unit: not tried  Topical pain relievers: not tried  Other healthcare providers patient is seeing for back pain: Other    He eats 2-3 servings of fruits and vegetables daily.He consumes 2 sweetened beverage(s) daily.He exercises with enough effort to increase his heart rate 20 to 29 minutes per day.  He exercises with enough effort to increase his heart rate 4 days per week.   He is taking medications regularly.       Back spasm   - started 4-5 days ago   - tried heat, massage   - dumping a Roomba with arms out caused the spasm      Itchy back   [facility]  - was getting severe itching  - started 6 months ago   - started Eucerin cream   - itching from hot tub, chlorine  - not swimming for a few weeks and still itchy   - tried OTC hydrocortisone                   Objective    /64 (BP Location: Right arm, Patient Position: Sitting, Cuff Size: Adult Large)   Pulse 59   Temp 97.1  F (36.2  C) (Tympanic)   Resp 20   Ht 1.854 m (6' 1\")   Wt 103.5 kg (228 lb 4 oz)   SpO2 94%   BMI 30.11 kg/m    Body mass index is 30.11 kg/m .  Physical Exam   GENERAL: alert and no distress  MS: tenderness to palpation bilateral lumbar paraspinal muscles R>L  SKIN: multiple scattered SK across back, multiple areas of excoriations. No rash or hives            Signed Electronically by: Carmine De Souza MD    "

## 2024-02-02 NOTE — NURSING NOTE
"Medication Reconciliation: Complete    Dana Horvath LPN  2/2/2024 2:10 PM  Chief Complaint   Patient presents with    Back Pain     Low back muscle spasms with shooting pain rated 6       Initial /64 (BP Location: Right arm, Patient Position: Sitting, Cuff Size: Adult Large)   Pulse 59   Temp 97.1  F (36.2  C) (Tympanic)   Resp 20   Ht 1.854 m (6' 1\")   Wt 103.5 kg (228 lb 4 oz)   SpO2 94%   BMI 30.11 kg/m   Estimated body mass index is 30.11 kg/m  as calculated from the following:    Height as of this encounter: 1.854 m (6' 1\").    Weight as of this encounter: 103.5 kg (228 lb 4 oz).  Medication Review: complete    The next two questions are to help us understand your food security.  If you are feeling you need any assistance in this area, we have resources available to support you today.          2/2/2024   SDOH- Food Insecurity   Within the past 12 months, did you worry that your food would run out before you got money to buy more? N   Within the past 12 months, did the food you bought just not last and you didn t have money to get more? N         Health Care Directive:  Patient has a Health Care Directive on file      Dana Horvath LPN      "

## 2024-02-05 ENCOUNTER — PATIENT OUTREACH (OUTPATIENT)
Dept: GASTROENTEROLOGY | Facility: CLINIC | Age: 74
End: 2024-02-05
Payer: MEDICARE

## 2024-02-11 ENCOUNTER — MYC MEDICAL ADVICE (OUTPATIENT)
Dept: FAMILY MEDICINE | Facility: OTHER | Age: 74
End: 2024-02-11
Payer: MEDICARE

## 2024-02-11 DIAGNOSIS — M62.830 SPASM OF MUSCLE OF LOWER BACK: Primary | ICD-10-CM

## 2024-02-12 NOTE — TELEPHONE ENCOUNTER
2/2/2024  OV with Tyler De Souza for spasm of muscle of lower back.      Swapnil'd up Pt referral and routing to Tyler De Souza, as she was the provider to see patient for this issue.      Reina Cameron RN on 2/12/2024 at 3:53 PM

## 2024-02-20 ENCOUNTER — THERAPY VISIT (OUTPATIENT)
Dept: PHYSICAL THERAPY | Facility: OTHER | Age: 74
End: 2024-02-20
Attending: STUDENT IN AN ORGANIZED HEALTH CARE EDUCATION/TRAINING PROGRAM
Payer: MEDICARE

## 2024-02-20 DIAGNOSIS — M62.830 SPASM OF MUSCLE OF LOWER BACK: ICD-10-CM

## 2024-02-20 PROCEDURE — 97161 PT EVAL LOW COMPLEX 20 MIN: CPT | Mod: GP

## 2024-02-20 PROCEDURE — 97110 THERAPEUTIC EXERCISES: CPT | Mod: GP

## 2024-02-20 NOTE — PROGRESS NOTES
PHYSICAL THERAPY EVALUATION  Type of Visit: Evaluation    See electronic medical record for Abuse and Falls Screening details.    Subjective       Presenting condition or subjective complaint: low back pain, spasm  Date of onset: 01/25/24    Relevant medical history: COPD; High blood pressure; Smoking   Dates & types of surgery: R & L rotator cuff    Prior Level of Function  Transfers: Independent  Ambulation: Independent  ADL: Independent    Living Environment  Type of home: Apartment/condo   Employment:   retired    Pain assessment: Pain present  See objective evaluation for additional pain details     Objective   LUMBAR SPINE EVALUATION  PAIN: Pain Level with Use: 4/10  Pain Quality: Aching  Pain Frequency: with activity  Pain is Exacerbated By: walking, household tasks  Pain is Relieved By: heat, rest, and certain positions  POSTURE: WFL  GAIT: WFL  ROM: AROM WFL, lumbar and bilateral hip extension moderately restricted  STRENGTH: lumbar WFL; hip strength deficits abduction 4/5 R, 3/5 L, extension 3-/5 bilateral  MYOTOMES:    Left Right   T12-L3 (Hip Flexion) 4+ 4+   L2-4 (Quads)  5 5   L4 (Ankle DF) 4 4     LUMBAR/HIP Special Tests:    Left Right   JOSE Negative  Negative    FADIR/Labrum/HEIDI Negative  Negative    Robin's Negative  Negative    SLR Negative  Negative    Ely's Positive Positive       Left Right   Apley's (Meniscus) Negative  Negative    Cristian's (Meniscus) Negative  Negative    Ligamentous Stability     Anterior Drawer (ACL) Positive Positive   Posterior Drawer (PCL) Negative Negative   Valgus Stress Testing at 0 Deg and 30 Deg Negative Negative   Varus Stress Testing at 0 Deg and 30 Deg  Negative Negative     PALPATION: WFL  SPINAL SEGMENTAL CONCLUSIONS: gross moderate hypomobility thoracic>lumbar    Assessment & Plan   CLINICAL IMPRESSIONS  Medical Diagnosis: Spasm of muscle of lower back    Treatment Diagnosis: bilateral low back pain with mobility deficits; hip strength and mobility deficits    Impression/Assessment: Patient is a 73 year old male with low back pain complaints.  The following significant findings have been identified: Pain, Decreased ROM/flexibility, Decreased joint mobility, Decreased strength, Impaired muscle performance, and Decreased activity tolerance. These impairments interfere with their ability to perform self care tasks, work tasks, recreational activities, household mobility, and community mobility as compared to previous level of function.     Clinical Decision Making (Complexity):  Clinical Presentation: Stable/Uncomplicated  Clinical Presentation Rationale: based on medical and personal factors listed in PT evaluation  Clinical Decision Making (Complexity): Low complexity    PLAN OF CARE  Treatment Interventions:  Modalities: Cryotherapy, E-stim, Hot Pack, Mechanical Traction, Ultrasound, Vasoneumatic Device  Interventions: Manual Therapy, Neuromuscular Re-education, Therapeutic Activity, Therapeutic Exercise, Aquatic Therapy    Long Term Goals     PT Goal 1  Goal Identifier: pain  Goal Description: patient will report 30% reduction of low back pain with walking all necessary distances  Rationale: to maximize safety and independence within the home;to maximize safety and independence within the community;to maximize safety and independence with performance of ADLs and functional tasks  Target Date: 04/02/24  PT Goal 2  Goal Identifier: strength  Goal Description: patient will demonstrate 4+/5 bilateral gross hip strength in order to optimize functional mobility  Rationale: to maximize safety and independence with performance of ADLs and functional tasks;to maximize safety and independence within the home;to maximize safety and independence within the community  Target Date: 05/14/24  PT Goal 3  Goal Identifier: ROM  Goal Description: patient will demonstrate functional hip extension in order to optimize amulation tolerance  Rationale: to maximize safety and independence with  performance of ADLs and functional tasks;to maximize safety and independence within the home;to maximize safety and independence within the community  Target Date: 05/14/24      Frequency of Treatment: 2x/week  Duration of Treatment: 90 days    Education Assessment:   Learner/Method: Patient;Listening;Demonstration;Pictures/Video;No Barriers to Learning    Risks and benefits of evaluation/treatment have been explained.   Patient/Family/caregiver agrees with Plan of Care.     Evaluation Time:     PT Eval, Low Complexity Minutes (09951): 20    Signing Clinician: BABITA MCMAHON DPT      [facility]                                                                                   OUTPATIENT PHYSICAL THERAPY      PLAN OF TREATMENT FOR OUTPATIENT REHABILITATION   Patient's Last Name, First Name, Kaushik Banks YOB: 1950   Provider's Name   [facility]   Medical Record No.  [id number]     Onset Date: 01/25/24  Start of Care Date: 02/20/24     Medical Diagnosis:  Spasm of muscle of lower back    PT Treatment Diagnosis:  bilateral low back pain with mobility deficits; hip strength and mobility deficits Plan of Treatment  Frequency/Duration: 2x/week/ 90 days    Certification date from 02/20/24 to 05/20/24         See note for plan of treatment details and functional goals     BABITA MCMAHON DPT                         I CERTIFY THE NEED FOR THESE SERVICES FURNISHED UNDER        THIS PLAN OF TREATMENT AND WHILE UNDER MY CARE     (Physician attestation of this document indicates review and certification of the therapy plan).              Referring Provider:  Carmine De Souza    Initial Assessment  See Epic Evaluation- Start of Care Date: 02/20/24

## 2024-02-22 ENCOUNTER — THERAPY VISIT (OUTPATIENT)
Dept: PHYSICAL THERAPY | Facility: OTHER | Age: 74
End: 2024-02-22
Attending: STUDENT IN AN ORGANIZED HEALTH CARE EDUCATION/TRAINING PROGRAM
Payer: MEDICARE

## 2024-02-22 DIAGNOSIS — M62.830 SPASM OF MUSCLE OF LOWER BACK: Primary | ICD-10-CM

## 2024-02-22 PROCEDURE — 97110 THERAPEUTIC EXERCISES: CPT | Mod: GP

## 2024-02-25 ENCOUNTER — MYC MEDICAL ADVICE (OUTPATIENT)
Dept: FAMILY MEDICINE | Facility: OTHER | Age: 74
End: 2024-02-25
Payer: MEDICARE

## 2024-02-29 ENCOUNTER — THERAPY VISIT (OUTPATIENT)
Dept: PHYSICAL THERAPY | Facility: OTHER | Age: 74
End: 2024-02-29
Attending: STUDENT IN AN ORGANIZED HEALTH CARE EDUCATION/TRAINING PROGRAM
Payer: MEDICARE

## 2024-02-29 DIAGNOSIS — M62.830 SPASM OF MUSCLE OF LOWER BACK: Primary | ICD-10-CM

## 2024-02-29 PROCEDURE — 97110 THERAPEUTIC EXERCISES: CPT | Mod: GP

## 2024-03-01 ENCOUNTER — OFFICE VISIT (OUTPATIENT)
Dept: FAMILY MEDICINE | Facility: OTHER | Age: 74
End: 2024-03-01
Payer: MEDICARE

## 2024-03-01 VITALS
DIASTOLIC BLOOD PRESSURE: 62 MMHG | BODY MASS INDEX: 29.29 KG/M2 | HEIGHT: 73 IN | HEART RATE: 78 BPM | SYSTOLIC BLOOD PRESSURE: 120 MMHG | OXYGEN SATURATION: 96 % | TEMPERATURE: 97.4 F | WEIGHT: 221 LBS | RESPIRATION RATE: 20 BRPM

## 2024-03-01 DIAGNOSIS — J01.10 ACUTE NON-RECURRENT FRONTAL SINUSITIS: Primary | ICD-10-CM

## 2024-03-01 PROCEDURE — G0463 HOSPITAL OUTPT CLINIC VISIT: HCPCS

## 2024-03-01 PROCEDURE — 99213 OFFICE O/P EST LOW 20 MIN: CPT

## 2024-03-01 RX ORDER — FLUTICASONE PROPIONATE 50 MCG
1 SPRAY, SUSPENSION (ML) NASAL DAILY
Qty: 18.2 ML | Refills: 0 | Status: SHIPPED | OUTPATIENT
Start: 2024-03-01 | End: 2024-04-25

## 2024-03-01 RX ORDER — AZITHROMYCIN 250 MG/1
TABLET, FILM COATED ORAL
Qty: 6 TABLET | Refills: 0 | Status: SHIPPED | OUTPATIENT
Start: 2024-03-01 | End: 2024-03-06

## 2024-03-01 ASSESSMENT — ENCOUNTER SYMPTOMS: HEADACHES: 1

## 2024-03-01 ASSESSMENT — PAIN SCALES - GENERAL: PAINLEVEL: MODERATE PAIN (4)

## 2024-03-01 NOTE — PROGRESS NOTES
ASSESSMENT/PLAN:    (J01.10) Acute non-recurrent frontal sinusitis  (primary encounter diagnosis)  Comment: Patient presents with concerns of a sinusitis, he has had sinus pain and pressure with headache for over a week. He got better and but then endorses double sickening. He does note he had a fever and chills at one point.  This has resolved.  On exam he is afebrile, he does have bilateral frontal sinus tenderness.  Will opt to treat at this time due to duration of symptoms and double sickening.  I also sent Flonase to the pharmacy for him and recommend twice daily nasal saline.  Plan: azithromycin (ZITHROMAX) 250 MG tablet,         fluticasone (FLONASE) 50 MCG/ACT nasal spray  Symptomatic treatment - Encouraged fluids, salt water gargles, honey (only if greater than 1 year in age due to risk of botulism), elevation, humidifier, sinus rinse/netti pot, lozenges, tea, topical vapor rub, popsicles, rest, etc     May use over-the-counter Tylenol or ibuprofen PRN    Discussed warning signs/symptoms indicative of need to f/u    Follow up if symptoms persist or worsen or concerns    I have reviewed the nursing notes.  I have reviewed the findings, diagnosis, plan and need for follow up with the patient.    I explained my diagnostic considerations and recommendations to the patient, who voiced understanding and agreement with the treatment plan. All questions were answered. We discussed potential side effects of any prescribed or recommended therapies, as well as expectations for response to treatments.    MEGAN FELDER, RAVEN CNP  3/1/2024  12:09 PM    HPI:    Kaushik Ansari is a 73 year old male  who presents to [facility] today for concerns of sinusitis.    He has had sinus pain and pressure with headache for over a week. He got better and but then endorses double sickening. He does note he had a fever and chills at one point.  This has resolved.    Afrin x 2 days.     PCP: Lee.    Past Medical History:    Diagnosis Date    Anxiety disorder     No Comments Provided    Complete tear of right rotator cuff     3/23/2017    Dependence on other enabling machines and devices     No Comments Provided    Encounter for prescription of emergency contraception     No Comments Provided    Essential tremor     No Comments Provided    Impingement syndrome of right shoulder     3/23/2017    Other specified postprocedural states     2017    Primary osteoarthritis of shoulder     3/23/2017    Sciatica     No Comments Provided    Status post shoulder surgery 2017    Trigger thumb of right hand     2017     Past Surgical History:   Procedure Laterality Date    COLONOSCOPY  2000    COLONOSCOPY  2005    normal by patient report    COLONOSCOPY  2014    OTHER SURGICAL HISTORY      15872.0,CT CORONARY ANGIOGRAM ([place])    OTHER SURGICAL HISTORY      2017,114864,OTHER,Right    VASECTOMY      No Comments Provided     Social History     Tobacco Use    Smoking status: Former     Packs/day: 2.00     Years: 20.00     Additional pack years: 0.00     Total pack years: 40.00     Types: Cigarettes     Quit date: 1984     Years since quittin.1    Smokeless tobacco: Former     Types: Chew   Substance Use Topics    Alcohol use: No     Alcohol/week: 0.0 standard drinks of alcohol     Current Outpatient Medications   Medication Sig Dispense Refill    acetaminophen (TYLENOL) 500 MG tablet Take 1,000 mg by mouth every 6 hours as needed for mild pain      aspirin 81 MG EC tablet Take 81 mg by mouth daily      cetirizine (ZYRTEC) 10 MG tablet Take 1 tablet (10 mg) by mouth daily 90 tablet 3    Coenzyme Q10 (COQ10 PO) Take 200 mg by mouth daily      cyanocobalamin (VITAMIN B-12) 100 MCG tablet Take 100 mcg by mouth daily      cyclobenzaprine (FLEXERIL) 5 MG tablet Take 1 tablet (5 mg) by mouth 3 times daily as needed for muscle spasms 30 tablet 3    hydrOXYzine marcelo (VISTARIL) 25 MG capsule Take 1 capsule (25 mg) by  "mouth 3 times daily as needed for itching 30 capsule 3    lisinopril (ZESTRIL) 10 MG tablet Take 1 tablet (10 mg) by mouth daily 90 tablet 4    order for DME Equipment being ordered: CPAP supplies 1 each 1    order for DME Equipment being ordered: CPAP replacement supplies 1 each 3    pyridOXINE (VITAMIN B6) 100 MG TABS Take 100 mg by mouth daily      sildenafil (VIAGRA) 100 MG tablet Take 1 tablet (100 mg) by mouth daily as needed (For ED) 9 tablet 11    triamcinolone (KENALOG) 0.1 % external cream Apply topically 2 times daily For up two weeks at a time. Apply to back 80 g 3    vitamin C (ASCORBIC ACID) 500 MG tablet Take 500 mg by mouth daily      Vitamin D3 (CHOLECALCIFEROL) 25 mcg (1000 units) tablet Take 125 mcg by mouth daily       Allergies   Allergen Reactions    Dust Mites Cough and Other (See Comments)     Past medical history, past surgical history, current medications and allergies reviewed and accurate to the best of my knowledge.      ROS:  Refer to HPI    /62   Pulse 78   Temp 97.4  F (36.3  C) (Tympanic)   Resp 20   Ht 1.854 m (6' 1\")   Wt 100.2 kg (221 lb)   SpO2 96%   BMI 29.16 kg/m      EXAM:  General Appearance: Well appearing 73 year old male, appropriate appearance for age. No acute distress   Ears: Left TM intact, translucent with bony landmarks appreciated, no erythema, no effusion, no bulging, no purulence.  Right TM intact, translucent with bony landmarks appreciated, no erythema, no effusion, no bulging, no purulence.  Left auditory canal clear.  Right auditory canal clear.  Normal external ears, non tender.  Eyes: conjunctivae normal without erythema or irritation, corneas clear, no drainage or crusting, no eyelid swelling, pupils equal   Oropharynx: moist mucous membranes, posterior pharynx without erythema, no exudates or petechiae, no post nasal drip seen, no trismus, voice clear.    Sinuses: Bilateral frontal sinus tenderness.  Nose:  Bilateral nares: no erythema, no " edema, no drainage or congestion   Neck: supple without adenopathy  Respiratory: normal chest wall and respirations.  Normal effort.  Clear to auscultation bilaterally, no wheezing, crackles or rhonchi.  No increased work of breathing.  No cough appreciated.  Cardiac: RRR with no murmurs  Musculoskeletal:  Equal movement of bilateral upper extremities.  Equal movement of bilateral lower extremities.  Normal gait.    Dermatological: no rashes noted of exposed skin  Neuro: Alert and oriented to person, place, and time.  Cranial nerves II-XII grossly intact with no focal or lateralizing deficits.  Muscle tone normal.  Gait normal. No tremor.   Psychological: normal affect, alert, oriented, and pleasant.

## 2024-03-01 NOTE — PATIENT INSTRUCTIONS
I recommend twice daily nasal saline. Daily Flonase.   Antibiotic as ordered.  Follow up if symptoms are worsening or you have any concerns.

## 2024-03-01 NOTE — NURSING NOTE
"Chief Complaint   Patient presents with    Headache     Sinus pressure had a fever, post nasal drip  for a week        Initial /62   Pulse 78   Temp 97.4  F (36.3  C) (Tympanic)   Resp 20   Ht 1.854 m (6' 1\")   Wt 100.2 kg (221 lb)   SpO2 96%   BMI 29.16 kg/m   Estimated body mass index is 29.16 kg/m  as calculated from the following:    Height as of this encounter: 1.854 m (6' 1\").    Weight as of this encounter: 100.2 kg (221 lb).  Medication Review: complete    The next two questions are to help us understand your food security.  If you are feeling you need any assistance in this area, we have resources available to support you today.          2/2/2024   SDOH- Food Insecurity   Within the past 12 months, did you worry that your food would run out before you got money to buy more? N   Within the past 12 months, did the food you bought just not last and you didn t have money to get more? N         Health Care Directive:  Patient has a Health Care Directive on file      Haydee Garcia LPN      " Ear Cerumen Removal    Date/Time: 1/10/2023 6:12 PM  Performed by: JUDY Gunderson  Authorized by: JUDY Gunderson     Consent:     Consent obtained:  Verbal    Consent given by:  Patient    Risks, benefits, and alternatives were discussed: yes      Risks discussed:  Bleeding, dizziness, infection, incomplete removal, pain and TM perforation    Alternatives discussed:  Alternative treatment and delayed treatment  Universal protocol:     Procedure explained and questions answered to patient or proxy's satisfaction: yes      Patient identity confirmed:  Verbally with patient  Procedure details:     Location:  L ear and R ear    Procedure type: irrigation      Procedure outcomes: cerumen removed    Post-procedure details:     Inspection:  No bleeding, ear canal clear and TM intact    Hearing quality:  Normal    Procedure completion:  Tolerated well, no immediate complications    Use otoscope for procedure.  Took about 10 minutes

## 2024-03-01 NOTE — PROGRESS NOTES
"  {PROVIDER CHARTING PREFERENCE:892006}    Joaquina Faulkner is a 73 year old, presenting for the following health issues:  Headache (Sinus pressure had a fever, post nasal drip  for a week )  {(!) Visit Details have not yet been documented.  Please enter Visit Details and then use this list to pull in documentation. (Optional):906558}  Headache          {MA/LPN/RN Pre-Provider Visit Orders- hCG/UA/Strep (Optional):672272}  {SUPERLIST (Optional):266569}  {additonal problems for provider to add (Optional):851755}    {ROS Picklists (Optional):585154}      Objective    /62   Pulse 78   Temp 97.4  F (36.3  C) (Tympanic)   Resp 20   Ht 1.854 m (6' 1\")   Wt 100.2 kg (221 lb)   SpO2 96%   BMI 29.16 kg/m    Body mass index is 29.16 kg/m .  Physical Exam   {Exam List (Optional):579549}    {Diagnostic Test Results (Optional):084309}        Signed Electronically by: [facility] NURSE  {Email feedback regarding this note to [email or web address]   :701492}  "

## 2024-03-05 ENCOUNTER — THERAPY VISIT (OUTPATIENT)
Dept: PHYSICAL THERAPY | Facility: OTHER | Age: 74
End: 2024-03-05
Attending: STUDENT IN AN ORGANIZED HEALTH CARE EDUCATION/TRAINING PROGRAM
Payer: MEDICARE

## 2024-03-05 ENCOUNTER — MYC MEDICAL ADVICE (OUTPATIENT)
Dept: FAMILY MEDICINE | Facility: OTHER | Age: 74
End: 2024-03-05

## 2024-03-05 DIAGNOSIS — G47.33 OBSTRUCTIVE SLEEP APNEA: Primary | ICD-10-CM

## 2024-03-05 DIAGNOSIS — M62.830 SPASM OF MUSCLE OF LOWER BACK: Primary | ICD-10-CM

## 2024-03-05 PROCEDURE — 97113 AQUATIC THERAPY/EXERCISES: CPT | Mod: GP,PN,CQ

## 2024-03-07 ENCOUNTER — THERAPY VISIT (OUTPATIENT)
Dept: PHYSICAL THERAPY | Facility: OTHER | Age: 74
End: 2024-03-07
Attending: STUDENT IN AN ORGANIZED HEALTH CARE EDUCATION/TRAINING PROGRAM
Payer: MEDICARE

## 2024-03-07 ENCOUNTER — MYC MEDICAL ADVICE (OUTPATIENT)
Dept: PULMONOLOGY | Facility: OTHER | Age: 74
End: 2024-03-07

## 2024-03-07 DIAGNOSIS — M62.830 SPASM OF MUSCLE OF LOWER BACK: Primary | ICD-10-CM

## 2024-03-07 PROCEDURE — 97110 THERAPEUTIC EXERCISES: CPT | Mod: GP

## 2024-03-08 ASSESSMENT — SLEEP AND FATIGUE QUESTIONNAIRES
HOW LIKELY ARE YOU TO NOD OFF OR FALL ASLEEP WHILE WATCHING TV: MODERATE CHANCE OF DOZING
HOW LIKELY ARE YOU TO NOD OFF OR FALL ASLEEP WHILE SITTING INACTIVE IN A PUBLIC PLACE: MODERATE CHANCE OF DOZING
HOW LIKELY ARE YOU TO NOD OFF OR FALL ASLEEP WHILE SITTING QUIETLY AFTER LUNCH WITHOUT ALCOHOL: MODERATE CHANCE OF DOZING
HOW LIKELY ARE YOU TO NOD OFF OR FALL ASLEEP WHEN YOU ARE A PASSENGER IN A CAR FOR AN HOUR WITHOUT A BREAK: MODERATE CHANCE OF DOZING
HOW LIKELY ARE YOU TO NOD OFF OR FALL ASLEEP WHILE LYING DOWN TO REST IN THE AFTERNOON WHEN CIRCUMSTANCES PERMIT: MODERATE CHANCE OF DOZING
HOW LIKELY ARE YOU TO NOD OFF OR FALL ASLEEP WHILE SITTING AND READING: MODERATE CHANCE OF DOZING
HOW LIKELY ARE YOU TO NOD OFF OR FALL ASLEEP IN A CAR, WHILE STOPPED FOR A FEW MINUTES IN TRAFFIC: WOULD NEVER DOZE
HOW LIKELY ARE YOU TO NOD OFF OR FALL ASLEEP WHILE SITTING AND TALKING TO SOMEONE: WOULD NEVER DOZE

## 2024-03-12 ENCOUNTER — THERAPY VISIT (OUTPATIENT)
Dept: PHYSICAL THERAPY | Facility: OTHER | Age: 74
End: 2024-03-12
Attending: FAMILY MEDICINE
Payer: MEDICARE

## 2024-03-12 DIAGNOSIS — M62.830 SPASM OF MUSCLE OF LOWER BACK: Primary | ICD-10-CM

## 2024-03-12 PROCEDURE — 97110 THERAPEUTIC EXERCISES: CPT | Mod: GP

## 2024-03-12 PROCEDURE — 97112 NEUROMUSCULAR REEDUCATION: CPT | Mod: GP

## 2024-03-12 PROCEDURE — 97530 THERAPEUTIC ACTIVITIES: CPT | Mod: GP

## 2024-03-13 NOTE — PROGRESS NOTES
03/08/24 0751   Reason For Your Visit   Please briefly describe the main reason(s) for your sleep visit I've had diagnosed sleep apnea for about 20 years; and need another sleep study to get a modern CPAP machine.   Approximately when did this problem start I was diagnosed about 20 years ago because of my chronic snoring.   What are your goals for this visit To determine the current severity of my sleep apnea at my age (73).   Time in Bed - Work Or School Days   Do you work or go to school No   What time do you usually get into bed 9 pm   About how long does it take you to fall asleep almost immediately   How often do you have trouble falling asleep only when I have an excited day or drink coffee   How often do you wake up during the night On average I sleep 3 or 4 continuous hours; but awake at about 1 am. Then I am awake for 2 hours . I then fall asleep again until 6 am; but this sleep is not continuous and not a good sleep.   What wakes you up at night Use the bathroom;Anxiety;Uncertain   How often do you have trouble falling back to sleep I have trouble falling asleep after 1 am   About how long does it take to fall back to sleep at least 2 hours (1 am to 3 am)   What do you usually do if you have trouble getting back to sleep Mostly think; and rarely get up to read or watch TV   What time do you usually get out of bed to start your day 6 to 7 am   Do you use an alarm No   Time in Bed - Weekends/Non-work Days/All Other Days   What time do you usually get into bed 9 pm   About how long does it take you to fall asleep almost immediately   What time do you usually get out of bed to start your day 6 am to 7 am   Do you use an alarm No   Sleep Need   On average, about how much sleep do you think you get rarely more than 6 (often less) ; but rarely enough   About how much sleep do you think you need 7 hours   Sleep Position   Which sleep positions do you prefer Back;Side   How often do you take a nap on purpose If  I'm exhausted and I've slept poorly, (and I can nap) I'll take an afternoon nap   About how long are your naps 20-30 minutes   Do you feel better after naps Yes   How often do you doze off unintentionally 5   Have you ever had a driving accident or near-miss due to sleepiness/drowsiness No   Sleep Disruptions - Breathing/Snoring   Do you snore Yes   Do other people complain about your snoring Yes   Have you been told you stop breathing in your sleep Yes   Do you have issues with any of the following Stuffy nose when you wake up   Sleep Disruptions - Movement   Does it happen when you are resting No   Does it get better if you move around Yes   Does it happen more at night Yes   Have you been told you kick your legs at night No   Sleep Disruptions - Behaviours in Sleep   Do you ever experience sudden muscle weakness during the day Yes   4) Is there anything else you would like your sleep provider to know My sleep apnea has been severe and chronic and I rely on my CPAP machine to survive   Caffeine, Alcohol and Other Substances   How many caffeinated beverages (coffee, tea, soda, energy drinks) per day 2 to 3 cups   What time of day is your last caffeine use Only in the morning (before 10 am); but occassionally in the afternoon when I need to be awake after 9 pm   List any prescribed or over the counter stimulants that you take none   List any prescribed or over the counter sleep medication you take none   List previous sleep medications you have tried numerous (but cn't remember all of them)   Do you drink alcohol to help you sleep No   Do you drink alcohol near bedtime No   Family History   Has any family member been diagnosed with a sleep disorder Yes   If yes, please indicate many of my siblings have similar sleep problems   In the last TWO WEEKS have you experienced any of the following symptoms?   Fevers No   Night Sweats No   Weight Gain No   Pain at Night Yes   Double Vision No   Changes in Vision No    Difficulty Breathing through Nose Yes   Sore Throat in Morning No   Dry Mouth in the Morning No   Shortness of Breath Lying Flat No   Shortness of Breath With Activity Yes   Awakening with Shortness of Breath No   Increased Cough No   Heart Racing at Night No   Swelling in Feet or Legs No   Diarrhea at Night No   Heartburn at Night No   Urinating More than Once at Night Yes   Losing Control of Urine at Night No   Joint Pains at Night No   Headaches in Morning No   Weakness in Arms or Legs Yes   Depressed Mood No   Anxiety Yes         3/8/2024     7:57 AM    [place] Sleepiness Scale ( CLAIRE Manning  [id number]<br>ESS - [place]/English - Final version - 21 Nov 07 - [facility] [place].)   Sitting and reading Moderate chance of dozing   Watching TV Moderate chance of dozing   Sitting, inactive in a public place (e.g. a theatre or a meeting) Moderate chance of dozing   As a passenger in a car for an hour without a break Moderate chance of dozing   Lying down to rest in the afternoon when circumstances permit Moderate chance of dozing   Sitting and talking to someone Would never doze   Sitting quietly after a lunch without alcohol Moderate chance of dozing   In a car, while stopped for a few minutes in traffic Would never doze   [place] Score (MC) 12   [place] Score (Sleep) 12         3/8/2024     7:55 AM   Insomnia Severity Index (AURELIO)   Difficulty falling asleep 0   Difficulty staying asleep 3   Problems waking up too early 3   How SATISFIED/DISSATISFIED are you with your CURRENT sleep pattern? 3   How NOTICEABLE to others do you think your sleep problem is in terms of impairing the quality of your life? 2   How WORRIED/DISTRESSED are you about your current sleep problem? 3   To what extent do you consider your sleep problem to INTERFERE with your daily functioning (e.g. daytime fatigue, mood, ability to function at work/daily chores, concentration, memory, mood, etc.) CURRENTLY? 3   AURELIO Total Score 17         3/8/2024      7:55 AM   STOP BANG Questionnaire (  2008, the American Society [employer]est[employer]   1. Snoring - Do you snore loudly (louder than talking or loud enough to be heard through closed doors)? Yes   2. Tired - Do you often feel tired, fatigued, or sleepy during daytime? Yes   3. Observed - Has anyone observed you stop breathing during your sleep? Yes   4. Blood pressure - Do you have or are you being treated for high blood pressure? Yes   5. BMI - BMI more than 35 kg/m2? No   6. Age - Age over 50 yr old? Yes   7. Neck circumference - Neck circumference greater than 40 cm? Yes   8. Gender - Gender male? Yes   STOP BANG Score (MC): 6 (High risk of REDD)

## 2024-03-15 NOTE — TELEPHONE ENCOUNTER
Chart review prior to sleep testing.    Patient Summary:  73 year old male who is referred for establishing care for REDD.    Patient Active Problem List    Diagnosis Date Noted    Spasm of muscle of lower back 02/20/2024     Priority: Medium    Neuropathy, peripheral axonal 12/09/2021     Priority: Medium    Lumbosacral radiculopathy at L5 12/09/2021     Priority: Medium    Essential hypertension 11/14/2019     Priority: Medium    Benign prostatic hyperplasia with urinary frequency 10/16/2018     Priority: Medium    Bunion 10/16/2018     Priority: Medium    Familial tremor 10/16/2018     Priority: Medium     Overview:   Benign      Plantar fascial fibromatosis 10/16/2018     Priority: Medium     Overview:   2006 to present      Sciatica 10/16/2018     Priority: Medium     Overview:   Mild      Trigger finger of right thumb 07/24/2017     Priority: Medium    Status post shoulder surgery 04/19/2017     Priority: Medium    AC (acromioclavicular) joint arthritis 03/23/2017     Priority: Medium    Complete tear of right rotator cuff 03/23/2017     Priority: Medium    Impingement syndrome of right shoulder 03/23/2017     Priority: Medium    Degeneration of intervertebral disc of lumbar region 10/23/2015     Priority: Medium    Allergic rhinitis due to pollen 05/27/2015     Priority: Medium    Acute upper respiratory infection 06/19/2012     Priority: Medium    Lactose intolerance 08/30/2011     Priority: Medium    Anxiety state 03/08/2011     Priority: Medium    Otitis media, serous 12/28/2010     Priority: Medium    Pain in joint, lower leg 08/11/2010     Priority: Medium    Lateral epicondylitis 08/11/2010     Priority: Medium    Seborrheic keratosis 08/11/2010     Priority: Medium    Depressive disorder, not elsewhere classified 01/08/2008     Priority: Medium    Myalgia and myositis 06/13/2006     Priority: Medium     Overview:   Muscle aches  IMO Update 10/11      Disturbance of skin sensation 08/11/2005      "Priority: Medium     Overview:   Foot paresthesia, right leg with standing      Diverticulosis of colon 03/01/2005     Priority: Medium     Overview:   Few sigmoid diverticula, one benign polyp, needs repeat in 2009  IMO Update 10/11      History of colonic polyps 03/01/2005     Priority: Medium     Overview:   IMO Update 10/11      Obstructive sleep apnea 04/02/2003     Priority: Medium     Overview:   on CPAP, uses  IMO Update 10/11  Overview:   On CPAP         Current Outpatient Medications   Medication    acetaminophen (TYLENOL) 500 MG tablet    aspirin 81 MG EC tablet    cetirizine (ZYRTEC) 10 MG tablet    Coenzyme Q10 (COQ10 PO)    cyanocobalamin (VITAMIN B-12) 100 MCG tablet    cyclobenzaprine (FLEXERIL) 5 MG tablet    fluticasone (FLONASE) 50 MCG/ACT nasal spray    hydrOXYzine marcelo (VISTARIL) 25 MG capsule    lisinopril (ZESTRIL) 10 MG tablet    order for DME    order for DME    pyridOXINE (VITAMIN B6) 100 MG TABS    sildenafil (VIAGRA) 100 MG tablet    triamcinolone (KENALOG) 0.1 % external cream    vitamin C (ASCORBIC ACID) 500 MG tablet    Vitamin D3 (CHOLECALCIFEROL) 25 mcg (1000 units) tablet     No current facility-administered medications for this visit.       Pertinent PMHx of myalgia, myositis, REDD, HTN, anxiety, BPH, depression.    Prior sleep testing ~2002, did not have full report for review.    STOP-BANG score of 6, with unknown neck circumference.  [place] score of 12.  AURELIO: 17    BMI of Estimated body mass index is 29.16 kg/m  as calculated from the following:    Height as of 3/1/24: 1.854 m (6' 1\").    Weight as of 3/1/24: 100.2 kg (221 lb).     Chief concern per questionnaire: \"I've had diagnosed sleep apnea for about 20 years; and need another sleep study to get a modern CPAP machine. \"    Duration of symptoms:  \"I was diagnosed about 20 years ago because of my chronic snoring. \"    Goals for visit per questionnaire: \"To determine the current severity of my sleep apnea at my age (73). " "\"    \"My sleep apnea has been severe and chronic and I rely on my CPAP machine to survive \"    Sleep pattern:  Workdays.  9pm to 6-7am.  Weekends.  9pm to 6-7am.  Time to fall asleep: ~few minutes.  Awakenings: 1 times per night, 1-2 hours to return to sleep.  Average total sleep time:  6 hours  Napping.  ? days per week, 20-30 minutes per nap.    \"On average I sleep 3 or 4 continuous hours; but awake at about 1 am. Then I am awake for 2 hours . I then fall asleep again until 6 am; but this sleep is not continuous and not a good sleep. \"    No for RLS screen.  No for sleep walking.  No for dream enactment behavior.  No for bruxism.    No for morning headaches.  Yes for snoring.  Yes for observed apnea.  Yes for FHx of REDD - siblings.    Caffeine use:  No for 3+ per day.  No for within 6 hours of bed.    A/P:    1.)  History of REDD - unknown severity  2.)  Using PAP therapy - unknown settings    Would recommend clinic visit to get up to date PAP download.    For repeat testing to allow for new equipment, would appear to be candidate for either diagnostic home sleep testing or in-lab PSG without using of PAP.    ---  This note was written with the assistance of the Dragon voice-dictation technology software. The final document, although reviewed, may contain errors. For corrections, please contact the office.    Jerry Thornton MD    Sleep Medicine  [place], [place]  Main Office: [phone]  [facility] [place]  [address] [place] [address], [id number]  Schedule visits: [phone]  Main Office: [phone]  Fax: [phone]    "

## 2024-04-02 ENCOUNTER — TRANSFERRED RECORDS (OUTPATIENT)
Dept: HEALTH INFORMATION MANAGEMENT | Facility: OTHER | Age: 74
End: 2024-04-02

## 2024-04-03 PROBLEM — G56.03 CARPAL TUNNEL SYNDROME, BILATERAL UPPER LIMBS: Status: ACTIVE | Noted: 2023-11-09

## 2024-04-03 PROBLEM — M16.12 PRIMARY OSTEOARTHRITIS OF LEFT HIP: Status: ACTIVE | Noted: 2020-11-23

## 2024-04-03 NOTE — PROGRESS NOTES
Preoperative Evaluation  [facility]  [address]  [place] [address]  Phone: [phone]  Fax: [phone]  Primary Provider: Alec Howard  Pre-op Performing Provider: TUTU ELDER  Apr 5, 2024       Kaushik is a 73 year old, presenting for the following:  Pre-Op Exam        4/5/2024     2:41 PM   Additional Questions   Roomed by Vera JOHNSON     Surgical Information  Surgery/Procedure: Left Flexor tendosynovectomy, Left middle and Ring finger trigger finger release.   Surgery Location: [facility]  Surgeon: Dr Mckay  Surgery Date: 4/11/24  Time of Surgery: ?  Where patient plans to recover: At home with family  Fax number for surgical facility: [phone]    Assessment & Plan     The proposed surgical procedure is considered LOW risk.      ICD-10-CM    1. Chronic midline low back pain with right-sided sciatica  M54.41 Occupational Medicine Referral    G89.29 XR Lumbar Spine 2/3 Views      2. Essential hypertension  I10 Basic Metabolic Panel     CBC W PLT No Diff     lisinopril (ZESTRIL) 10 MG tablet     Basic Metabolic Panel     CBC W PLT No Diff      3. Preop general physical exam  Z01.818       4. Bilateral carpal tunnel syndrome  G56.03           Risks and Recommendations  The patient has the following additional risks and recommendations for perioperative complications:   - No identified additional risk factors other than previously addressed    Antiplatelet or Anticoagulation Medication Instructions   - Patient is on no antiplatelet or anticoagulation medications.  If taking aspirin, he will hold 1 week prior to procedure.    Additional Medication Instructions  Patient is to take all scheduled medications on the day of surgery    Recommendation  APPROVAL GIVEN to proceed with proposed procedure, without further diagnostic evaluation.    Regarding chronic back pain, patient is referred to spine rehab locally.  Will obtain an updated lumbar x-ray  today.    Subjective       HPI related to upcoming procedure: Patient has symptoms consistent with bilateral carpal tunnel syndrome and plans surgical release.    Unrelated, he has a chronic history of back pain.  He has been working with physical therapy.  He is requesting a referral to the spine rehab program.  It has been sometime since she has had any imaging done.          4/5/2024     2:44 PM   Preop Questions   1. Have you ever had a heart attack or stroke? No   2. Have you ever had surgery on your heart or blood vessels, such as a stent placement, a coronary artery bypass, or surgery on an artery in your head, neck, heart, or legs? No   3. Do you have chest pain with activity? No   4. Do you have a history of  heart failure? No   5. Do you currently have a cold, bronchitis or symptoms of other infection? No   6. Do you have a cough, shortness of breath, or wheezing? No   7. Do you or anyone in your family have previous history of blood clots? No   8. Do you or does anyone in your family have a serious bleeding problem such as prolonged bleeding following surgeries or cuts? No   9. Have you ever had problems with anemia or been told to take iron pills? YES -    10. Have you had any abnormal blood loss such as black, tarry or bloody stools? No   11. Have you ever had a blood transfusion? No   12. Are you willing to have a blood transfusion if it is medically needed before, during, or after your surgery? Yes   13. Have you or any of your relatives ever had problems with anesthesia? No   14. Do you have sleep apnea, excessive snoring or daytime drowsiness? YES -uses CPAP   14a. Do you have a CPAP machine? Yes   15. Do you have any artifical heart valves or other implanted medical devices like a pacemaker, defibrillator, or continuous glucose monitor? No   16. Do you have artificial joints? No   17. Are you allergic to latex? No     Health Care Directive  Patient has a Health Care Directive on  file      Preoperative Review of    reviewed - no record of controlled substances prescribed.        Patient Active Problem List    Diagnosis Date Noted    Spasm of muscle of lower back 02/20/2024     Priority: Medium    Carpal tunnel syndrome, bilateral upper limbs 11/09/2023     Priority: Medium    Neuropathy, peripheral axonal 12/09/2021     Priority: Medium    Lumbosacral radiculopathy at L5 12/09/2021     Priority: Medium    Primary osteoarthritis of left hip 11/23/2020     Priority: Medium    Essential hypertension 11/14/2019     Priority: Medium    Benign prostatic hyperplasia with urinary frequency 10/16/2018     Priority: Medium    Bunion 10/16/2018     Priority: Medium    Familial tremor 10/16/2018     Priority: Medium     Overview:   Benign      Plantar fascial fibromatosis 10/16/2018     Priority: Medium     Overview:   2006 to present      Sciatica 10/16/2018     Priority: Medium     Overview:   Mild      Trigger finger of right thumb 07/24/2017     Priority: Medium    Status post shoulder surgery 04/19/2017     Priority: Medium    AC (acromioclavicular) joint arthritis 03/23/2017     Priority: Medium    Complete tear of right rotator cuff 03/23/2017     Priority: Medium    Impingement syndrome of right shoulder 03/23/2017     Priority: Medium    Degeneration of intervertebral disc of lumbar region 10/23/2015     Priority: Medium    Allergic rhinitis due to pollen 05/27/2015     Priority: Medium    Acute upper respiratory infection 06/19/2012     Priority: Medium    Lactose intolerance 08/30/2011     Priority: Medium    Anxiety state 03/08/2011     Priority: Medium    Otitis media, serous 12/28/2010     Priority: Medium    Pain in joint, lower leg 08/11/2010     Priority: Medium    Lateral epicondylitis 08/11/2010     Priority: Medium    Seborrheic keratosis 08/11/2010     Priority: Medium    Depressive disorder, not elsewhere classified 01/08/2008     Priority: Medium    Myalgia and myositis  06/13/2006     Priority: Medium     Overview:   Muscle aches  IMO Update 10/11      Disturbance of skin sensation 08/11/2005     Priority: Medium     Overview:   Foot paresthesia, right leg with standing      Diverticulosis of colon 03/01/2005     Priority: Medium     Overview:   Few sigmoid diverticula, one benign polyp, needs repeat in 2009  IMO Update 10/11      History of colonic polyps 03/01/2005     Priority: Medium     Overview:   IMO Update 10/11      Obstructive sleep apnea 04/02/2003     Priority: Medium     Overview:   on CPAP, uses  IMO Update 10/11  Overview:   On CPAP        Past Medical History:   Diagnosis Date    Anxiety disorder     No Comments Provided    Complete tear of right rotator cuff     3/23/2017    Dependence on other enabling machines and devices     No Comments Provided    Encounter for prescription of emergency contraception     No Comments Provided    Essential tremor     No Comments Provided    Impingement syndrome of right shoulder     3/23/2017    Other specified postprocedural states     4/19/2017    Primary osteoarthritis of shoulder     3/23/2017    Sciatica     No Comments Provided    Status post shoulder surgery 4/19/2017    Trigger thumb of right hand     7/24/2017     Past Surgical History:   Procedure Laterality Date    COLONOSCOPY  03/2000    COLONOSCOPY  03/01/2005    normal by patient report    COLONOSCOPY  01/03/2014    OTHER SURGICAL HISTORY      18395.0,CT CORONARY ANGIOGRAM (IA)    OTHER SURGICAL HISTORY      04/19/2017,510695,OTHER,Right    VASECTOMY      No Comments Provided     Current Outpatient Medications   Medication Sig Dispense Refill    acetaminophen (TYLENOL) 500 MG tablet Take 1,000 mg by mouth every 6 hours as needed for mild pain      aspirin 81 MG EC tablet Take 81 mg by mouth daily      cetirizine (ZYRTEC) 10 MG tablet Take 1 tablet (10 mg) by mouth daily 90 tablet 3    Coenzyme Q10 (COQ10 PO) Take 200 mg by mouth daily      cyanocobalamin (VITAMIN  B-12) 100 MCG tablet Take 100 mcg by mouth daily      cyclobenzaprine (FLEXERIL) 5 MG tablet Take 1 tablet (5 mg) by mouth 3 times daily as needed for muscle spasms 30 tablet 3    fluticasone (FLONASE) 50 MCG/ACT nasal spray Spray 1 spray into both nostrils daily 18.2 mL 0    hydrOXYzine marcelo (VISTARIL) 25 MG capsule Take 1 capsule (25 mg) by mouth 3 times daily as needed for itching 30 capsule 3    lisinopril (ZESTRIL) 10 MG tablet Take 1.5 tablets (15 mg) by mouth daily 135 tablet 4    order for DME Equipment being ordered: CPAP supplies 1 each 1    order for DME Equipment being ordered: CPAP replacement supplies 1 each 3    pyridOXINE (VITAMIN B6) 100 MG TABS Take 100 mg by mouth daily      sildenafil (VIAGRA) 100 MG tablet Take 1 tablet (100 mg) by mouth daily as needed (For ED) 9 tablet 11    vitamin C (ASCORBIC ACID) 500 MG tablet Take 500 mg by mouth daily      Vitamin D3 (CHOLECALCIFEROL) 25 mcg (1000 units) tablet Take 125 mcg by mouth daily      triamcinolone (KENALOG) 0.1 % external cream Apply topically 2 times daily For up two weeks at a time. Apply to back (Patient not taking: Reported on 2024) 80 g 3       Allergies   Allergen Reactions    Dust Mites Cough and Other (See Comments)        Social History     Tobacco Use    Smoking status: Former     Packs/day: 2.00     Years: 20.00     Additional pack years: 0.00     Total pack years: 40.00     Types: Cigarettes     Quit date: 1984     Years since quittin.2    Smokeless tobacco: Former     Types: Chew   Substance Use Topics    Alcohol use: No     Alcohol/week: 0.0 standard drinks of alcohol       History   Drug Use Unknown         Review of Systems    Review of Systems  Constitutional, HEENT, cardiovascular, pulmonary, gi and gu systems are negative, except as otherwise noted.    Objective    /72 (BP Location: Right arm, Patient Position: Sitting, Cuff Size: Adult Large)   Pulse 76   Temp (!) 96.6  F (35.9  C) (Tympanic)   Resp 20   " Ht 1.854 m (6' 1\")   Wt 101 kg (222 lb 9.6 oz)   SpO2 97%   BMI 29.37 kg/m     Estimated body mass index is 29.37 kg/m  as calculated from the following:    Height as of this encounter: 1.854 m (6' 1\").    Weight as of this encounter: 101 kg (222 lb 9.6 oz).  Physical Exam  Constitutional:       Appearance: He is well-developed.   Eyes:      Conjunctiva/sclera: Conjunctivae normal.   Neck:      Thyroid: No thyromegaly.   Cardiovascular:      Rate and Rhythm: Normal rate and regular rhythm.      Heart sounds: Normal heart sounds. No murmur heard.  Pulmonary:      Effort: Pulmonary effort is normal. No respiratory distress.      Breath sounds: Normal breath sounds.   Abdominal:      Palpations: Abdomen is soft.   Lymphadenopathy:      Cervical: No cervical adenopathy.   Skin:     Findings: No rash.   Neurological:      Mental Status: He is alert and oriented to person, place, and time.         Recent Labs   Lab Test 08/21/23  0929 11/23/22  1005   HGB 13.7  --      --     140   POTASSIUM 4.0 3.8   CR 0.97 0.92        Diagnostics  Recent Results (from the past 24 hour(s))   Basic Metabolic Panel    Collection Time: 04/05/24  3:16 PM   Result Value Ref Range    Sodium 140 135 - 145 mmol/L    Potassium 4.4 3.4 - 5.3 mmol/L    Chloride 104 98 - 107 mmol/L    Carbon Dioxide (CO2) 28 22 - 29 mmol/L    Anion Gap 8 7 - 15 mmol/L    Urea Nitrogen 17.1 8.0 - 23.0 mg/dL    Creatinine 0.93 0.67 - 1.17 mg/dL    GFR Estimate 86 >60 mL/min/1.73m2    Calcium 9.7 8.8 - 10.2 mg/dL    Glucose 115 (H) 70 - 99 mg/dL   CBC W PLT No Diff    Collection Time: 04/05/24  3:16 PM   Result Value Ref Range    WBC Count 7.3 4.0 - 11.0 10e3/uL    RBC Count 4.31 (L) 4.40 - 5.90 10e6/uL    Hemoglobin 14.0 13.3 - 17.7 g/dL    Hematocrit 42.3 40.0 - 53.0 %    MCV 98 78 - 100 fL    MCH 32.5 26.5 - 33.0 pg    MCHC 33.1 31.5 - 36.5 g/dL    RDW 13.1 10.0 - 15.0 %    Platelet Count 190 150 - 450 10e3/uL      No EKG required, no history of " coronary heart disease, significant arrhythmia, peripheral arterial disease or other structural heart disease.    Revised Cardiac Risk Index (RCRI)  The patient has the following serious cardiovascular risks for perioperative complications:   - No serious cardiac risks = 0 points     RCRI Interpretation: 0 points: Class I (very low risk - 0.4% complication rate)         Signed Electronically by: Berta Roberts MD  Copy of this evaluation report is provided to requesting physician.

## 2024-04-04 ENCOUNTER — THERAPY VISIT (OUTPATIENT)
Dept: PHYSICAL THERAPY | Facility: OTHER | Age: 74
End: 2024-04-04
Attending: STUDENT IN AN ORGANIZED HEALTH CARE EDUCATION/TRAINING PROGRAM
Payer: MEDICARE

## 2024-04-04 DIAGNOSIS — M62.830 SPASM OF MUSCLE OF LOWER BACK: Primary | ICD-10-CM

## 2024-04-04 PROCEDURE — 97113 AQUATIC THERAPY/EXERCISES: CPT | Mod: GP,PN,CQ

## 2024-04-05 ENCOUNTER — HOSPITAL ENCOUNTER (OUTPATIENT)
Dept: GENERAL RADIOLOGY | Facility: OTHER | Age: 74
Discharge: HOME | End: 2024-04-05
Attending: FAMILY MEDICINE
Payer: MEDICARE

## 2024-04-05 ENCOUNTER — OFFICE VISIT (OUTPATIENT)
Dept: FAMILY MEDICINE | Facility: OTHER | Age: 74
End: 2024-04-05
Attending: FAMILY MEDICINE
Payer: MEDICARE

## 2024-04-05 VITALS
HEART RATE: 76 BPM | HEIGHT: 73 IN | BODY MASS INDEX: 29.5 KG/M2 | OXYGEN SATURATION: 97 % | SYSTOLIC BLOOD PRESSURE: 120 MMHG | TEMPERATURE: 96.6 F | RESPIRATION RATE: 20 BRPM | DIASTOLIC BLOOD PRESSURE: 72 MMHG | WEIGHT: 222.6 LBS

## 2024-04-05 DIAGNOSIS — G89.29 CHRONIC MIDLINE LOW BACK PAIN WITH RIGHT-SIDED SCIATICA: Primary | ICD-10-CM

## 2024-04-05 DIAGNOSIS — Z01.818 PREOP GENERAL PHYSICAL EXAM: ICD-10-CM

## 2024-04-05 DIAGNOSIS — M54.41 CHRONIC MIDLINE LOW BACK PAIN WITH RIGHT-SIDED SCIATICA: ICD-10-CM

## 2024-04-05 DIAGNOSIS — M54.41 CHRONIC MIDLINE LOW BACK PAIN WITH RIGHT-SIDED SCIATICA: Primary | ICD-10-CM

## 2024-04-05 DIAGNOSIS — I10 ESSENTIAL HYPERTENSION: ICD-10-CM

## 2024-04-05 DIAGNOSIS — G56.03 BILATERAL CARPAL TUNNEL SYNDROME: ICD-10-CM

## 2024-04-05 DIAGNOSIS — G89.29 CHRONIC MIDLINE LOW BACK PAIN WITH RIGHT-SIDED SCIATICA: ICD-10-CM

## 2024-04-05 LAB
ANION GAP SERPL CALCULATED.3IONS-SCNC: 8 MMOL/L (ref 7–15)
BUN SERPL-MCNC: 17.1 MG/DL (ref 8–23)
CALCIUM SERPL-MCNC: 9.7 MG/DL (ref 8.8–10.2)
CHLORIDE SERPL-SCNC: 104 MMOL/L (ref 98–107)
CREAT SERPL-MCNC: 0.93 MG/DL (ref 0.67–1.17)
DEPRECATED HCO3 PLAS-SCNC: 28 MMOL/L (ref 22–29)
EGFRCR SERPLBLD CKD-EPI 2021: 86 ML/MIN/1.73M2
ERYTHROCYTE [DISTWIDTH] IN BLOOD BY AUTOMATED COUNT: 13.1 % (ref 10–15)
GLUCOSE SERPL-MCNC: 115 MG/DL (ref 70–99)
HCT VFR BLD AUTO: 42.3 % (ref 40–53)
HGB BLD-MCNC: 14 G/DL (ref 13.3–17.7)
MCH RBC QN AUTO: 32.5 PG (ref 26.5–33)
MCHC RBC AUTO-ENTMCNC: 33.1 G/DL (ref 31.5–36.5)
MCV RBC AUTO: 98 FL (ref 78–100)
PLATELET # BLD AUTO: 190 10E3/UL (ref 150–450)
POTASSIUM SERPL-SCNC: 4.4 MMOL/L (ref 3.4–5.3)
RBC # BLD AUTO: 4.31 10E6/UL (ref 4.4–5.9)
SODIUM SERPL-SCNC: 140 MMOL/L (ref 135–145)
WBC # BLD AUTO: 7.3 10E3/UL (ref 4–11)

## 2024-04-05 PROCEDURE — 80048 BASIC METABOLIC PNL TOTAL CA: CPT | Mod: ZL | Performed by: FAMILY MEDICINE

## 2024-04-05 PROCEDURE — G0463 HOSPITAL OUTPT CLINIC VISIT: HCPCS

## 2024-04-05 PROCEDURE — 36415 COLL VENOUS BLD VENIPUNCTURE: CPT | Mod: ZL | Performed by: FAMILY MEDICINE

## 2024-04-05 PROCEDURE — 85041 AUTOMATED RBC COUNT: CPT | Mod: ZL | Performed by: FAMILY MEDICINE

## 2024-04-05 PROCEDURE — 99214 OFFICE O/P EST MOD 30 MIN: CPT | Performed by: FAMILY MEDICINE

## 2024-04-05 PROCEDURE — 72100 X-RAY EXAM L-S SPINE 2/3 VWS: CPT

## 2024-04-05 RX ORDER — LISINOPRIL 10 MG/1
15 TABLET ORAL DAILY
Qty: 135 TABLET | Refills: 4 | Status: SHIPPED | OUTPATIENT
Start: 2024-04-05 | End: 2024-09-27

## 2024-04-05 ASSESSMENT — PAIN SCALES - GENERAL: PAINLEVEL: MILD PAIN (2)

## 2024-04-05 ASSESSMENT — ANXIETY QUESTIONNAIRES
3. WORRYING TOO MUCH ABOUT DIFFERENT THINGS: NOT AT ALL
6. BECOMING EASILY ANNOYED OR IRRITABLE: SEVERAL DAYS
4. TROUBLE RELAXING: SEVERAL DAYS
GAD7 TOTAL SCORE: 4
1. FEELING NERVOUS, ANXIOUS, OR ON EDGE: SEVERAL DAYS
GAD7 TOTAL SCORE: 4
7. FEELING AFRAID AS IF SOMETHING AWFUL MIGHT HAPPEN: NOT AT ALL
5. BEING SO RESTLESS THAT IT IS HARD TO SIT STILL: NOT AT ALL
8. IF YOU CHECKED OFF ANY PROBLEMS, HOW DIFFICULT HAVE THESE MADE IT FOR YOU TO DO YOUR WORK, TAKE CARE OF THINGS AT HOME, OR GET ALONG WITH OTHER PEOPLE?: SOMEWHAT DIFFICULT
IF YOU CHECKED OFF ANY PROBLEMS ON THIS QUESTIONNAIRE, HOW DIFFICULT HAVE THESE PROBLEMS MADE IT FOR YOU TO DO YOUR WORK, TAKE CARE OF THINGS AT HOME, OR GET ALONG WITH OTHER PEOPLE: SOMEWHAT DIFFICULT
7. FEELING AFRAID AS IF SOMETHING AWFUL MIGHT HAPPEN: NOT AT ALL
2. NOT BEING ABLE TO STOP OR CONTROL WORRYING: SEVERAL DAYS
GAD7 TOTAL SCORE: 4

## 2024-04-05 ASSESSMENT — PATIENT HEALTH QUESTIONNAIRE - PHQ9
SUM OF ALL RESPONSES TO PHQ QUESTIONS 1-9: 2
10. IF YOU CHECKED OFF ANY PROBLEMS, HOW DIFFICULT HAVE THESE PROBLEMS MADE IT FOR YOU TO DO YOUR WORK, TAKE CARE OF THINGS AT HOME, OR GET ALONG WITH OTHER PEOPLE: NOT DIFFICULT AT ALL
SUM OF ALL RESPONSES TO PHQ QUESTIONS 1-9: 2

## 2024-04-05 NOTE — NURSING NOTE
"Chief Complaint   Patient presents with    Pre-Op Exam       Initial /72 (BP Location: Right arm, Patient Position: Sitting, Cuff Size: Adult Large)   Pulse 76   Temp (!) 96.6  F (35.9  C) (Tympanic)   Resp 20   Ht 1.854 m (6' 1\")   Wt 101 kg (222 lb 9.6 oz)   SpO2 97%   BMI 29.37 kg/m   Estimated body mass index is 29.37 kg/m  as calculated from the following:    Height as of this encounter: 1.854 m (6' 1\").    Weight as of this encounter: 101 kg (222 lb 9.6 oz).  Medication Review: complete    The next two questions are to help us understand your food security.  If you are feeling you need any assistance in this area, we have resources available to support you today.          2/2/2024   SDOH- Food Insecurity   Within the past 12 months, did you worry that your food would run out before you got money to buy more? N   Within the past 12 months, did the food you bought just not last and you didn t have money to get more? N         Health Care Directive:  Patient has a Health Care Directive on file      Vera Neri      "

## 2024-04-11 ENCOUNTER — TRANSFERRED RECORDS (OUTPATIENT)
Dept: HEALTH INFORMATION MANAGEMENT | Facility: OTHER | Age: 74
End: 2024-04-11

## 2024-04-24 ASSESSMENT — PATIENT HEALTH QUESTIONNAIRE - PHQ9
10. IF YOU CHECKED OFF ANY PROBLEMS, HOW DIFFICULT HAVE THESE PROBLEMS MADE IT FOR YOU TO DO YOUR WORK, TAKE CARE OF THINGS AT HOME, OR GET ALONG WITH OTHER PEOPLE: NOT DIFFICULT AT ALL
SUM OF ALL RESPONSES TO PHQ QUESTIONS 1-9: 1
SUM OF ALL RESPONSES TO PHQ QUESTIONS 1-9: 1

## 2024-04-24 ASSESSMENT — ANXIETY QUESTIONNAIRES
2. NOT BEING ABLE TO STOP OR CONTROL WORRYING: NOT AT ALL
5. BEING SO RESTLESS THAT IT IS HARD TO SIT STILL: NOT AT ALL
7. FEELING AFRAID AS IF SOMETHING AWFUL MIGHT HAPPEN: NOT AT ALL
3. WORRYING TOO MUCH ABOUT DIFFERENT THINGS: NOT AT ALL
6. BECOMING EASILY ANNOYED OR IRRITABLE: SEVERAL DAYS
GAD7 TOTAL SCORE: 2
1. FEELING NERVOUS, ANXIOUS, OR ON EDGE: SEVERAL DAYS
7. FEELING AFRAID AS IF SOMETHING AWFUL MIGHT HAPPEN: NOT AT ALL
8. IF YOU CHECKED OFF ANY PROBLEMS, HOW DIFFICULT HAVE THESE MADE IT FOR YOU TO DO YOUR WORK, TAKE CARE OF THINGS AT HOME, OR GET ALONG WITH OTHER PEOPLE?: NOT DIFFICULT AT ALL
4. TROUBLE RELAXING: NOT AT ALL
GAD7 TOTAL SCORE: 2
GAD7 TOTAL SCORE: 2
IF YOU CHECKED OFF ANY PROBLEMS ON THIS QUESTIONNAIRE, HOW DIFFICULT HAVE THESE PROBLEMS MADE IT FOR YOU TO DO YOUR WORK, TAKE CARE OF THINGS AT HOME, OR GET ALONG WITH OTHER PEOPLE: NOT DIFFICULT AT ALL

## 2024-04-25 ENCOUNTER — OFFICE VISIT (OUTPATIENT)
Dept: FAMILY MEDICINE | Facility: OTHER | Age: 74
End: 2024-04-25
Attending: FAMILY MEDICINE
Payer: MEDICARE

## 2024-04-25 VITALS
DIASTOLIC BLOOD PRESSURE: 64 MMHG | SYSTOLIC BLOOD PRESSURE: 116 MMHG | OXYGEN SATURATION: 98 % | WEIGHT: 223 LBS | HEART RATE: 80 BPM | HEIGHT: 73 IN | BODY MASS INDEX: 29.55 KG/M2 | TEMPERATURE: 97.2 F | RESPIRATION RATE: 18 BRPM

## 2024-04-25 DIAGNOSIS — Z48.02 ENCOUNTER FOR REMOVAL OF SUTURES: Primary | ICD-10-CM

## 2024-04-25 DIAGNOSIS — G56.00 CARPAL TUNNEL SYNDROME, UNSPECIFIED LATERALITY: ICD-10-CM

## 2024-04-25 PROCEDURE — 99213 OFFICE O/P EST LOW 20 MIN: CPT | Performed by: FAMILY MEDICINE

## 2024-04-25 PROCEDURE — G0463 HOSPITAL OUTPT CLINIC VISIT: HCPCS

## 2024-04-25 ASSESSMENT — PAIN SCALES - GENERAL: PAINLEVEL: MILD PAIN (2)

## 2024-04-25 NOTE — NURSING NOTE
"Medication Reconciliation: Complete    Dana Horvath LPN  4/25/2024 3:39 PM  Chief Complaint   Patient presents with    Surgical Followup     Left hand/wrist surgery 04/11/24 [facility]       Initial /64 (BP Location: Right arm, Patient Position: Sitting, Cuff Size: Adult Regular)   Pulse 80   Temp 97.2  F (36.2  C) (Tympanic)   Resp 18   Ht 1.854 m (6' 1\")   Wt 101.2 kg (223 lb)   SpO2 98%   BMI 29.42 kg/m   Estimated body mass index is 29.42 kg/m  as calculated from the following:    Height as of this encounter: 1.854 m (6' 1\").    Weight as of this encounter: 101.2 kg (223 lb).  Medication Review: complete    The next two questions are to help us understand your food security.  If you are feeling you need any assistance in this area, we have resources available to support you today.          2/2/2024   SDOH- Food Insecurity   Within the past 12 months, did you worry that your food would run out before you got money to buy more? N   Within the past 12 months, did the food you bought just not last and you didn t have money to get more? N         Health Care Directive:  Patient has a Health Care Directive on file      Dana Horvath LPN      "

## 2024-04-28 NOTE — PROGRESS NOTES
"Kaushik Ansari is a 73 year old male who is being evaluated via a billable video visit.       The patient has been notified of following:      \"This video visit will be conducted via a call between you and your physician/provider. We have found that certain health care needs can be provided without the need for an in-person physical exam.  This service lets us provide the care you need with a video conversation.  If a prescription is necessary we can send it directly to your pharmacy.  If lab work is needed we can place an order for that and you can then stop by our lab to have the test done at a later time.     Video visits are billed at different rates depending on your insurance coverage.  Please reach out to your insurance provider with any questions.     If during the course of the call the physician/provider feels a video visit is not appropriate, you will not be charged for this service.\"     Patient has given verbal consent for Video visit? Yes  How would you like to obtain your AVS? Mail a copy  If you are dropped from the video visit, the video invite should be resent to: Text to cell phone: -  Will anyone else be joining your video visit? No  If patient encounters technical issues they should call [phone]      Video-Visit Details     Type of service:  Video Visit     Start Time:  2pm  End Time:  2:20pm    Originating Location (pt. Location): Home     Distant Location (provider location):  Off-site, [facility] [place]       Platform used for Video Visit: Deal[employer]    Virtual visit for establishing care and need for repeat testing to allow for replacement PAP equipment.     A/P:     1.)  History of REDD -presumed moderate  2.)  Using PAP therapy -presumed CPAP 11 cm H2O      For repeat testing to allow for new equipment, would appear to be candidate for either diagnostic home sleep testing or in-lab split night PSG.    We agreed to proceed with split-night in lab PSG.  I would plan to " "split if AHI greater than 10 given longstanding usage of CPAP and likely difficulty to sleep without CPAP.      Recommended Sleep Testing/Procedures:    Adult and PSG/Titration CPAP (Bed 1) and Comments I would plan to split if AHI > 10 since he is a long-term CPAP user              SUBJECTIVE:  Kaushik Ansari is a 73 year old male.    73 year old male who is referred for establishing care for REDD.     Pertinent PMHx of myalgia, myositis, REDD, HTN, anxiety, BPH, depression.     Prior sleep testing ~2002, did not have full report for review.     STOP-BANG score of 6, with unknown neck circumference.  Dugway score of 12.  AURELIO: 17     BMI of Estimated body mass index is 29.16 kg/m  as calculated from the following:    Height as of 3/1/24: 1.854 m (6' 1\").    Weight as of 3/1/24: 100.2 kg (221 lb).      Today -we reviewed his sleep history more detail.  He did have a written letter from his sleep study in March 1, 2002 with mention of AHI 14, duration of events 35 seconds, richy SpO2 87%, CPAP at 7 cm H2O effective.  In the interim, he did have increase in his pressure from 7 up to 11 cm H2O.  He uses CPAP on a nightly basis, but he does feel at times that the pressure may still be slightly too low.    Chief concern per questionnaire: \"I've had diagnosed sleep apnea for about 20 years; and need another sleep study to get a modern CPAP machine. \"     Duration of symptoms:  \"I was diagnosed about 20 years ago because of my chronic snoring. \"     Goals for visit per questionnaire: \"To determine the current severity of my sleep apnea at my age (73). \"     \"My sleep apnea has been severe and chronic and I rely on my CPAP machine to survive \"     Sleep pattern:  Workdays.  9pm to 6-7am.  Weekends.  9pm to 6-7am.  Time to fall asleep: ~few minutes.  Awakenings: 1 times per night, 1-2 hours to return to sleep.  Average total sleep time:  6 hours  Napping.  ? days per week, 20-30 minutes per nap.     \"On average I sleep 3 or " "4 continuous hours; but awake at about 1 am. Then I am awake for 2 hours . I then fall asleep again until 6 am; but this sleep is not continuous and not a good sleep. \"     No for RLS screen.  No for sleep walking.  No for dream enactment behavior.  No for bruxism.     No for morning headaches.  Yes for snoring.  Yes for observed apnea.  Yes for FHx of REDD - siblings.     Caffeine use:  No for 3+ per day.  No for within 6 hours of bed.      Past medical history:    Patient Active Problem List    Diagnosis Date Noted    Spasm of muscle of lower back 02/20/2024     Priority: Medium    Carpal tunnel syndrome, bilateral upper limbs 11/09/2023     Priority: Medium    Neuropathy, peripheral axonal 12/09/2021     Priority: Medium    Lumbosacral radiculopathy at L5 12/09/2021     Priority: Medium    Primary osteoarthritis of left hip 11/23/2020     Priority: Medium    Essential hypertension 11/14/2019     Priority: Medium    Benign prostatic hyperplasia with urinary frequency 10/16/2018     Priority: Medium    Bunion 10/16/2018     Priority: Medium    Familial tremor 10/16/2018     Priority: Medium     Overview:   Benign      Plantar fascial fibromatosis 10/16/2018     Priority: Medium     Overview:   2006 to present      Sciatica 10/16/2018     Priority: Medium     Overview:   Mild      Trigger finger of right thumb 07/24/2017     Priority: Medium    Status post shoulder surgery 04/19/2017     Priority: Medium    AC (acromioclavicular) joint arthritis 03/23/2017     Priority: Medium    Complete tear of right rotator cuff 03/23/2017     Priority: Medium    Impingement syndrome of right shoulder 03/23/2017     Priority: Medium    Degeneration of intervertebral disc of lumbar region 10/23/2015     Priority: Medium    Allergic rhinitis due to pollen 05/27/2015     Priority: Medium    Acute upper respiratory infection 06/19/2012     Priority: Medium    Lactose intolerance 08/30/2011     Priority: Medium    Anxiety state " 03/08/2011     Priority: Medium    Otitis media, serous 12/28/2010     Priority: Medium    Pain in joint, lower leg 08/11/2010     Priority: Medium    Lateral epicondylitis 08/11/2010     Priority: Medium    Seborrheic keratosis 08/11/2010     Priority: Medium    Depressive disorder, not elsewhere classified 01/08/2008     Priority: Medium    Myalgia and myositis 06/13/2006     Priority: Medium     Overview:   Muscle aches  IMO Update 10/11      Disturbance of skin sensation 08/11/2005     Priority: Medium     Overview:   Foot paresthesia, right leg with standing      Diverticulosis of colon 03/01/2005     Priority: Medium     Overview:   Few sigmoid diverticula, one benign polyp, needs repeat in 2009  IMO Update 10/11      History of colonic polyps 03/01/2005     Priority: Medium     Overview:   IMO Update 10/11      Obstructive sleep apnea 04/02/2003     Priority: Medium     Overview:   on CPAP, uses  IMO Update 10/11  Overview:   On CPAP         10 point ROS of systems including Constitutional, Eyes, Respiratory, Cardiovascular, Gastroenterology, Genitourinary, Integumentary, Muscularskeletal, Psychiatric were all negative except for pertinent positives noted in my HPI.    Current Outpatient Medications   Medication Sig Dispense Refill    acetaminophen (TYLENOL) 500 MG tablet Take 1,000 mg by mouth every 6 hours as needed for mild pain      cetirizine (ZYRTEC) 10 MG tablet Take 1 tablet (10 mg) by mouth daily 90 tablet 3    Coenzyme Q10 (COQ10 PO) Take 200 mg by mouth daily      cyanocobalamin (VITAMIN B-12) 100 MCG tablet Take 100 mcg by mouth daily      lisinopril (ZESTRIL) 10 MG tablet Take 1.5 tablets (15 mg) by mouth daily 135 tablet 4    order for DME Equipment being ordered: CPAP supplies 1 each 1    order for DME Equipment being ordered: CPAP replacement supplies 1 each 3    pyridOXINE (VITAMIN B6) 100 MG TABS Take 100 mg by mouth daily      sildenafil (VIAGRA) 100 MG tablet Take 1 tablet (100 mg) by  mouth daily as needed (For ED) 9 tablet 11    vitamin C (ASCORBIC ACID) 500 MG tablet Take 500 mg by mouth daily      Vitamin D3 (CHOLECALCIFEROL) 25 mcg (1000 units) tablet Take 125 mcg by mouth daily         OBJECTIVE:  There were no vitals taken for this visit.    Physical Exam     ---  This note was written with the assistance of the Dragon voice-dictation technology software. The final document, although reviewed, may contain errors. For corrections, please contact the office.    Total time spent preparing to see the patient, review of chart, obtaining history and physical examination, review of sleep testing, review of treatment options, education, discussion with patient and documenting in Epic / EMR was 25 minutes.  All time involved was spent on the day of service for the patient (the same day as the patient's appointment).    Jerry Thornton MD    Sleep Medicine  [facility]  - [place], [place]  Main Office: [phone]  [facility] [place], [place]  [address], [address]  Schedule visits: [phone]  Main Office: [phone]  Fax: [phone]

## 2024-04-29 ENCOUNTER — VIRTUAL VISIT (OUTPATIENT)
Dept: PULMONOLOGY | Facility: OTHER | Age: 74
End: 2024-04-29
Attending: FAMILY MEDICINE
Payer: MEDICARE

## 2024-04-29 VITALS — HEIGHT: 74 IN | WEIGHT: 223 LBS | BODY MASS INDEX: 28.62 KG/M2

## 2024-04-29 DIAGNOSIS — G47.33 OSA (OBSTRUCTIVE SLEEP APNEA): Primary | ICD-10-CM

## 2024-04-29 DIAGNOSIS — I10 ESSENTIAL HYPERTENSION: ICD-10-CM

## 2024-04-29 PROBLEM — Z48.02 ENCOUNTER FOR REMOVAL OF SUTURES: Status: ACTIVE | Noted: 2024-04-29

## 2024-04-29 PROCEDURE — 99203 OFFICE O/P NEW LOW 30 MIN: CPT | Mod: 95 | Performed by: FAMILY MEDICINE

## 2024-04-29 ASSESSMENT — PAIN SCALES - GENERAL: PAINLEVEL: NO PAIN (0)

## 2024-04-29 NOTE — PATIENT INSTRUCTIONS
Hello,    It was a pleasure to meet you today.  Here is a summary of our plan:    Our plan was to proceed with scheduling the sleep study where we start without the CPAP for a few hours to document its presence for insurance coverage and would plan to spend the remainder of the sleep study on the CPAP and we will work on finding the optimal pressure setting.  Our scheduling number for the sleep study is [phone].    We are hoping to get your feedback regarding your experience with your clinic visit today with myself and the participation with the medical student.  This is a very short 7 question questionnaire and should only take only 1 or 2 minutes to complete.  The URL is listed below, or you can scan the Ranovus code to pull this up with your smart phone.  This information is anonymous.    I greatly appreciate your time in completing this questionnaire.    [email or web address]        Jerry Thornton MD    Sleep Medicine  [place], [place]  Main Office: [phone]  [facility] [address], [address]  Schedule visits: [phone]  Main Office: [phone]  Fax: [phone]

## 2024-04-29 NOTE — PROGRESS NOTES
SUBJECTIVE:   Kaushik Ansari is a 73 year old male who presents to clinic today for the following health issues:  Suture removal.  Follow-up carpal tunnel  Patient arrives here for follow-up carpal tunnel.  He had surgery at [facility].  And is in need of suture removal.  He reports he is doing well.  Does have a follow-up in [place].  But cannot get in there for suture removal.  He is undergoing OT.    History of Present Illness       Reason for visit:  Remove stitches from left hand after carpal tunnel release surgery on April 11, 2024  Symptom onset:  1-2 weeks ago  Symptoms include:  Stiff fingers  Symptom intensity:  Moderate  Symptom progression:  Improving  Had these symptoms before:  No  What makes it worse:  Too much hand activity  What makes it better:  Massage fingers and hand    He eats 2-3 servings of fruits and vegetables daily.He consumes 3 sweetened beverage(s) daily.He exercises with enough effort to increase his heart rate 20 to 29 minutes per day.    He is taking medications regularly.        Patient Active Problem List    Diagnosis Date Noted    Encounter for removal of sutures 04/29/2024     Priority: Medium    Spasm of muscle of lower back 02/20/2024     Priority: Medium    Carpal tunnel syndrome, bilateral upper limbs 11/09/2023     Priority: Medium    Neuropathy, peripheral axonal 12/09/2021     Priority: Medium    Lumbosacral radiculopathy at L5 12/09/2021     Priority: Medium    Primary osteoarthritis of left hip 11/23/2020     Priority: Medium    Essential hypertension 11/14/2019     Priority: Medium    Benign prostatic hyperplasia with urinary frequency 10/16/2018     Priority: Medium    Bunion 10/16/2018     Priority: Medium    Familial tremor 10/16/2018     Priority: Medium     Overview:   Benign      Plantar fascial fibromatosis 10/16/2018     Priority: Medium     Overview:   2006 to present      Sciatica 10/16/2018     Priority: Medium     Overview:   Mild      Trigger  "finger of right thumb 07/24/2017     Priority: Medium    Status post shoulder surgery 04/19/2017     Priority: Medium    AC (acromioclavicular) joint arthritis 03/23/2017     Priority: Medium    Complete tear of right rotator cuff 03/23/2017     Priority: Medium    Impingement syndrome of right shoulder 03/23/2017     Priority: Medium    Degeneration of intervertebral disc of lumbar region 10/23/2015     Priority: Medium    Allergic rhinitis due to pollen 05/27/2015     Priority: Medium    Acute upper respiratory infection 06/19/2012     Priority: Medium    Lactose intolerance 08/30/2011     Priority: Medium    Anxiety state 03/08/2011     Priority: Medium    Otitis media, serous 12/28/2010     Priority: Medium    Pain in joint, lower leg 08/11/2010     Priority: Medium    Lateral epicondylitis 08/11/2010     Priority: Medium    Seborrheic keratosis 08/11/2010     Priority: Medium    Depressive disorder, not elsewhere classified 01/08/2008     Priority: Medium    Myalgia and myositis 06/13/2006     Priority: Medium     Overview:   Muscle aches  IMO Update 10/11      Disturbance of skin sensation 08/11/2005     Priority: Medium     Overview:   Foot paresthesia, right leg with standing      Diverticulosis of colon 03/01/2005     Priority: Medium     Overview:   Few sigmoid diverticula, one benign polyp, needs repeat in 2009  IMO Update 10/11      History of colonic polyps 03/01/2005     Priority: Medium     Overview:   IMO Update 10/11      Obstructive sleep apnea 04/02/2003     Priority: Medium     Overview:   on CPAP, uses  IMO Update 10/11  Overview:   On CPAP         Review of Systems     OBJECTIVE:     /64 (BP Location: Right arm, Patient Position: Sitting, Cuff Size: Adult Regular)   Pulse 80   Temp 97.2  F (36.2  C) (Tympanic)   Resp 18   Ht 1.854 m (6' 1\")   Wt 101.2 kg (223 lb)   SpO2 98%   BMI 29.42 kg/m    Body mass index is 29.42 kg/m .  Physical Exam  Constitutional:       Appearance: Normal " appearance.   Skin:     General: Skin is warm.      Comments: Incisions appear to be healing well.  Sutures removed.  Steri-Strips applied   Neurological:      Mental Status: He is alert.         Diagnostic Test Results:  none     ASSESSMENT/PLAN:         (Z48.02) Encounter for removal of sutures  (primary encounter diagnosis)  Comment:   Plan:     (G56.00) Carpal tunnel syndrome, unspecified laterality  Comment:   Plan: Currently doing well.  Sutures removed.  No signs of secondary infection.  Follow-up with surgery as scheduled      Gordon Dan MD  [facility]

## 2024-04-29 NOTE — PROGRESS NOTES
"Virtual Visit Details    Type of service:  Video Visit   Video Start Time: {video visit start/end time for provider to select:108110}  Video End Time:{video visit start/end time for provider to select:962561}    Originating Location (pt. Location): {video visit patient location:323479::\"Home\"}  {PROVIDER LOCATION On-site should be selected for visits conducted from your clinic location or adjoining [facility] hospital, academic office, or other nearby [facility] building. Off-site should be selected for all other provider locations, including home:127470}  Distant Location (provider location):  {virtual location provider:656373}  Platform used for Video Visit: {Virtual Visit Platforms:452420::\"Ringio\"}    "

## 2024-04-29 NOTE — NURSING NOTE
Is the patient currently in the state of [place]? YES    Visit mode:VIDEO    If the visit is dropped, the patient can be reconnected by: VIDEO VISIT: Send to e-mail at: [email or web address]    Will anyone else be joining the visit? NO  (If patient encounters technical issues they should call [phone]    How would you like to obtain your AVS? MyChart    Are changes needed to the allergy or medication list? No    Are refills needed on medications prescribed by this physician? NO    Reason for visit: RECHIRENE KAT

## 2024-04-30 ENCOUNTER — MYC MEDICAL ADVICE (OUTPATIENT)
Dept: FAMILY MEDICINE | Facility: OTHER | Age: 74
End: 2024-04-30
Payer: MEDICARE

## 2024-04-30 DIAGNOSIS — G56.03 CARPAL TUNNEL SYNDROME, BILATERAL UPPER LIMBS: Primary | ICD-10-CM

## 2024-04-30 DIAGNOSIS — E85.9 AMYLOIDOSIS, UNSPECIFIED TYPE (H): ICD-10-CM

## 2024-05-01 ENCOUNTER — OFFICE VISIT (OUTPATIENT)
Dept: FAMILY MEDICINE | Facility: OTHER | Age: 74
End: 2024-05-01
Attending: CHIROPRACTOR
Payer: MEDICARE

## 2024-05-01 VITALS
WEIGHT: 221 LBS | RESPIRATION RATE: 17 BRPM | DIASTOLIC BLOOD PRESSURE: 78 MMHG | BODY MASS INDEX: 28.36 KG/M2 | SYSTOLIC BLOOD PRESSURE: 132 MMHG | HEIGHT: 74 IN | OXYGEN SATURATION: 99 % | HEART RATE: 76 BPM

## 2024-05-01 DIAGNOSIS — M54.41 CHRONIC MIDLINE LOW BACK PAIN WITH RIGHT-SIDED SCIATICA: Primary | ICD-10-CM

## 2024-05-01 DIAGNOSIS — G89.29 CHRONIC MIDLINE LOW BACK PAIN WITH RIGHT-SIDED SCIATICA: Primary | ICD-10-CM

## 2024-05-01 PROCEDURE — G0463 HOSPITAL OUTPT CLINIC VISIT: HCPCS

## 2024-05-01 PROCEDURE — 99207 PR BACK PROGRAM: CPT | Performed by: CHIROPRACTOR

## 2024-05-01 ASSESSMENT — PAIN SCALES - GENERAL: PAINLEVEL: MILD PAIN (2)

## 2024-05-01 NOTE — PROGRESS NOTES
CHIEF COMPLAINT:   Chief Complaint   Patient presents with    Consult       HISTORY OF PRESENTING INJURY     New patient referred for consideration of our spine program.  He has chronic right sided sciatica, dating 30-40 years ago.  Recently obtained x-rays per Dr. Roberts and was referred to me.  PCP is Dr. Howard.  Kaushik recently had approximately 6 sessions of physical therapy that was helping, but had to stop for carpal tunnel surgery.  Pool therapy was beneficial and he enjoyed working with Alexandria. Kaushik has persistent right sided groin pain as well as occasional popping in the left hip. He stays active with a morning stretching routine and is interested in learning more appropriate daily home exercises.  He denies any symptoms of cauda equina.    Oswestry (CAMERON) Questionnaire        5/1/2024    10:42 AM   OSWESTRY DISABILITY INDEX   Count 4   Sum 6   Oswestry Score (%) 30 %        PAST MEDICAL HISTORY:  Past Medical History:   Diagnosis Date    Anxiety disorder     No Comments Provided    Complete tear of right rotator cuff     3/23/2017    Dependence on other enabling machines and devices     No Comments Provided    Encounter for prescription of emergency contraception     No Comments Provided    Essential tremor     No Comments Provided    Impingement syndrome of right shoulder     3/23/2017    Other specified postprocedural states     4/19/2017    Primary osteoarthritis of shoulder     3/23/2017    Sciatica     No Comments Provided    Status post shoulder surgery 4/19/2017    Trigger thumb of right hand     7/24/2017       PAST SURGICAL HISTORY:  Past Surgical History:   Procedure Laterality Date    COLONOSCOPY  03/2000    COLONOSCOPY  03/01/2005    normal by patient report    COLONOSCOPY  01/03/2014    OTHER SURGICAL HISTORY      86123.0,CT CORONARY ANGIOGRAM (IA)    OTHER SURGICAL HISTORY      04/19/2017,746924,OTHER,Right    VASECTOMY      No Comments Provided       ALLERGIES:  Allergies   Allergen Reactions     Pollen Extract Other (See Comments)     Sinus drainage, eyes watering        CURRENT MEDICATIONS:  Current Outpatient Medications   Medication Sig Dispense Refill    acetaminophen (TYLENOL) 500 MG tablet Take 1,000 mg by mouth every 6 hours as needed for mild pain      cetirizine (ZYRTEC) 10 MG tablet Take 1 tablet (10 mg) by mouth daily 90 tablet 3    Coenzyme Q10 (COQ10 PO) Take 200 mg by mouth daily      cyanocobalamin (VITAMIN B-12) 100 MCG tablet Take 100 mcg by mouth daily      lisinopril (ZESTRIL) 10 MG tablet Take 1.5 tablets (15 mg) by mouth daily 135 tablet 4    order for DME Equipment being ordered: CPAP supplies 1 each 1    order for DME Equipment being ordered: CPAP replacement supplies 1 each 3    pyridOXINE (VITAMIN B6) 100 MG TABS Take 100 mg by mouth daily      sildenafil (VIAGRA) 100 MG tablet Take 1 tablet (100 mg) by mouth daily as needed (For ED) 9 tablet 11    vitamin C (ASCORBIC ACID) 500 MG tablet Take 500 mg by mouth daily      Vitamin D3 (CHOLECALCIFEROL) 25 mcg (1000 units) tablet Take 125 mcg by mouth daily         SOCIAL HISTORY:  Social History     Socioeconomic History    Marital status: Single     Spouse name: Not on file    Number of children: Not on file    Years of education: Not on file    Highest education level: Not on file   Occupational History    Not on file   Tobacco Use    Smoking status: Former     Current packs/day: 0.00     Average packs/day: 2.0 packs/day for 20.0 years (40.0 ttl pk-yrs)     Types: Cigarettes     Start date: 1964     Quit date: 1984     Years since quittin.3    Smokeless tobacco: Former     Types: Chew   Vaping Use    Vaping status: Never Used   Substance and Sexual Activity    Alcohol use: No     Alcohol/week: 0.0 standard drinks of alcohol    Drug use: Never    Sexual activity: Not Currently   Other Topics Concern    Not on file   Social History Narrative    His wife  about , now remarried.     Works as a [employer] .  Now  retired as of 2009.    Sarah (Dennie) - Wife    Updated  12/5/2013    **pre upload 12/12/2012**     Social Determinants of Health     Financial Resource Strain: Low Risk  (2/2/2024)    Financial Resource Strain     Within the past 12 months, have you or your family members you live with been unable to get utilities (heat, electricity) when it was really needed?: No   Food Insecurity: Low Risk  (2/2/2024)    Food Insecurity     Within the past 12 months, did you worry that your food would run out before you got money to buy more?: No     Within the past 12 months, did the food you bought just not last and you didn t have money to get more?: No   Transportation Needs: Low Risk  (2/2/2024)    Transportation Needs     Within the past 12 months, has lack of transportation kept you from medical appointments, getting your medicines, non-medical meetings or appointments, work, or from getting things that you need?: No   Physical Activity: Insufficiently Active (11/5/2023)    Received from [facility], [facility]    Exercise Vital Sign     Days of Exercise per Week: 4 days     Minutes of Exercise per Session: 20 min   Stress: Not on file   Social Connections: Not on file   Interpersonal Safety: Low Risk  (4/25/2024)    Interpersonal Safety     Do you feel physically and emotionally safe where you currently live?: Yes     Within the past 12 months, have you been hit, slapped, kicked or otherwise physically hurt by someone?: No     Within the past 12 months, have you been humiliated or emotionally abused in other ways by your partner or ex-partner?: No   Housing Stability: Low Risk  (2/2/2024)    Housing Stability     Do you have housing? : Yes     Are you worried about losing your housing?: No       FAMILY HISTORY:  Family History   Problem Relation Age of Onset    Breast Cancer Mother         Cancer-breast    Substance Abuse Father         Alcohol/Drug    Diabetes Daughter         Diabetes       REVIEW OF  "SYSTEMS:    Unremarkable other than chief complaint      PHYSICAL EXAM:   /78   Pulse 76   Resp 17   Ht 1.88 m (6' 2\")   Wt 100.2 kg (221 lb)   SpO2 99%   BMI 28.37 kg/m   Body mass index is 28.37 kg/m .  General Appearance: Pleasant, alert, appropriate appearance for age. No acute distress. Patient is ambulatory without assistance. Transitions without difficulty.  Stance and gait normal, no antalgia. Lower extremity strength testing is wnl.  Patellar reflexes are absent bilaterally.   Straight Leg Raiser Test: negative bilaterally to 75 degrees  Kemps Test: positive right extension  Ely's Test: positive right  Ely's Sign: positive right  Nachlas Test: positive right  Yeoman's Test:  positive right  Hibb's Test: negative bilaterally  Sensory is: Intact    Radiographic images were independently reviewed and discussed with the patient.       Study Result    Narrative & Impression   XR LUMBAR SPINE 2/3 VIEWS     HISTORY: 73 years Male Chronic midline low back pain with right-sided  sciatica; Chronic midline low back pain with right-sided sciatica     COMPARISON: 5/27/2015     TECHNIQUE:     FINDINGS: Alignment of the lumbar spine is normal. Vertebral body  height is maintained throughout. There is mild disc space narrowing of  304 L4-L5 and L5-S1. There is very mild rotoscoliosis convex to the  right.                                                                      IMPRESSION: Mild to moderate multilevel degenerative disease. No  evidence of fracture or subluxation. Degenerative disc disease has  worsened slightly since the prior study.     BREE TOTH MD         IMPRESSION/PLAN:    (1) Spine Program referral placed.  We will treat this case with Rox ARELLANO Focus on L5-S1 disc, nerve flossing, and quad stretching.  2nd half of program get Alexandria involved again for pool therapy  (2) consider MRI at my next follow-up should he not respond to our program  (3) Follow up will be 4 weeks after starting " physical therapy    Total time spent today in chart review/preparation, face to face evaluation, and documentation: 51 minutes.        Francisco Sun DC, SANDEEFP, CICE  Director - Occupational Medicine Department  Diplomate of the American Board of Forensic Professionals  Board Certified - American Board of Independent Medical Examiners    1:19 PM 5/1/2024

## 2024-05-02 NOTE — PROGRESS NOTES
DISCHARGE  Reason for Discharge: Patient chooses to discontinue therapy. Patient reports back pain has resolved, he wants to discontinue therapy and transition to independent HEP at this time.    Equipment Issued: n/a    Discharge Plan: Patient to continue home program.    Referring Provider:  Carmine De Souza     04/04/24 0500   Appointment Info   Signing clinician's name / credentials Alexandria Hughes, PTA   Visits Used 7   Medical Diagnosis Spasm of muscle of lower back   PT Tx Diagnosis bilateral low back pain with mobility deficits; hip strength and mobility deficits   Progress Note/Certification   Start of Care Date 02/20/24   Onset of illness/injury or Date of Surgery 01/25/24   Therapy Frequency 2x/week   Predicted Duration 90 days   Certification date from 02/20/24   Certification date to 05/20/24   Progress Note Due Date 03/29/24   Supervision   PT Assistant Visit Number 2   PT Goal 1   Goal Identifier pain   Goal Description patient will report 30% reduction of low back pain with walking all necessary distances   Rationale to maximize safety and independence within the home;to maximize safety and independence within the community;to maximize safety and independence with performance of ADLs and functional tasks   Target Date 04/02/24   Date Met 04/04/24   PT Goal 2   Goal Identifier strength   Goal Description patient will demonstrate 4+/5 bilateral gross hip strength in order to optimize functional mobility   Rationale to maximize safety and independence with performance of ADLs and functional tasks;to maximize safety and independence within the home;to maximize safety and independence within the community   Target Date 05/14/24   PT Goal 3   Goal Identifier ROM   Goal Description patient will demonstrate functional hip extension in order to optimize amulation tolerance   Rationale to maximize safety and independence with performance of ADLs and functional tasks;to maximize safety and independence  within the home;to maximize safety and independence within the community   Target Date 05/14/24   Subjective Report   Subjective Report Patient in the pool this session, pt states his back has been feeling good and has been able to walk farther, he would like to continue on his own with the pool after today, and also purchased his own ankle floats.   Therapeutic Procedure/Exercise   Ther Proc 2 strength, mobility   Ther Proc 2 - Details clamshell, reverse clamshell, s/l hip abd, thoracic rotation with hand behind head; supine march, 90-90 hold 3 x15sec, bridge, lower trunk rotation; DANIEL 2 x30sec, added cervical rotation; quadruped alt shoulder taps; sit<>stand; wall slides; supine piriformis stretch, seated trunk flexion stretch   Patient Response/Progress well tolerated, demonstrated good understanding   Aquatic Therapy   Aquatic Therapy Minutes (85803) 30   Aquatic Therapy 1 Warm up   Aquatic Therapy 1 - Details Amb 3-5ft depth mod speed, fwd.back and side stepping 4 length ea. Low back stretch at poolside followed by standing extension. Ankle floats on for the entire session.   Aquatic Therapy 2 LE ex   Aquatic Therapy 2 - Details Multi hip all directions 5ft , wide squat HS curls, kick back with hands on poolside, cues for glute activation. 3ft depth no floats on ankles.   Aquatic Therapy 3 Core/stability   Aquatic Therapy 3 - Details Standing 3ft wide squat holding UE floats, press downs cues for core engagement, standing horz add/abd, alt marches with opp knee to float. Squats 3ft.   Skilled Intervention eduation, technique   Patient Response/Progress good tolerance   Education   Learner/Method Patient;Listening;Demonstration;Pictures/Video;No Barriers to Learning   Plan   Home program HEP, pt will  handout for aquatic routine  ([employer] access code [id number])   Plan for next session progress hip/pelvis strength   Total Session Time   Timed Code Treatment Minutes 30   Total Treatment Time (sum of timed  and untimed services) 30

## 2024-05-09 ENCOUNTER — TELEPHONE (OUTPATIENT)
Dept: FAMILY MEDICINE | Facility: OTHER | Age: 74
End: 2024-05-09
Payer: MEDICARE

## 2024-05-09 DIAGNOSIS — E85.9 AMYLOIDOSIS, UNSPECIFIED TYPE (H): Primary | ICD-10-CM

## 2024-05-09 NOTE — TELEPHONE ENCOUNTER
Pao with [facility] requests a call back regarding assistance with the patients hematology referral. Pao stated the patient believed TJ would be able to assist with finding a provider closer to [place].    Okay to leave detailed message.        Ariadne Zavala on 5/9/2024 at 2:51 PM

## 2024-05-10 NOTE — TELEPHONE ENCOUNTER
Spoke with Pao at [facility] and she stated the patient wants to see a hematologist more local. She reports the patient does not want to travel. Referral pending for provider.  Sully Hoffman LPN

## 2024-05-20 ENCOUNTER — THERAPY VISIT (OUTPATIENT)
Dept: SLEEP MEDICINE | Facility: OTHER | Age: 74
End: 2024-05-20
Attending: FAMILY MEDICINE
Payer: MEDICARE

## 2024-05-20 DIAGNOSIS — G47.33 OSA (OBSTRUCTIVE SLEEP APNEA): ICD-10-CM

## 2024-05-20 DIAGNOSIS — I10 ESSENTIAL HYPERTENSION: ICD-10-CM

## 2024-05-20 PROCEDURE — 95811 POLYSOM 6/>YRS CPAP 4/> PARM: CPT | Mod: 26 | Performed by: FAMILY MEDICINE

## 2024-05-20 PROCEDURE — 95811 POLYSOM 6/>YRS CPAP 4/> PARM: CPT

## 2024-05-21 ENCOUNTER — PATIENT OUTREACH (OUTPATIENT)
Dept: ONCOLOGY | Facility: OTHER | Age: 74
End: 2024-05-21
Payer: MEDICARE

## 2024-05-21 NOTE — PROGRESS NOTES
The patient presents to the sleep center with complaints of sleep disordered breathing. The patient's AHI without treatment was 14.3, with treatment 2.0, with the lowest o2 saturation without treatment being 87%, with treatment 89%, with the time below 89% without treatment 6.3 minutes, with treatment 0.1 minutes. No sleep aid was taken. Normal sinus rhythm with frequent PVC's were present. Multiple limb movements were present during the test. Mild snoring was observed by the tech. The patient was fitted with an Resmed Airfit N20 large mask, and was started on a cpap of 5cmh20. The patient stayed on that pressure for the remainder of the night. All stages of sleep were observed. The total sleep time without treatment was 121.5 minutes, with treatment 121.5 minutes, sleep efficiency without treatment 35.9%, with treatment 52.6%, the sleep latency without treatment 14.4 minutes, with treatment 108 minutes. The patient tolerated the test and treatment well.

## 2024-05-22 NOTE — PROGRESS NOTES
[facility]: Cancer Care Initial Note                                    Discussion with Patient:                                                      Referral from [facility]   Patient had carpal tunnel surgery and pathology returned showing concern for possible amyloidosis contributing to carpal tunnel              Assessment:                                                      Initial  Current living arrangement:: I live alone  Type of residence:: Apartment  Informal Support system:: Friends  Equipment Currently Used at Home: none  Bed or wheelchair confined:: No  Mobility Status: Independent  Transportation means:: Accessible car  Medication adherence problem (GOAL):: No  Knowledgeable about how to use meds:: Yes  Medication side effects suspected:: No  Referrals Placed: None  Advanced Care Plans/Directives on file:: Yes  Pain Score: Moderate Pain (5) (carpal tunnel surgery - having pain since surgery 2 months ago. seeing occupational therapist in June)        Intervention/Education provided during outreach:                                                           Consult scheduled with Dr. Tamez 6/21/24  Signature:  Sandra Estrada RN

## 2024-05-26 NOTE — PROCEDURES
SLEEP STUDY INTERPRETATION  SPLIT NIGHT STUDY      Patient: KRISS CEBALLOS  YOB: 1950  Study Date: 5/20/2024  MRN: [id number]  Referring Provider: Alec Howard MD  Ordering Provider: Jerry Thornton MD    Indications for Polysomnography: The patient is a 73 year old Male who is 6'1  and weighs 221 lbs. His BMI is 0.0, [place] sleepiness scale 12 and neck circumference is - cm. Relevant medical history includes myalgia, myositis, REDD, HTN, anxiety, BPH, depression. A diagnostic polysomnogram was performed to document presence of known REDD to allow for replacement equipment. After 121.5 minutes of sleep time the patient exhibited sufficient respiratory events qualifying him for a CPAP trial which was then initiated.    Polysomnogram Data: A full night polysomnogram recorded the standard physiologic parameters including EEG, EOG, EMG, ECG, nasal and oral airflow. Respiratory parameters of chest and abdominal movements were recorded with respiratory inductance plethysmography. Oxygen saturation was recorded by pulse oximetry.  Hypopnea scoring rule used: 1B 4%    Diagnostic PSG  Sleep Architecture: Normal sleep onset latency.  No clear N3 nor REM with poor sleep efficiency without CPAP.  The total recording time of the polysomnogram was 338.4 minutes. The total sleep time was 121.5 minutes. Sleep latency was normal at 14.4 minutes without the use of a sleep aid. REM latency was - minutes. Arousal index was increased at 15.8 arousals per hour. Sleep efficiency was poor at 35.9%. Wake after sleep onset was 202.5 minutes. The patient spent 7.8% of total sleep time in Stage N1, 92.2% in Stage N2, 0.0% in Stage N3, and 0.0% in REM. Time in REM supine was - minutes.    Respiration: Mild to borderline moderate OS A(AHI 14.3) without sleep-associated hypoxemia.  Probable that REDD under-reported given no REM observed.  Events ? The polysomnogram revealed a presence of 28 obstructive, - central, and - mixed  apneas resulting in an apnea index of 13.8 events per hour. There were 1 obstructive hypopneas and - central hypopneas resulting in an obstructive hypopnea index of 0.5 and central hypopnea index of - events per hour. The combined apnea/hypopnea index was 14.3 events per hour (central apnea/hypopnea index was - events per hour).  The REM AHI was - events per hour. The supine AHI was 40.8 events per hour. The RERA index was - events per hour. The RDI was 14.3 events per hour.  Snoring - was reported as mild.  Respiratory rate and pattern - was notable for normal respiratory rate and pattern.  Sustained Sleep Associated Hypoventilation - Transcutaneous carbon dioxide monitoring was not used, however significant hypoventilation was not suggested by oximetry.  Sleep Associated Hypoxemia - (Greater than 5 minutes O2 sat at or below 88%) was not present. Baseline oxygen saturation was 92.3%. Lowest oxygen saturation was 67.0%. Time spent less than or equal to 88% was 2.1 minutes. Time spent less than or equal to 89% was 6.3 minutes.     Treatment PSG  Sleep Architecture: Normalized arousal index, somewhat improved sleep efficiency, lateral REM observed.  At 01:40:54 AM the patient was placed on PAP treatment and was titrated at pressures ranging from CPAP 5 cmH2O up to CPAP 5 cmH2O. The total recording time of the treatment portion of the study was 230.9 minutes. The total sleep time was 121.5 minutes. During the treatment portion of the study the sleep latency was 108.0 minutes. REM latency was 13.0 minutes. Arousal index was normal/increased at 5.4 arousals per hour. Sleep efficiency was improved at 52.6%. Wake after sleep onset was 1.5 minutes. The patient spent 0.0% of total sleep time in Stage N1, 70.4% in Stage N2, 0.0% in Stage N3, and 29.6% in REM. Time in REM supine was - minutes.           Respiration: CPAP 5 cm H2O appeared effective in lateral NREM and REM, minimal supine sleep observed.  The final pressure  was CPAP 5 cmH2O with an AHI of 2.0 events per hour. Time in REM supine on final pressure was - minutes.   This titration was considered Adequate (residual AHI with 75% decrease or above constraints without REM?supine sleep at final pressure).    Movement Activity: Very frequent PLM's observed during diagnostic portion.  Periodic Limb Movements  During the diagnostic portion of the study, there were 142 PLMs recorded. The PLM index was 70.1 movements per hour. The PLM Arousal Index was 1.0 per hour.  During the treatment portion of the study, there were - PLMs recorded. The PLM index was - movements per hour. The PLM Arousal Index was - per hour.  REM EMG Activity - Excessive transient/sustained muscle activity was not present.  Nocturnal Behavior - Abnormal sleep related behaviors were not noted during/arising out of NREM / REM sleep.   Bruxism - None apparent.    Cardiac Summary: Appears NSR with frequent PVC's.  During the diagnostic portion of the study, the average pulse rate was 65.6 bpm. The minimum pulse rate was 39.0 bpm while the maximum pulse rate was 92.0 bpm.    During the treatment portion of the study, the average pulse rate was 59.2 bpm. The minimum pulse rate was 45.0 bpm while the maximum pulse rate was 87.0 bpm.     Assessment:   Mild to borderline moderate OS A(AHI 14.3) without sleep-associated hypoxemia.  Probable that REDD under-reported given no REM observed.  CPAP 5 cm H2O appeared effective in lateral NREM and REM, minimal supine sleep observed.  Very frequent PLM's observed during diagnostic portion.    Recommendations:  Treatment of REDD with CPAP at 5 cmH2O. Recommend clinical follow up with sleep management team, for coaching and review of effectiveness and compliance measures.  Treatment of REDD with Auto?titrating PAP therapy with a range of 5 cmH2O to 10 cmH2O. Recommend clinical follow up with sleep management team, including review of compliance measures.  Patient may be a candidate  for dental appliance through referral to Sleep Dentistry for the treatment of obstructive sleep apnea and/or socially disruptive snoring.  If devices are not acceptable or effective, patient may benefit from evaluation of possible surgical options for the treatment of obstructive sleep apnea and/or socially disruptive snoring. If he is interested, would recommend referral to specialized ENT?Sleep provider.  Advice regarding the risks of drowsy driving.  Suggest optimizing sleep schedule and avoiding sleep deprivation.  Weight management (if BMI > 30).  Pharmacologic therapy should be used for management of restless legs syndrome only if present and clinically indicated and not based on the presence of periodic limb movements alone.    Diagnostic Codes:   Obstructive Sleep Apnea G47.33  Periodic Limb Movement Disorder G47.61     _____________________________________   Electronically Signed By: Jerry Thornton MD (5/26/2024)

## 2024-05-28 ENCOUNTER — VIRTUAL VISIT (OUTPATIENT)
Dept: PULMONOLOGY | Facility: OTHER | Age: 74
End: 2024-05-28
Attending: FAMILY MEDICINE
Payer: MEDICARE

## 2024-05-28 VITALS — BODY MASS INDEX: 28.23 KG/M2 | HEIGHT: 74 IN | WEIGHT: 220 LBS

## 2024-05-28 DIAGNOSIS — I10 ESSENTIAL HYPERTENSION: ICD-10-CM

## 2024-05-28 DIAGNOSIS — G47.33 OSA (OBSTRUCTIVE SLEEP APNEA): Primary | ICD-10-CM

## 2024-05-28 PROCEDURE — G2211 COMPLEX E/M VISIT ADD ON: HCPCS | Mod: 95 | Performed by: FAMILY MEDICINE

## 2024-05-28 PROCEDURE — 99213 OFFICE O/P EST LOW 20 MIN: CPT | Mod: 95 | Performed by: FAMILY MEDICINE

## 2024-05-28 ASSESSMENT — PAIN SCALES - GENERAL: PAINLEVEL: NO PAIN (0)

## 2024-05-28 NOTE — PROGRESS NOTES
"Virtual Visit Details    Type of service:  Video Visit   Video Start Time:  2:30pm  Video End Time: 2:45pm    Originating Location (pt. Location): Home    Distant Location (provider location):  Off-site  Platform used for Video Visit: Arslan Ansari is a 73 year old male who is being evaluated via a billable video visit.       The patient has been notified of following:      \"This video visit will be conducted via a call between you and your physician/provider. We have found that certain health care needs can be provided without the need for an in-person physical exam.  This service lets us provide the care you need with a video conversation.  If a prescription is necessary we can send it directly to your pharmacy.  If lab work is needed we can place an order for that and you can then stop by our lab to have the test done at a later time.     Video visits are billed at different rates depending on your insurance coverage.  Please reach out to your insurance provider with any questions.     If during the course of the call the physician/provider feels a video visit is not appropriate, you will not be charged for this service.\"     Patient has given verbal consent for Video visit? Yes  How would you like to obtain your AVS? Mail a copy  If you are dropped from the video visit, the video invite should be resent to: Text to cell phone: -  Will anyone else be joining your video visit? No  If patient encounters technical issues they should call [phone]      Video-Visit Details     Type of service:  Video Visit     Virtual visit for review of split-night sleep study results.     A/P:     1.)  Mild to borderline moderate REDD (AHI 14.3) without sleep-associated hypoxemia. Probable that REDD under-reported given no REM observed.   - CPAP 5 cm H2O appeared effective in lateral NREM and REM, minimal supine sleep observed.   - Very frequent PLM's observed during diagnostic portion.     2.)  Using PAP therapy " "-presumed CPAP 11 cm H2O      Plan for replacement CPAP equipment.  Given overall comfort with settings of 11 cm H2O, I did enter replacement CPAP at same pressure setting of 11 cm H2O.  We can adjust if needed.    Plan for follow-up 4 to 6 weeks after receiving new CPAP machine.    The longitudinal plan of care for the diagnosis(es)/condition(s) as documented were addressed during this visit. Due to the added complexity in care, I will continue to support Kaushik in the subsequent management and with ongoing continuity of care.      SUBJECTIVE:  Kaushik Ansari is a 73 year old male.    Pertinent PMHx of myalgia, myositis, REDD, HTN, anxiety, BPH, depression.     Prior sleep testing ~2002, did not have full report for review.    4/29/2024 -we reviewed his sleep history more detail.  He did have a written letter from his sleep study in March 1, 2002 with mention of AHI 14, duration of events 35 seconds, richy SpO2 87%, CPAP at 7 cm H2O effective.  In the interim, he did have increase in his pressure from 7 up to 11 cm H2O.  He uses CPAP on a nightly basis, but he does feel at times that the pressure may still be slightly too low.     Chief concern per questionnaire: \"I've had diagnosed sleep apnea for about 20 years; and need another sleep study to get a modern CPAP machine. \"     Duration of symptoms:  \"I was diagnosed about 20 years ago because of my chronic snoring. \"     Goals for visit per questionnaire: \"To determine the current severity of my sleep apnea at my age (73). \"     \"My sleep apnea has been severe and chronic and I rely on my CPAP machine to survive \"     Sleep pattern:  Workdays.  9pm to 6-7am.  Weekends.  9pm to 6-7am.  Time to fall asleep: ~few minutes.  Awakenings: 1 times per night, 1-2 hours to return to sleep.  Average total sleep time:  6 hours  Napping.  ? days per week, 20-30 minutes per nap.     \"On average I sleep 3 or 4 continuous hours; but awake at about 1 am. Then I am awake for 2 " "hours . I then fall asleep again until 6 am; but this sleep is not continuous and not a good sleep. \"     No for RLS screen.  No for sleep walking.  No for dream enactment behavior.  No for bruxism.     No for morning headaches.  Yes for snoring.  Yes for observed apnea.  Yes for FHx of REDD - siblings.     Caffeine use:  No for 3+ per day.  No for within 6 hours of bed.    A/P for repeat split-night PSG to allow for replacement CPAP.    Today -reviewed his sleep test results in detail, he is interested in proceeding with getting the replacement machine.    SLEEP STUDY INTERPRETATION  SPLIT NIGHT STUDY        Patient: KRISS CEBALLOS  YOB: 1950  Study Date: 5/20/2024  MRN: [id number]  Referring Provider: Alec Howard MD  Ordering Provider: Jerry Thornton MD     Indications for Polysomnography: The patient is a 73 year old Male who is 6'1  and weighs 221 lbs. His BMI is 0.0, [place] sleepiness scale 12 and neck circumference is - cm. Relevant medical history includes myalgia, myositis, REDD, HTN, anxiety, BPH, depression. A diagnostic polysomnogram was performed to document presence of known REDD to allow for replacement equipment. After 121.5 minutes of sleep time the patient exhibited sufficient respiratory events qualifying him for a CPAP trial which was then initiated.     Polysomnogram Data: A full night polysomnogram recorded the standard physiologic parameters including EEG, EOG, EMG, ECG, nasal and oral airflow. Respiratory parameters of chest and abdominal movements were recorded with respiratory inductance plethysmography. Oxygen saturation was recorded by pulse oximetry.  Hypopnea scoring rule used: 1B 4%     Diagnostic PSG  Sleep Architecture: Normal sleep onset latency.  No clear N3 nor REM with poor sleep efficiency without CPAP.  The total recording time of the polysomnogram was 338.4 minutes. The total sleep time was 121.5 minutes. Sleep latency was normal at 14.4 minutes without the " use of a sleep aid. REM latency was - minutes. Arousal index was increased at 15.8 arousals per hour. Sleep efficiency was poor at 35.9%. Wake after sleep onset was 202.5 minutes. The patient spent 7.8% of total sleep time in Stage N1, 92.2% in Stage N2, 0.0% in Stage N3, and 0.0% in REM. Time in REM supine was - minutes.     Respiration: Mild to borderline moderate OS A(AHI 14.3) without sleep-associated hypoxemia.  Probable that REDD under-reported given no REM observed.  Events ? The polysomnogram revealed a presence of 28 obstructive, - central, and - mixed apneas resulting in an apnea index of 13.8 events per hour. There were 1 obstructive hypopneas and - central hypopneas resulting in an obstructive hypopnea index of 0.5 and central hypopnea index of - events per hour. The combined apnea/hypopnea index was 14.3 events per hour (central apnea/hypopnea index was - events per hour).  The REM AHI was - events per hour. The supine AHI was 40.8 events per hour. The RERA index was - events per hour. The RDI was 14.3 events per hour.  Snoring - was reported as mild.  Respiratory rate and pattern - was notable for normal respiratory rate and pattern.  Sustained Sleep Associated Hypoventilation - Transcutaneous carbon dioxide monitoring was not used, however significant hypoventilation was not suggested by oximetry.  Sleep Associated Hypoxemia - (Greater than 5 minutes O2 sat at or below 88%) was not present. Baseline oxygen saturation was 92.3%. Lowest oxygen saturation was 67.0%. Time spent less than or equal to 88% was 2.1 minutes. Time spent less than or equal to 89% was 6.3 minutes.      Treatment PSG  Sleep Architecture: Normalized arousal index, somewhat improved sleep efficiency, lateral REM observed.  At 01:40:54 AM the patient was placed on PAP treatment and was titrated at pressures ranging from CPAP 5 cmH2O up to CPAP 5 cmH2O. The total recording time of the treatment portion of the study was 230.9 minutes.  The total sleep time was 121.5 minutes. During the treatment portion of the study the sleep latency was 108.0 minutes. REM latency was 13.0 minutes. Arousal index was normal/increased at 5.4 arousals per hour. Sleep efficiency was improved at 52.6%. Wake after sleep onset was 1.5 minutes. The patient spent 0.0% of total sleep time in Stage N1, 70.4% in Stage N2, 0.0% in Stage N3, and 29.6% in REM. Time in REM supine was - minutes.               Respiration: CPAP 5 cm H2O appeared effective in lateral NREM and REM, minimal supine sleep observed.  The final pressure was CPAP 5 cmH2O with an AHI of 2.0 events per hour. Time in REM supine on final pressure was - minutes.   This titration was considered Adequate (residual AHI with 75% decrease or above constraints without REM?supine sleep at final pressure).     Movement Activity: Very frequent PLM's observed during diagnostic portion.  Periodic Limb Movements  During the diagnostic portion of the study, there were 142 PLMs recorded. The PLM index was 70.1 movements per hour. The PLM Arousal Index was 1.0 per hour.  During the treatment portion of the study, there were - PLMs recorded. The PLM index was - movements per hour. The PLM Arousal Index was - per hour.  REM EMG Activity - Excessive transient/sustained muscle activity was not present.  Nocturnal Behavior - Abnormal sleep related behaviors were not noted during/arising out of NREM / REM sleep.   Bruxism - None apparent.     Cardiac Summary: Appears NSR with frequent PVC's.  During the diagnostic portion of the study, the average pulse rate was 65.6 bpm. The minimum pulse rate was 39.0 bpm while the maximum pulse rate was 92.0 bpm.     During the treatment portion of the study, the average pulse rate was 59.2 bpm. The minimum pulse rate was 45.0 bpm while the maximum pulse rate was 87.0 bpm.      Assessment:   Mild to borderline moderate OS A(AHI 14.3) without sleep-associated hypoxemia.  Probable that REDD  under-reported given no REM observed.  CPAP 5 cm H2O appeared effective in lateral NREM and REM, minimal supine sleep observed.  Very frequent PLM's observed during diagnostic portion.     Recommendations:  Treatment of REDD with CPAP at 5 cmH2O. Recommend clinical follow up with sleep management team, for coaching and review of effectiveness and compliance measures.  Treatment of REDD with Auto?titrating PAP therapy with a range of 5 cmH2O to 10 cmH2O. Recommend clinical follow up with sleep management team, including review of compliance measures.  Patient may be a candidate for dental appliance through referral to Sleep Dentistry for the treatment of obstructive sleep apnea and/or socially disruptive snoring.  If devices are not acceptable or effective, patient may benefit from evaluation of possible surgical options for the treatment of obstructive sleep apnea and/or socially disruptive snoring. If he is interested, would recommend referral to specialized ENT?Sleep provider.  Advice regarding the risks of drowsy driving.  Suggest optimizing sleep schedule and avoiding sleep deprivation.  Weight management (if BMI > 30).  Pharmacologic therapy should be used for management of restless legs syndrome only if present and clinically indicated and not based on the presence of periodic limb movements alone.     Diagnostic Codes:   Obstructive Sleep Apnea G47.33  Periodic Limb Movement Disorder G47.61     _____________________________________   Electronically Signed By: Jerry Thornton MD (5/26/2024)       Past medical history:    Patient Active Problem List    Diagnosis Date Noted    Encounter for removal of sutures 04/29/2024     Priority: Medium    Spasm of muscle of lower back 02/20/2024     Priority: Medium    Carpal tunnel syndrome, bilateral upper limbs 11/09/2023     Priority: Medium    Neuropathy, peripheral axonal 12/09/2021     Priority: Medium    Lumbosacral radiculopathy at L5 12/09/2021     Priority:  Medium    Primary osteoarthritis of left hip 11/23/2020     Priority: Medium    Essential hypertension 11/14/2019     Priority: Medium    Benign prostatic hyperplasia with urinary frequency 10/16/2018     Priority: Medium    Bunion 10/16/2018     Priority: Medium    Familial tremor 10/16/2018     Priority: Medium     Overview:   Benign      Plantar fascial fibromatosis 10/16/2018     Priority: Medium     Overview:   2006 to present      Sciatica 10/16/2018     Priority: Medium     Overview:   Mild      Trigger finger of right thumb 07/24/2017     Priority: Medium    Status post shoulder surgery 04/19/2017     Priority: Medium    AC (acromioclavicular) joint arthritis 03/23/2017     Priority: Medium    Complete tear of right rotator cuff 03/23/2017     Priority: Medium    Impingement syndrome of right shoulder 03/23/2017     Priority: Medium    Degeneration of intervertebral disc of lumbar region 10/23/2015     Priority: Medium    Allergic rhinitis due to pollen 05/27/2015     Priority: Medium    Acute upper respiratory infection 06/19/2012     Priority: Medium    Lactose intolerance 08/30/2011     Priority: Medium    Anxiety state 03/08/2011     Priority: Medium    Otitis media, serous 12/28/2010     Priority: Medium    Pain in joint, lower leg 08/11/2010     Priority: Medium    Lateral epicondylitis 08/11/2010     Priority: Medium    Seborrheic keratosis 08/11/2010     Priority: Medium    Depressive disorder, not elsewhere classified 01/08/2008     Priority: Medium    Myalgia and myositis 06/13/2006     Priority: Medium     Overview:   Muscle aches  IMO Update 10/11      Disturbance of skin sensation 08/11/2005     Priority: Medium     Overview:   Foot paresthesia, right leg with standing      Diverticulosis of colon 03/01/2005     Priority: Medium     Overview:   Few sigmoid diverticula, one benign polyp, needs repeat in 2009  IMO Update 10/11      History of colonic polyps 03/01/2005     Priority: Medium      Overview:   IMO Update 10/11      Obstructive sleep apnea 04/02/2003     Priority: Medium     Overview:   on CPAP, uses  IMO Update 10/11  Overview:   On CPAP         10 point ROS of systems including Constitutional, Eyes, Respiratory, Cardiovascular, Gastroenterology, Genitourinary, Integumentary, Muscularskeletal, Psychiatric were all negative except for pertinent positives noted in my HPI.    Current Outpatient Medications   Medication Sig Dispense Refill    acetaminophen (TYLENOL) 500 MG tablet Take 1,000 mg by mouth every 6 hours as needed for mild pain      cetirizine (ZYRTEC) 10 MG tablet Take 1 tablet (10 mg) by mouth daily 90 tablet 3    Coenzyme Q10 (COQ10 PO) Take 200 mg by mouth daily      cyanocobalamin (VITAMIN B-12) 100 MCG tablet Take 100 mcg by mouth daily      lisinopril (ZESTRIL) 10 MG tablet Take 1.5 tablets (15 mg) by mouth daily 135 tablet 4    order for DME Equipment being ordered: CPAP supplies 1 each 1    order for DME Equipment being ordered: CPAP replacement supplies 1 each 3    pyridOXINE (VITAMIN B6) 100 MG TABS Take 100 mg by mouth daily      sildenafil (VIAGRA) 100 MG tablet Take 1 tablet (100 mg) by mouth daily as needed (For ED) 9 tablet 11    vitamin C (ASCORBIC ACID) 500 MG tablet Take 500 mg by mouth daily      Vitamin D3 (CHOLECALCIFEROL) 25 mcg (1000 units) tablet Take 125 mcg by mouth daily         OBJECTIVE:  There were no vitals taken for this visit.    Physical Exam     ---  This note was written with the assistance of the Dragon voice-dictation technology software. The final document, although reviewed, may contain errors. For corrections, please contact the office.    Total time spent preparing to see the patient, review of chart, obtaining history and physical examination, review of sleep testing, review of treatment options, education, discussion with patient and documenting in Epic / EMR was 20 minutes.  All time involved was spent on the day of service for the patient  (the same day as the patient's appointment).    Jerry Thornton MD    Sleep Medicine  [place], [place]  Main Office: [phone]  [facility] [address], [address]  Schedule visits: [phone]  Main Office: [phone]  Fax: [phone]

## 2024-05-28 NOTE — NURSING NOTE
Patient confirms medications and allergies are accurate via patients echeck in completion, and or denies any changes since last reviewed/verified.     Is the patient currently in the state of [place]? YES    Visit mode:VIDEO    If the visit is dropped, the patient can be reconnected by: VIDEO VISIT: Text to cell phone:   Telephone Information:   Mobile [phone]       Will anyone else be joining the visit? NO  (If patient encounters technical issues they should call [phone]    How would you like to obtain your AVS? MyChart    Are changes needed to the allergy or medication list? No    Are refills needed on medications prescribed by this physician? NO    Reason for visit: RECHECK (/Sleep Study x 1 week ago.)    Flu Vaccine Last given :9/21/23    Elizabeth VALLESF

## 2024-05-30 ENCOUNTER — DOCUMENTATION ONLY (OUTPATIENT)
Dept: HOME HEALTH SERVICES | Facility: CLINIC | Age: 74
End: 2024-05-30
Payer: MEDICARE

## 2024-05-30 NOTE — PROGRESS NOTES
Patient was offered choice of vendor and chose [facility].  Patient Kaushik Ansari was set up at Outside Vendor [place], [place] on May 30, 2024. Patient received a Resmed Airsense 10 Pressures were set at  11 cm H2O.   Patient s ramp is Off and FLEX/EPR is EPR, 3.  Patient received a Resmed Mask name: AirTouch N20  Nasal mask size Medium, heated tubing and heated humidifier.  Patient has the following compliance requirements: using and visit requirements  Patient has a follow up on 07/18/24 with Dr. Thornton.    Sathish Chung

## 2024-06-04 LAB — SLPCOMP: NORMAL

## 2024-06-06 ENCOUNTER — THERAPY VISIT (OUTPATIENT)
Dept: OCCUPATIONAL THERAPY | Facility: OTHER | Age: 74
End: 2024-06-06
Attending: FAMILY MEDICINE
Payer: MEDICARE

## 2024-06-06 DIAGNOSIS — G56.03 CARPAL TUNNEL SYNDROME, BILATERAL UPPER LIMBS: ICD-10-CM

## 2024-06-06 PROCEDURE — 97110 THERAPEUTIC EXERCISES: CPT | Mod: GO

## 2024-06-06 PROCEDURE — 97140 MANUAL THERAPY 1/> REGIONS: CPT | Mod: GO

## 2024-06-06 PROCEDURE — 97165 OT EVAL LOW COMPLEX 30 MIN: CPT | Mod: GO

## 2024-06-06 PROCEDURE — 97018 PARAFFIN BATH THERAPY: CPT | Mod: GO

## 2024-06-06 NOTE — PROGRESS NOTES
OCCUPATIONAL THERAPY EVALUATION  Type of Visit: Evaluation    See electronic medical record for Abuse and Falls Screening details.    Subjective     Patient is a 73 year old male who arrives for OT evaluation following CTR and trigger finger release ( middle and ring) from 4/11/24 completed by Dr. Mckay at [facility].   Patient reports that following surgery he was informed that there were signs of Amyloidosis, and that this could possibly be the reasoning for the carpal tunnel syndrome symptoms.   He reports that he has an appointment with the hematologist in June to follow up with that.        Presenting condition or subjective complaint: Address pain in hand, wrist, forearm, and fingers in left upper extremity  Date of onset: 04/11/24    Relevant medical history: Asthma; COPD; High blood pressure   Dates & types of surgery: History of R (April 2017) and L (Janurary 2024) rotator cuff repairs, trigger finger release (right) July 2022    Prior diagnostic imaging/testing results:       Prior therapy history for the same diagnosis, illness or injury: Yes Had 1 OT visit following surgery    Prior Level of Function  Transfers: Independent  Ambulation: Independent  ADL: Independent  IADL: Driving, Finances, Housekeeping, Laundry, Meal preparation, Medication management, Yard work    Living Environment  Social support: Alone   Type of home: Apartment/condo   Stairs to enter the home: No       Ramp:     Stairs inside the home:         Help at home: Home and Yard maintenance tasks  Equipment owned:       Patient goals for therapy: use left hand normally    Pain assessment: Pain present     Objective   ADDITIONAL HISTORY:  Right hand dominant  Patient reports symptoms of pain, stiffness/loss of motion, weakness/loss of strength, edema, numbness, and tingling   Transportation: drives  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      PAIN:  Pain Level at Rest: 1/10  Pain Level with Use: 3/10  Pain Location: hand,  long finger, ring finger, and forearm , wrist    POSTURE: Normal     EDEMA: Mild     SCAR/WOUND:  healing well, no concerns     SENSATION: finger tips will get numb occasionally, with certain positions     ROM:  Left wrist flex/ext: 70/44  Right wrist flex/ext: 90/52    STRENGTH:     Measured in pounds 6/9/2024 6/9/2024    Left Right   Average 43 75     Lateral Pinch  Measured in pounds 6/9/2024 6/9/2024    Left Right   Average 18 22     3 Point Pinch  Measured in pounds 6/9/2024 6/9/2024    Left Right   Average 12 20       Assessment & Plan   CLINICAL IMPRESSIONS  Medical Diagnosis: carpal tunnel syndrome, trigger finger release    Treatment Diagnosis: decreased strength/ROM of LUE    Impression/Assessment: Pt is a 73 year old male presenting to Occupational Therapy due to carpal tunnel and trigger finger release.  The following significant findings have been identified: Impaired ROM, Impaired sensation, Impaired strength, and Pain.  These identified deficits interfere with their ability to perform self care tasks, recreational activities, household chores,  yard work, and meal planning and preparation as compared to previous level of function.   Patient's limitations or Problem List includes: Pain, Decreased ROM/motion, Weakness, Decreased , Decreased pinch, Decreased coordination, Decreased dexterity, Tightness in musculature, and Adherence in connective tissue of the left wrist and hand which interferes with the patient's ability to perform Self Care Tasks (dressing, grooming), Sleep Patterns, Recreational Activities, and Household Chores as compared to previous level of function.    Clinical Decision Making (Complexity):  Assessment of Occupational Performance: 1-3 Performance Deficits  Occupational Performance Limitations: hygiene and grooming, home establishment and management, meal preparation and cleanup, shopping, sleep, and leisure activities  Clinical Decision Making (Complexity): Low  complexity    PLAN OF CARE  Treatment Interventions:  Modalities: Hot Pack, Paraffin, Ultrasound  Interventions: Therapeutic Activity, Therapeutic Exercise, Manual Therapy, Orthotic Fitting/Training    Long Term Goals   OT Goal 1  Goal Identifier: strength  Goal Description: Patient will improve left  strength by at least 10# without pain or compensation of movement patterns  Rationale: In order to maximize safety and independence with ADL/IADLs  Target Date: 08/01/24  OT Goal 2  Goal Identifier: ROM  Goal Description: Patient will have improved left wrist ROM by at least 10 degrees (pain free) in each pain  Rationale: In order to maximize safety and independence with performance of self-care activities  Target Date: 08/01/24  OT Goal 3  Goal Identifier: HEP  Goal Description: Patient will demonstrate independence in HEP and pain management techniques  Rationale: In order to maximize safety and independence with performance of self-care activities;In order to maximize safety and independence with ADL/IADLs  Target Date: 08/01/24      Frequency of Treatment: 1-2x week  Duration of Treatment: 8 weeks     Recommended Referrals to Other Professionals:  none at this time  Education Assessment: Learner/Method: Patient;Pictures/Video;Demonstration;No Barriers to Learning     Risks and benefits of evaluation/treatment have been explained.   Patient/Family/caregiver agrees with Plan of Care.     Evaluation Time:         Signing Clinician: Princess Jon OT      [facility]                                                                                   OUTPATIENT OCCUPATIONAL THERAPY      PLAN OF TREATMENT FOR OUTPATIENT REHABILITATION   Patient's Last Name, First Name, Kaushik Banks YOB: 1950   Provider's Name   [facility]   Medical Record No.  [id number]     Onset Date: 04/11/24 Start of Care Date:  6/6/2024     Medical Diagnosis:   carpal tunnel syndrome, trigger finger release      OT Treatment Diagnosis:  decreased strength/ROM of LUE Plan of Treatment  Frequency/Duration:1-2x week/8 weeks    Certification date from 06/06/24   To 08/01/24        See note for plan of treatment details and functional goals     Princess Jon OT                         I CERTIFY THE NEED FOR THESE SERVICES FURNISHED UNDER        THIS PLAN OF TREATMENT AND WHILE UNDER MY CARE     (Physician attestation of this document indicates review and certification of the therapy plan).              Referring Provider:  Alec Howard    Initial Assessment  See Epic Evaluation-

## 2024-06-10 ENCOUNTER — THERAPY VISIT (OUTPATIENT)
Dept: OCCUPATIONAL THERAPY | Facility: OTHER | Age: 74
End: 2024-06-10
Attending: STUDENT IN AN ORGANIZED HEALTH CARE EDUCATION/TRAINING PROGRAM
Payer: MEDICARE

## 2024-06-10 DIAGNOSIS — M65.341 TRIGGER RING FINGER OF RIGHT HAND: ICD-10-CM

## 2024-06-10 DIAGNOSIS — G62.9 GENERALIZED NEUROPATHY: ICD-10-CM

## 2024-06-10 DIAGNOSIS — G56.03 CARPAL TUNNEL SYNDROME, BILATERAL UPPER LIMBS: Primary | ICD-10-CM

## 2024-06-10 PROCEDURE — 97110 THERAPEUTIC EXERCISES: CPT | Mod: GO

## 2024-06-10 PROCEDURE — 97035 APP MDLTY 1+ULTRASOUND EA 15: CPT | Mod: GO

## 2024-06-10 PROCEDURE — 97140 MANUAL THERAPY 1/> REGIONS: CPT | Mod: GO

## 2024-06-21 ENCOUNTER — ONCOLOGY VISIT (OUTPATIENT)
Dept: ONCOLOGY | Facility: OTHER | Age: 74
End: 2024-06-21
Attending: INTERNAL MEDICINE
Payer: MEDICARE

## 2024-06-21 VITALS
WEIGHT: 219 LBS | OXYGEN SATURATION: 98 % | TEMPERATURE: 97.5 F | BODY MASS INDEX: 28.12 KG/M2 | DIASTOLIC BLOOD PRESSURE: 79 MMHG | SYSTOLIC BLOOD PRESSURE: 144 MMHG | RESPIRATION RATE: 16 BRPM | HEART RATE: 58 BPM

## 2024-06-21 DIAGNOSIS — E85.9 AMYLOIDOSIS, UNSPECIFIED TYPE (H): ICD-10-CM

## 2024-06-21 LAB
ALBUMIN SERPL BCG-MCNC: 4.7 G/DL (ref 3.5–5.2)
ALP SERPL-CCNC: 58 U/L (ref 40–150)
ALT SERPL W P-5'-P-CCNC: 16 U/L (ref 0–70)
ANION GAP SERPL CALCULATED.3IONS-SCNC: 10 MMOL/L (ref 7–15)
AST SERPL W P-5'-P-CCNC: 22 U/L (ref 0–45)
BASOPHILS # BLD AUTO: 0 10E3/UL (ref 0–0.2)
BASOPHILS NFR BLD AUTO: 1 %
BILIRUB SERPL-MCNC: 0.5 MG/DL
BUN SERPL-MCNC: 15 MG/DL (ref 8–23)
CALCIUM SERPL-MCNC: 10.1 MG/DL (ref 8.8–10.2)
CHLORIDE SERPL-SCNC: 101 MMOL/L (ref 98–107)
CREAT SERPL-MCNC: 0.89 MG/DL (ref 0.67–1.17)
DEPRECATED HCO3 PLAS-SCNC: 28 MMOL/L (ref 22–29)
EGFRCR SERPLBLD CKD-EPI 2021: 89 ML/MIN/1.73M2
EOSINOPHIL # BLD AUTO: 0.2 10E3/UL (ref 0–0.7)
EOSINOPHIL NFR BLD AUTO: 2 %
ERYTHROCYTE [DISTWIDTH] IN BLOOD BY AUTOMATED COUNT: 12.8 % (ref 10–15)
GLUCOSE SERPL-MCNC: 114 MG/DL (ref 70–99)
HCT VFR BLD AUTO: 46.5 % (ref 40–53)
HGB BLD-MCNC: 15.2 G/DL (ref 13.3–17.7)
IMM GRANULOCYTES # BLD: 0 10E3/UL
IMM GRANULOCYTES NFR BLD: 0 %
LYMPHOCYTES # BLD AUTO: 2.4 10E3/UL (ref 0.8–5.3)
LYMPHOCYTES NFR BLD AUTO: 33 %
MCH RBC QN AUTO: 31.7 PG (ref 26.5–33)
MCHC RBC AUTO-ENTMCNC: 32.7 G/DL (ref 31.5–36.5)
MCV RBC AUTO: 97 FL (ref 78–100)
MONOCYTES # BLD AUTO: 0.5 10E3/UL (ref 0–1.3)
MONOCYTES NFR BLD AUTO: 7 %
NEUTROPHILS # BLD AUTO: 4.2 10E3/UL (ref 1.6–8.3)
NEUTROPHILS NFR BLD AUTO: 57 %
NRBC # BLD AUTO: 0 10E3/UL
NRBC BLD AUTO-RTO: 0 /100
PLATELET # BLD AUTO: 201 10E3/UL (ref 150–450)
POTASSIUM SERPL-SCNC: 3.7 MMOL/L (ref 3.4–5.3)
PROT SERPL-MCNC: 7.8 G/DL (ref 6.4–8.3)
RBC # BLD AUTO: 4.79 10E6/UL (ref 4.4–5.9)
SODIUM SERPL-SCNC: 139 MMOL/L (ref 135–145)
TOTAL PROTEIN SERUM FOR ELP: 7.4 G/DL (ref 6.4–8.3)
WBC # BLD AUTO: 7.4 10E3/UL (ref 4–11)

## 2024-06-21 PROCEDURE — 84165 PROTEIN E-PHORESIS SERUM: CPT | Mod: ZL | Performed by: STUDENT IN AN ORGANIZED HEALTH CARE EDUCATION/TRAINING PROGRAM

## 2024-06-21 PROCEDURE — 83521 IG LIGHT CHAINS FREE EACH: CPT | Mod: ZL | Performed by: INTERNAL MEDICINE

## 2024-06-21 PROCEDURE — 82247 BILIRUBIN TOTAL: CPT | Mod: ZL | Performed by: INTERNAL MEDICINE

## 2024-06-21 PROCEDURE — 86334 IMMUNOFIX E-PHORESIS SERUM: CPT | Mod: 26 | Performed by: STUDENT IN AN ORGANIZED HEALTH CARE EDUCATION/TRAINING PROGRAM

## 2024-06-21 PROCEDURE — 84155 ASSAY OF PROTEIN SERUM: CPT | Mod: ZL | Performed by: INTERNAL MEDICINE

## 2024-06-21 PROCEDURE — 82374 ASSAY BLOOD CARBON DIOXIDE: CPT | Mod: ZL | Performed by: INTERNAL MEDICINE

## 2024-06-21 PROCEDURE — 84166 PROTEIN E-PHORESIS/URINE/CSF: CPT | Mod: 26 | Performed by: STUDENT IN AN ORGANIZED HEALTH CARE EDUCATION/TRAINING PROGRAM

## 2024-06-21 PROCEDURE — 85025 COMPLETE CBC W/AUTO DIFF WBC: CPT | Mod: ZL | Performed by: INTERNAL MEDICINE

## 2024-06-21 PROCEDURE — G0463 HOSPITAL OUTPT CLINIC VISIT: HCPCS

## 2024-06-21 PROCEDURE — 84166 PROTEIN E-PHORESIS/URINE/CSF: CPT | Mod: ZL | Performed by: STUDENT IN AN ORGANIZED HEALTH CARE EDUCATION/TRAINING PROGRAM

## 2024-06-21 PROCEDURE — 99205 OFFICE O/P NEW HI 60 MIN: CPT | Performed by: INTERNAL MEDICINE

## 2024-06-21 PROCEDURE — 36415 COLL VENOUS BLD VENIPUNCTURE: CPT | Mod: ZL | Performed by: INTERNAL MEDICINE

## 2024-06-21 PROCEDURE — 84165 PROTEIN E-PHORESIS SERUM: CPT | Mod: 26 | Performed by: STUDENT IN AN ORGANIZED HEALTH CARE EDUCATION/TRAINING PROGRAM

## 2024-06-21 PROCEDURE — 86334 IMMUNOFIX E-PHORESIS SERUM: CPT | Mod: ZL | Performed by: STUDENT IN AN ORGANIZED HEALTH CARE EDUCATION/TRAINING PROGRAM

## 2024-06-21 ASSESSMENT — PAIN SCALES - GENERAL: PAINLEVEL: NO PAIN (1)

## 2024-06-21 NOTE — PROGRESS NOTES
HEMATOLOGY CONSULT NOTE  Jun 21, 2024    Reason for consult: Amyloidosis    HISTORY OF PRESENT ILLNESS  Kaushik Ansari is a 73 year old male with PMH as stated below was seen in the hematology clinic for amyloidosis.    His history in short is as follows:    Mr. Ansari underwent surgery of his left wrist for carpal tunnel on 4/11/2024.  Pathology specimen from the left wrist tenosynovium was submitted for review.  It showed tenosynovium tissue with focal amyloid deposition identified on [place] red stain section.  The amyloid protein was sent to [facility] for identification.  Liquid chromatography tandem mass spectrometry from the [place] red positive amyloid deposits.  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  Following his surgery he feels like his left hand is improving however improving slowly.  He also has some pain just below his left wrist.    He also complains of neuropathy from his knee up to his feet.  He states this is bilateral and started after he developed COVID a few years ago.     Denies any chest pain, shortness of breath or any cardiac issues.  Denies any leg swelling.  Denies any episodes of orthostatic hypotension or persistent diarrhea.    REVIEW OF SYSTEMS  A 12-point ROS negative except as in HPI      Current Outpatient Medications   Medication Sig Dispense Refill    acetaminophen (TYLENOL) 500 MG tablet Take 1,000 mg by mouth every 6 hours as needed for mild pain      Coenzyme Q10 (COQ10 PO) Take 200 mg by mouth daily      lisinopril (ZESTRIL) 10 MG tablet Take 1.5 tablets (15 mg) by mouth daily 135 tablet 4    Multiple Vitamins-Minerals (CENTRUM SILVER 50+MEN) TABS Take 1 tablet by mouth daily      order for DME Equipment being ordered: CPAP supplies 1 each 1    order for DME Equipment being ordered: CPAP replacement supplies 1 each 3    VITAMIN A PO Take 1 capsule by mouth daily      vitamin C (ASCORBIC ACID) 500 MG tablet Take  500 mg by mouth daily      cetirizine (ZYRTEC) 10 MG tablet Take 1 tablet (10 mg) by mouth daily (Patient not taking: Reported on 6/21/2024) 90 tablet 3    sildenafil (VIAGRA) 100 MG tablet Take 1 tablet (100 mg) by mouth daily as needed (For ED) (Patient not taking: Reported on 6/21/2024) 9 tablet 11    Vitamin D3 (CHOLECALCIFEROL) 25 mcg (1000 units) tablet Take 125 mcg by mouth daily (Patient not taking: Reported on 6/21/2024)         Allergies   Allergen Reactions    Pollen Extract Other (See Comments)     Sinus drainage, eyes watering      Immunization History   Administered Date(s) Administered    COVID-19 Monovalent 18+ (Moderna) 05/03/2021, 05/31/2021, 12/14/2021    Flu, Unspecified 10/22/2008    Influenza (High Dose) 3 valent vaccine 09/15/2015, 10/27/2016, 12/09/2017, 10/17/2018    Influenza (IIV3) PF 10/22/2008, 10/11/2011, 10/17/2012, 11/01/2013    Influenza Vaccine 65+ (Fluzone HD) 10/20/2021, 11/23/2022, 09/21/2023    Pneumo Conj 13-V (2010&after) 12/09/2017    Pneumococcal 23 valent 08/22/2019    RSV Vaccine (Arexvy) 09/23/2023    TDAP (Adacel,Boostrix) 09/23/2023    TDAP Vaccine (Boostrix) 12/13/2012    Zoster recombinant adjuvanted (SHINGRIX) 09/23/2023       Past Medical History:   Diagnosis Date    Anxiety disorder     No Comments Provided    Complete tear of right rotator cuff     3/23/2017    Dependence on other enabling machines and devices     No Comments Provided    Encounter for prescription of emergency contraception     No Comments Provided    Essential tremor     No Comments Provided    Impingement syndrome of right shoulder     3/23/2017    Other specified postprocedural states     4/19/2017    Primary osteoarthritis of shoulder     3/23/2017    Sciatica     No Comments Provided    Status post shoulder surgery 4/19/2017    Trigger thumb of right hand     7/24/2017       Past Surgical History:   Procedure Laterality Date    COLONOSCOPY  03/2000    COLONOSCOPY  03/01/2005    normal by  patient report    COLONOSCOPY  01/03/2014    OTHER SURGICAL HISTORY      80455.0,CT CORONARY ANGIOGRAM (IA)    OTHER SURGICAL HISTORY      04/19/2017,844376,OTHER,Right    VASECTOMY      No Comments Provided       SOCIAL HISTORY  History   Smoking Status    Former    Types: Cigarettes   Smokeless Tobacco    Former    Types: Chew    Quit date: 1969    Social History    Substance and Sexual Activity      Alcohol use: Never        Comment: FHx of alcoholism     History   Drug Use Unknown       FAMILY HISTORY  Family History   Problem Relation Age of Onset    Breast Cancer Mother         Cancer-breast    Substance Abuse Father         Alcohol/Drug    Diabetes Daughter         Diabetes       PHYSICAL EXAMINATION  BP (!) 144/79   Pulse 58   Temp 97.5  F (36.4  C) (Tympanic)   Resp 16   Wt 99.3 kg (219 lb)   SpO2 98%   BMI 28.12 kg/m    Wt Readings from Last 2 Encounters:   06/21/24 99.3 kg (219 lb)   05/28/24 99.8 kg (220 lb)     Physical Exam  Constitutional:       Appearance: Normal appearance.   Pulmonary:      Effort: Pulmonary effort is normal.   Neurological:      Mental Status: He is alert and oriented to person, place, and time.   Psychiatric:         Mood and Affect: Mood normal.         Behavior: Behavior normal.   Laboratory and imaging:     Latest Reference Range & Units 06/21/24 12:12   WBC 4.0 - 11.0 10e3/uL 7.4   Hemoglobin 13.3 - 17.7 g/dL 15.2   Hematocrit 40.0 - 53.0 % 46.5   Platelet Count 150 - 450 10e3/uL 201       ASSESSMENT AND PLAN    ATTR amyloidosis:    Mr. Ansari underwent carpal tunnel surgery on 4/11/2024.  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    He complains of bilateral wrist numbness and bilateral numbness starting from his knee to his legs.  Denies any GI symptoms, orthostatic symptoms or cardiac symptoms currently.  His current symptoms presenting symptoms are neuropathy and this could be from his  amyloidosis.  May benefit from a referral to tertiary center like [facility] for further management.    Discussed that this does not appear to be AL amyloidosis which is a part of a plasma cell dyscrasia and can require treatment with chemotherapy.  Will check SPEP with FANNIE, UPEP and kappa lambda light chain.  CBCD and CMP were within normal limits.    No further follow-up is required with hematology as this is not AL amyloidosis.    Follow-up with hematology as needed.    Total time spent on the patient on day of encounter was 60 minutes doing chart review, review of test results, interpretation of results, patient visit and documentation.       Deanna Tamez MD

## 2024-06-21 NOTE — NURSING NOTE
"Oncology Rooming Note    June 21, 2024 11:40 AM   Kaushik Ansari is a 73 year old male who presents for:    Chief Complaint   Patient presents with    Hematology     CONSULT:   Amyloidosis     Initial Vitals: BP (!) 144/79   Pulse 58   Temp 97.5  F (36.4  C) (Tympanic)   Resp 16   Wt 99.3 kg (219 lb)   SpO2 98%   BMI 28.12 kg/m   Estimated body mass index is 28.12 kg/m  as calculated from the following:    Height as of 5/28/24: 1.88 m (6' 2\").    Weight as of this encounter: 99.3 kg (219 lb). Body surface area is 2.28 meters squared.  No Pain (1) Comment: Data Unavailable   No LMP for male patient.  Allergies reviewed: Yes  Medications reviewed: Yes    Medications: Medication refills not needed today.  Pharmacy name entered into iFormulary: [facility] - [address]    Frailty Screening:   Is the patient here for a new oncology consult visit in cancer care? 1. Yes. Over the past month, have you experienced difficulty or required a caregiver to assist with:   1. Balance, walking or general mobility (including any falls)? NO  2. Completion of self-care tasks such as bathing, dressing, toileting, grooming/hygiene?  NO  3. Concentration or memory that affects your daily life?  NO       Clinical concerns: States he started OT. Still having pain in his hand.  Dr Tamez was notified.      Ava Bhagat CMA ([facility])  "

## 2024-06-24 ENCOUNTER — TELEPHONE (OUTPATIENT)
Dept: PULMONOLOGY | Facility: OTHER | Age: 74
End: 2024-06-24

## 2024-06-24 ENCOUNTER — THERAPY VISIT (OUTPATIENT)
Dept: OCCUPATIONAL THERAPY | Facility: OTHER | Age: 74
End: 2024-06-24
Attending: FAMILY MEDICINE
Payer: MEDICARE

## 2024-06-24 DIAGNOSIS — G62.9 GENERALIZED NEUROPATHY: ICD-10-CM

## 2024-06-24 DIAGNOSIS — M65.341 TRIGGER RING FINGER OF RIGHT HAND: ICD-10-CM

## 2024-06-24 DIAGNOSIS — G56.03 CARPAL TUNNEL SYNDROME, BILATERAL UPPER LIMBS: Primary | ICD-10-CM

## 2024-06-24 LAB
KAPPA LC FREE SER-MCNC: 2.12 MG/DL (ref 0.33–1.94)
KAPPA LC FREE/LAMBDA FREE SER NEPH: 1.09 {RATIO} (ref 0.26–1.65)
LAMBDA LC FREE SERPL-MCNC: 1.94 MG/DL (ref 0.57–2.63)

## 2024-06-24 PROCEDURE — 97140 MANUAL THERAPY 1/> REGIONS: CPT | Mod: GO

## 2024-06-24 PROCEDURE — 97018 PARAFFIN BATH THERAPY: CPT | Mod: GO,XU

## 2024-06-24 PROCEDURE — 97110 THERAPEUTIC EXERCISES: CPT | Mod: GO

## 2024-06-25 LAB
ALBUMIN SERPL ELPH-MCNC: 4.7 G/DL (ref 3.7–5.1)
ALPHA1 GLOB SERPL ELPH-MCNC: 0.3 G/DL (ref 0.2–0.4)
ALPHA2 GLOB SERPL ELPH-MCNC: 0.8 G/DL (ref 0.5–0.9)
B-GLOBULIN SERPL ELPH-MCNC: 0.7 G/DL (ref 0.6–1)
GAMMA GLOB SERPL ELPH-MCNC: 0.9 G/DL (ref 0.7–1.6)
M PROTEIN SERPL ELPH-MCNC: 0 G/DL
PATH REPORT.COMMENTS IMP SPEC: NORMAL
PROT PATTERN SERPL ELPH-IMP: NORMAL
PROT PATTERN SERPL IFE-IMP: NORMAL
PROT PATTERN UR ELPH-IMP: NORMAL

## 2024-07-01 ENCOUNTER — MYC MEDICAL ADVICE (OUTPATIENT)
Dept: FAMILY MEDICINE | Facility: OTHER | Age: 74
End: 2024-07-01
Payer: MEDICARE

## 2024-07-12 NOTE — TELEPHONE ENCOUNTER
Information from [facility] is available under the Media tab.     Routing to provider to review and respond.  Shree Corwder RN on 7/12/2024 at 3:01 PM

## 2024-07-18 ENCOUNTER — VIRTUAL VISIT (OUTPATIENT)
Dept: PULMONOLOGY | Facility: OTHER | Age: 74
End: 2024-07-18
Attending: FAMILY MEDICINE
Payer: MEDICARE

## 2024-07-18 VITALS — HEIGHT: 74 IN | BODY MASS INDEX: 27.59 KG/M2 | WEIGHT: 215 LBS

## 2024-07-18 DIAGNOSIS — G47.33 OSA (OBSTRUCTIVE SLEEP APNEA): Primary | ICD-10-CM

## 2024-07-18 PROCEDURE — 99213 OFFICE O/P EST LOW 20 MIN: CPT | Mod: 95 | Performed by: FAMILY MEDICINE

## 2024-07-18 PROCEDURE — G2211 COMPLEX E/M VISIT ADD ON: HCPCS | Mod: 95 | Performed by: FAMILY MEDICINE

## 2024-07-18 ASSESSMENT — PAIN SCALES - GENERAL: PAINLEVEL: NO PAIN (0)

## 2024-07-18 NOTE — PROGRESS NOTES
"Virtual Visit Details    Type of service:  Video Visit   Video Start Time: {video visit start/end time for provider to select:660981}  Video End Time:{video visit start/end time for provider to select:619281}    Originating Location (pt. Location): {video visit patient location:615155::\"Home\"}  {PROVIDER LOCATION On-site should be selected for visits conducted from your clinic location or adjoining [facility] hospital, academic office, or other nearby [facility] building. Off-site should be selected for all other provider locations, including home:535832}  Distant Location (provider location):  {virtual location provider:646040}  Platform used for Video Visit: {Virtual Visit Platforms:437399::\"[employer]\"}    "

## 2024-07-18 NOTE — NURSING NOTE
Current patient location: [address]   [facility] [address]    Is the patient currently in the state of [place]? YES    Visit mode:VIDEO    If the visit is dropped, the patient can be reconnected by: VIDEO VISIT: Text to cell phone:   Telephone Information:   Mobile [phone]       Will anyone else be joining the visit? NO  (If patient encounters technical issues they should call [phone])    How would you like to obtain your AVS? MyChart    Are changes needed to the allergy or medication list? No    Are refills needed on medications prescribed by this physician? NO    Reason for visit: HELEN KAT

## 2024-07-18 NOTE — PROGRESS NOTES
CPAP Compliance Visit:       Name: Kaushik Ansari MRN# [id number]   Age: 73 year old YOB: 1950     Date of Consultation: July 18, 2024  Primary care provider: Alec Howard    Compliance:   96 % of days with >4 hours of use.   1days/30 with no use   Average use: 7hours 33minutes per day   95%ile leak 26.7 L/min   CPAP 95% pressure 9.6 cm   AHI 3.4 events/hour   AFIA 0.8  PB 0%     Impression:   Patient is {CPAP Compliance :698601}

## 2024-07-18 NOTE — PROGRESS NOTES
"Kaushik Ansari is a 73 year old male who is being evaluated via a billable video visit.       The patient has been notified of following:      \"This video visit will be conducted via a call between you and your physician/provider. We have found that certain health care needs can be provided without the need for an in-person physical exam.  This service lets us provide the care you need with a video conversation.  If a prescription is necessary we can send it directly to your pharmacy.  If lab work is needed we can place an order for that and you can then stop by our lab to have the test done at a later time.     Video visits are billed at different rates depending on your insurance coverage.  Please reach out to your insurance provider with any questions.     If during the course of the call the physician/provider feels a video visit is not appropriate, you will not be charged for this service.\"     Patient has given verbal consent for Video visit? Yes  How would you like to obtain your AVS? Mail a copy  If you are dropped from the video visit, the video invite should be resent to: Text to cell phone: -  Will anyone else be joining your video visit? No  If patient encounters technical issues they should call [phone]      Video-Visit Details     Type of service:  Video Visit     Start Time:  1030  End Time: 10:44 AM    Originating Location (pt. Location): Home     Distant Location (provider location):  Off-site, [facility]       Platform used for Video Visit: TeleFlip    Virtual visit for CPAP compliance follow-up.     A/P:     1.)  Mild to borderline moderate REDD (AHI 14.3) without sleep-associated hypoxemia. Probable that REDD under-reported given no REM observed.   - CPAP 5 cm H2O appeared effective in lateral NREM and REM, minimal supine sleep observed.   - Very frequent PLM's observed during diagnostic portion.      Plan for replacement CPAP equipment.  Given overall comfort with " "settings of 11 cm H2O, I did enter replacement CPAP at same pressure setting of 11 cm H2O.      Appears well controlled with good compliance on CPAP auto titrate 5-10 cm H2O, along with resolution of air swallowing and morning bloating.    His previous CPAP does not have auto titrate capability, so I did adjust his pressure to 8 cm H2O based on the median from his other machine download.    Plan for follow-up in 1 year per routine or sooner if questions or concerns.    The longitudinal plan of care for the diagnosis(es)/condition(s) as documented were addressed during this visit. Due to the added complexity in care, I will continue to support Kaushik in the subsequent management and with ongoing continuity of care.      SUBJECTIVE:  Kaushik Ansari is a 73 year old male.    Pertinent PMHx of myalgia, myositis, REDD, HTN, anxiety, BPH, depression.     Prior sleep testin2024 - Split night PSG with weight 221 lbs.  Mild to borderline moderate REDD (AHI 14.3) without sleep-associated hypoxemia. Probable that REDD under-reported given no REM observed. CPAP 5 cm H2O appeared effective in lateral NREM and REM, minimal supine sleep observed. Very frequent PLM's observed during diagnostic portion.      2024 -we reviewed his sleep history more detail.  He did have a written letter from his sleep study in 2002 with mention of AHI 14, duration of events 35 seconds, richy SpO2 87%, CPAP at 7 cm H2O effective.  In the interim, he did have increase in his pressure from 7 up to 11 cm H2O.  He uses CPAP on a nightly basis, but he does feel at times that the pressure may still be slightly too low.     Chief concern per questionnaire: \"I've had diagnosed sleep apnea for about 20 years; and need another sleep study to get a modern CPAP machine. \"     Duration of symptoms:  \"I was diagnosed about 20 years ago because of my chronic snoring. \"     Goals for visit per questionnaire: \"To determine the current severity of " "my sleep apnea at my age (73). \"     \"My sleep apnea has been severe and chronic and I rely on my CPAP machine to survive \"     Sleep pattern:  Workdays.  9pm to 6-7am.  Weekends.  9pm to 6-7am.  Time to fall asleep: ~few minutes.  Awakenings: 1 times per night, 1-2 hours to return to sleep.  Average total sleep time:  6 hours  Napping.  ? days per week, 20-30 minutes per nap.     \"On average I sleep 3 or 4 continuous hours; but awake at about 1 am. Then I am awake for 2 hours . I then fall asleep again until 6 am; but this sleep is not continuous and not a good sleep. \"     No for RLS screen.  No for sleep walking.  No for dream enactment behavior.  No for bruxism.     No for morning headaches.  Yes for snoring.  Yes for observed apnea.  Yes for FHx of REDD - siblings.     Caffeine use:  No for 3+ per day.  No for within 6 hours of bed.     A/P for repeat split-night PSG to allow for replacement CPAP.     5/28/2024 -reviewed his sleep test results in detail, he is interested in proceeding with getting the replacement machine.    A/P for replacement CPAP 11 cm H2O.    Today -overall, he feels that the CPAP is now doing very well.    On June 24, we did reduce his pressures from 11 cm H2O down to auto titrate 5-10 cm H2O.  This was based on some concerns for excessive air with morning bloating and gassiness.  Since the pressure change, this has resolved and he feels it has been much more comfortable.  He would like us to try and adjust his previous machine as well if possible.    CPAP download reviewed from June 24 through July 16, 2024 on auto titrate 5-10 cm H2O.  Average daily usage 7 hours 33 minutes.  Pressure median 7.8, 95th percentile 9.6 cm H2O.  AHI 3.4.    Past medical history:    Patient Active Problem List    Diagnosis Date Noted    Encounter for removal of sutures 04/29/2024     Priority: Medium    Spasm of muscle of lower back 02/20/2024     Priority: Medium    Carpal tunnel syndrome, bilateral upper " limbs 11/09/2023     Priority: Medium    Neuropathy, peripheral axonal 12/09/2021     Priority: Medium    Lumbosacral radiculopathy at L5 12/09/2021     Priority: Medium    Primary osteoarthritis of left hip 11/23/2020     Priority: Medium    Essential hypertension 11/14/2019     Priority: Medium    Benign prostatic hyperplasia with urinary frequency 10/16/2018     Priority: Medium    Bunion 10/16/2018     Priority: Medium    Familial tremor 10/16/2018     Priority: Medium     Overview:   Benign      Plantar fascial fibromatosis 10/16/2018     Priority: Medium     Overview:   2006 to present      Sciatica 10/16/2018     Priority: Medium     Overview:   Mild      Trigger finger of right thumb 07/24/2017     Priority: Medium    Status post shoulder surgery 04/19/2017     Priority: Medium    AC (acromioclavicular) joint arthritis 03/23/2017     Priority: Medium    Complete tear of right rotator cuff 03/23/2017     Priority: Medium    Impingement syndrome of right shoulder 03/23/2017     Priority: Medium    Degeneration of intervertebral disc of lumbar region 10/23/2015     Priority: Medium    Allergic rhinitis due to pollen 05/27/2015     Priority: Medium    Acute upper respiratory infection 06/19/2012     Priority: Medium    Lactose intolerance 08/30/2011     Priority: Medium    Anxiety state 03/08/2011     Priority: Medium    Otitis media, serous 12/28/2010     Priority: Medium    Pain in joint, lower leg 08/11/2010     Priority: Medium    Lateral epicondylitis 08/11/2010     Priority: Medium    Seborrheic keratosis 08/11/2010     Priority: Medium    Depressive disorder, not elsewhere classified 01/08/2008     Priority: Medium    Myalgia and myositis 06/13/2006     Priority: Medium     Overview:   Muscle aches  IMO Update 10/11      Disturbance of skin sensation 08/11/2005     Priority: Medium     Overview:   Foot paresthesia, right leg with standing      Diverticulosis of colon 03/01/2005     Priority: Medium      Overview:   Few sigmoid diverticula, one benign polyp, needs repeat in 2009  IMO Update 10/11      History of colonic polyps 03/01/2005     Priority: Medium     Overview:   IMO Update 10/11      Obstructive sleep apnea 04/02/2003     Priority: Medium     Overview:   on CPAP, uses  IMO Update 10/11  Overview:   On CPAP         10 point ROS of systems including Constitutional, Eyes, Respiratory, Cardiovascular, Gastroenterology, Genitourinary, Integumentary, Muscularskeletal, Psychiatric were all negative except for pertinent positives noted in my HPI.    Current Outpatient Medications   Medication Sig Dispense Refill    acetaminophen (TYLENOL) 500 MG tablet Take 1,000 mg by mouth every 6 hours as needed for mild pain      cetirizine (ZYRTEC) 10 MG tablet Take 1 tablet (10 mg) by mouth daily (Patient not taking: Reported on 6/21/2024) 90 tablet 3    Coenzyme Q10 (COQ10 PO) Take 200 mg by mouth daily      lisinopril (ZESTRIL) 10 MG tablet Take 1.5 tablets (15 mg) by mouth daily 135 tablet 4    Multiple Vitamins-Minerals (CENTRUM SILVER 50+MEN) TABS Take 1 tablet by mouth daily      order for DME Equipment being ordered: CPAP supplies 1 each 1    order for DME Equipment being ordered: CPAP replacement supplies 1 each 3    sildenafil (VIAGRA) 100 MG tablet Take 1 tablet (100 mg) by mouth daily as needed (For ED) (Patient not taking: Reported on 6/21/2024) 9 tablet 11    VITAMIN A PO Take 1 capsule by mouth daily      vitamin C (ASCORBIC ACID) 500 MG tablet Take 500 mg by mouth daily      Vitamin D3 (CHOLECALCIFEROL) 25 mcg (1000 units) tablet Take 125 mcg by mouth daily (Patient not taking: Reported on 6/21/2024)         OBJECTIVE:  There were no vitals taken for this visit.    Physical Exam     ---  This note was written with the assistance of the Dragon voice-dictation technology software. The final document, although reviewed, may contain errors. For corrections, please contact the office.    Total time spent  preparing to see the patient, review of chart, obtaining history and physical examination, review of sleep testing, review of treatment options, education, discussion with patient and documenting in Epic / EMR was 20 minutes.  All time involved was spent on the day of service for the patient (the same day as the patient's appointment).    Jerry Thornton MD    Sleep Medicine  [place], [place]  Main Office: [phone]  [facility] [address], [address]  Schedule visits: [phone]  Main Office: [phone]  Fax: [phone]

## 2024-08-08 ENCOUNTER — MYC MEDICAL ADVICE (OUTPATIENT)
Dept: FAMILY MEDICINE | Facility: OTHER | Age: 74
End: 2024-08-08
Payer: MEDICARE

## 2024-08-21 ENCOUNTER — MYC MEDICAL ADVICE (OUTPATIENT)
Dept: FAMILY MEDICINE | Facility: OTHER | Age: 74
End: 2024-08-21
Payer: MEDICARE

## 2024-08-21 DIAGNOSIS — M25.532 LEFT WRIST PAIN: Primary | ICD-10-CM

## 2024-08-23 NOTE — TELEPHONE ENCOUNTER
Called [facility] to see what they need from Dr Howard.    Left Voicemail.     Shree Crowder RN on 8/23/2024 at 3:31 PM

## 2024-08-26 NOTE — TELEPHONE ENCOUNTER
OK< I think he wants it expedited on the OCCUPATIONAL THERAPY. It might not be possible... As far as his shortness of breath/dyspnea on exertion, needs to be seen. Has not really had a work up for this, last chest x ray was 1 year ago with some possible chf changes.

## 2024-08-26 NOTE — TELEPHONE ENCOUNTER
[facility] called back. They will need a new OT order placed for his L wrist.     Routing to provider to review and respond.  Shree Crowder RN on 8/26/2024 at 7:50 AM

## 2024-09-02 NOTE — TELEPHONE ENCOUNTER
For COPD/asthma concerns, I agree with NOEL Edmond NP and Dr. Lee MD - Kaushik needs to be seen to address this to determine if a chest x-ray, PFTs, etc., need to be ordered. This can be addressed at visit with TJSYLVIA at the end of September, however, if he needs to be seen sooner, [facility] or a hybrid provider (if available) would be a good option.     If STD concerns - needs to be seen for lab work - again, this can be addressed during his visit with Dr. Howard, if concerns sooner, [facility] is a good option.     Eri Grant PA-C  9/2/2024  6:24 PM

## 2024-09-22 ENCOUNTER — MYC MEDICAL ADVICE (OUTPATIENT)
Dept: FAMILY MEDICINE | Facility: OTHER | Age: 74
End: 2024-09-22
Payer: MEDICARE

## 2024-09-23 NOTE — TELEPHONE ENCOUNTER
Pt's b/p have been creeping up in last few months (150s/60s). Pt says he is going to start taking 20 mg and follow up with PCP at appt on 9/27    Currently prescribed Lisinopril 15 mg.     Routing to provider to review and respond.  Shree Crowder RN on 9/23/2024 at 8:45 AM

## 2024-09-24 ENCOUNTER — THERAPY VISIT (OUTPATIENT)
Dept: OCCUPATIONAL THERAPY | Facility: OTHER | Age: 74
End: 2024-09-24
Attending: FAMILY MEDICINE
Payer: MEDICARE

## 2024-09-24 DIAGNOSIS — M25.532 LEFT WRIST PAIN: ICD-10-CM

## 2024-09-24 PROCEDURE — 97140 MANUAL THERAPY 1/> REGIONS: CPT | Mod: GO

## 2024-09-24 PROCEDURE — 97035 APP MDLTY 1+ULTRASOUND EA 15: CPT | Mod: GO

## 2024-09-24 PROCEDURE — 97018 PARAFFIN BATH THERAPY: CPT | Mod: GO,XU

## 2024-09-24 PROCEDURE — 97110 THERAPEUTIC EXERCISES: CPT | Mod: GO

## 2024-09-24 NOTE — PROGRESS NOTES
OT DISCHARGE  Reason for Discharge: Patient chooses to discontinue therapy.    Equipment Issued: theraputty     Discharge Plan: Patient to continue home program.    Referring Provider:  Alec Howard       06/24/24 0500   Appointment Info   Treating Provider Princess Jon OTR/L   Visits Used 3   Medical Diagnosis carpal tunnel syndrome, trigger finger release   OT Tx Diagnosis decreased strength/ROM of LUE   Progress Note/Certification   Onset of Illness/Injury or Date of Surgery 04/11/24   Therapy Frequency 1-2x week   Predicted Duration 8 weeks   Certification date from 06/06/24   Certification date to 08/01/24   OT Goal 1   Goal Identifier strength   Goal Description Patient will improve left  strength by at least 10# without pain or compensation of movement patterns   Rationale In order to maximize safety and independence with ADL/IADLs   Target Date 08/01/24   OT Goal 2   Goal Identifier ROM   Goal Description Patient will have improved left wrist ROM by at least 10 degrees (pain free) in each pain   Rationale In order to maximize safety and independence with performance of self-care activities   Target Date 08/01/24   OT Goal 3   Goal Identifier HEP   Goal Description Patient will demonstrate independence in HEP and pain management techniques   Rationale In order to maximize safety and independence with performance of self-care activities;In order to maximize safety and independence with ADL/IADLs   Target Date 08/01/24   Subjective Report   Subjective Report Patient reports that he did go see the hematologist and found the carpal tunnel syndrome is not from Amyloidosis.  He reports his symptoms and strength are improving.  He was able to go swimming and just limited amount of time in the pool doing laps.   Paraffin Bath   Paraffin Minutes (42716) 5 Minutes   Treatment Detail patient washed hands and dipped into paraffin x 4.  Saran wrap and towel to prevent heat loss   Patient Response/Progress  Patient reports that heat feels good   Ultrasound   Ultrasound -Type (does not include 3-5 min prep/cleanup time) Pulsed 20%;5 cm sound head   Duration (does not include the 3-5 min set up/clean up time) 8 min   Treatment Interventions (OT)   Interventions Therapeutic Procedure/Exercise   Therapeutic Procedure/Exercise   Therapeutic Procedure: strength, endurance, ROM, flexibillity minutes (82990) 25   Ther Proc 1 - Details Provided stretch to left wrist in extension and wyman stretch.  Completed 1# wrist strengthening in all planes x 15. Provided with yellow theraputty and educated in exercises.   Skilled Intervention increase strength and ROM   Manual Therapy   Manual Therapy Minutes (17588) 14   Manual Therapy 1 - Details STM/IASTM to carpal tunnel scarring and surrounding tissues.   Skilled Intervention break down scar tissue, reduce adhesions   Education   Learner/Method Patient;Pictures/Video;Demonstration;No Barriers to Learning   Plan   Home program theraputty, sponge, rubber bands   Plan for next session strengthening, US if needed   Total Session Time   Timed Code Treatment Minutes 39   Total Treatment Time (sum of timed and untimed services) 44

## 2024-09-24 NOTE — PROGRESS NOTES
OCCUPATIONAL THERAPY EVALUATION  Type of Visit: Evaluation    Fall Risk Screen:  Fall screen completed by: OT  Have you fallen 2 or more times in the past year?: No  Have you fallen and had an injury in the past year?: No  Is patient a fall risk?: No    Subjective      Presenting condition or subjective complaint: Pain in left forearm and left hand  Date of onset: 08/20/24    Relevant medical history:    Asthma; COPD; High blood pressure   Dates & types of surgery:   History of R (April 2017) and L (Janurary 2024) rotator cuff repairs, trigger finger release (right) July 2022       Prior diagnostic imaging/testing results:     yes see chart  Prior therapy history for the same diagnosis, illness or injury: No      Prior Level of Function  Transfers: Independent  Ambulation: Independent  ADL: Independent  IADL: Driving, Finances, Housekeeping, Laundry, Meal preparation, Yard work    Living Environment  Social support: Alone   Type of home: Apartment/condo; Multi-level   Stairs to enter the home: Yes   Is there a railing: Yes      Employment:    retired  Hobbies/Interests:  Dance, 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[place], walk the dog    Patient goals for therapy:      Pain assessment: Pain present     Objective   ADDITIONAL HISTORY:  Right hand dominant  Patient reports symptoms of pain, stiffness/loss of motion, weakness/loss of strength, and numbness  Transportation: drives  Currently not working due to [employer],       PAIN:  Pain Level at Rest: 1/10  Pain Level with Use: 5/10  Pain Location: wrist and hand  Pain Quality: Aching, Shooting, and Tingling  Pain Frequency: intermittent    POSTURE: Normal     EDEMA: Mild     SCAR/WOUND: Quality: good/completely healed, well healed    SENSATION: Intermittently and numbness in finger tips      ROM: See flowsheet    STRENGTH: see flowsheet      Assessment & Plan   CLINICAL IMPRESSIONS  Medical Diagnosis: carpal tunnel syndrome, trigger finger release    Treatment  Diagnosis: decreased strength/ROM of LUE    Impression/Assessment: Pt is a 73 year old male presenting to Occupational Therapy due to left hand pain.  The following significant findings have been identified: Impaired ROM, Impaired strength, and Pain.  These identified deficits interfere with their ability to perform recreational activities, household chores,  yard work, and meal planning and preparation as compared to previous level of function.   Patient's limitations or Problem List includes: Pain, Decreased ROM/motion, Weakness, Sensory disturbance, Decreased , Decreased pinch, Tightness in musculature, and Adherence in connective tissue of the left wrist and hand which interferes with the patient's ability to perform Recreational Activities and Household Chores as compared to previous level of function.    Clinical Decision Making (Complexity):  Assessment of Occupational Performance: 1-3 Performance Deficits  Occupational Performance Limitations: hygiene and grooming, driving and community mobility, home establishment and management, meal preparation and cleanup, and sleep  Clinical Decision Making (Complexity): Low complexity    PLAN OF CARE  Treatment Interventions:  Modalities: Hot Pack, Iontophoresis, Paraffin, Ultrasound  Interventions: Self-Care/Home Management, Therapeutic Activity, Therapeutic Exercise, Manual Therapy    Long Term Goals   OT Goal 1  Goal Identifier: strength  Goal Description: Patient will improve left  strength by at least 7# without pain or compensation of movement patterns  Rationale: In order to maximize safety and independence with ADL/IADLs  Target Date: 11/19/24  OT Goal 2  Goal Identifier: ROM  Goal Description: Patient will have improved left wrist ROM by at least 10 degrees (pain free) in each pain  Rationale: In order to maximize safety and independence with performance of self-care activities  Target Date: 11/19/24  OT Goal 3  Goal Identifier: HEP  Goal Description:  Patient will demonstrate independence in HEP and pain management techniques  Rationale: In order to maximize safety and independence with performance of self-care activities;In order to maximize safety and independence with ADL/IADLs  Target Date: 11/19/24      Frequency of Treatment: 0-1x week  Duration of Treatment:       Recommended Referrals to Other Professionals:  None at this time  Education Assessment: Learner/Method: Patient;Pictures/Video;Demonstration;No Barriers to Learning     Risks and benefits of evaluation/treatment have been explained.   Patient/Family/caregiver agrees with Plan of Care.     Evaluation Time:            Signing Clinician: FRANCE Klein [facility]                                                                                   OUTPATIENT OCCUPATIONAL THERAPY      PLAN OF TREATMENT FOR OUTPATIENT REHABILITATION   Patient's Last Name, First Name, Kaushik Banks YOB: 1950   Provider's Name   [facility]   Medical Record No.  [id number]     Onset Date: 08/20/24 Start of Care Date: 09/24/24     Medical Diagnosis:  carpal tunnel syndrome, trigger finger release      OT Treatment Diagnosis:  decreased strength/ROM of LUE Plan of Treatment  Frequency/Duration:0-1x week/     Certification date from 09/24/24   To 11/19/24        See note for plan of treatment details and functional goals     Princess Jon OT                         I CERTIFY THE NEED FOR THESE SERVICES FURNISHED UNDER        THIS PLAN OF TREATMENT AND WHILE UNDER MY CARE     (Physician attestation of this document indicates review and certification of the therapy plan).              Referring Provider:  Alec Howard    Initial Assessment  See Epic Evaluation- 09/24/24

## 2024-09-27 ENCOUNTER — OFFICE VISIT (OUTPATIENT)
Dept: FAMILY MEDICINE | Facility: OTHER | Age: 74
End: 2024-09-27
Attending: FAMILY MEDICINE
Payer: MEDICARE

## 2024-09-27 VITALS
SYSTOLIC BLOOD PRESSURE: 138 MMHG | OXYGEN SATURATION: 99 % | RESPIRATION RATE: 16 BRPM | TEMPERATURE: 97.2 F | DIASTOLIC BLOOD PRESSURE: 76 MMHG | BODY MASS INDEX: 26.71 KG/M2 | HEART RATE: 92 BPM | HEIGHT: 75 IN | WEIGHT: 214.8 LBS

## 2024-09-27 DIAGNOSIS — Z13.220 LIPID SCREENING: ICD-10-CM

## 2024-09-27 DIAGNOSIS — Z23 NEED FOR SHINGLES VACCINE: ICD-10-CM

## 2024-09-27 DIAGNOSIS — R06.02 SHORTNESS OF BREATH: ICD-10-CM

## 2024-09-27 DIAGNOSIS — I10 ESSENTIAL HYPERTENSION: ICD-10-CM

## 2024-09-27 DIAGNOSIS — Z00.00 ENCOUNTER FOR MEDICARE ANNUAL WELLNESS EXAM: Primary | ICD-10-CM

## 2024-09-27 PROCEDURE — G0463 HOSPITAL OUTPT CLINIC VISIT: HCPCS

## 2024-09-27 PROCEDURE — G0439 PPPS, SUBSEQ VISIT: HCPCS | Performed by: FAMILY MEDICINE

## 2024-09-27 PROCEDURE — 99214 OFFICE O/P EST MOD 30 MIN: CPT | Mod: 25 | Performed by: FAMILY MEDICINE

## 2024-09-27 SDOH — HEALTH STABILITY: PHYSICAL HEALTH: ON AVERAGE, HOW MANY DAYS PER WEEK DO YOU ENGAGE IN MODERATE TO STRENUOUS EXERCISE (LIKE A BRISK WALK)?: 4 DAYS

## 2024-09-27 ASSESSMENT — PAIN SCALES - GENERAL: PAINLEVEL: NO PAIN (0)

## 2024-09-27 ASSESSMENT — SOCIAL DETERMINANTS OF HEALTH (SDOH): HOW OFTEN DO YOU GET TOGETHER WITH FRIENDS OR RELATIVES?: MORE THAN THREE TIMES A WEEK

## 2024-09-27 NOTE — NURSING NOTE
"Chief Complaint   Patient presents with    Medicare Visit       Initial /76   Pulse 92   Temp 97.2  F (36.2  C) (Tympanic)   Resp 16   Ht 1.892 m (6' 2.5\")   Wt 97.4 kg (214 lb 12.8 oz)   SpO2 99%   BMI 27.21 kg/m   Estimated body mass index is 27.21 kg/m  as calculated from the following:    Height as of this encounter: 1.892 m (6' 2.5\").    Weight as of this encounter: 97.4 kg (214 lb 12.8 oz).  Medication Reconciliation: complete        "

## 2024-09-27 NOTE — PROGRESS NOTES
Preventive Care Visit  [facility]  Alec Howard MD, Family Medicine  Sep 27, 2024  {Provider  Link to SmartSet :668763}    {PROVIDER CHARTING PREFERENCE:870442}    Joaquina Faulkner is a 73 year old, presenting for the following:  Medicare Visit        9/27/2024     3:12 PM   Additional Questions   Roomed by Sully DOMINGUEZ   Accompanied by Self         9/27/2024     3:12 PM   Patient Reported Additional Medications   Patient reports taking the following new medications N/A     {ROOMER if patient is in their first year of Medicare a vision screen is required click here to document the Vison screen and then refresh the note to pull in results  :599821}    Health Care Directive  Patient has a Health Care Directive on file  Advance care planning document is on file and is current.    HPI  ***    {SUPERLIST (Optional):945488}  {additonal problems for provider to add (Optional):092993}      9/27/2024   General Health   How would you rate your overall physical health? Good   Feel stress (tense, anxious, or unable to sleep) To some extent      (!) STRESS CONCERN      9/27/2024   Nutrition   Diet: Low salt            9/27/2024   Exercise   Days per week of moderate/strenous exercise 4 days            9/27/2024   Social Factors   Frequency of gathering with friends or relatives More than three times a week   Worry food won't last until get money to buy more No   Food not last or not have enough money for food? No   Do you have housing? (Housing is defined as stable permanent housing and does not include staying ouside in a car, in a tent, in an abandoned building, in an overnight shelter, or couch-surfing.) Yes   Are you worried about losing your housing? No   Lack of transportation? No   Unable to get utilities (heat,electricity)? No            9/27/2024   Fall Risk   Fallen 2 or more times in the past year? No   Trouble with walking or balance? No             9/27/2024   Activities of Daily Living-  Home Safety   Needs help with the following daily activites None of the above   Safety concerns in the home None of the above            2024   Dental   Dentist two times every year? Yes            2024   Hearing Screening   Hearing concerns? (!) I FEEL THAT PEOPLE ARE MUMBLING OR NOT SPEAKING CLEARLY.    (!) I NEED TO ASK PEOPLE TO SPEAK UP OR REPEAT THEMSELVES.    (!) IT'S HARD TO FOLLOW A CONVERSATION IN A NOISY RESTAURANT OR CROWDED ROOM.    (!) TROUBLE UNDERSTANDING SOFT OR WHISPERED SPEECH.       Multiple values from one day are sorted in reverse-chronological order         2024   Driving Risk Screening   Patient/family members have concerns about driving No            2024   General Alertness/Fatigue Screening   Have you been more tired than usual lately? No            2024   Urinary Incontinence Screening   Bothered by leaking urine in past 6 months No            2024   TB Screening   Were you born outside of the [place]? No          {Rooming Staff Patient needs a PHQ as part of the AWV.  Use this link to complete and then refresh the note to pull results Link to PHQ2 Assessment :771574}  {USE TO PULL IN PHQ RESULTS FOR TODAY:766197}      2024   Substance Use   Alcohol more than 3/day or more than 7/wk No   Do you have a current opioid prescription? No   How severe/bad is pain from 1 to 10? 2/10   Do you use any other substances recreationally? No        Social History     Tobacco Use    Smoking status: Former     Current packs/day: 0.00     Average packs/day: 1 pack/day for 20.0 years (20.0 ttl pk-yrs)     Types: Cigarettes     Start date: 1964     Quit date: 1984     Years since quittin.7    Smokeless tobacco: Former     Types: Chew     Quit date:    Vaping Use    Vaping status: Never Used   Substance Use Topics    Alcohol use: Never     Comment: FHx of alcoholism    Drug use: Never     {Provider  If there are gaps in the social history shown above, please  follow the link to update and then refresh the note Link to Social and Substance History :567759}  ASCVD Risk   The 10-year ASCVD risk score (Carol SARAVIA, et al., 2019) is: 31.2%    Values used to calculate the score:      Age: 73 years      Sex: Male      Is Non- : No      Diabetic: No      Tobacco smoker: No      Systolic Blood Pressure: 138 mmHg      Is BP treated: Yes      HDL Cholesterol: 50 mg/dL      Total Cholesterol: 189 mg/dL    {Link to Fracture Risk Assessment Tool (Optional):571859}    {Provider  REQUIRED FOR AWV Use the storyboard to review patient history, after sections have been marked as reviewed, refresh note to capture documentation:794541}  {Provider   REQUIRED AWV use this link to review and update sexual activity history  after section has been marked as reviewed, refresh note to capture documentation:072390}  Reviewed and updated as needed this visit by Provider                    {HISTORY OPTIONS (Optional):907238}  Current providers sharing in care for this patient include:  Patient Care Team:  Alec Howard MD as PCP - General (Family Practice)  Alec Howard MD as Assigned PCP  Jerry Thornton MD as Assigned Sleep Provider  Deanna Tamez MD as Assigned Cancer Care Provider    The following health maintenance items are reviewed in Epic and correct as of today:  Health Maintenance   Topic Date Due    LIPID  11/23/2023    INFLUENZA VACCINE (1) 09/01/2024    COVID-19 Vaccine (4 - 2024-25 season) 09/01/2024    ZOSTER IMMUNIZATION (2 of 2) 11/18/2023    MEDICARE ANNUAL WELLNESS VISIT  09/21/2024    SHUKRI ASSESSMENT  10/25/2024    BMP  06/21/2025    FALL RISK ASSESSMENT  09/27/2025    GLUCOSE  06/21/2027    ADVANCE CARE PLANNING  09/22/2028    COLORECTAL CANCER SCREENING  01/04/2029    DTAP/TDAP/TD IMMUNIZATION (3 - Td or Tdap) 09/23/2033    HEPATITIS C SCREENING  Completed    PHQ-2 (once per calendar year)  Completed    Pneumococcal Vaccine: 65+ Years   "Completed    RSV VACCINE  Completed    AORTIC ANEURYSM SCREENING (SYSTEM ASSIGNED)  Completed    HPV IMMUNIZATION  Aged Out    MENINGITIS IMMUNIZATION  Aged Out    RSV MONOCLONAL ANTIBODY  Aged Out       {ROS Picklists (Optional):038991}     Objective    Exam  /76   Pulse 92   Temp 97.2  F (36.2  C) (Tympanic)   Resp 16   Ht 1.892 m (6' 2.5\")   Wt 97.4 kg (214 lb 12.8 oz)   SpO2 99%   BMI 27.21 kg/m     Estimated body mass index is 27.21 kg/m  as calculated from the following:    Height as of this encounter: 1.892 m (6' 2.5\").    Weight as of this encounter: 97.4 kg (214 lb 12.8 oz).    Physical Exam  {Exam Choices (Optional):459667}        9/27/2024   Mini Cog   Clock Draw Score 2 Normal   3 Item Recall 1 object recalled   Mini Cog Total Score 3        {A Mini-Cog total score of 0-2 suggests the possibility of dementia, score of 3-5 suggests no dementia:740091}         Signed Electronically by: Alec Howard MD  {Email feedback regarding this note to [email or web address]   :305283}  "

## 2024-09-27 NOTE — PATIENT INSTRUCTIONS
Patient Education   Preventive Care Advice   This is general advice given by our system to help you stay healthy. However, your care team may have specific advice just for you. Please talk to your care team about your preventive care needs.  Nutrition  Eat 5 or more servings of fruits and vegetables each day.  Try wheat bread, brown rice and whole grain pasta (instead of white bread, rice, and pasta).  Get enough calcium and vitamin D. Check the label on foods and aim for 100% of the RDA (recommended daily allowance).  Lifestyle  Exercise at least 150 minutes each week  (30 minutes a day, 5 days a week).  Do muscle strengthening activities 2 days a week. These help control your weight and prevent disease.  No smoking.  Wear sunscreen to prevent skin cancer.  Have a dental exam and cleaning every 6 months.  Yearly exams  See your health care team every year to talk about:  Any changes in your health.  Any medicines your care team has prescribed.  Preventive care, family planning, and ways to prevent chronic diseases.  Shots (vaccines)   HPV shots (up to age 26), if you've never had them before.  Hepatitis B shots (up to age 59), if you've never had them before.  COVID-19 shot: Get this shot when it's due.  Flu shot: Get a flu shot every year.  Tetanus shot: Get a tetanus shot every 10 years.  Pneumococcal, hepatitis A, and RSV shots: Ask your care team if you need these based on your risk.  Shingles shot (for age 50 and up)  General health tests  Diabetes screening:  Starting at age 35, Get screened for diabetes at least every 3 years.  If you are younger than age 35, ask your care team if you should be screened for diabetes.  Cholesterol test: At age 39, start having a cholesterol test every 5 years, or more often if advised.  Bone density scan (DEXA): At age 50, ask your care team if you should have this scan for osteoporosis (brittle bones).  Hepatitis C: Get tested at least once in your life.  STIs (sexually  transmitted infections)  Before age 24: Ask your care team if you should be screened for STIs.  After age 24: Get screened for STIs if you're at risk. You are at risk for STIs (including HIV) if:  You are sexually active with more than one person.  You don't use condoms every time.  You or a partner was diagnosed with a sexually transmitted infection.  If you are at risk for HIV, ask about PrEP medicine to prevent HIV.  Get tested for HIV at least once in your life, whether you are at risk for HIV or not.  Cancer screening tests  Cervical cancer screening: If you have a cervix, begin getting regular cervical cancer screening tests starting at age 21.  Breast cancer scan (mammogram): If you've ever had breasts, begin having regular mammograms starting at age 40. This is a scan to check for breast cancer.  Colon cancer screening: It is important to start screening for colon cancer at age 45.  Have a colonoscopy test every 10 years (or more often if you're at risk) Or, ask your provider about stool tests like a FIT test every year or Cologuard test every 3 years.  To learn more about your testing options, visit:   .  For help making a decision, visit:   https://bit.ly/wb52621.  Prostate cancer screening test: If you have a prostate, ask your care team if a prostate cancer screening test (PSA) at age 55 is right for you.  Lung cancer screening: If you are a current or former smoker ages 50 to 80, ask your care team if ongoing lung cancer screenings are right for you.  For informational purposes only. Not to replace the advice of your health care provider. Copyright   2023 [facility] [employer]. All rights reserved. Clinically reviewed by the [facility] Transitions Program. [employer] 355667 - REV 01/24.  Hearing Loss: Care Instructions  Overview     Hearing loss is a sudden or slow decrease in how well you hear. It can range from slight to profound. Permanent hearing loss can occur with aging. It also can  happen when you are exposed long-term to loud noise. Examples include listening to loud music, riding motorcycles, or being around other loud machines.  Hearing loss can affect your work and home life. It can make you feel lonely or depressed. You may feel that you have lost your independence. But hearing aids and other devices can help you hear better and feel connected to others.  Follow-up care is a key part of your treatment and safety. Be sure to make and go to all appointments, and call your doctor if you are having problems. It's also a good idea to know your test results and keep a list of the medicines you take.  How can you care for yourself at home?  Avoid loud noises whenever possible. This helps keep your hearing from getting worse.  Always wear hearing protection around loud noises.  Wear a hearing aid as directed.  A professional can help you pick a hearing aid that will work best for you.  You can also get hearing aids over the counter for mild to moderate hearing loss.  Have hearing tests as your doctor suggests. They can show whether your hearing has changed. Your hearing aid may need to be adjusted.  Use other devices as needed. These may include:  Telephone amplifiers and hearing aids that can connect to a television, stereo, radio, or microphone.  Devices that use lights or vibrations. These alert you to the doorbell, a ringing telephone, or a baby monitor.  Television closed-captioning. This shows the words at the bottom of the screen. Most new TVs can do this.  TTY (text telephone). This lets you type messages back and forth on the telephone instead of talking or listening. These devices are also called TDD. When messages are typed on the keyboard, they are sent over the phone line to a receiving TTY. The message is shown on a monitor.  Use text messaging, social media, and email if it is hard for you to communicate by telephone.  Try to learn a listening technique called speechreading. It is  "not lipreading. You pay attention to people's gestures, expressions, posture, and tone of voice. These clues can help you understand what a person is saying. Face the person you are talking to, and have them face you. Make sure the lighting is good. You need to see the other person's face clearly.  Think about counseling if you need help to adjust to your hearing loss.  When should you call for help?  Watch closely for changes in your health, and be sure to contact your doctor if:    You think your hearing is getting worse.     You have new symptoms, such as dizziness or nausea.   Where can you learn more?  Go to https://www.[employer].net/patiented  Enter R798 in the search box to learn more about \"Hearing Loss: Care Instructions.\"  Current as of: September 27, 2023               Content Version: 14.0    1626-3325 [employer].   Care instructions adapted under license by your healthcare professional. If you have questions about a medical condition or this instruction, always ask your healthcare professional. [employer] disclaims any warranty or liability for your use of this information.         "

## 2024-10-01 ENCOUNTER — THERAPY VISIT (OUTPATIENT)
Dept: OCCUPATIONAL THERAPY | Facility: OTHER | Age: 74
End: 2024-10-01
Attending: FAMILY MEDICINE
Payer: MEDICARE

## 2024-10-01 DIAGNOSIS — M25.532 LEFT WRIST PAIN: Primary | ICD-10-CM

## 2024-10-01 PROCEDURE — 97140 MANUAL THERAPY 1/> REGIONS: CPT | Mod: GO

## 2024-10-01 PROCEDURE — 97110 THERAPEUTIC EXERCISES: CPT | Mod: GO

## 2024-10-01 PROCEDURE — 97035 APP MDLTY 1+ULTRASOUND EA 15: CPT | Mod: GO

## 2024-10-01 PROCEDURE — 97018 PARAFFIN BATH THERAPY: CPT | Mod: GO,XU
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
## 2024-10-08 ENCOUNTER — THERAPY VISIT (OUTPATIENT)
Dept: OCCUPATIONAL THERAPY | Facility: OTHER | Age: 74
End: 2024-10-08
Attending: FAMILY MEDICINE
Payer: MEDICARE

## 2024-10-08 DIAGNOSIS — G56.03 CARPAL TUNNEL SYNDROME, BILATERAL UPPER LIMBS: ICD-10-CM

## 2024-10-08 DIAGNOSIS — M25.532 LEFT WRIST PAIN: Primary | ICD-10-CM

## 2024-10-08 PROCEDURE — 97110 THERAPEUTIC EXERCISES: CPT | Mod: GO

## 2024-10-08 PROCEDURE — 97035 APP MDLTY 1+ULTRASOUND EA 15: CPT | Mod: GO

## 2024-10-08 PROCEDURE — 97018 PARAFFIN BATH THERAPY: CPT | Mod: GO

## 2024-10-08 PROCEDURE — 97140 MANUAL THERAPY 1/> REGIONS: CPT | Mod: GO

## 2024-10-14 ENCOUNTER — OFFICE VISIT (OUTPATIENT)
Dept: FAMILY MEDICINE | Facility: OTHER | Age: 74
End: 2024-10-14
Attending: FAMILY MEDICINE
Payer: MEDICARE

## 2024-10-14 ENCOUNTER — HOSPITAL ENCOUNTER (OUTPATIENT)
Dept: RESPIRATORY THERAPY | Facility: OTHER | Age: 74
Discharge: HOME | End: 2024-10-14
Attending: FAMILY MEDICINE
Payer: MEDICARE

## 2024-10-14 VITALS
HEART RATE: 98 BPM | WEIGHT: 216.5 LBS | TEMPERATURE: 97.6 F | RESPIRATION RATE: 16 BRPM | SYSTOLIC BLOOD PRESSURE: 134 MMHG | HEIGHT: 75 IN | BODY MASS INDEX: 26.92 KG/M2 | OXYGEN SATURATION: 96 % | DIASTOLIC BLOOD PRESSURE: 70 MMHG

## 2024-10-14 DIAGNOSIS — L08.9 TOE INFECTION: Primary | ICD-10-CM

## 2024-10-14 DIAGNOSIS — R06.02 SHORTNESS OF BREATH: ICD-10-CM

## 2024-10-14 PROCEDURE — 99213 OFFICE O/P EST LOW 20 MIN: CPT

## 2024-10-14 PROCEDURE — G0463 HOSPITAL OUTPT CLINIC VISIT: HCPCS | Mod: 25

## 2024-10-14 PROCEDURE — G0463 HOSPITAL OUTPT CLINIC VISIT: HCPCS

## 2024-10-14 PROCEDURE — 999N000157 HC STATISTIC RCP TIME EA 10 MIN

## 2024-10-14 PROCEDURE — 94010 BREATHING CAPACITY TEST: CPT

## 2024-10-14 PROCEDURE — 94010 BREATHING CAPACITY TEST: CPT | Mod: 26 | Performed by: INTERNAL MEDICINE

## 2024-10-14 RX ORDER — CEPHALEXIN 500 MG/1
500 CAPSULE ORAL 4 TIMES DAILY
Qty: 28 CAPSULE | Refills: 0 | Status: SHIPPED | OUTPATIENT
Start: 2024-10-14 | End: 2024-10-21

## 2024-10-14 ASSESSMENT — ANXIETY QUESTIONNAIRES
GAD7 TOTAL SCORE: 0
4. TROUBLE RELAXING: NOT AT ALL
3. WORRYING TOO MUCH ABOUT DIFFERENT THINGS: NOT AT ALL
IF YOU CHECKED OFF ANY PROBLEMS ON THIS QUESTIONNAIRE, HOW DIFFICULT HAVE THESE PROBLEMS MADE IT FOR YOU TO DO YOUR WORK, TAKE CARE OF THINGS AT HOME, OR GET ALONG WITH OTHER PEOPLE: NOT DIFFICULT AT ALL
5. BEING SO RESTLESS THAT IT IS HARD TO SIT STILL: NOT AT ALL
6. BECOMING EASILY ANNOYED OR IRRITABLE: NOT AT ALL
1. FEELING NERVOUS, ANXIOUS, OR ON EDGE: NOT AT ALL
GAD7 TOTAL SCORE: 0
2. NOT BEING ABLE TO STOP OR CONTROL WORRYING: NOT AT ALL
7. FEELING AFRAID AS IF SOMETHING AWFUL MIGHT HAPPEN: NOT AT ALL

## 2024-10-14 ASSESSMENT — PAIN SCALES - GENERAL: PAINLEVEL: NO PAIN (0)

## 2024-10-14 NOTE — NURSING NOTE
"Chief Complaint   Patient presents with    Toe Pain     Right pinky toe     Patient here wit concerns of right pinky toe redness or bruising since last night.    Initial /70   Pulse 98   Temp 97.6  F (36.4  C) (Tympanic)   Resp 16   Ht 1.892 m (6' 2.5\")   Wt 98.2 kg (216 lb 8 oz)   SpO2 96%   BMI 27.43 kg/m   Estimated body mass index is 27.43 kg/m  as calculated from the following:    Height as of this encounter: 1.892 m (6' 2.5\").    Weight as of this encounter: 98.2 kg (216 lb 8 oz).  Medication Review: complete    The next two questions are to help us understand your food security.  If you are feeling you need any assistance in this area, we have resources available to support you today.          10/14/2024   SDOH- Food Insecurity   Within the past 12 months, did you worry that your food would run out before you got money to buy more? N   Within the past 12 months, did the food you bought just not last and you didn t have money to get more? N            Health Care Directive:  Patient has a Health Care Directive on file      Ange Canas LPN      "

## 2024-10-14 NOTE — PROGRESS NOTES
ASSESSMENT/PLAN:    I have reviewed the nursing notes.  I have reviewed the findings, diagnosis, plan and need for follow up with the patient.    1. Toe infection  - cephALEXin (KEFLEX) 500 MG capsule; Take 1 capsule (500 mg) by mouth 4 times daily for 7 days.  Dispense: 28 capsule; Refill: 0    Patient presents with erythema and edema of the right little toe. Offered an x-ray but patient declined due to not recalling injuring his toe and no difficulties with ROM. Will treat patient for a toe infection with Keflex.  Advised patient to elevate his foot when possible.  May use ice. May use over-the-counter Tylenol or ibuprofen PRN.    Discussed warning signs/symptoms indicative of need to f/u    Follow up if symptoms persist or worsen or concerns    I explained my diagnostic considerations and recommendations to the patient, who voiced understanding and agreement with the treatment plan. All questions were answered. We discussed potential side effects of any prescribed or recommended therapies, as well as expectations for response to treatments.    RAVEN Sadler CNP  10/14/2024  2:49 PM    HPI:    Kaushik Ansari is a 73 year old male who presents to [facility] today for concerns of toe pain    Patient states that he was taking off his socks last night and noticed that his right little toe was red and mildly swollen with no known injury.  He states he has neuropathy without diabetes of his feet and has for the last 4 years but he does have sensation.  He states there is mild pain.  He denies any fever chills or drainage.  He states that the skin is intact.    Past Medical History:   Diagnosis Date    Anxiety disorder     No Comments Provided    Complete tear of right rotator cuff     3/23/2017    Dependence on other enabling machines and devices     No Comments Provided    Encounter for prescription of emergency contraception     No Comments Provided    Essential tremor     No Comments Provided     Impingement syndrome of right shoulder     3/23/2017    Other specified postprocedural states     2017    Primary osteoarthritis of shoulder     3/23/2017    Sciatica     No Comments Provided    Status post shoulder surgery 2017    Trigger thumb of right hand     2017     Past Surgical History:   Procedure Laterality Date    COLONOSCOPY  2000    COLONOSCOPY  2005    normal by patient report    COLONOSCOPY  2014    OTHER SURGICAL HISTORY      24370.0,CT CORONARY ANGIOGRAM (IA)    OTHER SURGICAL HISTORY      2017,[id number],OTHER,Right    VASECTOMY      No Comments Provided     Social History     Tobacco Use    Smoking status: Former     Current packs/day: 0.00     Average packs/day: 1 pack/day for 20.0 years (20.0 ttl pk-yrs)     Types: Cigarettes     Start date: 1964     Quit date: 1984     Years since quittin.8    Smokeless tobacco: Former     Types: Chew     Quit date:    Substance Use Topics    Alcohol use: Never     Comment: FHx of alcoholism     Current Outpatient Medications   Medication Sig Dispense Refill    acetaminophen (TYLENOL) 500 MG tablet Take 1,000 mg by mouth every 6 hours as needed for mild pain      Coenzyme Q10 (COQ10 PO) Take 200 mg by mouth daily      Multiple Vitamins-Minerals (CENTRUM SILVER 50+MEN) TABS Take 1 tablet by mouth daily      order for DME Equipment being ordered: CPAP supplies 1 each 1    order for DME Equipment being ordered: CPAP replacement supplies 1 each 3    VITAMIN A PO Take 1 capsule by mouth daily      vitamin C (ASCORBIC ACID) 500 MG tablet Take 500 mg by mouth daily      Vitamin D3 (CHOLECALCIFEROL) 25 mcg (1000 units) tablet Take 125 mcg by mouth daily.      cetirizine (ZYRTEC) 10 MG tablet Take 1 tablet (10 mg) by mouth daily (Patient not taking: Reported on 2024) 90 tablet 3    sildenafil (VIAGRA) 100 MG tablet Take 1 tablet (100 mg) by mouth daily as needed (For ED) (Patient not taking: Reported on 2024) 9  "tablet 11     Allergies   Allergen Reactions    Pollen Extract Other (See Comments)     Sinus drainage, eyes watering      Past medical history, past surgical history, current medications and allergies reviewed and accurate to the best of my knowledge.      ROS:  Refer to HPI    /70   Pulse 98   Temp 97.6  F (36.4  C) (Tympanic)   Resp 16   Ht 1.892 m (6' 2.5\")   Wt 98.2 kg (216 lb 8 oz)   SpO2 96%   BMI 27.43 kg/m      EXAM:  General Appearance: Well appearing 73 year old male, appropriate appearance for age. No acute distress   Musculoskeletal:  Equal movement of bilateral upper extremities.  Equal movement of bilateral lower extremities.  Normal gait.    Dermatological: Erythema and mild edema of dorsal surface of right little toe, no bony abnormalities noted, sensation intact, skin is intact, no drainage noted, mild tenderness with palpation  Neuro: Alert and oriented to person, place, and time.    Psychological: normal affect, alert, oriented, and pleasant.       "

## 2024-10-15 ENCOUNTER — THERAPY VISIT (OUTPATIENT)
Dept: OCCUPATIONAL THERAPY | Facility: OTHER | Age: 74
End: 2024-10-15
Attending: FAMILY MEDICINE
Payer: MEDICARE

## 2024-10-15 DIAGNOSIS — M25.532 LEFT WRIST PAIN: Primary | ICD-10-CM

## 2024-10-15 DIAGNOSIS — G56.03 CARPAL TUNNEL SYNDROME, BILATERAL UPPER LIMBS: ICD-10-CM

## 2024-10-15 PROCEDURE — 97035 APP MDLTY 1+ULTRASOUND EA 15: CPT | Mod: GO

## 2024-10-15 PROCEDURE — 97110 THERAPEUTIC EXERCISES: CPT | Mod: GO

## 2024-10-15 PROCEDURE — 97018 PARAFFIN BATH THERAPY: CPT | Mod: GO,XU

## 2024-10-15 PROCEDURE — 97140 MANUAL THERAPY 1/> REGIONS: CPT | Mod: GO

## 2024-10-23 ENCOUNTER — THERAPY VISIT (OUTPATIENT)
Dept: OCCUPATIONAL THERAPY | Facility: OTHER | Age: 74
End: 2024-10-23
Attending: FAMILY MEDICINE
Payer: MEDICARE

## 2024-10-23 DIAGNOSIS — G56.03 CARPAL TUNNEL SYNDROME, BILATERAL UPPER LIMBS: ICD-10-CM

## 2024-10-23 DIAGNOSIS — M25.532 LEFT WRIST PAIN: Primary | ICD-10-CM

## 2024-10-23 PROCEDURE — 97110 THERAPEUTIC EXERCISES: CPT | Mod: GO

## 2024-10-23 PROCEDURE — 97035 APP MDLTY 1+ULTRASOUND EA 15: CPT | Mod: GO

## 2024-10-23 PROCEDURE — 97140 MANUAL THERAPY 1/> REGIONS: CPT | Mod: GO

## 2024-10-23 PROCEDURE — 97018 PARAFFIN BATH THERAPY: CPT | Mod: GO

## 2024-10-29 ENCOUNTER — THERAPY VISIT (OUTPATIENT)
Dept: OCCUPATIONAL THERAPY | Facility: OTHER | Age: 74
End: 2024-10-29
Attending: FAMILY MEDICINE
Payer: MEDICARE

## 2024-10-29 DIAGNOSIS — M25.532 LEFT WRIST PAIN: Primary | ICD-10-CM

## 2024-10-29 DIAGNOSIS — G56.03 CARPAL TUNNEL SYNDROME, BILATERAL UPPER LIMBS: ICD-10-CM

## 2024-10-29 PROCEDURE — 97018 PARAFFIN BATH THERAPY: CPT | Mod: GO

## 2024-10-29 PROCEDURE — 97140 MANUAL THERAPY 1/> REGIONS: CPT | Mod: GO

## 2024-10-29 PROCEDURE — 97110 THERAPEUTIC EXERCISES: CPT | Mod: GO

## 2024-10-29 PROCEDURE — 97035 APP MDLTY 1+ULTRASOUND EA 15: CPT | Mod: GO

## 2024-11-05 ENCOUNTER — THERAPY VISIT (OUTPATIENT)
Dept: OCCUPATIONAL THERAPY | Facility: OTHER | Age: 74
End: 2024-11-05
Attending: FAMILY MEDICINE
Payer: MEDICARE

## 2024-11-05 DIAGNOSIS — G62.9 GENERALIZED NEUROPATHY: ICD-10-CM

## 2024-11-05 DIAGNOSIS — M25.532 LEFT WRIST PAIN: Primary | ICD-10-CM

## 2024-11-06 NOTE — PROGRESS NOTES
OT DISCHARGE  Reason for Discharge: Patient has met all goals.  Patient chooses to discontinue therapy.    Equipment Issued: therabands and putty    Discharge Plan: Patient to continue home program.    Referring Provider:  Alec Howard         11/05/24 0500   Appointment Info   Treating Provider Princess Jon, OTR/L   Visits Used 7   Medical Diagnosis carpal tunnel syndrome, trigger finger release   OT Tx Diagnosis decreased strength/ROM of LUE   Progress Note/Certification   Start Of Care Date 09/24/24   Onset of Illness/Injury or Date of Surgery 08/20/24   Therapy Frequency 0-1x week   Predicted Duration 8 weeks   Certification date from 09/24/24   Certification date to 11/19/24   OT Goal 1   Goal Identifier strength   Goal Description Patient will improve left  strength by at least 7# without pain or compensation of movement patterns   Rationale In order to maximize safety and independence with ADL/IADLs   Target Date 11/19/24   OT Goal 2   Goal Identifier ROM   Goal Description Patient will have improved left wrist ROM by at least 10 degrees (pain free) in each pain   Rationale In order to maximize safety and independence with performance of self-care activities   Target Date 11/19/24   OT Goal 3   Goal Identifier HEP   Goal Description Patient will demonstrate independence in HEP and pain management techniques   Rationale In order to maximize safety and independence with performance of self-care activities;In order to maximize safety and independence with ADL/IADLs   Target Date 11/19/24   Subjective Report   Subjective Report Patient reports that his right hand is falling asleep more now.  He is reporting some neck/shoulder pain on the left side.  He reports his left hands has been feeling good now and he has been working on massaging the scar tissue. Patient plans to see if primary care provider and possibly get an Xray of the neck.   Objective Measures   Objective Measures Objective Measure  1;Objective Measure 2   Objective Measure 1   Objective Measure  strength   Details Right: 85#  Left: 66#   Objective Measure 2   Objective Measure wrist ROM   Details Left Flex: 70  ext: 45   Paraffin Bath   Paraffin Minutes (80448) 5 Minutes   Treatment Detail patient washed hands and dipped into paraffin x 4.  Saran wrap and towel to prevent heat loss   Patient Response/Progress Patient reports that heat feels good   Ultrasound   Ultrasound, Minutes (77617) 8 Minutes   Ultrasound -Type (does not include 3-5 min prep/cleanup time) 5 cm sound head   Intensity .8w/cm2   Duration (does not include the 3-5 min set up/clean up time) 8 min   Frequency 3 MHz   Location left anterior wrist and palm   Positioning sitting   Therapeutic Procedure/Exercise   Therapeutic Procedure: strength, endurance, ROM, flexibillity minutes (68245) 16   Ther Proc 1 - Details Overview of exercises being completed at home.  Discussed modifications as needed. Provided stretch to left wrist in extension and wyman stretch.  Provided stretch to flexor musculature. Completed median nerve glides x 5. completed Wb through bilateral hands on tabletop and giving stretch. Educated in and performed  neck stretches and scapular retraction exercises.   Skilled Intervention increase strength and ROM   Manual Therapy   Manual Therapy Minutes (50021) 19   Manual Therapy 1 - Details STM/IASTM to carpal tunnel scarring and surrounding tissues. Cupping and STM into flexor musculature in forearm.   STM to upper trap to areas of tightness.  Compelted scuapular mobilizations   Skilled Intervention break down scar tissue, reduce adhesions   Patient Response/Progress reduced area of dense tissue   Education   Learner/Method Patient;Pictures/Video;Demonstration;No Barriers to Learning   Plan   Home program theraputty, sponge, rubber bands   Plan for next session Continue with interventions above   Total Session Time   Timed Code Treatment Minutes 43   Total  Treatment Time (sum of timed and untimed services) 48

## 2024-11-21 ENCOUNTER — OFFICE VISIT (OUTPATIENT)
Dept: FAMILY MEDICINE | Facility: OTHER | Age: 74
End: 2024-11-21
Attending: FAMILY MEDICINE
Payer: MEDICARE

## 2024-11-21 VITALS
RESPIRATION RATE: 16 BRPM | BODY MASS INDEX: 27.87 KG/M2 | DIASTOLIC BLOOD PRESSURE: 88 MMHG | TEMPERATURE: 97.6 F | SYSTOLIC BLOOD PRESSURE: 138 MMHG | HEART RATE: 60 BPM | OXYGEN SATURATION: 99 % | WEIGHT: 220 LBS

## 2024-11-21 DIAGNOSIS — M62.830 SPASM OF LEFT TRAPEZIUS MUSCLE: Primary | ICD-10-CM

## 2024-11-21 ASSESSMENT — PAIN SCALES - GENERAL: PAINLEVEL_OUTOF10: MODERATE PAIN (4)

## 2024-11-21 NOTE — PROGRESS NOTES
Sports Medicine Office Note    HPI:  73-year-old male coming in for evaluation of left-sided neck pain.  His pain has been present for 1 month.  No inciting event or injury.  He feels that his left side is chronically weaker than his right.  He likes to swim and does have some limitations with this activity.  He denies any radicular symptoms down the LUE.  He rates his pain at 3/10.  He characterized the pain as achy.  He has been using heat which has provided some relief.  He is currently in hand therapy following a carpal tunnel surgery and his hand therapist looked at this area and applied what sounds like KT tape with minimal improvement of symptoms.  Some soft tissue work briefly did provide some mild improvement.  No previous issues with this type of pain.      EXAM:  /88 (BP Location: Right arm, Patient Position: Sitting, Cuff Size: Adult Regular)   Pulse 60   Temp 97.6  F (36.4  C) (Temporal)   Resp 16   Wt 99.8 kg (220 lb)   SpO2 99%   BMI 27.87 kg/m    MUSCULOSKELETAL EXAM:  CERVICAL SPINE  Inspection:  -No gross deformity  -No bruising or swelling  -Scars:  None    Tenderness to palpation of the:  -Spinous processes:  Negative  -Paracervical musculature:  Negative  -Occipital nerves:  Negative  -Upper trapezius musculature: Positive on the left    Range of Motion:  -Active flexion:  45  -Active extension:  45  -Left lateral rotation:  45  -Right lateral rotation:  45  -Left lateral side bendin  -Right lateral side bending:  10    Strength:  -Deltoids:  5/5 left, 5/5 right  -Supraspinatus:  5/5 left, 5/5 right  -Infraspinatus:  5/5 left, 5/5 right  -Subscapularis:  5/5 left, 5/5 right  -Biceps: 5/5 left, 5/5 right  -Triceps: 5/5 left, 5/5 right  - strength: 5/5 left, 5/5 right  -Wrist extension: 5/5 left, 5/5 right  -Interosseous: 5/5 left, 5/5 right  -Thumb-index finger pull-through: 5/5 left, 5/5 right    Sensation:  -Intact sensation to light touch in all dermatomes of the bilateral  upper extremities    Special Tests:  -Spurling maneuver: Negative bilaterally    Other:  -No signs of cyanosis. Normal skin temperature of the upper extremities.      IMAGING:  None      ASSESSMENT/PLAN:  Diagnoses and all orders for this visit:  Spasm of left trapezius muscle    73-year-old male with spasming of the left sided upper trapezius musculature.  No indications for imaging.  Treatment options include rest, activity modification, heat, massage, home exercises, therapy, muscle relaxants, and trigger point injections.  These treatment options were reviewed with the patient  - Handout given with home exercises for neck spasm  - Activities as tolerated  - Discussed the role of muscle relaxants, will hold off as they seem to be more sedating as opposed to muscle relaxing  - Follow-up as needed  - If the symptoms or not improving with the above interventions, recommend formal PT      Eduardo Blanchard MD  11/21/2024  10:24 AM    Total time spent with this patient was 27 minutes which included chart review, visualization and independent interpretation of images, time spent with the patient, and documentation.    Procedure time:  0 minute(s)

## 2024-12-05 ENCOUNTER — MYC MEDICAL ADVICE (OUTPATIENT)
Dept: FAMILY MEDICINE | Facility: OTHER | Age: 74
End: 2024-12-05
Payer: MEDICARE

## 2024-12-05 DIAGNOSIS — M62.830 SPASM OF LEFT TRAPEZIUS MUSCLE: Primary | ICD-10-CM

## 2024-12-05 NOTE — TELEPHONE ENCOUNTER
LOV 11/21/24  73-year-old male with spasming of the left sided upper trapezius musculature.  No indications for imaging.  Treatment options include rest, activity modification, heat, massage, home exercises, therapy, muscle relaxants, and trigger point injections.  These treatment options were reviewed with the patient  - Handout given with home exercises for neck spasm  - Activities as tolerated  - Discussed the role of muscle relaxants, will hold off as they seem to be more sedating as opposed to muscle relaxing  - Follow-up as needed  - If the symptoms or not improving with the above interventions, recommend formal PT

## 2024-12-12 ENCOUNTER — THERAPY VISIT (OUTPATIENT)
Dept: PHYSICAL THERAPY | Facility: OTHER | Age: 74
End: 2024-12-12
Attending: FAMILY MEDICINE
Payer: MEDICARE

## 2024-12-12 DIAGNOSIS — M62.830 SPASM OF LEFT TRAPEZIUS MUSCLE: ICD-10-CM

## 2024-12-12 NOTE — PROGRESS NOTES
"PHYSICAL THERAPY EVALUATION  Type of Visit: Evaluation              Subjective         Presenting condition or subjective complaint:    Spasm of left trapezius muscle [M62.830]        Date of onset:      Relevant medical history:   B RC surgeries.  Dates & types of surgery:  None    Prior diagnostic imaging/testing results:     No imagery for this diagnosis  Prior therapy history for the same diagnosis, illness or injury:    Possibly years ago    Prior Level of Function  Transfers: Independent  Ambulation: Independent  ADL: Independent  IADL: NML    Living Environment  Social support:   Lives in own Apt  Type of home:   Apt  Stairs to enter the home:       No  Ramp:   No  Stairs inside the home:       No  Help at home:  No  Equipment owned:   not related to this diagnosis    Employment:    Retired   Hobbies/Interests:  Volunteer work. Dog care. Walking    Patient goals for therapy:  To be able to get rid of neck pain.     Pain assessment: Pain present  Location: Left neck 3/10/Rating: 3-5/10 at worse.     Objective   CERVICAL SPINE EVALUATION  PAIN: Pain Level at Rest: 2/10  Pain Level with Use: 4/10  Pain Location: cervical spine  Pain Quality: Aching, Nagging, and Pressure  Pain is Exacerbated By: Rotating and side bend to left  INTEGUMENTARY (edema, incisions): WNL  POSTURE: Fwd head and rounded shoulders  GAIT:   Weightbearing Status: WBAT  Assistive Device(s):   Gait Deviations:   BALANCE/PROPRIOCEPTION: Able to complete SLS for 15\" per side.  WEIGHTBEARING ALIGNMENT:   ROM: left rot 35 deg, 58 deg to right.Lateral flexion to L10 deg, to right 20 deg. Flexikon to 50 deg and ext to 45 deg.    MYOTOMES: WNL  DTR S: WNL  CORD SIGNS: Not indicated  DERMATOMES: NML  NEURAL TENSION: Cervical WNL  FLEXIBILITY: Noted tightness and tissue tension in the left upper trap, levator, SCM/scalenes.   SPECIAL TESTS: Not indicated  PALPATION: 2/4 tenderness with palpation of left upper trap and upper cervical " paraspinals. Tender trigger points located within the upper trap, levator and scapular retractors  SPINAL SEGMENTAL CONCLUSIONS: Note hypomobility of C4-5. 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 Feels this began as result of swimming overhand crawl technique. He had been doing that with a weakened left RC due to past RC repair.  The following significant findings have been identified: Pain, Decreased ROM/flexibility, Decreased joint mobility, and Impaired muscle performance. These impairments interfere with their ability to perform recreational activities, driving , and community mobility as compared to previous level of function.     Clinical Decision Making (Complexity):  Clinical Presentation: Stable/Uncomplicated  Clinical Presentation Rationale: based on medical and personal factors listed in PT evaluation  Clinical Decision Making (Complexity): Low complexity    PLAN OF CARE  Treatment Interventions:  Modalities: Cryotherapy, Hot Pack  Interventions: Manual Therapy, Therapeutic Exercise    Long Term Goals            Frequency of Treatment:    Duration of Treatment:      Recommended Referrals to Other Professionals: No additional referrals required  Education Assessment:        Risks and benefits of evaluation/treatment have been explained.   Patient/Family/caregiver agrees with Plan of Care.     Evaluation Time:             Signing Clinician: Reji Spears, PT        [facility]                                                                                   OUTPATIENT PHYSICAL THERAPY      PLAN OF TREATMENT FOR OUTPATIENT REHABILITATION   Patient's Last Name, First Name,  Kaushik Banks YOB: 1950   Provider's Name   [facility]   Medical Record No.  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              Referring Provider:  Alec Howard    Initial Assessment  See Epic Evaluation- Start of Care Date: 12/05/24 (Referral date for a chronic neck pain and stiffness issue.)

## 2025-01-02 ENCOUNTER — THERAPY VISIT (OUTPATIENT)
Dept: PHYSICAL THERAPY | Facility: OTHER | Age: 75
End: 2025-01-02
Attending: STUDENT IN AN ORGANIZED HEALTH CARE EDUCATION/TRAINING PROGRAM
Payer: MEDICARE

## 2025-01-02 DIAGNOSIS — M62.830 SPASM OF LEFT TRAPEZIUS MUSCLE: Primary | ICD-10-CM

## 2025-01-07 ENCOUNTER — THERAPY VISIT (OUTPATIENT)
Dept: PHYSICAL THERAPY | Facility: OTHER | Age: 75
End: 2025-01-07
Attending: FAMILY MEDICINE
Payer: MEDICARE

## 2025-01-07 DIAGNOSIS — M62.830 SPASM OF LEFT TRAPEZIUS MUSCLE: Primary | ICD-10-CM

## 2025-01-07 NOTE — PROGRESS NOTES
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   Other pertinent information 2/10   Progress Note/Certification   Start of Care Date 12/05/24  (Referral date for a chronic neck pain and stiffness issue.)   Onset of illness/injury or Date of Surgery 10/12/24   Therapy Frequency 1-2 X per week decreasing and transitioning to a HEP   Predicted Duration 8 weeks   Certification date from 12/12/24   Certification date to 02/06/25   Progress Note Due Date 01/14/25       Present No   GOALS   PT Goals 2;3   PT Goal 1   Goal Identifier Pain   Goal Description Patient will report a decrease in neck pain th no higher than 2/10 at worse allowing a return to all nml activities including swimming, working out with resistance training at the [employer] w/o limit. He will be able to complete all nml tasks at home including cleaning, small maintenance tasks using hand tools w/o limt due to neck pain.   Rationale to maximize safety and independence with performance of ADLs and functional tasks;to maximize safety and independence with transportation;to maximize safety and independence within the home;to maximize safety and independence within the community   Goal Progress Goal met   Date Met 01/07/25   PT Goal 2   Goal Identifier Mobility   Goal Description Patient will demo cervical mobility as follows; Rotation of at least 50 deg B, lateral flexion of 25 deg B, ext and flexion to 50 deg.. This will support safer driving, and allow a return to swimming with restored nml technique. Goal will be met in 8 weeks   Rationale to maximize safety and independence with performance of ADLs and functional tasks;to maximize safety and independence within the community;to maximize safety and independence within the home;to  "maximize safety and independence with transportation   Goal Progress Noted improvement in cervical mobility. While some restriction persists he is able to move his neck w/o pain. He should continue with his stretching exercises.   Target Date 02/06/25   PT Goal 3   Goal Identifier Strength   Goal Description Patient will demo left shoulder /scapualr muscle group strength of at least 4+/5 and equal to the right shoulder allowing for improved swimming efficiency, ability to complete tasks at home without limit due to weakness and remove undue stress from the cervical/upper shoulder musculature. This will help prevent future recurrence of neck strain /pain.   Rationale to maximize safety and independence with performance of ADLs and functional tasks;to maximize safety and independence within the home;to maximize safety and independence within the community;to maximize safety and independence with transportation   Goal Progress This will be an on gpoing process with the patient completing a HEP focused on cervical ROM, shoulder /capular mobility and strength   Target Date 02/06/25   Subjective Report   Subjective Report Reports his left shoulder is \"a bit sore\" after swimming crawl stroke. Reports his left upper trap and neck region are \"good\". His shoulder exercioses are gpoinmg well. He feels he has enough to continue independently.   Objective Measures   Objective Measures Objective Measure 1;Objective Measure 2   Objective Measure 1   Objective Measure Cervical AROM   Objective Measure 2   Objective Measure Palpation/ muscle tension   Treatment Interventions (PT)   Interventions Therapeutic Procedure/Exercise   Therapeutic Procedure/Exercise   Therapeutic Procedures: strength, endurance, ROM, flexibility minutes (24514) 29   Ther Proc 1 Instruction in therapeutic exercise program   Ther Proc 1 - Details Reviewed following final HEP; 1) upper trap/scm stretch, 2) levator stretch, 3) Scalene stretch, 4) pectoral " stretch, 5) cervical rotation stretch, 6) Upper thoracic extension stretch, 7) Cervical retraction mobilization. Continue with the following strengthening exercises; ROWs, scapular retraction, shld extension, B EROT, all with green to blue band. 2 sets of 10-15 reps. Also discussed issues he was having with his right knee and ankle. He was advised to maintain a good strengthening program for his knee and to maintain full ROM. He will start work on some balance training for his right ankle.   Skilled Intervention Yes instruction and demo   Patient Response/Progress Patient was able to complete the exercises with min to mod verbal and tactile cues   Education   Learner/Method Patient;No Barriers to Learning   Plan   Home program As above. HEP will be reviewed and revised as indicated   Plan for next session Patient will be discahrged from PT at this time. He will continue with his HEP and call with any questions   Total Session Time   Timed Code Treatment Minutes 29   Total Treatment Time (sum of timed and untimed services) 29         DISCHARGE  Reason for Discharge: Patient has made good progress towards all goals    Equipment Issued: Red and blue band    Discharge Plan: Patient to continue home program.    Referring Provider:  Eduardo Boateng

## 2025-02-19 ENCOUNTER — MYC MEDICAL ADVICE (OUTPATIENT)
Dept: FAMILY MEDICINE | Facility: OTHER | Age: 75
End: 2025-02-19
Payer: MEDICARE

## 2025-02-19 NOTE — TELEPHONE ENCOUNTER
See MyChart from same day where these concerns are added to note and patient is offered appt with Dr. Howard to discuss.     Reina Cameron RN on 2/19/2025 at 2:04 PM

## 2025-02-19 NOTE — TELEPHONE ENCOUNTER
Dr. Howard,    Any chance I can use a  hospital follow-up spot on your schedule to work him in for his multiple concerns?   It doesn't appear that you have any provider add-on's (at least through mid April- that's as far as I looked).     ___________________________________________________________________    2 MyChart messages sent.     Broke up with significant other and her  is requesting that he pay her $24,000.  Nothing medical in first paragraph.   [facility] recommended 1% Diclofenac for neuropathy in his feet- what do you think?  My Thoughts:  This would be a good think to try!    From Second Message:     3. Had carpal tunnel surgery on 4/11/14 on left hand.  Right hand is now bad.   4.  Has a bunion on left foot- along with neuropathy.  I may want you to recommend a foot surgeon.   5.  Sinus infection two weeks ago.  Still getting a tickling cough- not sure if it's my Lisinopril.      Offered Appt with Dr. Howard.   ____________________________________________________________________    No future OV's.      9/27/24  LOV with Lee for MWV.      Reina Cameron, RN on 2/19/2025 at 1:55 PM

## 2025-03-21 NOTE — PROGRESS NOTES
"Patient Information     Patient Name MRN Sex Kaushik Christensen [id number] Male 1950      Progress Notes by Yfn Kelley, PT at 5/10/2017 11:00 AM     Author:  Yfn Kelley, PT Service:  (none) Author Type:  PT- Physical Therapist     Filed:  5/10/2017 11:25 AM Date of Service:  5/10/2017 11:00 AM Status:  Signed     :  Yfn Kelley PT (PT- Physical Therapist)            [facility]  Outpatient PT - Daily Note      Date of Service: 5/10/2017    Visit: 2     Patient Name: \"Jean Claude\" Kaushik Ansari   YOB: 1950   Referring MD/Provider: Dick  Diagnosis: S/P arthroscopic right shoulder surgery Z98.890    Medical & Treatment Diagnosis: S/P arthroscopic right shoulder surgery Z98.890   Insurance:  Medicare  Start of Care Date: 17  Certification Dates: From Start of Service to Re-Cert Due: 17  Preadmission Functional Mobility: Independent  Instructions:  DOS 17  Start rehab now   Phase 1 - no passive ROM at shoulder   Phase 2A - on    Phase 2B - on  -out of pillow in the daytime, wall walk 1X per every hour awake.  Focus is full passive forward flexion by 8 weeks post op.   Wear pillow at night   Phase 3 - on  - out of all gear then.      Subjective      Pain Ratin-2/10; Location: R shoulder  Getting better each week, discomfort has improved. Sleeping better at night, cannot lay onto his R side but he is not concerned about this. Taking his arm out of the sling regularly throughout the day and doing his elbow exercises.    Objective    Today's Intervention:   Therapeutic Exercise to improve mobility, strength and endurance:  - reviewed HEP and rehab protocol/phases  - R elbow and wrist AROM    Home Exercise Program:   - R elbow, wrist AROM    Assessment    Goals:   STGs to be reached in 3-6 weeks  1. Pt to be independent with HEP and self mgmt techniques.  2. Pt to demo full PROM of R shoulder for improved mobility  3. Pt to " have 1/10 pain or less throughout the day for improved ADLs     LTGs to be reached in 6-12 weeks  1. Pt to be able use his R UE for everyday ADLs like dressing, eating, drinking  2. Pt to have 0/10 pain or less throughout the day for improved ADLs  3. Pt to have at least 5-/5 MMT of R shoulder for improved strength and mobility  4. Pt to be able to sleep throughout the night without waking do to pain for improved health and wellness.      Patient Specific Functional and Pain Scales (PSFS) on 5/3/2017:                         Clinician Instructions: Complete after the history and before the exam.                         Initial Assessment: We want to know what 3 activities in your life you are unable to perform, or are having the most difficulty performing, as a result of your chief problem. Please list and score at least 3 activities that you are unable to perform, or having the most difficulty performing, because of your chief problem.   Patient Specific Activity Scoring Scheme (score one number for each activity):   Activity Score (0-10)  0= Unable to perform activity  10= Able to perform activity at same level as before injury or problem   1. Reaching overhead with R UE 0/10   2. Using R UE for dressing 0/10   3. Using R UE for drinking/eating 0/10               Totals:  0/30 = 0% ability which relates to 100% impairment                         Patient verbally states that they understand that the information they have provided above is current and complete to the best of their knowledge.                         Patient Specific Functional Scale Modifier Scale Conversion: (patient's modifier that correlates with pt's score on PSFS): 0-CN (100% Impaired).     G codes and Modifier taken from patient completing the PSFS:   Initial Primary G Code and Modifier:                         Per the Patient's intake and/or assessment the Primary G Code is: Mobility .                        The Patient's Impairment,  Limitation or Restriction Modifier would be best described as: CN - 100% Impairment.   Goal Primary G Code and Modifier:                         The Patient's G Code Goal would be: Mobility                         The Patient's Impairment, Limitation or Restriction Modifier goal would be best described as: CI - 1% - 20% Impairment.       Plan    Plan for next visit:  Cont with PT per POC to improve/maintain ROM, strength, endurance, proprioception and to decrease pain/discomfort.     Planned Interventions:    Home Exercise Program development   Therapeutic Exercise (ROM & Strengthening)   Manual Therapy   Neuromuscular Re-education   Ultrasound including Phonophoresis with Ketoprofen   Electrical Stimulation including Iontophoresis with Dexamethasone   Therapeutic Activities   Gait Training  Mechanical Traction        Student or PTA has been instructed in and demonstrates skills necessary to carry out above stated treatment plan: Yes    Thank you for your referral to [facility].  Please call with any questions, concerns or comments.  [phone]        Yfn Kelley, PT, ATC            DASH Diet/Nutrition Supplements

## 2025-04-02 ENCOUNTER — MYC MEDICAL ADVICE (OUTPATIENT)
Dept: FAMILY MEDICINE | Facility: OTHER | Age: 75
End: 2025-04-02
Payer: MEDICARE

## 2025-04-02 NOTE — TELEPHONE ENCOUNTER
"Dr. Howard,    Pt would like to let you know that his  may reach out to you about patient walking his dog off the leash in his apartment complex stairwell.   There have been multiple complaints about his dog off-leash and he plans to fight this with a .     I have let him know that we don't deal with legal matters but he would like you to be \"kept in the loop\".     _________________________________________________________________________________      Called patient at his request.     About 2 weeks ago, got a letter from [facility] about several completes regarding  dog, Cara.   They wanted a meeting right away.  I told them I could meet on 4/1.      There were several alligations of dog being unleashed and aggressive behavior in my apartment building complex. I walk my dog 1-3 times/day in this stairwell and can't have him on a leash going down the stairs because of:    Carpal Tunnel surgery, COPD, asthma   They were unconcerned with this and know I have these conditions.     [facility]- walks dog in stairwell.    Going up and down stairs- must leash her anywhere on property.      \"They showed me the leash law and there is nothing in there about aggressive behavior\"  I told him there doesn't have to be anything about aggressive behavior-  but dogs in public places must be leashed, by law, whether they are aggressive or not.      I have $13,000 in this dog.    I love this dog.  I have no where else to go.      My  may reach out to Dr. Howard.      Reina Cameron RN on 4/2/2025 at 9:11 AM    "

## 2025-06-03 ENCOUNTER — MYC MEDICAL ADVICE (OUTPATIENT)
Dept: FAMILY MEDICINE | Facility: OTHER | Age: 75
End: 2025-06-03
Payer: MEDICARE

## 2025-06-03 DIAGNOSIS — B35.3 TINEA PEDIS, UNSPECIFIED LATERALITY: Primary | ICD-10-CM

## 2025-06-03 RX ORDER — MICONAZOLE NITRATE 20 MG/G
CREAM TOPICAL 2 TIMES DAILY
Qty: 15 G | Refills: 1 | Status: SHIPPED | OUTPATIENT
Start: 2025-06-03

## 2025-06-03 NOTE — TELEPHONE ENCOUNTER
Pt requesting another Rx for cream for athlete's foot he had in the past. (Miconazole nitrate 6%)    Only cream I see in his chart is Triamcinolone cream for Urticaria.     Swapnil'd up order for Miconazole Nitrate 2%    Routing to provider to review and respond.  Shree Crowder RN on 6/3/2025 at 10:12 AM

## 2025-08-27 DIAGNOSIS — I10 ESSENTIAL HYPERTENSION: ICD-10-CM

## 2025-08-28 RX ORDER — LISINOPRIL 10 MG/1
20 TABLET ORAL DAILY
Qty: 180 TABLET | Refills: 0 | Status: SHIPPED | OUTPATIENT
Start: 2025-08-28

## (undated) RX ORDER — ACETAMINOPHEN 500 MG
TABLET ORAL
Status: DISPENSED
Start: 2020-03-05

## (undated) RX ORDER — SODIUM CHLORIDE 9 MG/ML
INJECTION, SOLUTION INTRAVENOUS
Status: DISPENSED
Start: 2019-07-15

## (undated) RX ORDER — KETOROLAC TROMETHAMINE 15 MG/ML
INJECTION, SOLUTION INTRAMUSCULAR; INTRAVENOUS
Status: DISPENSED
Start: 2019-07-15

## (undated) RX ORDER — LISINOPRIL 10 MG/1
TABLET ORAL
Status: DISPENSED
Start: 2019-11-07

## (undated) RX ORDER — PROCHLORPERAZINE MALEATE 5 MG
TABLET ORAL
Status: DISPENSED
Start: 2021-04-21

## (undated) RX ORDER — ACETAMINOPHEN 325 MG/1
TABLET ORAL
Status: DISPENSED
Start: 2019-07-15

## (undated) RX ORDER — METOPROLOL SUCCINATE 25 MG/1
TABLET, EXTENDED RELEASE ORAL
Status: DISPENSED
Start: 2019-11-07

## (undated) RX ORDER — KETOROLAC TROMETHAMINE 30 MG/ML
INJECTION, SOLUTION INTRAMUSCULAR; INTRAVENOUS
Status: DISPENSED
Start: 2021-04-21

## (undated) RX ORDER — DOXYCYCLINE 100 MG/10ML
INJECTION, POWDER, LYOPHILIZED, FOR SOLUTION INTRAVENOUS
Status: DISPENSED
Start: 2019-07-15